# Patient Record
Sex: MALE | Race: WHITE | Employment: FULL TIME | URBAN - METROPOLITAN AREA
[De-identification: names, ages, dates, MRNs, and addresses within clinical notes are randomized per-mention and may not be internally consistent; named-entity substitution may affect disease eponyms.]

---

## 2017-01-11 ENCOUNTER — TRANSFERRED RECORDS (OUTPATIENT)
Dept: HEALTH INFORMATION MANAGEMENT | Facility: CLINIC | Age: 57
End: 2017-01-11

## 2017-01-12 ENCOUNTER — OFFICE VISIT (OUTPATIENT)
Dept: DERMATOLOGY | Facility: CLINIC | Age: 57
End: 2017-01-12
Payer: COMMERCIAL

## 2017-01-12 VITALS — HEART RATE: 91 BPM | DIASTOLIC BLOOD PRESSURE: 96 MMHG | SYSTOLIC BLOOD PRESSURE: 151 MMHG | OXYGEN SATURATION: 96 %

## 2017-01-12 DIAGNOSIS — D17.0 LIPOMA OF HEAD: ICD-10-CM

## 2017-01-12 DIAGNOSIS — L82.1 SK (SEBORRHEIC KERATOSIS): ICD-10-CM

## 2017-01-12 DIAGNOSIS — D48.5 NEOPLASM OF UNCERTAIN BEHAVIOR OF SKIN: Primary | ICD-10-CM

## 2017-01-12 DIAGNOSIS — L81.4 LENTIGO: ICD-10-CM

## 2017-01-12 PROCEDURE — 99203 OFFICE O/P NEW LOW 30 MIN: CPT | Mod: 25 | Performed by: DERMATOLOGY

## 2017-01-12 PROCEDURE — 11100 HC BIOPSY SKIN/SUBQ/MUC MEM, SINGLE LESION: CPT | Performed by: DERMATOLOGY

## 2017-01-12 PROCEDURE — 88305 TISSUE EXAM BY PATHOLOGIST: CPT | Performed by: DERMATOLOGY

## 2017-01-12 NOTE — PATIENT INSTRUCTIONS
Wound Care Instructions     FOR SUPERFICIAL WOUNDS     [facility] [phone]    [facility] [phone]                       AFTER 24 HOURS YOU SHOULD REMOVE THE BANDAGE AND BEGIN DAILY DRESSING CHANGES AS FOLLOWS:     1) Remove Dressing.     2) Clean and dry the area with tap water using a Q-tip or sterile gauze pad.     3) Apply Vaseline, Aquaphor, Polysporin ointment or Bacitracin ointment over entire wound.  Do NOT use Neosporin ointment.     4) Cover the wound with a band-aid, or a sterile non-stick gauze pad and micropore paper tape      REPEAT THESE INSTRUCTIONS AT LEAST ONCE A DAY UNTIL THE WOUND HAS COMPLETELY HEALED.    It is an old wives tale that a wound heals better when it is exposed to air and allowed to dry out. The wound will heal faster with a better cosmetic result if it is kept moist with ointment and covered with a bandage.    **Do not let the wound dry out.**      Supplies Needed:      *Cotton tipped applicators (Q-tips)    *Polysporin Ointment or Bacitracin Ointment (NOT NEOSPORIN)    *Band-aids or non-stick gauze pads and micropore paper tape.      PATIENT INFORMATION:    During the healing process you will notice a number of changes. All wounds develop a small halo of redness surrounding the wound.  This means healing is occurring. Severe itching with extensive redness usually indicates sensitivity to the ointment or bandage tape used to dress the wound.  You should call our office if this develops.      Swelling  and/or discoloration around your surgical site is common, particularly when performed around the eye.    All wounds normally drain.  The larger the wound the more drainage there will be.  After 7-10 days, you will notice the wound beginning to shrink and new skin will begin to grow.  The wound is healed when you can see skin has formed over the entire area.  A healed wound has a healthy, shiny look to the surface and is red to dark pink in color  to normalize.  Wounds may take approximately 4-6 weeks to heal.  Larger wounds may take 6-8 weeks.  After the wound is healed you may discontinue dressing changes.    You may experience a sensation of tightness as your wound heals. This is normal and will gradually subside.    Your healed wound may be sensitive to temperature changes. This sensitivity improves with time, but if you re having a lot of discomfort, try to avoid temperature extremes.    Patients frequently experience itching after their wound appears to have healed because of the continue healing under the skin.  Plain Vaseline will help relieve the itching.        POSSIBLE COMPLICATIONS    BLEEDIN. Leave the bandage in place.  2. Use tightly rolled up gauze or a cloth to apply direct pressure over the bandage for 30  minutes.  3. Reapply pressure for an additional 30 minutes if necessary  4. Use additional gauze and tape to maintain pressure once the bleeding has stopped.

## 2017-01-12 NOTE — PROGRESS NOTES
Bryce Espinoza is a 56 year old year old male patient here today for growth on left chest and scalp.   .  Patient states this has been present for years.  Patient reports the following symptoms:  Bleeding on chest , tender on scalp .  Patient reports the following previous treatments none.  Patient reports the following modifying factors none.  Associated symptoms: none.  Patient has no other skin complaints today.  Remainder of the HPI, Meds, PMH, Allergies, FH, and SH was reviewed in chart.    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     Unspecified hemorrhoids without mention of complication 8/01     Unspecified hereditary and idiopathic peripheral neuropathy        Past Surgical History   Procedure Laterality Date     C nonspecific procedure  2/00/02     EBCT        Family History   Problem Relation Age of Onset     Arthritis Mother      RA     Hypertension Mother      Prostate Cancer Maternal Grandfather      80     CANCER Maternal Grandmother      Lung     Breast Cancer Mother        Social History     Social History     Marital Status:      Spouse Name: N/A     Number of Children: N/A     Years of Education: N/A     Occupational History     Not on file.     Social History Main Topics     Smoking status: Former Smoker -- 0.50 packs/day for 25 years     Quit date: 02/02/2013     Smokeless tobacco: Never Used     Alcohol Use: Yes      Comment: 4-5 drinks per week     Drug Use: Yes      Comment: nica taylor     Sexual Activity: Yes     Other Topics Concern     Not on file     Social History Narrative       Outpatient Encounter Prescriptions as of 1/12/2017    Medication Sig Dispense Refill     lisinopril (PRINIVIL,ZESTRIL) 10 MG tablet Take 1 tablet (10 mg) by mouth daily 30 tablet 8     Cholecalciferol (VITAMIN D) 2000 UNITS tablet Take 2,000 Units by mouth daily       fluticasone (FLONASE) 50 MCG/ACT nasal spray Spray 1-2 sprays into both nostrils daily 1 Package 1     ASPIRIN 81 MG OR TABS 1 tab po QD (Once per day) 30 12     No facility-administered encounter medications on file as of 1/12/2017.             Review Of Systems  Skin: As above  Eyes: negative  Ears/Nose/Throat: negative  Respiratory: No shortness of breath, dyspnea on exertion, cough, or hemoptysis  Cardiovascular: negative  Gastrointestinal: negative  Genitourinary: negative  Musculoskeletal: negative  Neurologic: negative  Psychiatric: negative  Hematologic/Lymphatic/Immunologic: negative  Endocrine: negative      O:   NAD, WDWN, Alert & Oriented, Mood & Affect wnl, Vitals stable   Here today alone   /96 mmHg  Pulse 91  SpO2 96%   General appearance normal   Vitals stable   Alert, oriented and in no acute distress      Following lymph nodes palpated: Occipital, Cervical, Supraclavicular no lad   L chest crusted 2cm red brown scaly papule    L forehead soft movable nodule    Stuck on papules and brown macules on trunk and ext         The remainder of expanded problem focused exam was unremarkable; the following areas were examined:  scalp/hair, conjunctiva/lids, face, neck, lips, ab, back , chest, digits/nails, RUE, LUE.      Eyes: Conjunctivae/lids:Normal     ENT: Lips, buccal mucosa, tongue: normal    MSK:Normal    Cardiovascular: peripheral edema none    Pulm: Breathing Normal    Lymph Nodes: No Head and Neck Lymphadenopathy     Neuro/Psych: Orientation:Normal; Mood/Affect:Normal      A/P:  1. Lipoma  Schedule excision  2. Inflamed seborrheic keratosis v squamous cell carcinoma on chest  TANGENTIAL BIOPSY SENT OUT:  After consent, anesthesia with LEC and prep, tangential excision performed  and specimen sent out for permanent section histology.  No complications and routine wound care. Patient told to call our office in 1-2 weeks for result.        3. Seborrheic keratosis, lentigo  BENIGN LESIONS DISCUSSED WITH PATIENT:  I discussed the specifics of tumor, prognosis, and genetics of benign lesions.  I explained that treatment of these lesions would be purely cosmetic and not medically neccessary.  I discussed with patient different removal options including excision, cautery and /or laser.      Nature and genetics of benign skin lesions dicussed with patient.  Signs and Symptoms of skin cancer discussed with patient.  Patient encouraged to perform monthly skin exams.  UV precautions reviewed with patient.  Skin care regimen reviewed with patient: Eliminate harsh soaps, i.e. Dial, zest, irsih spring; Mild soaps such as Cetaphil or Dove sensitive skin, avoid hot or cold showers, aggressive use of emollients including vanicream, cetaphil or cerave discussed with patient.    Risks of non-melanoma skin cancer discussed with patient   Return to clinic prn and for excision

## 2017-01-12 NOTE — NURSING NOTE
"Initial /96 mmHg  Pulse 91  SpO2 96% Estimated body mass index is 29.99 kg/(m^2) as calculated from the following:    Height as of 8/14/15: 1.753 m (5' 9\").    Weight as of 8/14/15: 92.171 kg (203 lb 3.2 oz). .      "

## 2017-01-12 NOTE — MR AVS SNAPSHOT
After Visit Summary   1/12/2017    Bryce Espinoza    MRN: [id number]           Patient Information     Date Of Birth          1960        Visit Information        Provider Department      1/12/2017 1:45 PM Grey Akers MD [facility]        Today's Diagnoses     Neoplasm of uncertain behavior of skin    -  1     SK (seborrheic keratosis)         Lentigo         Lipoma of head           Care Instructions          Wound Care Instructions     FOR SUPERFICIAL WOUNDS     [facility] [phone]    [facility] [phone]                       AFTER 24 HOURS YOU SHOULD REMOVE THE BANDAGE AND BEGIN DAILY DRESSING CHANGES AS FOLLOWS:     1) Remove Dressing.     2) Clean and dry the area with tap water using a Q-tip or sterile gauze pad.     3) Apply Vaseline, Aquaphor, Polysporin ointment or Bacitracin ointment over entire wound.  Do NOT use Neosporin ointment.     4) Cover the wound with a band-aid, or a sterile non-stick gauze pad and micropore paper tape      REPEAT THESE INSTRUCTIONS AT LEAST ONCE A DAY UNTIL THE WOUND HAS COMPLETELY HEALED.    It is an old wives tale that a wound heals better when it is exposed to air and allowed to dry out. The wound will heal faster with a better cosmetic result if it is kept moist with ointment and covered with a bandage.    **Do not let the wound dry out.**      Supplies Needed:      *Cotton tipped applicators (Q-tips)    *Polysporin Ointment or Bacitracin Ointment (NOT NEOSPORIN)    *Band-aids or non-stick gauze pads and micropore paper tape.      PATIENT INFORMATION:    During the healing process you will notice a number of changes. All wounds develop a small halo of redness surrounding the wound.  This means healing is occurring. Severe itching with extensive redness usually indicates sensitivity to the ointment or bandage tape used to dress the wound.  You should call our office if this develops.       Swelling  and/or discoloration around your surgical site is common, particularly when performed around the eye.    All wounds normally drain.  The larger the wound the more drainage there will be.  After 7-10 days, you will notice the wound beginning to shrink and new skin will begin to grow.  The wound is healed when you can see skin has formed over the entire area.  A healed wound has a healthy, shiny look to the surface and is red to dark pink in color to normalize.  Wounds may take approximately 4-6 weeks to heal.  Larger wounds may take 6-8 weeks.  After the wound is healed you may discontinue dressing changes.    You may experience a sensation of tightness as your wound heals. This is normal and will gradually subside.    Your healed wound may be sensitive to temperature changes. This sensitivity improves with time, but if you re having a lot of discomfort, try to avoid temperature extremes.    Patients frequently experience itching after their wound appears to have healed because of the continue healing under the skin.  Plain Vaseline will help relieve the itching.        POSSIBLE COMPLICATIONS    BLEEDIN. Leave the bandage in place.  2. Use tightly rolled up gauze or a cloth to apply direct pressure over the bandage for 30  minutes.  3. Reapply pressure for an additional 30 minutes if necessary  4. Use additional gauze and tape to maintain pressure once the bleeding has stopped.        Follow-ups after your visit        Who to contact     If you have questions or need follow up information about today's clinic visit or your schedule please contact [facility] directly at [phone].  Normal or non-critical lab and imaging results will be communicated to you by MyChart, letter or phone within 4 business days after the clinic has received the results. If you do not hear from us within 7 days, please contact the clinic through MyChart or phone. If you have a critical or  "abnormal lab result, we will notify you by phone as soon as possible.  Submit refill requests through [employer] or call your pharmacy and they will forward the refill request to us. Please allow 3 business days for your refill to be completed.          Additional Information About Your Visit        MyChart Information     [employer] lets you send messages to your doctor, view your test results, renew your prescriptions, schedule appointments and more. To sign up, go to [email or web address] . Click on \"Log in\" on the left side of the screen, which will take you to the Welcome page. Then click on \"Sign up Now\" on the right side of the page.     You will be asked to enter the access code listed below, as well as some personal information. Please follow the directions to create your username and password.     Your access code is: [email or web address]  Expires: 2017  2:13 PM     Your access code will  in 90 days. If you need help or a new code, please call your [facility] clinic or [phone].        Care EveryWhere ID     This is your Care EveryWhere ID. This could be used by other organizations to access your [facility] medical records  [id number]        Your Vitals Were     Pulse Pulse Oximetry                91 96%           Blood Pressure from Last 3 Encounters:   17 151/96   08/14/15 136/86   02/02/15 140/96    Weight from Last 3 Encounters:   08/14/15 92.171 kg (203 lb 3.2 oz)   02/02/15 92.08 kg (203 lb)   05/26/10 80.74 kg (178 lb)              We Performed the Following     BIOPSY SKIN/SUBQ/MUC MEM, SINGLE LESION     Surgical pathology exam        Primary Care Provider Office Phone # Fax #    Remington Riggs [phone]       [facility] [address] [facility] [address]        Thank you!     Thank you for choosing [facility]  for your care. Our goal is always to provide you with excellent care. Hearing back from our patients is one way we can continue " to improve our services. Please take a few minutes to complete the written survey that you may receive in the mail after your visit with us. Thank you!             Your Updated Medication List - Protect others around you: Learn how to safely use, store and throw away your medicines at www.disposemymeds.org.          This list is accurate as of: 1/12/17  2:13 PM.  Always use your most recent med list.                   Brand Name Dispense Instructions for use    aspirin 81 MG tablet     30    1 tab po QD (Once per day)       fluticasone 50 MCG/ACT spray    FLONASE    1 Package    Spray 1-2 sprays into both nostrils daily       lisinopril 10 MG tablet    PRINIVIL/ZESTRIL    30 tablet    Take 1 tablet (10 mg) by mouth daily       vitamin D 2000 UNITS tablet      Take 2,000 Units by mouth daily

## 2017-01-16 LAB — COPATH REPORT: NORMAL

## 2017-01-17 ENCOUNTER — TELEPHONE (OUTPATIENT)
Dept: DERMATOLOGY | Facility: CLINIC | Age: 57
End: 2017-01-17

## 2017-01-30 ENCOUNTER — OFFICE VISIT (OUTPATIENT)
Dept: URGENT CARE | Facility: URGENT CARE | Age: 57
End: 2017-01-30
Payer: COMMERCIAL

## 2017-01-30 ENCOUNTER — HOSPITAL ENCOUNTER (OUTPATIENT)
Dept: CT IMAGING | Facility: CLINIC | Age: 57
Discharge: HOME | End: 2017-01-30
Attending: PHYSICIAN ASSISTANT | Admitting: PHYSICIAN ASSISTANT
Payer: COMMERCIAL

## 2017-01-30 VITALS
BODY MASS INDEX: 30.81 KG/M2 | SYSTOLIC BLOOD PRESSURE: 110 MMHG | WEIGHT: 208 LBS | HEIGHT: 69 IN | HEART RATE: 70 BPM | OXYGEN SATURATION: 95 % | DIASTOLIC BLOOD PRESSURE: 60 MMHG | TEMPERATURE: 97.5 F

## 2017-01-30 DIAGNOSIS — R10.9 LEFT SIDED ABDOMINAL PAIN: ICD-10-CM

## 2017-01-30 DIAGNOSIS — M54.6 ACUTE LEFT-SIDED THORACIC BACK PAIN: ICD-10-CM

## 2017-01-30 DIAGNOSIS — R10.9 LEFT SIDED ABDOMINAL PAIN: Primary | ICD-10-CM

## 2017-01-30 LAB
ALBUMIN SERPL-MCNC: 3.7 G/DL (ref 3.4–5)
ALBUMIN UR-MCNC: NEGATIVE MG/DL
ALP SERPL-CCNC: 109 U/L (ref 40–150)
ALT SERPL W P-5'-P-CCNC: 29 U/L (ref 0–70)
AMYLASE SERPL-CCNC: 52 U/L (ref 30–110)
ANION GAP SERPL CALCULATED.3IONS-SCNC: 7 MMOL/L (ref 3–14)
APPEARANCE UR: CLEAR
AST SERPL W P-5'-P-CCNC: 14 U/L (ref 0–45)
BASOPHILS # BLD AUTO: 0 10E9/L (ref 0–0.2)
BASOPHILS NFR BLD AUTO: 0.2 %
BILIRUB SERPL-MCNC: 0.6 MG/DL (ref 0.2–1.3)
BILIRUB UR QL STRIP: NEGATIVE
BUN SERPL-MCNC: 10 MG/DL (ref 7–30)
CALCIUM SERPL-MCNC: 9 MG/DL (ref 8.5–10.1)
CHLORIDE SERPL-SCNC: 106 MMOL/L (ref 94–109)
CO2 SERPL-SCNC: 29 MMOL/L (ref 20–32)
COLOR UR AUTO: YELLOW
CREAT SERPL-MCNC: 0.67 MG/DL (ref 0.66–1.25)
DIFFERENTIAL METHOD BLD: NORMAL
EOSINOPHIL # BLD AUTO: 0.1 10E9/L (ref 0–0.7)
EOSINOPHIL NFR BLD AUTO: 1 %
ERYTHROCYTE [DISTWIDTH] IN BLOOD BY AUTOMATED COUNT: 12.7 % (ref 10–15)
GFR SERPL CREATININE-BSD FRML MDRD: ABNORMAL ML/MIN/1.7M2
GLUCOSE SERPL-MCNC: 92 MG/DL (ref 70–99)
GLUCOSE UR STRIP-MCNC: NEGATIVE MG/DL
HCT VFR BLD AUTO: 44 % (ref 40–53)
HGB BLD-MCNC: 15 G/DL (ref 13.3–17.7)
HGB UR QL STRIP: NEGATIVE
KETONES UR STRIP-MCNC: NEGATIVE MG/DL
LEUKOCYTE ESTERASE UR QL STRIP: NEGATIVE
LIPASE SERPL-CCNC: 62 U/L (ref 73–393)
LYMPHOCYTES # BLD AUTO: 2.1 10E9/L (ref 0.8–5.3)
LYMPHOCYTES NFR BLD AUTO: 22.8 %
MCH RBC QN AUTO: 32.9 PG (ref 26.5–33)
MCHC RBC AUTO-ENTMCNC: 34.1 G/DL (ref 31.5–36.5)
MCV RBC AUTO: 97 FL (ref 78–100)
MONOCYTES # BLD AUTO: 0.8 10E9/L (ref 0–1.3)
MONOCYTES NFR BLD AUTO: 9.1 %
NEUTROPHILS # BLD AUTO: 6.1 10E9/L (ref 1.6–8.3)
NEUTROPHILS NFR BLD AUTO: 66.9 %
NITRATE UR QL: NEGATIVE
PH UR STRIP: 5.5 PH (ref 5–7)
PLATELET # BLD AUTO: 245 10E9/L (ref 150–450)
POTASSIUM SERPL-SCNC: 3.7 MMOL/L (ref 3.4–5.3)
PROT SERPL-MCNC: 6.6 G/DL (ref 6.8–8.8)
RBC # BLD AUTO: 4.56 10E12/L (ref 4.4–5.9)
SODIUM SERPL-SCNC: 142 MMOL/L (ref 133–144)
SP GR UR STRIP: >1.03 (ref 1–1.03)
URN SPEC COLLECT METH UR: NORMAL
UROBILINOGEN UR STRIP-ACNC: 0.2 EU/DL (ref 0.2–1)
WBC # BLD AUTO: 9.2 10E9/L (ref 4–11)

## 2017-01-30 PROCEDURE — 87086 URINE CULTURE/COLONY COUNT: CPT | Performed by: PHYSICIAN ASSISTANT

## 2017-01-30 PROCEDURE — 36415 COLL VENOUS BLD VENIPUNCTURE: CPT | Performed by: PHYSICIAN ASSISTANT

## 2017-01-30 PROCEDURE — 80053 COMPREHEN METABOLIC PANEL: CPT | Performed by: PHYSICIAN ASSISTANT

## 2017-01-30 PROCEDURE — 85025 COMPLETE CBC W/AUTO DIFF WBC: CPT | Performed by: PHYSICIAN ASSISTANT

## 2017-01-30 PROCEDURE — 74177 CT ABD & PELVIS W/CONTRAST: CPT

## 2017-01-30 PROCEDURE — 25500064 ZZH RX 255 OP 636: Performed by: PHYSICIAN ASSISTANT

## 2017-01-30 PROCEDURE — 83690 ASSAY OF LIPASE: CPT | Performed by: PHYSICIAN ASSISTANT

## 2017-01-30 PROCEDURE — 99214 OFFICE O/P EST MOD 30 MIN: CPT | Performed by: PHYSICIAN ASSISTANT

## 2017-01-30 PROCEDURE — 25000125 ZZHC RX 250: Performed by: PHYSICIAN ASSISTANT

## 2017-01-30 PROCEDURE — 82150 ASSAY OF AMYLASE: CPT | Performed by: PHYSICIAN ASSISTANT

## 2017-01-30 PROCEDURE — 81003 URINALYSIS AUTO W/O SCOPE: CPT | Performed by: PHYSICIAN ASSISTANT

## 2017-01-30 RX ORDER — NAPROXEN 500 MG/1
500 TABLET ORAL 2 TIMES DAILY PRN
Qty: 30 TABLET | Refills: 1 | Status: SHIPPED | OUTPATIENT
Start: 2017-01-30 | End: 2018-11-29

## 2017-01-30 RX ORDER — IOPAMIDOL 755 MG/ML
104 INJECTION, SOLUTION INTRAVASCULAR ONCE
Status: COMPLETED | OUTPATIENT
Start: 2017-01-30 | End: 2017-01-30

## 2017-01-30 RX ORDER — METHOCARBAMOL 750 MG/1
750 TABLET, FILM COATED ORAL 3 TIMES DAILY PRN
Qty: 30 TABLET | Refills: 0 | Status: SHIPPED | OUTPATIENT
Start: 2017-01-30 | End: 2018-11-29

## 2017-01-30 RX ORDER — HYDROCODONE BITARTRATE AND ACETAMINOPHEN 5; 325 MG/1; MG/1
1 TABLET ORAL EVERY 6 HOURS PRN
Qty: 20 TABLET | Refills: 0 | Status: SHIPPED | OUTPATIENT
Start: 2017-01-30 | End: 2018-11-29

## 2017-01-30 RX ADMIN — SODIUM CHLORIDE 72 ML: 9 INJECTION, SOLUTION INTRAVENOUS at 18:35

## 2017-01-30 RX ADMIN — IOPAMIDOL 104 ML: 755 INJECTION, SOLUTION INTRAVENOUS at 18:31

## 2017-01-30 NOTE — NURSING NOTE
"Chief Complaint   Patient presents with     Pain     left side pain x 2 weeks, sometimes has some dizziness, denies any injury, denies any urinary issues       Initial /60 mmHg  Pulse 70  Temp(Src) 97.5  F (36.4  C)  Ht 5' 9\" (1.753 m)  Wt 208 lb (94.348 kg)  BMI 30.70 kg/m2  SpO2 95% Estimated body mass index is 30.7 kg/(m^2) as calculated from the following:    Height as of this encounter: 5' 9\" (1.753 m).    Weight as of this encounter: 208 lb (94.348 kg).  BP completed using cuff size: tyra Anderson CMA      "

## 2017-02-01 LAB
BACTERIA SPEC CULT: NO GROWTH
MICRO REPORT STATUS: NORMAL
SPECIMEN SOURCE: NORMAL

## 2017-02-06 NOTE — PROGRESS NOTES
"SUBJECTIVE  HPI:  Bryce Espinoza is a 56 year old male who presents with the CC of left sided back, side and abdominal pain.    Pain is located in the left side area, with radiation to flank.  The pain is characterized as ongoing and painful, and at worst is a level 6 on a scale of 1-10.  Pain has been present for few days  and is fluctuating.  EXACERBATING FACTORS: certain movements  RELIEVING FACTORS:  rest.  ASSOCIATED SX: side pain.    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     Unspecified hemorrhoids without mention of complication 8/01     Unspecified hereditary and idiopathic peripheral neuropathy      Allergies   Allergen Reactions     Lisinopril      Body aches pt d/mylene  06/2015     No Known Drug Allergies      Social History   Substance Use Topics     Smoking status: Former Smoker -- 0.50 packs/day for 25 years     Quit date: 02/02/2013     Smokeless tobacco: Never Used     Alcohol Use: Yes      Comment: 4-5 drinks per week       ROS:  CONSTITUTIONAL:NEGATIVE for fever, chills, change in weight  INTEGUMENTARY/SKIN: NEGATIVE for worrisome rashes, moles or lesions  ENT/MOUTH: NEGATIVE for ear, mouth and throat problems  RESP:NEGATIVE for significant cough or SOB  CV: NEGATIVE for chest pain, palpitations or peripheral edema  GI: POSITIVE for left sided abdominal , left side pain  : negative for dysuria, hematuria, decreased urinary stream, erectile dysfunction  MUSCULOSKELETAL: Positive for left side flank pain  NEURO: NEGATIVE for weakness, dizziness or paresthesias    OBJECTIVE:  /60 mmHg  Pulse 70  Temp(Src) 97.5  F (36.4  C)  Ht 5' 9\" (1.753 m)  Wt 208 lb (94.348 kg)  BMI 30.70 " kg/m2  SpO2 95%  GENERAL APPEARANCE: healthy, alert and no distress  EYES: EOMI,  PERRL, conjunctiva clear  HENT: ear canals and TM's normal.  Nose and mouth without ulcers, erythema or lesions  NECK: supple, nontender, no lymphadenopathy  RESP: lungs clear to auscultation - no rales, rhonchi or wheezes  CV: regular rates and rhythm, normal S1 S2, no murmur noted  ABDOMEN: soft, normal bowel sounds, tenderness mild and moderate left side  MS: Positive for left side back and side pain with palpation  NEURO: Normal strength and tone, sensory exam grossly normal,  normal speech and mentation  SKIN: no suspicious lesions or rashes    Results for orders placed or performed in visit on 01/30/17   CBC with platelets differential   Result Value Ref Range    WBC 9.2 4.0 - 11.0 10e9/L    RBC Count 4.56 4.4 - 5.9 10e12/L    Hemoglobin 15.0 13.3 - 17.7 g/dL    Hematocrit 44.0 40.0 - 53.0 %    MCV 97 78 - 100 fl    MCH 32.9 26.5 - 33.0 pg    MCHC 34.1 31.5 - 36.5 g/dL    RDW 12.7 10.0 - 15.0 %    Platelet Count 245 150 - 450 10e9/L    Diff Method Automated Method     % Neutrophils 66.9 %    % Lymphocytes 22.8 %    % Monocytes 9.1 %    % Eosinophils 1.0 %    % Basophils 0.2 %    Absolute Neutrophil 6.1 1.6 - 8.3 10e9/L    Absolute Lymphocytes 2.1 0.8 - 5.3 10e9/L    Absolute Monocytes 0.8 0.0 - 1.3 10e9/L    Absolute Eosinophils 0.1 0.0 - 0.7 10e9/L    Absolute Basophils 0.0 0.0 - 0.2 10e9/L   Comprehensive metabolic panel   Result Value Ref Range    Sodium 142 133 - 144 mmol/L    Potassium 3.7 3.4 - 5.3 mmol/L    Chloride 106 94 - 109 mmol/L    Carbon Dioxide 29 20 - 32 mmol/L    Anion Gap 7 3 - 14 mmol/L    Glucose 92 70 - 99 mg/dL    Urea Nitrogen 10 7 - 30 mg/dL    Creatinine 0.67 0.66 - 1.25 mg/dL    GFR Estimate >90  Non  GFR Calc   >60 mL/min/1.7m2    GFR Estimate If Black >90   GFR Calc   >60 mL/min/1.7m2    Calcium 9.0 8.5 - 10.1 mg/dL    Bilirubin Total 0.6 0.2 - 1.3 mg/dL    Albumin 3.7  3.4 - 5.0 g/dL    Protein Total 6.6 (L) 6.8 - 8.8 g/dL    Alkaline Phosphatase 109 40 - 150 U/L    ALT 29 0 - 70 U/L    AST 14 0 - 45 U/L   *UA reflex to Microscopic   Result Value Ref Range    Color Urine Yellow     Appearance Urine Clear     Glucose Urine Negative NEG mg/dL    Bilirubin Urine Negative NEG    Ketones Urine Negative NEG mg/dL    Specific Gravity Urine >1.030 1.003 - 1.035    Blood Urine Negative NEG    pH Urine 5.5 5.0 - 7.0 pH    Protein Albumin Urine Negative NEG mg/dL    Urobilinogen Urine 0.2 0.2 - 1.0 EU/dL    Nitrite Urine Negative NEG    Leukocyte Esterase Urine Negative NEG    Source Midstream Urine    Lipase   Result Value Ref Range    Lipase 62 (L) 73 - 393 U/L   Amylase   Result Value Ref Range    Amylase 52 30 - 110 U/L   Urine Culture Aerobic Bacterial   Result Value Ref Range    Specimen Description Midstream Urine     Culture Micro No growth     Micro Report Status FINAL 02/01/2017        ASSESSMENT/PLAN:      ICD-10-CM    1. Left sided abdominal pain R10.9 CBC with platelets differential     Comprehensive metabolic panel     *UA reflex to Microscopic     Lipase     Amylase     CT Abdomen Pelvis w Contrast     naproxen (NAPROSYN) 500 MG tablet     HYDROcodone-acetaminophen (NORCO) 5-325 MG per tablet   2. Acute left-sided thoracic back pain M54.6 CBC with platelets differential     Comprehensive metabolic panel     *UA reflex to Microscopic     Lipase     Amylase     Urine Culture Aerobic Bacterial     CT Abdomen Pelvis w Contrast     naproxen (NAPROSYN) 500 MG tablet     HYDROcodone-acetaminophen (NORCO) 5-325 MG per tablet     methocarbamol (ROBAXIN) 750 MG tablet       Fluids, rest  Follow up with PCP   Go to the ED if symptoms worsen

## 2017-03-02 ENCOUNTER — OFFICE VISIT (OUTPATIENT)
Dept: DERMATOLOGY | Facility: CLINIC | Age: 57
End: 2017-03-02
Payer: COMMERCIAL

## 2017-03-02 DIAGNOSIS — D17.0 LIPOMA OF FACE: Primary | ICD-10-CM

## 2017-03-02 PROCEDURE — 88304 TISSUE EXAM BY PATHOLOGIST: CPT | Performed by: DERMATOLOGY

## 2017-03-02 PROCEDURE — 13132 CMPLX RPR F/C/C/M/N/AX/G/H/F: CPT | Performed by: DERMATOLOGY

## 2017-03-02 PROCEDURE — 11444 EXC FACE-MM B9+MARG 3.1-4 CM: CPT | Performed by: DERMATOLOGY

## 2017-03-02 NOTE — PROGRESS NOTES
Bryce Espinoza is a 56 year old year old male patient here today for tender lipoma on left forehead.  Remainder of the HPI, Meds, PMH, Allergies, FH, and SH was reviewed in chart.      Past Medical History   Diagnosis Date     Essential hypertension, benign      Hematuria      Malignant neoplasm of bladder, part unspecified 11/02     Transitional Cell Carcinoma bladder     Olecranon bursitis 3/01     right     Other and unspecified hyperlipidemia      RIGHT LUNG CALCIFIED GRANULOMA 2/02     RML     Tobacco use disorder      Quit 1/03     Unspecified hemorrhoids without mention of complication 8/01     Unspecified hereditary and idiopathic peripheral neuropathy        Past Surgical History   Procedure Laterality Date     C nonspecific procedure  2/00/02     EBCT        Family History   Problem Relation Age of Onset     Arthritis Mother      RA     Hypertension Mother      Prostate Cancer Maternal Grandfather      80     CANCER Maternal Grandmother      Lung     Breast Cancer Mother        Social History     Social History     Marital status:      Spouse name: N/A     Number of children: N/A     Years of education: N/A     Occupational History     Not on file.     Social History Main Topics     Smoking status: Former Smoker     Packs/day: 0.50     Years: 25.00     Quit date: 2/2/2013     Smokeless tobacco: Never Used     Alcohol use Yes      Comment: 4-5 drinks per week     Drug use: Yes      Comment: niac taylor     Sexual activity: Yes     Other Topics Concern     Not on file     Social History Narrative       Outpatient Encounter Prescriptions as of 3/2/2017   Medication Sig Dispense Refill     naproxen (NAPROSYN) 500 MG tablet Take 1 tablet (500 mg) by mouth 2 times daily as needed for moderate pain 30 tablet 1     HYDROcodone-acetaminophen (NORCO) 5-325 MG per tablet Take 1 tablet by mouth every 6 hours as needed 20 tablet 0     methocarbamol (ROBAXIN) 750 MG tablet Take 1 tablet (750 mg) by mouth 3 times  daily as needed 30 tablet 0     lisinopril (PRINIVIL,ZESTRIL) 10 MG tablet Take 1 tablet (10 mg) by mouth daily 30 tablet 8     Cholecalciferol (VITAMIN D) 2000 UNITS tablet Take 2,000 Units by mouth daily       fluticasone (FLONASE) 50 MCG/ACT nasal spray Spray 1-2 sprays into both nostrils daily 1 Package 1     ASPIRIN 81 MG OR TABS 1 tab po QD (Once per day) 30 12     No facility-administered encounter medications on file as of 3/2/2017.              Review Of Systems  Skin: As above  Eyes: negative  Ears/Nose/Throat: negative  Respiratory: No shortness of breath, dyspnea on exertion, cough, or hemoptysis  Cardiovascular: negative  Gastrointestinal: negative  Genitourinary: negative  Musculoskeletal: negative  Neurologic: negative  Psychiatric: negative  Hematologic/Lymphatic/Immunologic: negative  Endocrine: negative      O:   NAD, WDWN, Alert & Oriented, Mood & Affect wnl, Vitals stable   Here today alone   There were no vitals taken for this visit.   General appearance normal   Vitals stable   Alert, oriented and in no acute distress     L forehead movable 4cm nodule           Eyes: Conjunctivae/lids:Normal     ENT: Lips, buccal mucosa, tongue: normal    MSK:Normal    Cardiovascular: peripheral edema none    Pulm: Breathing Normal    Neuro/Psych: Orientation:Normal; Mood/Affect:Normal      A/P:  1. Lipoma  EXCISION OF BENIGN LESION AND COMPLEX: [facility] consent obtained, anesthesia and prep, the margins of the lesion were identified and an elliptical incision was made encompassing the lesion. The incisions were made through the skin and down to and including the subcutaneous tissue. The lesion was removed en bloc and submitted for perms pathologic review. The wound edges were widely undermined until adequate tissue mobility was obtained. hemostasis was achieved. The wound edges were then closed in a layered fashion, being careful not to leave any dead space. Postoperative length was  6.5 cm.   EBL minimal; complications none; wound care routine. The patient was discharged in good condition and will return in one week for wound evaluation.  Return to clinic one week

## 2017-03-02 NOTE — PATIENT INSTRUCTIONS
Sutured Wound Care     [facility]: [phone]    [facility]: [phone]          ? No strenuous activity for 48 hours. Resume moderate activity in 48 hours. No heavy exercising until you are seen for follow up in one week.     ? Take Tylenol as needed for discomfort.                         ? Do not drink alcoholic beverages for 48 hours.     ? Keep the pressure bandage in place for 24 hours. If the bandage becomes blood tinged or loose, reinforce it with gauze and tape.        (Refer to the reverse side of this page for management of bleeding).    ? Remove pressure bandage in 24 hours     ? Leave the flat bandage in place until your follow up appointment.    ? Keep the bandage dry. Wash around it carefully.    ? If the tape becomes soiled or starts to come off, reinforce it with additional paper tape.    ? Do not smoke for 3 weeks; smoking is detrimental to wound healing.    ? It is normal to have swelling and bruising around the surgical site. The bruising will fade in approximately 10-14 days. Elevate the area to reduce swelling.    ? Numbness, itchiness and sensitivity to temperature changes can occur after surgery and may take up to 18 months to normalize.      POSSIBLE COMPLICATIONS    BLEEDIN. Leave the bandage in place.  2. Use tightly rolled up gauze or a cloth to apply direct pressure over the bandage for 20   minutes.  3. Reapply pressure for an additional 20 minutes if necessary  4. Call the office or go to the nearest emergency room if pressure fails to stop the bleeding.  5. Use additional gauze and tape to maintain pressure once the bleeding has stopped.        PAIN:    1. Post operative pain should slowly get better, never worse.  2. A severe increase in pain may indicate a problem. Call the office if this occurs.    In case of emergency phone:Dr Akers [phone]

## 2017-03-02 NOTE — MR AVS SNAPSHOT
After Visit Summary   3/2/2017    Bryce Espinoza    MRN: [id number]           Patient Information     Date Of Birth          1960        Visit Information        Provider Department      3/2/2017 2:00 PM Grey Akers MD [facility]        Today's Diagnoses     Lipoma of face    -  1      Care Instructions      Sutured Wound Care     [facility]: [phone]    [facility]: [phone]          ? No strenuous activity for 48 hours. Resume moderate activity in 48 hours. No heavy exercising until you are seen for follow up in one week.     ? Take Tylenol as needed for discomfort.                         ? Do not drink alcoholic beverages for 48 hours.     ? Keep the pressure bandage in place for 24 hours. If the bandage becomes blood tinged or loose, reinforce it with gauze and tape.        (Refer to the reverse side of this page for management of bleeding).    ? Remove pressure bandage in 24 hours     ? Leave the flat bandage in place until your follow up appointment.    ? Keep the bandage dry. Wash around it carefully.    ? If the tape becomes soiled or starts to come off, reinforce it with additional paper tape.    ? Do not smoke for 3 weeks; smoking is detrimental to wound healing.    ? It is normal to have swelling and bruising around the surgical site. The bruising will fade in approximately 10-14 days. Elevate the area to reduce swelling.    ? Numbness, itchiness and sensitivity to temperature changes can occur after surgery and may take up to 18 months to normalize.      POSSIBLE COMPLICATIONS    BLEEDIN. Leave the bandage in place.  2. Use tightly rolled up gauze or a cloth to apply direct pressure over the bandage for 20   minutes.  3. Reapply pressure for an additional 20 minutes if necessary  4. Call the office or go to the nearest emergency room if pressure fails to stop the bleeding.  5. Use additional gauze and tape to  "maintain pressure once the bleeding has stopped.        PAIN:    1. Post operative pain should slowly get better, never worse.  2. A severe increase in pain may indicate a problem. Call the office if this occurs.    In case of emergency phone:Dr Akers [phone]          Follow-ups after your visit        Who to contact     If you have questions or need follow up information about today's clinic visit or your schedule please contact [facility] directly at [phone].  Normal or non-critical lab and imaging results will be communicated to you by [employer], letter or phone within 4 business days after the clinic has received the results. If you do not hear from us within 7 days, please contact the clinic through [employer] or phone. If you have a critical or abnormal lab result, we will notify you by phone as soon as possible.  Submit refill requests through [employer] or call your pharmacy and they will forward the refill request to us. Please allow 3 business days for your refill to be completed.          Additional Information About Your Visit        [employer] Information     [employer] lets you send messages to your doctor, view your test results, renew your prescriptions, schedule appointments and more. To sign up, go to [email or web address] . Click on \"Log in\" on the left side of the screen, which will take you to the Welcome page. Then click on \"Sign up Now\" on the right side of the page.     You will be asked to enter the access code listed below, as well as some personal information. Please follow the directions to create your username and password.     Your access code is: [email or web address]  Expires: 2017  2:13 PM     Your access code will  in 90 days. If you need help or a new code, please call your [facility] clinic or [phone].        Care EveryWhere ID     This is your Care EveryWhere ID. This could be used by other organizations to access your [facility] medical " records  [id number]         Blood Pressure from Last 3 Encounters:   01/30/17 110/60   01/12/17 (!) 151/96   08/14/15 136/86    Weight from Last 3 Encounters:   01/30/17 94.3 kg (208 lb)   08/14/15 92.2 kg (203 lb 3.2 oz)   02/02/15 92.1 kg (203 lb)              We Performed the Following     EXC BENIGN SKIN LESION FACE/EARS 3.1-4.0 CM     REPAIR COMPLEX, WOUND HEAD/AXIL/GEN/HND/FT 2.6-7.5 CM     Surgical pathology exam        Primary Care Provider Office Phone # Fax #    Remington Riggs [phone]       [facility] [address] [facility] [address]        Thank you!     Thank you for choosing [facility] [place]  for your care. Our goal is always to provide you with excellent care. Hearing back from our patients is one way we can continue to improve our services. Please take a few minutes to complete the written survey that you may receive in the mail after your visit with us. Thank you!             Your Updated Medication List - Protect others around you: Learn how to safely use, store and throw away your medicines at www.disposemymeds.org.          This list is accurate as of: 3/2/17  2:40 PM.  Always use your most recent med list.                   Brand Name Dispense Instructions for use    aspirin 81 MG tablet     30    1 tab po QD (Once per day)       fluticasone 50 MCG/ACT spray    FLONASE    1 Package    Spray 1-2 sprays into both nostrils daily       HYDROcodone-acetaminophen 5-325 MG per tablet    NORCO    20 tablet    Take 1 tablet by mouth every 6 hours as needed       lisinopril 10 MG tablet    PRINIVIL/ZESTRIL    30 tablet    Take 1 tablet (10 mg) by mouth daily       methocarbamol 750 MG tablet    ROBAXIN    30 tablet    Take 1 tablet (750 mg) by mouth 3 times daily as needed       naproxen 500 MG tablet    NAPROSYN    30 tablet    Take 1 tablet (500 mg) by mouth 2 times daily as needed for moderate pain       vitamin D 2000 UNITS tablet      Take 2,000  Units by mouth daily

## 2017-03-06 LAB — COPATH REPORT: NORMAL

## 2017-03-09 ENCOUNTER — ALLIED HEALTH/NURSE VISIT (OUTPATIENT)
Dept: DERMATOLOGY | Facility: CLINIC | Age: 57
End: 2017-03-09
Payer: COMMERCIAL

## 2017-03-09 DIAGNOSIS — D17.0 LIPOMA OF FACE: Primary | ICD-10-CM

## 2017-03-09 PROCEDURE — 99207 ZZC NO CHARGE NURSE ONLY: CPT

## 2017-03-09 NOTE — NURSING NOTE
Pt returned to clinic for post surgery 1 week follow up bandage change. Pt has no complaints, denies pain. Bandage removed from forehead, area cleansed with normal saline. Site is healing and wound edges approximating well. Reapplied new steri strips and paper tape.    Advised to watch for signs/sx of infection; spreading redness, drainage, odor, fever. Call or report promptly to clinic. Pt given written instructions and informed to rtc as needed. Patient verbalized understanding.

## 2017-03-09 NOTE — PATIENT INSTRUCTIONS
WOUND CARE INSTRUCTIONS  for  ONE WEEK AFTER SURGERY          1) Leave flat bandage on your skin for one week after today s bandage change.  2) In one week when you remove the bandage, you may resume your regular skin care routine, including washing with mild soap and water, applying moisturizer, make-up and sunscreen.    3) If there are any open or bleeding areas at the incision/graft site you should begin to cover the area with a bandage daily as follows:    1) Clean and dry the area with plain tap water using a Q-tip or sterile gauze pad.  2) Apply Polysporin or Bacitracin ointment to the open area.  3) Cover the wound with a band-aid or a sterile non-stick gauze pad and micropore paper tape.             *Once the bandages are removed, the scar will be red and firm (especially in the lip/chin area). This is normal and will fade in time. It might take 6-12 months for this to happen.     *Massaging the area will help the scar soften and fade quicker. Begin to massage the area one month after the bandages have been removed. To massage apply pressure directly and firmly over the scar with the fingertips and move in a circular motion. Massage the area for a few minutes several times a day. Continue to massage the site for several months.    *Approximately 6-8 weeks after surgery it is not uncommon to see the formation of  tender pimple-like  bump along the scar. This is normal. As the scar continues to mature and the stitches underneath the skin begin to dissolve, this might occur. Do not pick or squeeze, this will resolve on it s own. Should one break open producing a small amount of drainage, apply Polysporin or Bacitracin ointment a few times a day until the wound is completely healed.    *Numbness in the surgical area is expected. It might take 12-18 months for the feeling to return to normal. During this time sensations of itchiness, tingling and occasional sharp pains might be noted. These feelings are normal  and will subside once the nerves have completely healed.         IN CASE OF EMERGENCY: Dr Akers [phone]       If you were seen in [place] call: [phone]

## 2017-03-09 NOTE — MR AVS SNAPSHOT
After Visit Summary   3/9/2017    Bryce Espinoza    MRN: [id number]           Patient Information     Date Of Birth          1960        Visit Information        Provider Department      3/9/2017 1:20 PM  [facility]        Care Instructions    WOUND CARE INSTRUCTIONS  for  ONE WEEK AFTER SURGERY          1) Leave flat bandage on your skin for one week after today s bandage change.  2) In one week when you remove the bandage, you may resume your regular skin care routine, including washing with mild soap and water, applying moisturizer, make-up and sunscreen.    3) If there are any open or bleeding areas at the incision/graft site you should begin to cover the area with a bandage daily as follows:    1) Clean and dry the area with plain tap water using a Q-tip or sterile gauze pad.  2) Apply Polysporin or Bacitracin ointment to the open area.  3) Cover the wound with a band-aid or a sterile non-stick gauze pad and micropore paper tape.             *Once the bandages are removed, the scar will be red and firm (especially in the lip/chin area). This is normal and will fade in time. It might take 6-12 months for this to happen.     *Massaging the area will help the scar soften and fade quicker. Begin to massage the area one month after the bandages have been removed. To massage apply pressure directly and firmly over the scar with the fingertips and move in a circular motion. Massage the area for a few minutes several times a day. Continue to massage the site for several months.    *Approximately 6-8 weeks after surgery it is not uncommon to see the formation of  tender pimple-like  bump along the scar. This is normal. As the scar continues to mature and the stitches underneath the skin begin to dissolve, this might occur. Do not pick or squeeze, this will resolve on it s own. Should one break open producing a small amount of drainage, apply Polysporin or  Bacitracin ointment a few times a day until the wound is completely healed.    *Numbness in the surgical area is expected. It might take 12-18 months for the feeling to return to normal. During this time sensations of itchiness, tingling and occasional sharp pains might be noted. These feelings are normal and will subside once the nerves have completely healed.         IN CASE OF EMERGENCY: Dr Akers [phone]       If you were seen in [place] call: [phone]            Follow-ups after your visit        Who to contact     If you have questions or need follow up information about today's clinic visit or your schedule please contact [facility] directly at [phone].  Normal or non-critical lab and imaging results will be communicated to you by [employer], letter or phone within 4 business days after the clinic has received the results. If you do not hear from us within 7 days, please contact the clinic through [employer] or phone. If you have a critical or abnormal lab result, we will notify you by phone as soon as possible.  Submit refill requests through [employer] or call your pharmacy and they will forward the refill request to us. Please allow 3 business days for your refill to be completed.          Additional Information About Your Visit        [employer] Information     [employer] gives you secure access to your electronic health record. If you see a primary care provider, you can also send messages to your care team and make appointments. If you have questions, please call your primary care clinic.  If you do not have a primary care provider, please call [phone] and they will assist you.        Care EveryWhere ID     This is your Care EveryWhere ID. This could be used by other organizations to access your [place] medical records  [id number]         Blood Pressure from Last 3 Encounters:   01/30/17 110/60   01/12/17 (!) 151/96   08/14/15 136/86    Weight from Last 3 Encounters:    01/30/17 94.3 kg (208 lb)   08/14/15 92.2 kg (203 lb 3.2 oz)   02/02/15 92.1 kg (203 lb)              Today, you had the following     No orders found for display       Primary Care Provider Office Phone # Fax #    Remington Riggs [phone]       [facility] [address] [facility] [address]        Thank you!     Thank you for choosing [facility]  for your care. Our goal is always to provide you with excellent care. Hearing back from our patients is one way we can continue to improve our services. Please take a few minutes to complete the written survey that you may receive in the mail after your visit with us. Thank you!             Your Updated Medication List - Protect others around you: Learn how to safely use, store and throw away your medicines at www.disposemymeds.org.          This list is accurate as of: 3/9/17  1:42 PM.  Always use your most recent med list.                   Brand Name Dispense Instructions for use    aspirin 81 MG tablet     30    1 tab po QD (Once per day)       fluticasone 50 MCG/ACT spray    FLONASE    1 Package    Spray 1-2 sprays into both nostrils daily       HYDROcodone-acetaminophen 5-325 MG per tablet    NORCO    20 tablet    Take 1 tablet by mouth every 6 hours as needed       lisinopril 10 MG tablet    PRINIVIL/ZESTRIL    30 tablet    Take 1 tablet (10 mg) by mouth daily       methocarbamol 750 MG tablet    ROBAXIN    30 tablet    Take 1 tablet (750 mg) by mouth 3 times daily as needed       naproxen 500 MG tablet    NAPROSYN    30 tablet    Take 1 tablet (500 mg) by mouth 2 times daily as needed for moderate pain       vitamin D 2000 UNITS tablet      Take 2,000 Units by mouth daily

## 2017-11-01 ENCOUNTER — TELEPHONE (OUTPATIENT)
Dept: INTERNAL MEDICINE | Facility: CLINIC | Age: 57
End: 2017-11-01

## 2017-11-01 DIAGNOSIS — K64.4 EXTERNAL HEMORRHOIDS: Primary | ICD-10-CM

## 2017-11-01 NOTE — TELEPHONE ENCOUNTER
" Chart reviewed. Pt had colonoscopy in 2009.  Chart does not show a fam hx of colon cancer or polyps and pt had a normal colonoscopy in 2009. Dr Vargas did the colonoscopy then and said to do it 5 years later so that is the \"health maintenance warning\" that was entered in the chart to do 5 years later. However, unless pt has fam hx of colon CA or polyps in fam members before age 60 or  2 first degree relatives after age 60 with colon polyps/cancer hx, by current guidelines, pt would  NOT need a colonoscopy until 2019 (10 years after last one done) since is average risk, even with hx previous bladder CA. If pt has colon family history not documented in chart or having new rectal/intestinal sx (blood,etc), then route back to me. Otherwise will defer procedure for 2 years and HCM warning updated to reflect 10 year follow-up  "

## 2017-11-01 NOTE — TELEPHONE ENCOUNTER
Reason for Call: Request for an order or referral:    Order or referral being requested: colonoscopy    Date needed: as soon as possible    Has the patient been seen by the PCP for this problem? Not Applicable    Additional comments:     Phone number Patient can be reached at:  Cell number on file:    Telephone Information:   Mobile [phone]       Best Time:  anytime    Can we leave a detailed message on this number?  YES    Call taken on 11/1/2017 at 1:33 PM by TAMERA BROCK

## 2017-11-06 NOTE — TELEPHONE ENCOUNTER
informed pt of the below information and pt states he does have some blood in stool but thinks from a hemorrhoid . Phone number for colorectal where pt seen in past given to pt.Karime Treviño RN

## 2017-11-15 ENCOUNTER — TRANSFERRED RECORDS (OUTPATIENT)
Dept: HEALTH INFORMATION MANAGEMENT | Facility: CLINIC | Age: 57
End: 2017-11-15

## 2017-11-17 ENCOUNTER — OFFICE VISIT (OUTPATIENT)
Dept: PODIATRY | Facility: CLINIC | Age: 57
End: 2017-11-17
Payer: COMMERCIAL

## 2017-11-17 VITALS
BODY MASS INDEX: 30.07 KG/M2 | DIASTOLIC BLOOD PRESSURE: 110 MMHG | SYSTOLIC BLOOD PRESSURE: 178 MMHG | HEIGHT: 69 IN | WEIGHT: 203 LBS

## 2017-11-17 DIAGNOSIS — L60.8 TOENAIL DEFORMITY: ICD-10-CM

## 2017-11-17 DIAGNOSIS — B35.1 ONYCHOMYCOSIS: Primary | ICD-10-CM

## 2017-11-17 LAB
ALBUMIN SERPL-MCNC: 3.6 G/DL (ref 3.4–5)
ALP SERPL-CCNC: 105 U/L (ref 40–150)
ALT SERPL W P-5'-P-CCNC: 26 U/L (ref 0–70)
AST SERPL W P-5'-P-CCNC: 12 U/L (ref 0–45)
BILIRUB DIRECT SERPL-MCNC: 0.1 MG/DL (ref 0–0.2)
BILIRUB SERPL-MCNC: 0.5 MG/DL (ref 0.2–1.3)
PROT SERPL-MCNC: 6.6 G/DL (ref 6.8–8.8)

## 2017-11-17 PROCEDURE — 80076 HEPATIC FUNCTION PANEL: CPT | Performed by: PODIATRIST

## 2017-11-17 PROCEDURE — 99203 OFFICE O/P NEW LOW 30 MIN: CPT | Mod: 25 | Performed by: PODIATRIST

## 2017-11-17 PROCEDURE — 36415 COLL VENOUS BLD VENIPUNCTURE: CPT | Performed by: PODIATRIST

## 2017-11-17 NOTE — LETTER
11/17/2017         RE: Bryce Espinoza  [address] [facility] [address]        Dear Colleague,    Thank you for referring your patient, Bryce Espinoza, to the [facility]. Please see a copy of my visit note below.      ASSESSMENT/PLAN:    Encounter Diagnoses   Name Primary?     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    The cause and nature of fungal nails was discussed wtih the patient.  I explained that there are no ideal treatment options. Oral therapy rarely results in a cure and often needs to be repeated every few years.   Topical preparations are even less successful.      I explained that for particularly deformed and/or painful toenails, permanent removal is an option.      Many people opt to simply keep the nails trimmed and filed, living with the problem.        He elects to try terbinafin therapy, pending hepatic panel which is pending.    Body mass index is 29.98 kg/(m^2).    Weight management plan: Patient was referred to their PCP to discuss a diet and exercise plan.      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    RML     Tobacco use disorder     Quit 1/03     Unspecified hemorrhoids without mention of complication 8/01     Unspecified hereditary and  idiopathic peripheral neuropathy    *  *  Past Surgical History:   Procedure Laterality Date     C NONSPECIFIC PROCEDURE  2/00/02    EBCT   *  *  Current Outpatient Prescriptions   Medication Sig Dispense Refill     ASPIRIN 81 MG OR TABS 1 tab po QD (Once per day) 30 12     naproxen (NAPROSYN) 500 MG tablet Take 1 tablet (500 mg) by mouth 2 times daily as needed for moderate pain (Patient not taking: Reported on 11/17/2017) 30 tablet 1     HYDROcodone-acetaminophen (NORCO) 5-325 MG per tablet Take 1 tablet by mouth every 6 hours as needed (Patient not taking: Reported on 11/17/2017) 20 tablet 0     methocarbamol (ROBAXIN) 750 MG tablet Take 1 tablet (750 mg) by mouth 3 times daily as needed (Patient not taking: Reported on 11/17/2017) 30 tablet 0     lisinopril (PRINIVIL,ZESTRIL) 10 MG tablet Take 1 tablet (10 mg) by mouth daily (Patient not taking: Reported on 11/17/2017) 30 tablet 8     Cholecalciferol (VITAMIN D) 2000 UNITS tablet Take 2,000 Units by mouth daily       fluticasone (FLONASE) 50 MCG/ACT nasal spray Spray 1-2 sprays into both nostrils daily (Patient not taking: Reported on 11/17/2017) 1 Package 1       ROS:     A 10-point review of systems was performed and is positive for that noted in the HPI and as seen below.  All other areas are negative.     Numbness in feet?  no   Calf pain with walking? no  Recent foot/ankle injury? no  Weight change over past 12 months? no  Self perception as overweight? yes  Recent flu-like symptoms? no  Joint pain other than feet ? no    Social History: Employment:  [employer];  Exercise/Physical activity:  no;  Tobacco use:  no  Social History     Social History     Marital status:      Spouse name: N/A     Number of children: N/A     Years of education: N/A     Occupational History     Not on file.     Social History Main Topics     Smoking status: Former Smoker     Packs/day: 0.50     Years: 25.00     Quit date: 2/2/2013     Smokeless tobacco: Never Used      "Alcohol use Yes      Comment: 4-5 drinks per week     Drug use: Yes      Comment: nica taylor     Sexual activity: Yes     Other Topics Concern     Not on file     Social History Narrative       Family history:  Family History   Problem Relation Age of Onset     Arthritis Mother      RA     Hypertension Mother      Breast Cancer Mother      Prostate Cancer Maternal Grandfather      80     CANCER Maternal Grandmother      Lung       Rheumatoid arthritis:  no  Foot Problems: no  Diabetes: no      EXAM:    Vitals: BP (!) 178/110 (BP Location: Right arm, Cuff Size: Adult Large)  Ht 5' 9\" (1.753 m)  Wt 203 lb (92.1 kg)  BMI 29.98 kg/m2  BMI: Body mass index is 29.98 kg/(m^2).  Height: 5' 9\"    Constitutional/ general:  Pt is in no apparent distress, appears well-nourished.  Cooperative with history and physical exam.     Vascular:  Pedal pulses are palpable bilaterally for both the DP and PT arteries.  CFT < 3 sec.  No edema.  Pedal hair growth noted.     Neuro:  Alert and oriented x 3. Coordinated gait.  Light touch sensation is intact to the L4, L5, S1 distributions. No obvious deficits.  No evidence of neurological-based weakness, spasticity, or contracture in the lower extremities.     Derm: Normal texture and turgor.  No erythema, ecchymosis, or cyanosis.  No open lesions.   Multiple toenails are thickened, dystrophic and discolored.  No associated pain.    Musculoskeletal:    Lower extremity muscle strength is normal.  Patient is ambulatory without an assistive device or brace .  No gross deformities. Ankle and subtalar joint ROM within normal limits B/L.       Grey Naik DPM, FACFAS, [facility] Department of Podiatry/Foot & Ankle Surgery                Again, thank you for allowing me to participate in the care of your patient.        Sincerely,        Grey Naik DPM    "

## 2017-11-17 NOTE — PROGRESS NOTES
ASSESSMENT/PLAN:    Encounter Diagnoses   Name Primary?     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    The cause and nature of fungal nails was discussed wtih the patient.  I explained that there are no ideal treatment options. Oral therapy rarely results in a cure and often needs to be repeated every few years.   Topical preparations are even less successful.      I explained that for particularly deformed and/or painful toenails, permanent removal is an option.      Many people opt to simply keep the nails trimmed and filed, living with the problem.        He elects to try terbinafin therapy, pending hepatic panel which is pending.    Body mass index is 29.98 kg/(m^2).    Weight management plan: Patient was referred to their PCP to discuss a diet and exercise plan.      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    RML     Tobacco use disorder     Quit 1/03     Unspecified hemorrhoids without mention of complication 8/01     Unspecified hereditary and idiopathic peripheral neuropathy    *  *  Past Surgical History:   Procedure Laterality Date     C NONSPECIFIC PROCEDURE  2/00/02    EBCT   *  *  Current Outpatient Prescriptions   Medication Sig Dispense Refill     ASPIRIN 81 MG OR TABS 1 tab po QD (Once per day)  30 12     naproxen (NAPROSYN) 500 MG tablet Take 1 tablet (500 mg) by mouth 2 times daily as needed for moderate pain (Patient not taking: Reported on 11/17/2017) 30 tablet 1     HYDROcodone-acetaminophen (NORCO) 5-325 MG per tablet Take 1 tablet by mouth every 6 hours as needed (Patient not taking: Reported on 11/17/2017) 20 tablet 0     methocarbamol (ROBAXIN) 750 MG tablet Take 1 tablet (750 mg) by mouth 3 times daily as needed (Patient not taking: Reported on 11/17/2017) 30 tablet 0     lisinopril (PRINIVIL,ZESTRIL) 10 MG tablet Take 1 tablet (10 mg) by mouth daily (Patient not taking: Reported on 11/17/2017) 30 tablet 8     Cholecalciferol (VITAMIN D) 2000 UNITS tablet Take 2,000 Units by mouth daily       fluticasone (FLONASE) 50 MCG/ACT nasal spray Spray 1-2 sprays into both nostrils daily (Patient not taking: Reported on 11/17/2017) 1 Package 1       ROS:     A 10-point review of systems was performed and is positive for that noted in the HPI and as seen below.  All other areas are negative.     Numbness in feet?  no   Calf pain with walking? no  Recent foot/ankle injury? no  Weight change over past 12 months? no  Self perception as overweight? yes  Recent flu-like symptoms? no  Joint pain other than feet ? no    Social History: Employment:  [employer];  Exercise/Physical activity:  no;  Tobacco use:  no  Social History     Social History     Marital status:      Spouse name: N/A     Number of children: N/A     Years of education: N/A     Occupational History     Not on file.     Social History Main Topics     Smoking status: Former Smoker     Packs/day: 0.50     Years: 25.00     Quit date: 2/2/2013     Smokeless tobacco: Never Used     Alcohol use Yes      Comment: 4-5 drinks per week     Drug use: Yes      Comment: nica taylor     Sexual activity: Yes     Other Topics Concern     Not on file     Social History Narrative       Family history:  Family History   Problem Relation Age of Onset     Arthritis  "Mother      RA     Hypertension Mother      Breast Cancer Mother      Prostate Cancer Maternal Grandfather      80     CANCER Maternal Grandmother      Lung       Rheumatoid arthritis:  no  Foot Problems: no  Diabetes: no      EXAM:    Vitals: BP (!) 178/110 (BP Location: Right arm, Cuff Size: Adult Large)  Ht 5' 9\" (1.753 m)  Wt 203 lb (92.1 kg)  BMI 29.98 kg/m2  BMI: Body mass index is 29.98 kg/(m^2).  Height: 5' 9\"    Constitutional/ general:  Pt is in no apparent distress, appears well-nourished.  Cooperative with history and physical exam.     Vascular:  Pedal pulses are palpable bilaterally for both the DP and PT arteries.  CFT < 3 sec.  No edema.  Pedal hair growth noted.     Neuro:  Alert and oriented x 3. Coordinated gait.  Light touch sensation is intact to the L4, L5, S1 distributions. No obvious deficits.  No evidence of neurological-based weakness, spasticity, or contracture in the lower extremities.     Derm: Normal texture and turgor.  No erythema, ecchymosis, or cyanosis.  No open lesions.   Multiple toenails are thickened, dystrophic and discolored.  No associated pain.    Musculoskeletal:    Lower extremity muscle strength is normal.  Patient is ambulatory without an assistive device or brace .  No gross deformities. Ankle and subtalar joint ROM within normal limits B/L.       Grey Naik DPM, FACETIENNE, MS [place] Department of Podiatry/Foot & Ankle Surgery              "

## 2017-11-17 NOTE — MR AVS SNAPSHOT
"              After Visit Summary   11/17/2017    Bryce Espinoza    MRN: [id number]           Patient Information     Date Of Birth          1960        Visit Information        Provider Department      11/17/2017 8:15 AM Grey Naik DPM [facility]        Today's Diagnoses     Onychomycosis    -  1    Toenail deformity           Follow-ups after your visit        Who to contact     If you have questions or need follow up information about today's clinic visit or your schedule please contact [facility] directly at [phone].  Normal or non-critical lab and imaging results will be communicated to you by [employer]Medix[employer], letter or phone within 4 business days after the clinic has received the results. If you do not hear from us within 7 days, please contact the clinic through [employer]Med[employer] or phone. If you have a critical or abnormal lab result, we will notify you by phone as soon as possible.  Submit refill requests through [email or web address] or call your pharmacy and they will forward the refill request to us. Please allow 3 business days for your refill to be completed.          Additional Information About Your Visit        MyChart Information     [email or web address] gives you secure access to your electronic health record. If you see a primary care provider, you can also send messages to your care team and make appointments. If you have questions, please call your primary care clinic.  If you do not have a primary care provider, please call [phone] and they will assist you.        Care EveryWhere ID     This is your Care EveryWhere ID. This could be used by other organizations to access your [place] medical records  [id number]        Your Vitals Were     Height BMI (Body Mass Index)                5' 9\" (1.753 m) 29.98 kg/m2           Blood Pressure from Last 3 Encounters:   11/17/17 (!) 178/110   01/30/17 110/60   01/12/17 (!) 151/96    Weight from Last 3 Encounters: "   11/17/17 203 lb (92.1 kg)   01/30/17 208 lb (94.3 kg)   08/14/15 203 lb 3.2 oz (92.2 kg)              We Performed the Following     Hepatic panel (Albumin, ALT, AST, Bili, Alk Phos, TP)        Primary Care Provider Office Phone # Fax #    Remington Riggs [phone]       [address] [facility] [address]        Equal Access to Services     KATEY QUINONES : Hadii aad ku hadasho Soomaali, waaxda luqadaha, qaybta kaalmada adeegyada, waxay idiin hayaan adeeg kharash la'david . So [facility] [phone].    ATENCIÓN: Si habla español, tiene a navarro disposición servicios gratuitos de asistencia lingüística. [facility] [phone].    We comply with applicable federal civil rights laws and [place] laws. We do not discriminate on the basis of race, color, national origin, age, disability, sex, sexual orientation, or gender identity.            Thank you!     Thank you for choosing [facility]  for your care. Our goal is always to provide you with excellent care. Hearing back from our patients is one way we can continue to improve our services. Please take a few minutes to complete the written survey that you may receive in the mail after your visit with us. Thank you!             Your Updated Medication List - Protect others around you: Learn how to safely use, store and throw away your medicines at www.disposemymeds.org.          This list is accurate as of: 11/17/17  8:36 AM.  Always use your most recent med list.                   Brand Name Dispense Instructions for use Diagnosis    aspirin 81 MG tablet     30    1 tab po QD (Once per day)        fluticasone 50 MCG/ACT spray    FLONASE    1 Package    Spray 1-2 sprays into both nostrils daily    Dysfunction of Eustachian tube, right       HYDROcodone-acetaminophen 5-325 MG per tablet    NORCO    20 tablet    Take 1 tablet by mouth every 6 hours as needed    Acute left-sided thoracic back pain, Left sided abdominal pain       lisinopril  10 MG tablet    PRINIVIL/ZESTRIL    30 tablet    Take 1 tablet (10 mg) by mouth daily    Essential hypertension, benign       methocarbamol 750 MG tablet    ROBAXIN    30 tablet    Take 1 tablet (750 mg) by mouth 3 times daily as needed    Acute left-sided thoracic back pain       naproxen 500 MG tablet    NAPROSYN    30 tablet    Take 1 tablet (500 mg) by mouth 2 times daily as needed for moderate pain    Left sided abdominal pain, Acute left-sided thoracic back pain       vitamin D 2000 UNITS tablet      Take 2,000 Units by mouth daily    Hemoptysis, Cough

## 2017-11-17 NOTE — NURSING NOTE
"Chief Complaint   Patient presents with     Toenail     thickening and discoloration of toenails on both feet        Initial BP (!) 178/110 (BP Location: Right arm, Cuff Size: Adult Large)  Ht 5' 9\" (1.753 m)  Wt 203 lb (92.1 kg)  BMI 29.98 kg/m2 Estimated body mass index is 29.98 kg/(m^2) as calculated from the following:    Height as of this encounter: 5' 9\" (1.753 m).    Weight as of this encounter: 203 lb (92.1 kg).  Medication Reconciliation: complete   Bianka Post MA      "

## 2017-11-20 ENCOUNTER — TELEPHONE (OUTPATIENT)
Dept: INTERNAL MEDICINE | Facility: CLINIC | Age: 57
End: 2017-11-20

## 2017-11-20 ENCOUNTER — HOSPITAL PATHOLOGY (OUTPATIENT)
Dept: OTHER | Facility: CLINIC | Age: 57
End: 2017-11-20

## 2017-11-20 ENCOUNTER — TRANSFERRED RECORDS (OUTPATIENT)
Dept: HEALTH INFORMATION MANAGEMENT | Facility: CLINIC | Age: 57
End: 2017-11-20

## 2017-11-20 ENCOUNTER — TELEPHONE (OUTPATIENT)
Dept: PODIATRY | Facility: CLINIC | Age: 57
End: 2017-11-20

## 2017-11-20 NOTE — TELEPHONE ENCOUNTER
On Wed 11/15, pt was seen at [facility] [phone]  for Hemorrhoids and Rectal Bleeding.  Pt says that he called that clinic 2 hours ago, with worsening of symptoms since the Wed appt. He has a prolapsed Hemorid, he has been trying to push it back into the rectum, but that is not working. It is bleeding, it is painful, and he is unable to sit due to the pain / increased bleeding. Pt states he was seen by Dr. Rossy Hugo , but he wanted to see  Dr. Aníbal Howard (he was seen by this DR when he was [facility] years ago on 3/9/2011.   Advised that pt f/u with [facility] regarding his symptoms of bleeding and pain, and Hemorid that is unable to be reinserted back into the rectum.   Pt agreed, and said that Clinic was calling him now to f/u with message he left there earlier this morning.

## 2017-11-20 NOTE — TELEPHONE ENCOUNTER
Reason for Call:  Other call back    Detailed comments:  Pt is requesting the name of the doctor that Dr Riggs wanted the pt to see.  Pt states that he was to see a Dr Dang, but he had an appt with a different doctor.    Phone Number Patient can be reached at: Cell number on file:    Telephone Information:   Mobile [phone]       Best Time: asap    Can we leave a detailed message on this number? YES    Call taken on 11/20/2017 at 9:47 AM by TAMERA BROCK

## 2017-11-20 NOTE — TELEPHONE ENCOUNTER
Per Dr. Naik advise the patient that his labs are normal and we could send the RX to the pharmacy / patient declined the RX at this time stating that he's aware side effects / no RX sent / Dr. Naik is aware  Keya Barreto CMA

## 2017-11-22 LAB — COPATH REPORT: NORMAL

## 2018-06-20 ENCOUNTER — TRANSFERRED RECORDS (OUTPATIENT)
Dept: HEALTH INFORMATION MANAGEMENT | Facility: CLINIC | Age: 58
End: 2018-06-20

## 2018-10-25 ENCOUNTER — TELEPHONE (OUTPATIENT)
Dept: INTERNAL MEDICINE | Facility: CLINIC | Age: 58
End: 2018-10-25

## 2018-10-25 NOTE — TELEPHONE ENCOUNTER
Reason for Call:  Other call back    Detailed comments: pt called to let dr glaser know that his older brother had a triple bypass heart surg and his younger brother is have one now, pt is concerned and wants to be checked out because both of his brothers have had bypass heart surg. Please call pt back.     Phone Number Patient can be reached at: Other phone number:  [phone]    Best Time: anytime    Can we leave a detailed message on this number? YES    Call taken on 10/25/2018 at 12:00 PM by MILLIE NULL

## 2018-10-25 NOTE — TELEPHONE ENCOUNTER
Patient concerned because both of his brothers have needed bypass surgery within last 2 months. Patient denies having any symptoms. Wants to get checked out and have testing. Patient prefers to see Dr. Riggs. Has not seen him since 2015. Scheduled for 11/16, soonest opening with PCP that also worked with patient's schedule. FYI.

## 2018-11-13 NOTE — PROGRESS NOTES
"  SUBJECTIVE:   Bryce Espinoza is a 58 year old male who presents to clinic today for the following health issues:    Chief Complaint   Patient presents with     Family History     Heart disease, Both brothers recently uderwent Bypass Surgeries     Pt's past medical history, family history, habits, medications and allergies were reviewed with the patient today.  See snap shot for  HCM status. Most recent lab results reviewed with pt. Problem list and histories reviewed & adjusted, as indicated.  Additional history as below:    Nonsmoker now. Quit years ago.    2 siblings have been dx'd with CAD and had CABG. Father also had CAD.  Pt gets occ atypical pressure in chest with exertion. Didn't have when went deer hunting however recently. Denies SOB. No diahporesis or nausea with exertion. Needs lipids checked. BP elevated today and was in past also        Additional ROS:   Constitutional, HEENT, Cardiovascular, Pulmonary, GI and , Neuro, MSK and Psych review of systems/symptoms are otherwise negative or unchanged from previous, except as noted above.      OBJECTIVE:  /88  Pulse 80  Temp 98.4  F (36.9  C) (Oral)  Resp 16  Wt 197 lb (89.4 kg)  SpO2 96%  BMI 29.09 kg/m2   Estimated body mass index is 29.09 kg/(m^2) as calculated from the following:    Height as of 11/17/17: 5' 9\" (1.753 m).    Weight as of this encounter: 197 lb (89.4 kg).    Neck: no adenopathy. Thyroid normal to palpation. No bruits. No JVD  Pulm: Lungs clear to auscultation   CV: Regular rates and rhythm  GI: Soft, nontender, Normal active bowel sounds, No hepatosplenomegaly or masses palpable  Ext: Peripheral pulses intact. No edema.  Neuro: Normal strength and tone, sensory exam grossly normal    Assessment/Plan: (See plan discussion below for further details)  1. Atypical chest pain   No sx currently.  With strong fam hx, will check stress test heart  - NM Exercise stress test; Future    2. Family history of ischemic heart disease   " See #1 above  - NM Exercise stress test; Future    3. Essential hypertension, benign  Uncontrolled. Will start Amlodipine  - Comprehensive metabolic panel; Future  - amLODIPine (NORVASC) 5 MG tablet; Take 1 tablet (5 mg) by mouth daily  Dispense: 30 tablet; Refill: 11    4. Hyperlipidemia LDL goal <100  Due for lab f/u. Will then  Start statin given risks that is likely > 7.5% for CAD  - Comprehensive metabolic panel; Future  - Lipid panel reflex to direct LDL Fasting; Future    5. Screening for HIV (human immunodeficiency virus)   HIV screening recommendation per USPSTF guideline discussed with pt. Pt agrees.   - HIV Screening; Future    6. Need for hepatitis C screening test  Pt meets criteria for hepatitis C screening based on birth year 1945-65. Discussed pro/cons of Hep C screening/treatment and recs of USPTF. Pt agreeable to screening  - Hepatitis C Screen Reflex to HCV RNA Quant and Genotype; Future    7. Need for prophylactic vaccination with tetanus-diphtheria (Td)  - TD (ADULT, 7+) PRESERVE FREE  -      ADMIN VACCINE, FIRST    Plan discussion:   Fasting labs  next week. Likely will start statin therapy after numbers back given fam hx    Amlodipine 5mg daily for blood pressure   St[facility] test heart at [facility] with cardiology. They will call you to schedule. If not heard from them by Wed, then let me know  See me in 3-4 weeks for blood pressure recheck   Tetanus vaccine  Screenings as ordered          Remington Riggs MD  Internal Medicine Department  [facility]

## 2018-11-16 ENCOUNTER — OFFICE VISIT (OUTPATIENT)
Dept: INTERNAL MEDICINE | Facility: CLINIC | Age: 58
End: 2018-11-16
Payer: COMMERCIAL

## 2018-11-16 VITALS
OXYGEN SATURATION: 96 % | SYSTOLIC BLOOD PRESSURE: 156 MMHG | HEART RATE: 80 BPM | WEIGHT: 197 LBS | BODY MASS INDEX: 29.09 KG/M2 | RESPIRATION RATE: 16 BRPM | TEMPERATURE: 98.4 F | DIASTOLIC BLOOD PRESSURE: 88 MMHG

## 2018-11-16 DIAGNOSIS — E78.5 HYPERLIPIDEMIA LDL GOAL <100: ICD-10-CM

## 2018-11-16 DIAGNOSIS — Z11.59 NEED FOR HEPATITIS C SCREENING TEST: ICD-10-CM

## 2018-11-16 DIAGNOSIS — I10 ESSENTIAL HYPERTENSION, BENIGN: ICD-10-CM

## 2018-11-16 DIAGNOSIS — Z23 NEED FOR PROPHYLACTIC VACCINATION WITH TETANUS-DIPHTHERIA (TD): ICD-10-CM

## 2018-11-16 DIAGNOSIS — Z11.4 SCREENING FOR HIV (HUMAN IMMUNODEFICIENCY VIRUS): ICD-10-CM

## 2018-11-16 DIAGNOSIS — Z82.49 FAMILY HISTORY OF ISCHEMIC HEART DISEASE: ICD-10-CM

## 2018-11-16 DIAGNOSIS — R07.89 ATYPICAL CHEST PAIN: Primary | ICD-10-CM

## 2018-11-16 PROCEDURE — 90714 TD VACC NO PRESV 7 YRS+ IM: CPT | Performed by: INTERNAL MEDICINE

## 2018-11-16 PROCEDURE — 90471 IMMUNIZATION ADMIN: CPT | Performed by: INTERNAL MEDICINE

## 2018-11-16 PROCEDURE — 99215 OFFICE O/P EST HI 40 MIN: CPT | Mod: 25 | Performed by: INTERNAL MEDICINE

## 2018-11-16 NOTE — MR AVS SNAPSHOT
After Visit Summary   11/16/2018    Bryce Espinoza    MRN: [id number]           Patient Information     Date Of Birth          1960        Visit Information        Provider Department      11/16/2018 4:30 PM Remington Riggs MD [facility]        Today's Diagnoses     Need for hepatitis C screening test        Screening for HIV (human immunodeficiency virus)        Need for prophylactic vaccination with tetanus-diphtheria (Td)          Care Instructions     Fasting labs  next week  Check blood pressure at home in AM and later PM between now and lab appt and drop off the readings to me. Goal < 130/80  Stress test heart at [facility] with cardiology. They will call you to schedule. If not heard from them by Wed, then let me know          Follow-ups after your visit        Who to contact     If you have questions or need follow up information about today's clinic visit or your schedule please contact [facility] directly at [phone].  Normal or non-critical lab and imaging results will be communicated to you by [employer], letter or phone within 4 business days after the clinic has received the results. If you do not hear from us within 7 days, please contact the clinic through So[employer] or phone. If you have a critical or abnormal lab result, we will notify you by phone as soon as possible.  Submit refill requests through [employer] or call your pharmacy and they will forward the refill request to us. Please allow 3 business days for your refill to be completed.          Additional Information About Your Visit        Soo[employer]hart Information     [employer] gives you secure access to your electronic health record. If you see a primary care provider, you can also send messages to your care team and make appointments. If you have questions, please call your primary care clinic.  If you do not have a primary care provider, please call [phone] and they will assist you.         Care EveryWhere ID     This is your Care EveryWhere ID. This could be used by other organizations to access your [facility] medical records  [id number]        Your Vitals Were     Pulse Temperature Respirations Pulse Oximetry BMI (Body Mass Index)       80 98.4  F (36.9  C) (Oral) 16 96% 29.09 kg/m2        Blood Pressure from Last 3 Encounters:   11/16/18 156/88   11/17/17 (!) 178/110   01/30/17 110/60    Weight from Last 3 Encounters:   11/16/18 197 lb (89.4 kg)   11/17/17 203 lb (92.1 kg)   01/30/17 208 lb (94.3 kg)              We Performed the Following          ADMIN VACCINE, FIRST     Hepatitis C Screen Reflex to HCV RNA Quant and Genotype     HIV Screening     TD (ADULT, 7+) PRESERVE FREE        Primary Care Provider Office Phone # Fax #    Remington Riggs [phone]       [address] [facility] [address]        Equal Access to Services     KATEY QUINONES : Hadii aad ku hadasho Soomaali, waaxda luqadaha, qaybta kaalmada adeegyada, waxay idiin haylauran celeste weiner . So [facility] [phone].    ATENCIÓN: Si habla español, tiene a navarro disposición servicios gratuitos de asistencia lingüística. Llame al [phone].    We comply with applicable federal civil rights laws and [place] laws. We do not discriminate on the basis of race, color, national origin, age, disability, sex, sexual orientation, or gender identity.            Thank you!     Thank you for choosing [facility]  for your care. Our goal is always to provide you with excellent care. Hearing back from our patients is one way we can continue to improve our services. Please take a few minutes to complete the written survey that you may receive in the mail after your visit with us. Thank you!             Your Updated Medication List - Protect others around you: Learn how to safely use, store and throw away your medicines at www.disposemymeds.org.          This list is accurate as of 11/16/18  5:14 PM.   Always use your most recent med list.                   Brand Name Dispense Instructions for use Diagnosis    aspirin 81 MG tablet     30    1 tab po QD (Once per day)        fluticasone 50 MCG/ACT spray    FLONASE    1 Package    Spray 1-2 sprays into both nostrils daily    Dysfunction of Eustachian tube, right       HYDROcodone-acetaminophen 5-325 MG per tablet    NORCO    20 tablet    Take 1 tablet by mouth every 6 hours as needed    Acute left-sided thoracic back pain, Left sided abdominal pain       lisinopril 10 MG tablet    PRINIVIL/ZESTRIL    30 tablet    Take 1 tablet (10 mg) by mouth daily    Essential hypertension, benign       methocarbamol 750 MG tablet    ROBAXIN    30 tablet    Take 1 tablet (750 mg) by mouth 3 times daily as needed    Acute left-sided thoracic back pain       naproxen 500 MG tablet    NAPROSYN    30 tablet    Take 1 tablet (500 mg) by mouth 2 times daily as needed for moderate pain    Left sided abdominal pain, Acute left-sided thoracic back pain       vitamin D3 2000 units tablet    CHOLECALCIFEROL     Take 2,000 Units by mouth daily    Hemoptysis, Cough

## 2018-11-16 NOTE — PATIENT INSTRUCTIONS
Fasting labs  next week. Likely will start statin therapy after numbers back given fam hx    Amlodipine 5mg daily for blood pressure   Stress test heart at [facility] with cardiology. They will call you to schedule. If not heard from them by Wed, then let me know  See me in 3-4 weeks for blood pressure recheck   Tetanus vaccine  Screenings as ordered

## 2018-11-16 NOTE — NURSING NOTE
Screening Questionnaire for Adult Immunization    Are you sick today?   No   Do you have allergies to medications, food, a vaccine component or latex?   No   Have you ever had a serious reaction after receiving a vaccination?   No   Do you have a long-term health problem with heart disease, lung disease, asthma, kidney disease, metabolic disease (e.g. diabetes), anemia, or other blood disorder?   No   Do you have cancer, leukemia, HIV/AIDS, or any other immune system problem?   No   In the past 3 months, have you taken medications that affect  your immune system, such as prednisone, other steroids, or anticancer drugs; drugs for the treatment of rheumatoid arthritis, Crohn s disease, or psoriasis; or have you had radiation treatments?   No   Have you had a seizure, or a brain or other nervous system problem?   No   During the past year, have you received a transfusion of blood or blood     products, or been given immune (gamma) globulin or antiviral drug?   No   For women: Are you pregnant or is there a chance you could become        pregnant during the next month?   No   Have you received any vaccinations in the past 4 weeks?   No     Immunization questionnaire answers were all negative.        Per orders of Dr. Riggs, injection of TD given by Anisa Drew. Patient instructed to remain in clinic for 15 minutes afterwards, and to report any adverse reaction to me immediately.       Screening performed by Anisa Drew on 11/16/2018 at 4:48 PM.

## 2018-11-28 ENCOUNTER — TELEPHONE (OUTPATIENT)
Dept: INTERNAL MEDICINE | Facility: CLINIC | Age: 58
End: 2018-11-28

## 2018-11-28 NOTE — TELEPHONE ENCOUNTER
Patient called saying no one called for his stress test at [facility] no one called so he wants to know what to do now to schedule this please call pt at   [phone]

## 2018-11-29 PROBLEM — E78.5 HYPERLIPIDEMIA LDL GOAL <100: Status: ACTIVE | Noted: 2018-11-29

## 2018-11-29 RX ORDER — AMLODIPINE BESYLATE 5 MG/1
5 TABLET ORAL DAILY
Qty: 30 TABLET | Refills: 11 | Status: SHIPPED | OUTPATIENT
Start: 2018-11-29 | End: 2019-04-04

## 2018-11-30 NOTE — TELEPHONE ENCOUNTER
Called and spoke with patient. Patient did receive message from imaging to schedule appointment for stress test yesterday. Patient still needs to call and schedule appointment. Confirmed that patient had number to return the call to schedule.     Katerina Peres CMA on 11/30/2018 at 10:48 AM

## 2018-12-01 DIAGNOSIS — I10 ESSENTIAL HYPERTENSION, BENIGN: ICD-10-CM

## 2018-12-01 DIAGNOSIS — Z11.59 NEED FOR HEPATITIS C SCREENING TEST: ICD-10-CM

## 2018-12-01 DIAGNOSIS — Z11.4 SCREENING FOR HIV (HUMAN IMMUNODEFICIENCY VIRUS): ICD-10-CM

## 2018-12-01 DIAGNOSIS — E78.5 HYPERLIPIDEMIA LDL GOAL <100: ICD-10-CM

## 2018-12-01 LAB
ALBUMIN SERPL-MCNC: 3.8 G/DL (ref 3.4–5)
ALP SERPL-CCNC: 105 U/L (ref 40–150)
ALT SERPL W P-5'-P-CCNC: 30 U/L (ref 0–70)
ANION GAP SERPL CALCULATED.3IONS-SCNC: 6 MMOL/L (ref 3–14)
AST SERPL W P-5'-P-CCNC: 19 U/L (ref 0–45)
BILIRUB SERPL-MCNC: 0.5 MG/DL (ref 0.2–1.3)
BUN SERPL-MCNC: 13 MG/DL (ref 7–30)
CALCIUM SERPL-MCNC: 9 MG/DL (ref 8.5–10.1)
CHLORIDE SERPL-SCNC: 107 MMOL/L (ref 94–109)
CHOLEST SERPL-MCNC: 216 MG/DL
CO2 SERPL-SCNC: 26 MMOL/L (ref 20–32)
CREAT SERPL-MCNC: 0.68 MG/DL (ref 0.66–1.25)
GFR SERPL CREATININE-BSD FRML MDRD: >90 ML/MIN/1.7M2
GLUCOSE SERPL-MCNC: 104 MG/DL (ref 70–99)
HDLC SERPL-MCNC: 41 MG/DL
LDLC SERPL CALC-MCNC: 143 MG/DL
NONHDLC SERPL-MCNC: 175 MG/DL
POTASSIUM SERPL-SCNC: 4.1 MMOL/L (ref 3.4–5.3)
PROT SERPL-MCNC: 7 G/DL (ref 6.8–8.8)
SODIUM SERPL-SCNC: 139 MMOL/L (ref 133–144)
TRIGL SERPL-MCNC: 160 MG/DL

## 2018-12-01 PROCEDURE — 86803 HEPATITIS C AB TEST: CPT | Performed by: INTERNAL MEDICINE

## 2018-12-01 PROCEDURE — 80053 COMPREHEN METABOLIC PANEL: CPT | Performed by: INTERNAL MEDICINE

## 2018-12-01 PROCEDURE — 36415 COLL VENOUS BLD VENIPUNCTURE: CPT | Performed by: INTERNAL MEDICINE

## 2018-12-01 PROCEDURE — 80061 LIPID PANEL: CPT | Performed by: INTERNAL MEDICINE

## 2018-12-01 PROCEDURE — 87389 HIV-1 AG W/HIV-1&-2 AB AG IA: CPT | Performed by: INTERNAL MEDICINE

## 2018-12-03 LAB
HCV AB SERPL QL IA: NONREACTIVE
HIV 1+2 AB+HIV1 P24 AG SERPL QL IA: NONREACTIVE

## 2018-12-17 ENCOUNTER — OFFICE VISIT (OUTPATIENT)
Dept: URGENT CARE | Facility: URGENT CARE | Age: 58
End: 2018-12-17
Payer: COMMERCIAL

## 2018-12-17 ENCOUNTER — HOSPITAL ENCOUNTER (OUTPATIENT)
Dept: ULTRASOUND IMAGING | Facility: CLINIC | Age: 58
Discharge: HOME | End: 2018-12-17
Attending: FAMILY MEDICINE | Admitting: FAMILY MEDICINE
Payer: COMMERCIAL

## 2018-12-17 VITALS
WEIGHT: 202.4 LBS | SYSTOLIC BLOOD PRESSURE: 119 MMHG | HEART RATE: 76 BPM | OXYGEN SATURATION: 96 % | DIASTOLIC BLOOD PRESSURE: 78 MMHG | TEMPERATURE: 97.7 F | BODY MASS INDEX: 29.89 KG/M2

## 2018-12-17 DIAGNOSIS — N50.811 RIGHT TESTICULAR PAIN: Primary | ICD-10-CM

## 2018-12-17 DIAGNOSIS — C67.9 MALIGNANT NEOPLASM OF URINARY BLADDER, UNSPECIFIED SITE (H): ICD-10-CM

## 2018-12-17 DIAGNOSIS — N45.1 EPIDIDYMITIS: ICD-10-CM

## 2018-12-17 LAB
ALBUMIN UR-MCNC: NEGATIVE MG/DL
APPEARANCE UR: CLEAR
BILIRUB UR QL STRIP: NEGATIVE
COLOR UR AUTO: YELLOW
GLUCOSE UR STRIP-MCNC: NEGATIVE MG/DL
HGB UR QL STRIP: NEGATIVE
KETONES UR STRIP-MCNC: NEGATIVE MG/DL
LEUKOCYTE ESTERASE UR QL STRIP: NEGATIVE
NITRATE UR QL: NEGATIVE
PH UR STRIP: 5.5 PH (ref 5–7)
RBC #/AREA URNS AUTO: NORMAL /HPF
SOURCE: NORMAL
SP GR UR STRIP: >1.03 (ref 1–1.03)
UROBILINOGEN UR STRIP-ACNC: 0.2 EU/DL (ref 0.2–1)
WBC #/AREA URNS AUTO: NORMAL /HPF

## 2018-12-17 PROCEDURE — 87491 CHLMYD TRACH DNA AMP PROBE: CPT | Performed by: FAMILY MEDICINE

## 2018-12-17 PROCEDURE — 99214 OFFICE O/P EST MOD 30 MIN: CPT | Performed by: FAMILY MEDICINE

## 2018-12-17 PROCEDURE — 81001 URINALYSIS AUTO W/SCOPE: CPT | Performed by: FAMILY MEDICINE

## 2018-12-17 PROCEDURE — 87591 N.GONORRHOEAE DNA AMP PROB: CPT | Performed by: FAMILY MEDICINE

## 2018-12-17 PROCEDURE — 93976 VASCULAR STUDY: CPT

## 2018-12-17 RX ORDER — DOXYCYCLINE 100 MG/1
100 TABLET ORAL 2 TIMES DAILY
Qty: 20 TABLET | Refills: 0 | Status: SHIPPED | OUTPATIENT
Start: 2018-12-17 | End: 2019-04-04

## 2018-12-17 RX ORDER — NAPROXEN 500 MG/1
500 TABLET ORAL 2 TIMES DAILY WITH MEALS
Qty: 14 TABLET | Refills: 0 | COMMUNITY
Start: 2018-12-17 | End: 2019-04-04

## 2018-12-17 NOTE — PROGRESS NOTES
SUBJECTIVE: Bryce Espinoza is a 58 year old male presenting with a chief complaint of rt testicle pain.  Onset of symptoms was 3 day(s) ago.  Course of illness is worsening.    Severity moderate  Current and Associated symptoms: none  Treatment measures tried include None tried.  Predisposing factors include None.    Past Medical History:   Diagnosis Date     Essential hypertension, benign      Hematuria      Malignant neoplasm of bladder, part unspecified     Transitional Cell Carcinoma bladder     Olecranon bursitis 3/01    right     Other and unspecified hyperlipidemia      RIGHT LUNG CALCIFIED GRANULOMA     RML     Tobacco use disorder     Quit      Unspecified hemorrhoids without mention of complication      Unspecified hereditary and idiopathic peripheral neuropathy      Allergies   Allergen Reactions     Lisinopril      Body aches pt d/mylene  2015     Social History     Tobacco Use     Smoking status: Former Smoker     Packs/day: 0.50     Years: 25.00     Pack years: 12.50     Last attempt to quit: 2013     Years since quittin.8     Smokeless tobacco: Never Used   Substance Use Topics     Alcohol use: Yes     Comment: 4-5 drinks per week       ROS:  SKIN: no rash  GI: no vomiting    OBJECTIVE:  /78   Pulse 76   Temp 97.7  F (36.5  C) (Oral)   Wt 91.8 kg (202 lb 6.4 oz)   SpO2 96%   BMI 29.89 kg/m     GENERAL APPEARANCE: healthy, alert and no distress  ABDOMEN:  soft, nontender, no HSM or masses and bowel sounds normal  GU_male: no hernias, penis normal without urethral discharge and testicular tenderness rt sided  SKIN: no suspicious lesions or rashes    Study Result     ULTRASOUND TESTICULAR AND SCROTUM WITH DOPPLER LIMITED   2018  4:09 PM      HISTORY: Right testicle pain for 3 days. Right testicular pain.  Malignant neoplasm of urinary bladder, unspecified site (H).     COMPARISON: None.     FINDINGS:  Right testicle is 5 x 3.3 x 2.4 cm. Left testicle is 3.3  x  2.6 x 2.2 cm. No focal intraparenchymal testicular lesion. Color and  Doppler spectral assessment of the right and left testicle shows  normal arterial and venous waveforms. Right epididymal head cyst is  0.6 cm. The right epididymal head also appears mildly heterogeneous  and may be edematous. There is also the question of mild hyperemia of  the right epididymal head. Unremarkable left epididymis.     There is a right-sided small hydrocele. There are bilateral  varicoceles with example vessels on the right and left sides each  measuring 0.3 cm.                                                                      IMPRESSION:  1. Potential mild right epididymal head epididymitis.  2. No other acute abnormality. No testicular parenchymal lesion.  3. Small right hydrocele.  4. Bilateral small varicoceles.  5. Right epididymal head cyst.       ICD-10-CM    1. Right testicular pain N50.811 UA with Microscopic reflex to Culture     NEISSERIA GONORRHOEA PCR     CHLAMYDIA TRACHOMATIS PCR     US Testicular & Scrotum w Doppler Ltd     doxycycline monohydrate (ADOXA) 100 MG tablet     naproxen (NAPROSYN) 500 MG tablet   2. Malignant neoplasm of urinary bladder, unspecified site (H) C67.9 UA with Microscopic reflex to Culture     NEISSERIA GONORRHOEA PCR     CHLAMYDIA TRACHOMATIS PCR     US Testicular & Scrotum w Doppler Ltd   3. Epididymitis N45.1 doxycycline monohydrate (ADOXA) 100 MG tablet     naproxen (NAPROSYN) 500 MG tablet     Fluids/Rest, f/u if worse/not any better

## 2018-12-18 LAB
C TRACH DNA SPEC QL NAA+PROBE: NEGATIVE
N GONORRHOEA DNA SPEC QL NAA+PROBE: NEGATIVE
SPECIMEN SOURCE: NORMAL
SPECIMEN SOURCE: NORMAL

## 2019-01-03 ENCOUNTER — HOSPITAL ENCOUNTER (OUTPATIENT)
Dept: CARDIOLOGY | Facility: CLINIC | Age: 59
Discharge: HOME | End: 2019-01-03
Attending: INTERNAL MEDICINE | Admitting: INTERNAL MEDICINE
Payer: COMMERCIAL

## 2019-01-03 DIAGNOSIS — Z82.49 FAMILY HISTORY OF ISCHEMIC HEART DISEASE: ICD-10-CM

## 2019-01-03 DIAGNOSIS — R07.89 ATYPICAL CHEST PAIN: ICD-10-CM

## 2019-01-03 PROCEDURE — 78452 HT MUSCLE IMAGE SPECT MULT: CPT | Mod: 26 | Performed by: INTERNAL MEDICINE

## 2019-01-03 PROCEDURE — 93018 CV STRESS TEST I&R ONLY: CPT

## 2019-01-03 PROCEDURE — 34300033 ZZH RX 343: Performed by: INTERNAL MEDICINE

## 2019-01-03 PROCEDURE — 93016 CV STRESS TEST SUPVJ ONLY: CPT

## 2019-01-03 PROCEDURE — A9502 TC99M TETROFOSMIN: HCPCS | Performed by: INTERNAL MEDICINE

## 2019-01-03 PROCEDURE — 78452 HT MUSCLE IMAGE SPECT MULT: CPT

## 2019-01-03 RX ADMIN — TETROFOSMIN 4.03 MCI.: 1.38 INJECTION, POWDER, LYOPHILIZED, FOR SOLUTION INTRAVENOUS at 13:18

## 2019-01-03 RX ADMIN — TETROFOSMIN 13.2 MCI.: 1.38 INJECTION, POWDER, LYOPHILIZED, FOR SOLUTION INTRAVENOUS at 14:35

## 2019-02-28 ENCOUNTER — TELEPHONE (OUTPATIENT)
Dept: INTERNAL MEDICINE | Facility: CLINIC | Age: 59
End: 2019-02-28

## 2019-02-28 DIAGNOSIS — R07.9 CHEST PAIN, UNSPECIFIED TYPE: Primary | ICD-10-CM

## 2019-02-28 NOTE — TELEPHONE ENCOUNTER
Reason for Call:  Other call back    Detailed comments: Pt wants Dr Riggs to call him back regarding a stress test the pt had 1/3/19 and also some other lab tests.    Phone Number Patient can be reached at: Cell number on file:    Telephone Information:   Mobile [phone]       Best Time: anytime    Can we leave a detailed message on this number? YES    Call taken on 2/28/2019 at 3:09 PM by TAMERA BROCK

## 2019-03-05 NOTE — TELEPHONE ENCOUNTER
Spoke with patient.  Stress test reviewed.  No ischemia was seen on imaging portion but patient did have some ST depression and a question was raised whether patient could have diffuse three-vessel disease that made the imaging at contrast not show up.  Patient has not been exercising much recently though did shovel for about 1 hour and had slight shortness of breath that resolved after shoveling but no specific chest pain.  To be sure diffuse coronary disease not present, will have patient undergo a CT angio as suggested by cardiology.  Test ordered.  Patient informed that  [facility] will be calling him tomorrow to schedule.

## 2019-03-12 ENCOUNTER — TELEPHONE (OUTPATIENT)
Dept: INTERNAL MEDICINE | Facility: CLINIC | Age: 59
End: 2019-03-12

## 2019-03-12 NOTE — TELEPHONE ENCOUNTER
Patient called to say [facility] has not called regarding ct scan of heart was told to called dr glaser back to schedule this phone to pt [phone]

## 2019-03-13 NOTE — TELEPHONE ENCOUNTER
Pt informed of [facility] scheduling line to schedule appt.    Danielle Livingston, CMA

## 2019-03-26 ENCOUNTER — HOSPITAL ENCOUNTER (OUTPATIENT)
Dept: CARDIOLOGY | Facility: CLINIC | Age: 59
Discharge: HOME | End: 2019-03-26
Attending: INTERNAL MEDICINE | Admitting: INTERNAL MEDICINE
Payer: COMMERCIAL

## 2019-03-26 VITALS — DIASTOLIC BLOOD PRESSURE: 99 MMHG | HEART RATE: 72 BPM | SYSTOLIC BLOOD PRESSURE: 132 MMHG

## 2019-03-26 DIAGNOSIS — R07.9 CHEST PAIN, UNSPECIFIED TYPE: ICD-10-CM

## 2019-03-26 PROCEDURE — 75571 CT HRT W/O DYE W/CA TEST: CPT

## 2019-03-26 PROCEDURE — 75571 CT HRT W/O DYE W/CA TEST: CPT | Mod: 26 | Performed by: INTERNAL MEDICINE

## 2019-03-26 PROCEDURE — 25000132 ZZH RX MED GY IP 250 OP 250 PS 637: Performed by: INTERNAL MEDICINE

## 2019-03-26 RX ORDER — NITROGLYCERIN 0.4 MG/1
0.4 TABLET SUBLINGUAL
Status: DISCONTINUED | OUTPATIENT
Start: 2019-03-26 | End: 2019-03-27 | Stop reason: HOSPADM

## 2019-03-26 RX ORDER — METHYLPREDNISOLONE SODIUM SUCCINATE 125 MG/2ML
125 INJECTION, POWDER, LYOPHILIZED, FOR SOLUTION INTRAMUSCULAR; INTRAVENOUS
Status: DISCONTINUED | OUTPATIENT
Start: 2019-03-26 | End: 2019-03-27 | Stop reason: HOSPADM

## 2019-03-26 RX ORDER — METOPROLOL TARTRATE 1 MG/ML
5-15 INJECTION, SOLUTION INTRAVENOUS
Status: DISCONTINUED | OUTPATIENT
Start: 2019-03-26 | End: 2019-03-27 | Stop reason: HOSPADM

## 2019-03-26 RX ORDER — DIPHENHYDRAMINE HCL 25 MG
25 CAPSULE ORAL
Status: DISCONTINUED | OUTPATIENT
Start: 2019-03-26 | End: 2019-03-27 | Stop reason: HOSPADM

## 2019-03-26 RX ORDER — DIPHENHYDRAMINE HYDROCHLORIDE 50 MG/ML
25-50 INJECTION INTRAMUSCULAR; INTRAVENOUS
Status: DISCONTINUED | OUTPATIENT
Start: 2019-03-26 | End: 2019-03-27 | Stop reason: HOSPADM

## 2019-03-26 RX ORDER — ONDANSETRON 2 MG/ML
4 INJECTION INTRAMUSCULAR; INTRAVENOUS
Status: DISCONTINUED | OUTPATIENT
Start: 2019-03-26 | End: 2019-03-27 | Stop reason: HOSPADM

## 2019-03-26 RX ORDER — ACYCLOVIR 200 MG/1
0-1 CAPSULE ORAL
Status: DISCONTINUED | OUTPATIENT
Start: 2019-03-26 | End: 2019-03-27 | Stop reason: HOSPADM

## 2019-03-26 RX ORDER — METOPROLOL TARTRATE 50 MG
50-100 TABLET ORAL
Status: COMPLETED | OUTPATIENT
Start: 2019-03-26 | End: 2019-03-26

## 2019-03-26 RX ADMIN — METOPROLOL TARTRATE 100 MG: 50 TABLET ORAL at 14:00

## 2019-04-04 ENCOUNTER — OFFICE VISIT (OUTPATIENT)
Dept: INTERNAL MEDICINE | Facility: CLINIC | Age: 59
End: 2019-04-04
Payer: COMMERCIAL

## 2019-04-04 VITALS
DIASTOLIC BLOOD PRESSURE: 88 MMHG | HEART RATE: 80 BPM | BODY MASS INDEX: 30.21 KG/M2 | RESPIRATION RATE: 16 BRPM | WEIGHT: 204 LBS | HEIGHT: 69 IN | TEMPERATURE: 99.2 F | OXYGEN SATURATION: 97 % | SYSTOLIC BLOOD PRESSURE: 132 MMHG

## 2019-04-04 DIAGNOSIS — I25.10 CORONARY ARTERY DISEASE INVOLVING NATIVE CORONARY ARTERY OF NATIVE HEART, ANGINA PRESENCE UNSPECIFIED: ICD-10-CM

## 2019-04-04 DIAGNOSIS — I10 ESSENTIAL HYPERTENSION, BENIGN: ICD-10-CM

## 2019-04-04 DIAGNOSIS — L30.9 DERMATITIS: ICD-10-CM

## 2019-04-04 DIAGNOSIS — R94.39 ABNORMAL STRESS TEST: ICD-10-CM

## 2019-04-04 DIAGNOSIS — E78.5 HYPERLIPIDEMIA LDL GOAL <70: ICD-10-CM

## 2019-04-04 PROCEDURE — 99215 OFFICE O/P EST HI 40 MIN: CPT | Performed by: INTERNAL MEDICINE

## 2019-04-04 RX ORDER — ATORVASTATIN CALCIUM 80 MG/1
80 TABLET, FILM COATED ORAL DAILY
Qty: 30 TABLET | Refills: 11 | Status: SHIPPED | OUTPATIENT
Start: 2019-04-04 | End: 2019-06-10 | Stop reason: SINTOL

## 2019-04-04 RX ORDER — AMLODIPINE BESYLATE 5 MG/1
5 TABLET ORAL DAILY
Qty: 30 TABLET | Refills: 11 | Status: SHIPPED | OUTPATIENT
Start: 2019-04-04 | End: 2019-06-20 | Stop reason: ALTCHOICE

## 2019-04-04 ASSESSMENT — MIFFLIN-ST. JEOR: SCORE: 1735.72

## 2019-04-04 NOTE — PATIENT INSTRUCTIONS
Restart Amlodipine 5mg tab, 1 tab daily for blood pressure   Start Atorvastatin 80mg tab, 1 tab daily for cholesterol   Fasting lab in 1 month for cholesterol, liver   Appt with cardiology re: stress test, probable angiogram.   Call [phone] to schedule   Restart Aspirin 81mg daily for heart protection  Clotrimazole/ Lotrimin antifungal cream along with 1% hydrocortisone steroid cream. Mix a little of both together on finger and apply to rash area twice a day for up to 10 days,. If not resolved by then, let me know

## 2019-04-04 NOTE — PROGRESS NOTES
SUBJECTIVE:   Bryce Espinoza is a 58 year old male who presents to clinic today for the following health issues:  Chief Complaint   Patient presents with     Chest Pain     Derm Problem     x 1 days, Patient C/O reound red rash left armpit.  There is no tenderness when touched,       Pt's past medical history, family history, habits, medications and allergies were reviewed with the patient today.  See snap shot for  HCM status. Most recent lab results reviewed with pt. Problem list and histories reviewed & adjusted, as indicated.  Additional history as below:    Underwent previous stress test in Jan 2019 for some exertional chest pain that showed:    Impression       1. Exercise: Below average functional capacity, 96% maximum heart  rate still achieved. Baseline blood pressure 164/98, peak blood  pressure 230/100.       2. EKG: Normal baseline EKG, 2 mm of ST depression diffusely with  mostly horizontal ST segments, up to 2 mm ST elevation in aVR,  normalization of ST segments within 7 minutes of recovery       3. Symptoms: No chest pain during exercise  4. Myocardial perfusion imaging using single isotope technique  demonstrated normal perfusion, no ischemia or infarct.   5. Gated images demonstrated normal wall motion.  The left ventricular  systolic function is 53% at rest and with stress.  6. No previous study for comparison.     Perfusion is normal, however EKG shows diffuse ST segment depression.  This could be a response from the hypertensive response to exercise.  Multivessel disease with balanced ischemia is less likely. If  clinically indicated, consider cardiology consult, coronary CTA could  be a good study to more convincingly rule out coronary disease.       Because the study showed normal perfusion but abnormal EKG findings, CTA initially ordered but pt was thought to have too much calcium on coronary arteries by cardiology to undergo that test so order was changed by cardiology to a CT calcium  scoring test with results as as below:    Examination Date: 3/26/2019 3:37 PM      Clinical Information: CAD eval, prior stress imaging equivocal;  Intermittent exertional chest pain/shortness of breath. ST depression  on EKG portion of  NM stress test. Cardiology recommending CT Angio to  clarify possible 3V disease; Chest pain, unspecified type      Ordering Provider: Keely     PROCEDURE: High-resolution, ECG synchronized multi-slice computed  tomography was performed without incident. Coronary calcification was  analyzed using PK Clean calcium scoring software. Scan protocol was  optimized to minimize radiation exposure. The total radiation exposure  was calculated to be 32 DLP and 0.45 mSv.     A cardiac CT angiogram could not be completed as the patient had dense  coronary artery calcifications in the left main/ostial LAD preventing  evaluation of this territory. Therefore a calcium only score was  completed.     FINDINGS:     Overall quality of the study: Good.      CORONARY ARTERY CALCIUM SCORES:      Left main coronary artery: 41.3  Left anterior descending coronary artery: 258.94  Circumflex coronary artery: 11.04  Right coronary artery: 411.69     TOTAL CALCIUM SCORE: 722.97     The total Agatston calcium score is 722.97 placing the patient in the  96th percentile when compared to age and gender matched control group.     Please review Radiology report for incidental noncardiac findings that  will follow separately     CALCIUM SCORE OF >400: If the patient has more than one cardiac risk  factor then the risk factor ((male age >45, female age >55, diabetes,  hypertension, smoking, high cholesterol, low HDL, family history of  heart disease) of coronary heart disease, death or myocardial  infarction is 2.4% per year (DEEP Waldrop. et al. JACC, 2007;  49:378-402.) This represents a high and significant coronary  atherosclerotic plaque burden and is consistent with a high risk of  the presence of clinically  significant coronary artery disease.  Because of the risk for the presence of obstructive coronary disease,  the patient should consider further imaging studies or cardiac stress  test at the discretion of the physician. Alternatively, referral could  be made to a cardiologist for further evaluation. Consider starting  aspirin in doses between 81 and 325 mg if there is no  contraindication. If there are any cardiac symptoms (for example such  as chest pain/pressure or shortness of breath) then immediate medical  attention is necessary.      Lab Results   Component Value Date     12/01/2018     No results found for: A1C  Lab Results   Component Value Date    CHOL 216 12/01/2018     Lab Results   Component Value Date     12/01/2018     Lab Results   Component Value Date    HDL 41 12/01/2018     Lab Results   Component Value Date    TRIG 160 12/01/2018     Lab Results   Component Value Date    CR 0.68 12/01/2018     Lab Results   Component Value Date    ALT 30 12/01/2018     Lab Results   Component Value Date    AST 19 12/01/2018     No results found for: MICROL  Lab Results   Component Value Date    TSH 0.87 04/08/2015           Has not been exercising at all recently. Gets occ  Brief soreness/ache in chest with certain exertional activity such as lifting heavy things but says hard to say if muscle pull or other. Denies current chest pain or shortness of breath Never at rest  Previously on Amlodipine but stopped it on his own few mos ago. Denies side effects with med when taking. Intolerant of ACEI  Also has small superficial rash left axilla for last week or so. No insect bite. NO swelling or pain. No change deodorants    Additional ROS:   Constitutional, HEENT, Cardiovascular, Pulmonary, GI and , Neuro, MSK and Psych review of systems/symptoms are otherwise negative or unchanged from previous, except as noted above.      OBJECTIVE:  /88   Pulse 80   Temp 99.2  F (37.3  C) (Oral)   Resp 16    "Ht 1.753 m (5' 9\")   Wt 92.5 kg (204 lb)   SpO2 97%   BMI 30.13 kg/m     Estimated body mass index is 30.13 kg/m  as calculated from the following:    Height as of this encounter: 1.753 m (5' 9\").    Weight as of this encounter: 92.5 kg (204 lb).     Neck: no adenopathy. Thyroid normal to palpation. No bruits  Pulm: Lungs clear to auscultation   CV: Regular rates and rhythm  GI: Soft, nontender, Normal active bowel sounds, No hepatosplenomegaly or masses palpable  Ext: Peripheral pulses intact. No edema.  Neuro: Normal strength and tone, sensory exam grossly normal  MSK: No tenderness to palpation chest wall musculature  Skin: 4x7 cm area erythema left axilla. No underlying fluctuance or tenderness.     Assessment/Plan: (See plan discussion below for further details)  1. Essential hypertension, benign  Not controlled. Will restart Amlodipine. Will have recheck blood pressure when sees cardiology  - amLODIPine (NORVASC) 5 MG tablet; Take 1 tablet (5 mg) by mouth daily  Dispense: 30 tablet; Refill: 11    2. Hyperlipidemia LDL goal <70   With calcium score on CT for coronary arteries and possible exertional stable angina, will start high dose statin therapy. Recheck labs 1 month  - atorvastatin (LIPITOR) 80 MG tablet; Take 1 tablet (80 mg) by mouth daily  Dispense: 30 tablet; Refill: 11  - Lipid panel reflex to direct LDL Fasting; Future  - Comprehensive metabolic panel; Future  - CK total; Future    3. Abnormal stress test   Pt to see cardiology for probable coronary angio. No sx currently. Possible future beta blocker in addition  - CARDIOLOGY EVAL ADULT REFERRAL    4. Coronary artery disease involving native coronary artery of native heart, angina presence unspecified   Will restart ASA. Statin as above. Restart BP med control. ? Future BB. Will defer to cardiology depending on if angio done and results    5. Dermatitis   Probable mild tinea. Contact dermatitis less likely being one sided but will treat for both. " See plan discussion below.     Plan discussion:   Restart Amlodipine 5mg tab, 1 tab daily for blood pressure   Start Atorvastatin 80mg tab, 1 tab daily for cholesterol   Fasting lab in 1 month for cholesterol, liver   Appt with cardiology re: stress test for  probable coronary angiogram.   Call [phone] to schedule   Restart Aspirin 81mg daily for heart protection  Clotrimazole/ Lotrimin antifungal cream along with 1% hydrocortisone steroid cream. Mix a little of both together on finger and apply to rash area twice a day for up to 10 days,. If not resolved by then   pt to contact clinic            Remington Riggs MD  Internal Medicine Department  [facility]

## 2019-06-10 ENCOUNTER — TELEPHONE (OUTPATIENT)
Dept: INTERNAL MEDICINE | Facility: CLINIC | Age: 59
End: 2019-06-10

## 2019-06-10 NOTE — TELEPHONE ENCOUNTER
Remain off of Atorvastatin and restart Amlodipine to see if sx  reoccur. Most likely sx  related to Atorvastatin and not Amldipine if had similar in past with being on Atorvastatin (chart shows pt had been on Lipisor/ Atorvastatin [id number] and no mention then of muscle achiness and not on allergy list so that is why I chose to recently use Atorvastatin)  See me in a few weeks to review blood pressure with Amlodipine and call earlier if muscle achiness returns. Will discuss other cholesterol med options at f/u appt after seeing how does with Amlodipine alone

## 2019-06-10 NOTE — TELEPHONE ENCOUNTER
"Dr. Riggs,     Pt is calling with medication side effects. On 4/4/19, He started taking both amLODIPine (NORVASC) 5 MG & atorvastatin (LIPITOR) 80 MG tablet. And he says that after about 1 month on the meds he could tell \"bone pain\" was gradually starting. And after about 1.5 month \"he was miserable.\" He was: achy, sore, constant/continuous--did not go away, and nothing relieved the pain. It was hard to walk and says that he \"felt and looked like an old man walking.\"   \"Every bone and joint in my body hurt.\"  Pt stopped taking both meds about 2 weeks ago, and he is feeling much better. Says the bone pain has been slowly going away. It is only slightly there.  Pt says that he was on Lisinopril in the past and experienced similar symptoms - but they were not as bad as this pain he went through.   Also, says he is allergic to Lipitor - was on Lipitor in the past as well, and had bad reaction to it. (need to put on allergy list?)  "

## 2019-06-20 ENCOUNTER — OFFICE VISIT (OUTPATIENT)
Dept: INTERNAL MEDICINE | Facility: CLINIC | Age: 59
End: 2019-06-20
Payer: COMMERCIAL

## 2019-06-20 DIAGNOSIS — I25.10 CORONARY ARTERY DISEASE INVOLVING NATIVE CORONARY ARTERY OF NATIVE HEART WITHOUT ANGINA PECTORIS: ICD-10-CM

## 2019-06-20 DIAGNOSIS — E78.5 HYPERLIPIDEMIA LDL GOAL <70: ICD-10-CM

## 2019-06-20 DIAGNOSIS — I10 ESSENTIAL HYPERTENSION, BENIGN: Primary | ICD-10-CM

## 2019-06-20 PROCEDURE — 99214 OFFICE O/P EST MOD 30 MIN: CPT | Performed by: INTERNAL MEDICINE

## 2019-06-20 RX ORDER — AMLODIPINE BESYLATE 10 MG/1
10 TABLET ORAL DAILY
Qty: 30 TABLET | Refills: 11 | Status: SHIPPED | OUTPATIENT
Start: 2019-06-20 | End: 2019-08-22

## 2019-06-20 RX ORDER — ROSUVASTATIN CALCIUM 10 MG/1
10 TABLET, COATED ORAL DAILY
Qty: 30 TABLET | Refills: 11 | Status: SHIPPED | OUTPATIENT
Start: 2019-06-20 | End: 2019-08-22

## 2019-06-20 ASSESSMENT — MIFFLIN-ST. JEOR: SCORE: 1726.65

## 2019-06-20 NOTE — PROGRESS NOTES
Subjective     Bryce Espinoza is a 58 year old male who presents to clinic today for the following health issues:    HPI   Hyperlipidemia Follow-Up      Are you having any of the following symptoms? (Select all that apply)  No complaints of shortness of breath, chest pain or pressure.  No increased sweating or nausea with activity.  No left-sided neck or arm pain.  No complaints of pain in calves when walking 1-2 blocks.    Are you regularly taking any medication or supplement to lower your cholesterol?   No    Are you having muscle aches or other side effects that you think could be caused by your cholesterol lowering medication?  Yes- has now stopped Lipitor due to serious muscle pains      Hypertension Follow-up      Do you check your blood pressure regularly outside of the clinic? Yes     Are you following a low salt diet? No    Are your blood pressures ever more than 140 on the top number (systolic) OR more   than 90 on the bottom number (diastolic), for example 140/90? No    Amount of exercise or physical activity: 4-5 days/week for an average of all day for 10 hr shifts at work    Problems taking medications regularly: No    Medication side effects: muscle aches from med. Stopped taking    Diet: regular (no restrictions)    Pt's past medical history, family history, habits, medications and allergies were reviewed with the patient today.  See snap shot for  HCM status. Most recent lab results reviewed with pt. Problem list and histories reviewed & adjusted, as indicated.  Additional history as below:     Blood pressures 120-130/72-80 every night about 8PM when checked at home and stable per patient  Denies current chest pain, shortness of breath, abdominal pain, headache, vision changes or side effects with medications.  Patient was treated with high intensity 80 mg dose atorvastatin for hyperlipidemia with history of coronary disease but developed significant myalgias with this which stopped when he  "discontinued the medication  Previous stress testing did not show ischemia but there was question whether patient could have balanced three-vessel disease but given the test a false negative result.   Cardiac CT scan calcium scoring test was done which showed significant calcium burden    Lab Results   Component Value Date     12/01/2018     No results found for: A1C  Lab Results   Component Value Date    CHOL 216 12/01/2018     Lab Results   Component Value Date     12/01/2018     Lab Results   Component Value Date    HDL 41 12/01/2018     Lab Results   Component Value Date    TRIG 160 12/01/2018     Lab Results   Component Value Date    CR 0.68 12/01/2018     Lab Results   Component Value Date    ALT 30 12/01/2018     Lab Results   Component Value Date    AST 19 12/01/2018     No results found for: MICROL  Lab Results   Component Value Date    TSH 0.87 04/08/2015          Additional ROS:   Constitutional, HEENT, Cardiovascular, Pulmonary, GI and , Neuro, MSK and Psych review of systems/symptoms are otherwise negative or unchanged from previous, except as noted above.      OBJECTIVE:  /90   Pulse 73   Temp 97.8  F (36.6  C) (Oral)   Resp 16   Ht 1.753 m (5' 9\")   Wt 91.6 kg (202 lb)   SpO2 98%   BMI 29.83 kg/m     Estimated body mass index is 29.83 kg/m  as calculated from the following:    Height as of this encounter: 1.753 m (5' 9\").    Weight as of this encounter: 91.6 kg (202 lb).     Neck: no adenopathy. Thyroid normal to palpation. No bruits  Pulm: Lungs clear to auscultation   CV: Regular rates and rhythm  GI: Soft, nontender, Normal active bowel sounds, No hepatosplenomegaly or masses palpable  Ext: Peripheral pulses intact. No edema.   MSK: No tenderness to  palpation global musculature     Assessment/Plan: (See plan discussion below for further details)  1. Essential hypertension, benign  Uncontrolled.  Will increase amlodipine.  Possible future beta-blocker  - amLODIPine " (NORVASC) 10 MG tablet; Take 1 tablet (10 mg) by mouth daily  Dispense: 30 tablet; Refill: 11    2. Hyperlipidemia LDL goal <70  Uncontrolled.  Intolerant of atorvastatin.  Will try rosuvastatin.  Future labs in 6 weeks as previously ordered  - rosuvastatin (CRESTOR) 10 MG tablet; Take 1 tablet (10 mg) by mouth daily  Dispense: 30 tablet; Refill: 11    3. Coronary artery disease involving native coronary artery of native heart without angina pectoris  Previous mild exertional chest pain.  Denies currently.  Will see cardiology as below to discuss further    Plan discussion:   Increase Amlodipine to 5mg tab,   2 tabs (10mg) daily for blood pressure until run out. Then change to 10mg tab, 1 tab daily. Take in AM   In 2 weeks, then start Rosuvastatin (Crestor) 10mg tab, 1 tab daily for cholesterol. Future titration upward of Rosuvastatin as tolerate but since did not tolerate high dose Atorvastatin, starting lower for now.   6 weeks from now, then repeat a fasting lab test  Appt with cardiology to discuss possible angiogram and to check blood pressure. Call [phone] to schedule appt. Bring blood pressure machine to the appt to compare their reading and yours  If not able to see cardiology in the next 3-4 weeks, then see me back instead first to recheck blood pressure          Remington Riggs MD  Internal Medicine Department  [facility]    (Chart documentation was completed, in part, with [employer] voice-recognition software. Even though reviewed, some grammatical, spelling, and word errors may remain.)

## 2019-06-20 NOTE — PATIENT INSTRUCTIONS
Increase Amlodipine to 5mg tab,   2 tabs (10mg) daily for blood pressure until run out. Then change to 10mg tab, 1 tab daily. Take in AM   In 2 weeks, then start Rosuvastatin (Crestor) 10mg tab, 1 tab daily for cholesterol  In 6 weeks from now, then repeat a fasting lab test  Appt with cardiology to discuss possible angiogram and to check blood pressure. Call [phone] to schedule appt. Bring blood pressure machine to the appt to compare their reading and yours  If not able to see cardiology in the next 3-4 weeks, then see me back instead first to recheck blood pressure

## 2019-07-04 VITALS
HEIGHT: 69 IN | HEART RATE: 73 BPM | DIASTOLIC BLOOD PRESSURE: 90 MMHG | WEIGHT: 202 LBS | TEMPERATURE: 97.8 F | OXYGEN SATURATION: 98 % | RESPIRATION RATE: 16 BRPM | SYSTOLIC BLOOD PRESSURE: 152 MMHG | BODY MASS INDEX: 29.92 KG/M2

## 2019-07-04 PROBLEM — I25.10 CORONARY ARTERY DISEASE INVOLVING NATIVE CORONARY ARTERY OF NATIVE HEART WITHOUT ANGINA PECTORIS: Status: ACTIVE | Noted: 2019-07-04

## 2019-07-11 ENCOUNTER — TELEPHONE (OUTPATIENT)
Dept: INTERNAL MEDICINE | Facility: CLINIC | Age: 59
End: 2019-07-11

## 2019-07-11 NOTE — TELEPHONE ENCOUNTER
Would not recommend using Simvastatin for 2 reasons:  1.  Pt did not tolerate Atorvastaitn which is a fat soluble statin cholesterol med. Simvastatin (Zocor) is also a fat soluble statin. That is part of the reason why I chose Rosuvastatin for pt which is a water soluble statin instead so potentially less risk for causing muscle achiness like he had with Atorvastatin    2. Pt had muscle achiness when max dose Atorvastatin was prescribed (which was appropriate dose  with dx of coronary artery disease per  CT cardiac calcium scan). Pt needs to have  LDL cholesterol value driven < 70 which, based on most recent cholesterol values, would require him to also use max dose Simvastatin which increases the risk for muscle achiness side effects )since Simvastatin is a older and weaker statin). That is also why I chose Rosuvastatin which has a max dose of 40mg daily but should be able to get a reasonable drop in LDL cholesterol even with the current 10mg prescribed dose.    Unless pt is having new ,se with the Rosuvastatin, I recommend he stay with the current 10mg daily dose and recheck labs 1 month later  As previously instructed. If pt IS having new  Issues with the med, then I need to know what those issues are

## 2019-07-11 NOTE — TELEPHONE ENCOUNTER
Reason for Call:  Other call back    Detailed comments: Patient would like to change cholesterol medication to Simvastatin    Phone Number Patient can be reached at: Cell number on file:    Telephone Information:   Mobile [phone]       Best Time: Anytime    Can we leave a detailed message on this number? YES    Call taken on 7/11/2019 at 10:37 AM by John Bernstein

## 2019-07-19 ENCOUNTER — OFFICE VISIT (OUTPATIENT)
Dept: URGENT CARE | Facility: URGENT CARE | Age: 59
End: 2019-07-19
Payer: COMMERCIAL

## 2019-07-19 VITALS
SYSTOLIC BLOOD PRESSURE: 170 MMHG | HEIGHT: 69 IN | DIASTOLIC BLOOD PRESSURE: 87 MMHG | RESPIRATION RATE: 16 BRPM | BODY MASS INDEX: 29.92 KG/M2 | OXYGEN SATURATION: 100 % | WEIGHT: 202 LBS | HEART RATE: 80 BPM

## 2019-07-19 DIAGNOSIS — M25.50 MULTIPLE JOINT PAIN: Primary | ICD-10-CM

## 2019-07-19 LAB
ALBUMIN SERPL-MCNC: 3.9 G/DL (ref 3.4–5)
ALP SERPL-CCNC: 117 U/L (ref 40–150)
ALT SERPL W P-5'-P-CCNC: 32 U/L (ref 0–70)
ANION GAP SERPL CALCULATED.3IONS-SCNC: 5 MMOL/L (ref 3–14)
AST SERPL W P-5'-P-CCNC: 13 U/L (ref 0–45)
BASOPHILS # BLD AUTO: 0 10E9/L (ref 0–0.2)
BASOPHILS NFR BLD AUTO: 0.2 %
BILIRUB SERPL-MCNC: 0.3 MG/DL (ref 0.2–1.3)
BUN SERPL-MCNC: 20 MG/DL (ref 7–30)
CALCIUM SERPL-MCNC: 9 MG/DL (ref 8.5–10.1)
CHLORIDE SERPL-SCNC: 110 MMOL/L (ref 94–109)
CK SERPL-CCNC: 88 U/L (ref 30–300)
CO2 SERPL-SCNC: 26 MMOL/L (ref 20–32)
CREAT SERPL-MCNC: 0.64 MG/DL (ref 0.66–1.25)
DIFFERENTIAL METHOD BLD: ABNORMAL
EOSINOPHIL # BLD AUTO: 0.1 10E9/L (ref 0–0.7)
EOSINOPHIL NFR BLD AUTO: 0.7 %
ERYTHROCYTE [DISTWIDTH] IN BLOOD BY AUTOMATED COUNT: 13.6 % (ref 10–15)
ERYTHROCYTE [SEDIMENTATION RATE] IN BLOOD BY WESTERGREN METHOD: 9 MM/H (ref 0–20)
GFR SERPL CREATININE-BSD FRML MDRD: >90 ML/MIN/{1.73_M2}
GLUCOSE SERPL-MCNC: 89 MG/DL (ref 70–99)
HCT VFR BLD AUTO: 44.9 % (ref 40–53)
HGB BLD-MCNC: 15.2 G/DL (ref 13.3–17.7)
LYMPHOCYTES # BLD AUTO: 1.9 10E9/L (ref 0.8–5.3)
LYMPHOCYTES NFR BLD AUTO: 14.8 %
MAGNESIUM SERPL-MCNC: 2.4 MG/DL (ref 1.6–2.3)
MCH RBC QN AUTO: 32.1 PG (ref 26.5–33)
MCHC RBC AUTO-ENTMCNC: 33.9 G/DL (ref 31.5–36.5)
MCV RBC AUTO: 95 FL (ref 78–100)
MONOCYTES # BLD AUTO: 1.1 10E9/L (ref 0–1.3)
MONOCYTES NFR BLD AUTO: 8.9 %
NEUTROPHILS # BLD AUTO: 9.6 10E9/L (ref 1.6–8.3)
NEUTROPHILS NFR BLD AUTO: 75.4 %
PLATELET # BLD AUTO: 256 10E9/L (ref 150–450)
POTASSIUM SERPL-SCNC: 3.9 MMOL/L (ref 3.4–5.3)
PROT SERPL-MCNC: 7.1 G/DL (ref 6.8–8.8)
RBC # BLD AUTO: 4.73 10E12/L (ref 4.4–5.9)
SODIUM SERPL-SCNC: 141 MMOL/L (ref 133–144)
TSH SERPL DL<=0.005 MIU/L-ACNC: 0.82 MU/L (ref 0.4–4)
WBC # BLD AUTO: 12.8 10E9/L (ref 4–11)

## 2019-07-19 PROCEDURE — 86431 RHEUMATOID FACTOR QUANT: CPT | Performed by: FAMILY MEDICINE

## 2019-07-19 PROCEDURE — 84443 ASSAY THYROID STIM HORMONE: CPT | Performed by: FAMILY MEDICINE

## 2019-07-19 PROCEDURE — 80053 COMPREHEN METABOLIC PANEL: CPT | Performed by: FAMILY MEDICINE

## 2019-07-19 PROCEDURE — 82607 VITAMIN B-12: CPT | Performed by: FAMILY MEDICINE

## 2019-07-19 PROCEDURE — 85652 RBC SED RATE AUTOMATED: CPT | Performed by: FAMILY MEDICINE

## 2019-07-19 PROCEDURE — 99213 OFFICE O/P EST LOW 20 MIN: CPT | Performed by: FAMILY MEDICINE

## 2019-07-19 PROCEDURE — 82550 ASSAY OF CK (CPK): CPT | Performed by: FAMILY MEDICINE

## 2019-07-19 PROCEDURE — 86618 LYME DISEASE ANTIBODY: CPT | Performed by: FAMILY MEDICINE

## 2019-07-19 PROCEDURE — 83735 ASSAY OF MAGNESIUM: CPT | Performed by: FAMILY MEDICINE

## 2019-07-19 PROCEDURE — 36415 COLL VENOUS BLD VENIPUNCTURE: CPT | Performed by: FAMILY MEDICINE

## 2019-07-19 PROCEDURE — 86140 C-REACTIVE PROTEIN: CPT | Performed by: FAMILY MEDICINE

## 2019-07-19 PROCEDURE — 85025 COMPLETE CBC W/AUTO DIFF WBC: CPT | Performed by: FAMILY MEDICINE

## 2019-07-19 RX ORDER — ATORVASTATIN CALCIUM 80 MG/1
TABLET, FILM COATED ORAL
Refills: 11 | COMMUNITY
Start: 2019-05-12 | End: 2019-08-22

## 2019-07-19 RX ORDER — AMLODIPINE BESYLATE 5 MG/1
TABLET ORAL
Refills: 11 | Status: ON HOLD | COMMUNITY
Start: 2019-05-12 | End: 2019-09-25

## 2019-07-19 ASSESSMENT — MIFFLIN-ST. JEOR: SCORE: 1726.65

## 2019-07-20 LAB
CRP SERPL-MCNC: 5.9 MG/L (ref 0–8)
VIT B12 SERPL-MCNC: 292 PG/ML (ref 193–986)

## 2019-07-20 NOTE — PROGRESS NOTES
"SUBJECTIVE:                  Bryce Espinoza is a 58 year old male patient, here alone, in today for:  Diffuse joint pain. Having pain in his Rt. Leg.  No swelling of leg.  Feels like the pain is moving around. Has been in his legs. Has been in his upper extremities also.  Whole body aches. No fevers.  Increase in fatigue.      Was placed on blood pressure medication and cholesterol medication. Stopped both of them with no change in symptoms. Does not want to restart either medication.      Was in [place], [place] about a month ago. Feels like the pain started about the-but that was also when he was started on Cholesterol medication. Friend that was with him on that trip was recently diagnosed with Lyme's disease.  No known tick bites but was in the woods.      No CP, SOB.  Tried some Ibuprofen about an hour before came in. No rashes.      Seeing Cardiology in August. Has a strong family history of CAD.     Problem list and histories reviewed & adjusted, as indicated.    OBJECTIVE:                       /87 (BP Location: Left arm, Patient Position: Sitting, Cuff Size: Adult Regular)   Pulse 80   Resp 16   Ht 1.753 m (5' 9\")   Wt 91.6 kg (202 lb)   SpO2 100%   BMI 29.83 kg/m    GENERAL: no apparent distress  EYES: Conjunctiva are not injected, no discharge.  EARS: Left TM -no erythema, no effusion,  not bulged.               Right TM -no erythema, no effusion,  not bulged.  NOSE: no discharge, no sinus tenderness  THROAT: no erythema, no exudate, no lesions  NECK: supple, no adenopathy.  CARDIAC: regular rate and rhythm, no murmur  RESP: clear, no wheezing, no rales, no rhonchi  ABD: soft, no distension, no tenderness  SKIN: No rashes. No joint swelling or warmth.       Diagnostic testing:(labs, x-rays, EKG) - Multiple labs drawn today    ASSESSMENT/PLAN:                       (M25.50) Multiple joint pain  (primary encounter diagnosis)  Comment: ?etiology. Will await lab results. ?Lymes.  Plan: CBC with " platelets and differential,         Comprehensive metabolic panel (BMP + Alb, Alk         Phos, ALT, AST, Total. Bili, TP), ESR:         Erythrocyte sedimentation rate, TSH with free         T4 reflex, Rheumatoid factor, CRP,         inflammation, Lyme Disease Skye with reflex to         WB Serum, CK total, Magnesium, Vitamin B12          HTN-BP elevated today. Has been off of medication. Offered to switch to different medication. Patient does not want to start anything new at this time.  Encouraged patient to follow up with PCP in 3-5 days to recheck BP and follow up on labs.  Return sooner if symptoms worsening.     Symptomatic cares and fever control(if indicated) discussed.  Risks and benefits of meds discussed.    Karen Weiler, MD  [facility]

## 2019-07-22 LAB
B BURGDOR IGG+IGM SER QL: 0.02 (ref 0–0.89)
RHEUMATOID FACT SER NEPH-ACNC: <20 IU/ML (ref 0–20)

## 2019-08-22 ENCOUNTER — OFFICE VISIT (OUTPATIENT)
Dept: CARDIOLOGY | Facility: CLINIC | Age: 59
End: 2019-08-22
Attending: INTERNAL MEDICINE
Payer: COMMERCIAL

## 2019-08-22 VITALS
DIASTOLIC BLOOD PRESSURE: 92 MMHG | HEART RATE: 76 BPM | WEIGHT: 208 LBS | BODY MASS INDEX: 30.81 KG/M2 | HEIGHT: 69 IN | SYSTOLIC BLOOD PRESSURE: 160 MMHG

## 2019-08-22 DIAGNOSIS — E78.5 HYPERLIPIDEMIA LDL GOAL <70: ICD-10-CM

## 2019-08-22 DIAGNOSIS — I25.10 CORONARY ARTERY DISEASE INVOLVING NATIVE CORONARY ARTERY OF NATIVE HEART WITHOUT ANGINA PECTORIS: Primary | ICD-10-CM

## 2019-08-22 PROCEDURE — 93000 ELECTROCARDIOGRAM COMPLETE: CPT | Performed by: INTERNAL MEDICINE

## 2019-08-22 PROCEDURE — 99205 OFFICE O/P NEW HI 60 MIN: CPT | Performed by: INTERNAL MEDICINE

## 2019-08-22 RX ORDER — METOPROLOL TARTRATE 25 MG/1
25 TABLET, FILM COATED ORAL 2 TIMES DAILY
Qty: 60 TABLET | Refills: 3 | Status: ON HOLD | OUTPATIENT
Start: 2019-08-22 | End: 2019-09-30

## 2019-08-22 RX ORDER — ROSUVASTATIN CALCIUM 10 MG/1
10 TABLET, COATED ORAL DAILY
Qty: 30 TABLET | Refills: 11 | Status: SHIPPED | OUTPATIENT
Start: 2019-08-22 | End: 2019-09-13

## 2019-08-22 ASSESSMENT — MIFFLIN-ST. JEOR: SCORE: 1753.86

## 2019-08-22 NOTE — LETTER
8/22/2019      Remington Riggs MD  [address] [facility] [address]      RE: Bryce Espinoza       Dear Colleague,    I had the pleasure of seeing Bryce Espinoza in the [facility].    Service Date: 08/22/2019      HISTORY OF PRESENT ILLNESS:  This is a pleasant 58-year-old gentleman who is being referred to me by Dr. Riggs.  He has a history of hypertension and hyperlipidemia.  However, he says his home blood pressures have been fine.        He says many years ago, he was taking atorvastatin that he stopped taking.  He has a strong family history of coronary disease with his dad dying suddenly at a young age and both his siblings, which are near the same age as he is, have had bypass surgery.  He used to be a smoker.  He quit about 5 years ago.      In 01/2019, he had a stress test done for chest discomfort that showed normal perfusion, but showed markedly positive and ECG.  Subsequently, CT was recommended and the CT was not completed because the calcium score was close to 700.  He was prescribed atorvastatin 80 mg and amlodipine 10 mg, but he started having severe myalgias and ended up stopping both of those medications after a month.  Then he was prescribed rosuvastatin 10 mg, which he did not take because of concerns of this.  He has a past history of bladder cancer that he says has been taken care of many years ago.      Today he is seeking cardiology consultation for these problems.  As mentioned, his blood pressure is elevated in clinic today, but says at home readings are fine and he showed me pictures of that and all of them are in the 110s to 120s range.  He attributes to white coat hypertension.  He denies classic symptoms of angina, but says he has periodic chest discomfort and heart burning on and off, sometimes at rest but sometimes with activity.  He gets short winded and he is slowing down.  He denies any lower extremity edema, syncope or presyncope.      PHYSICAL  EXAMINATION:   VITAL SIGNS:  Blood pressure is 160/90 with a pulse of 76.   GENERAL:  Alert and oriented x3, in no acute distress.   NECK:  Supple.  JVP is normal.   LUNGS:  Clear.   CARDIAC:  Positive bowel sounds are regular, soft systolic murmur.  No rubs or gallops.   EXTREMITIES:  Warm without edema.   PSYCHIATRIC:  Appropriate affect.   ABDOMEN:  Nontender.   HEENT:  No icterus or pallor.      PERTINENT DATA:  Last LDL as of 12/2018 showed 143, HDL was 41.  Total cholesterol 216.  Creatinine last month was normal at 0.6, potassium 3.9, hemoglobin and platelets are okay.  Nuclear stress test and CT coronary calcium as mentioned in the HPI above.  ECG shows normal sinus rhythm with nonspecific ST-T changes with subtle ST depression likely nondiagnostic.      ASSESSMENT AND PLAN:  Bryce Espinoza is a pleasant 58-year-old gentleman who is complaining of symptoms of atypical angina with a positive stress test and a significantly elevated calcium score of 700.  Given his strong family history with both his brothers having bypass in the 60s he says and the fact that he has been a prior smoker with uncontrolled hyperlipidemia, at this time, I recommend coronary angiography to invasively assess coronary anatomy with intent to revascularize as indicated.  He understands the risk of the procedure including need for dual antiplatelet therapy thereafter.        At this time, we will start off with an echocardiogram.  He takes aspirin every day.  I have told him to start taking Crestor 10 mg.  He was somewhat concerned about the side effects.  I have told him to take it 3 times a week for now and then if he is tolerating it, then go to every day.  If he does not tolerate rosuvastatin, then he needs to get on the pravastatin or PCSK9 inhibitor, depending on what we find.        I will start him on metoprolol 25 mg b.i.d. at this time for borderline elevated blood pressure and presumed coronary disease.  He will also follow up  with Dr. Riggs, but we will have him see our ABBEY here after the catheterization in about 1 month and I will see him back in 3 months to close the loop.        He has been informed that if he has any evidence of resting chest pain, he needs to come into the ED. Risks and benefits of the procedure were discussed with the patient in detail that includes but not limited to the risk of stroke, heart attack, death, cardiac or vascular perforation, emergent stenting or bypass, contrast induced allergic reaction, renal dysfunction (including risks of temporary or permanent dialysis), and pulmonary, sedation, and vascular complications (including bleeding and transfusion). Patient denies any major active bleeding issues and is willing to take and comply with dual antiplatelet therapy and understands associated bleeding risks. Patient understands the overall risks of the procedure and wishes to proceed.     cc:      Remington Riggs MD    [facility]   [address] [place] [id number]         CHRISTINA CHEW MD             D: 2019   T: 2019   MT:       Name:     RIA CHACON   MRN:      [id number]        Account:      [id number]   :      1960           Service Date: 2019      Document: [id number]           Outpatient Encounter Medications as of 2019   Medication Sig Dispense Refill     ASPIRIN 81 MG OR TABS 1 tab po QD (Once per day) 30 12     Cholecalciferol (VITAMIN D) 2000 UNITS tablet Take 2,000 Units by mouth daily       metoprolol tartrate (LOPRESSOR) 25 MG tablet Take 1 tablet (25 mg) by mouth 2 times daily 60 tablet 3     rosuvastatin (CRESTOR) 10 MG tablet Take 1 tablet (10 mg) by mouth daily 30 tablet 11     amLODIPine (NORVASC) 5 MG tablet   11     [DISCONTINUED] amLODIPine (NORVASC) 10 MG tablet Take 1 tablet (10 mg) by mouth daily (Patient not taking: Reported on 2019) 30 tablet 11     [DISCONTINUED] atorvastatin (LIPITOR) 80 MG tablet   11     [DISCONTINUED]  rosuvastatin (CRESTOR) 10 MG tablet Take 1 tablet (10 mg) by mouth daily (Patient not taking: Reported on 8/22/2019) 30 tablet 11     No facility-administered encounter medications on file as of 8/22/2019.        Again, thank you for allowing me to participate in the care of your patient.      Sincerely,    Fetlon Blake MD     [facility]

## 2019-08-22 NOTE — PROGRESS NOTES
HPI and Plan:   See dictation [id number]    Orders Placed This Encounter   Procedures     Follow-Up with Cardiac Advanced Practice Provider     Follow-Up with Cardiologist     EKG 12-lead complete w/read - Clinics (performed today)     Echocardiogram Complete     Orders Placed This Encounter   Medications     rosuvastatin (CRESTOR) 10 MG tablet     Sig: Take 1 tablet (10 mg) by mouth daily     Dispense:  30 tablet     Refill:  11     metoprolol tartrate (LOPRESSOR) 25 MG tablet     Sig: Take 1 tablet (25 mg) by mouth 2 times daily     Dispense:  60 tablet     Refill:  3     Medications Discontinued During This Encounter   Medication Reason     amLODIPine (NORVASC) 10 MG tablet      atorvastatin (LIPITOR) 80 MG tablet      rosuvastatin (CRESTOR) 10 MG tablet Reorder         Encounter Diagnoses   Name Primary?     Coronary artery disease involving native coronary artery of native heart without angina pectoris Yes     Hyperlipidemia LDL goal <70        CURRENT MEDICATIONS:  Current Outpatient Medications   Medication Sig Dispense Refill     ASPIRIN 81 MG OR TABS 1 tab po QD (Once per day) 30 12     Cholecalciferol (VITAMIN D) 2000 UNITS tablet Take 2,000 Units by mouth daily       metoprolol tartrate (LOPRESSOR) 25 MG tablet Take 1 tablet (25 mg) by mouth 2 times daily 60 tablet 3     rosuvastatin (CRESTOR) 10 MG tablet Take 1 tablet (10 mg) by mouth daily 30 tablet 11     amLODIPine (NORVASC) 5 MG tablet   11       ALLERGIES     Allergies   Allergen Reactions     Atorvastatin      Myalgias     Lisinopril      Body aches pt d/mylene  06/2015       PAST MEDICAL HISTORY:  Past Medical History:   Diagnosis Date     Coronary artery disease involving native coronary artery of native heart without angina pectoris 7/4/2019    Per CT calcium score of 722.97 3/2019     Essential hypertension, benign      Hematuria      Malignant neoplasm of bladder, 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     Olecranon bursitis  3/01    right     Other and unspecified hyperlipidemia      RIGHT LUNG CALCIFIED GRANULOMA     RML     Tobacco use disorder     Quit      Unspecified hemorrhoids without mention of complication      Unspecified hereditary and idiopathic peripheral neuropathy        PAST SURGICAL HISTORY:  Past Surgical History:   Procedure Laterality Date     C NONSPECIFIC PROCEDURE      EBCT       FAMILY HISTORY:  Family History   Problem Relation Age of Onset     Arthritis Mother         RA     Hypertension Mother      Breast Cancer Mother      Prostate Cancer Maternal Grandfather         80     Cancer Maternal Grandmother         Lung     Coronary Artery Disease Father      Other - See Comments Brother      Diabetes Brother      Genetic Disorder Son        SOCIAL HISTORY:  Social History     Socioeconomic History     Marital status:      Spouse name: None     Number of children: None     Years of education: None     Highest education level: None   Occupational History     None   Social Needs     Financial resource strain: None     Food insecurity:     Worry: None     Inability: None     Transportation needs:     Medical: None     Non-medical: None   Tobacco Use     Smoking status: Former Smoker     Packs/day: 0.50     Years: 25.00     Pack years: 12.50     Last attempt to quit: 2013     Years since quittin.5     Smokeless tobacco: Never Used   Substance and Sexual Activity     Alcohol use: Yes     Comment: 4-5 drinks per week     Drug use: Yes     Comment: nica taylor     Sexual activity: Yes   Lifestyle     Physical activity:     Days per week: None     Minutes per session: None     Stress: None   Relationships     Social connections:     Talks on phone: None     Gets together: None     Attends [employer] service: None     Active member of club or organization: None     Attends meetings of clubs or organizations: None     Relationship status: None     Intimate partner violence:     Fear of current  "or ex partner: None     Emotionally abused: None     Physically abused: None     Forced sexual activity: None   Other Topics Concern     Parent/sibling w/ CABG, MI or angioplasty before 65F 55M? Not Asked   Social History Narrative     None       Review of Systems:  Skin:  Negative     Eyes:  Negative    ENT:  Negative    Respiratory:  Negative    Cardiovascular:  Negative    Gastroenterology: Negative    Genitourinary:  Negative    Musculoskeletal:  Positive for nocturnal cramping  Neurologic:  Negative    Psychiatric:  Negative    Heme/Lymph/Imm:  Negative    Endocrine:  Negative      Physical Exam:  Vitals: BP (!) 160/92   Pulse 76   Ht 1.753 m (5' 9\")   Wt 94.3 kg (208 lb)   BMI 30.72 kg/m      Recent Lab Results:  LIPID RESULTS:  Lab Results   Component Value Date    CHOL 216 (H) 12/01/2018    HDL 41 12/01/2018     (H) 12/01/2018    TRIG 160 (H) 12/01/2018    CHOLHDLRATIO 5.3 (H) 04/08/2015       LIVER ENZYME RESULTS:  Lab Results   Component Value Date    AST 13 07/19/2019    ALT 32 07/19/2019       CBC RESULTS:  Lab Results   Component Value Date    WBC 12.8 (H) 07/19/2019    RBC 4.73 07/19/2019    HGB 15.2 07/19/2019    HCT 44.9 07/19/2019    MCV 95 07/19/2019    MCH 32.1 07/19/2019    MCHC 33.9 07/19/2019    RDW 13.6 07/19/2019     07/19/2019       BMP RESULTS:  Lab Results   Component Value Date     07/19/2019    POTASSIUM 3.9 07/19/2019    CHLORIDE 110 (H) 07/19/2019    CO2 26 07/19/2019    ANIONGAP 5 07/19/2019    GLC 89 07/19/2019    BUN 20 07/19/2019    CR 0.64 (L) 07/19/2019    GFRESTIMATED >90 07/19/2019    GFRESTBLACK >90 07/19/2019    KELLY 9.0 07/19/2019        A1C RESULTS:  No results found for: A1C    INR RESULTS:  No results found for: INR        CC  Remington Riggs MD  [address] [place] [address]                  "

## 2019-08-22 NOTE — PROGRESS NOTES
Service Date: 08/22/2019      HISTORY OF PRESENT ILLNESS:  This is a pleasant 58-year-old gentleman who is being referred to me by Dr. Riggs.  He has a history of hypertension and hyperlipidemia.  However, he says his home blood pressures have been fine.        He says many years ago, he was taking atorvastatin that he stopped taking.  He has a strong family history of coronary disease with his dad dying suddenly at a young age and both his siblings, which are near the same age as he is, have had bypass surgery.  He used to be a smoker.  He quit about 5 years ago.      In 01/2019, he had a stress test done for chest discomfort that showed normal perfusion, but showed markedly positive and ECG.  Subsequently, CT was recommended and the CT was not completed because the calcium score was close to 700.  He was prescribed atorvastatin 80 mg and amlodipine 10 mg, but he started having severe myalgias and ended up stopping both of those medications after a month.  Then he was prescribed rosuvastatin 10 mg, which he did not take because of concerns of this.  He has a past history of bladder cancer that he says has been taken care of many years ago.      Today he is seeking cardiology consultation for these problems.  As mentioned, his blood pressure is elevated in clinic today, but says at home readings are fine and he showed me pictures of that and all of them are in the 110s to 120s range.  He attributes to white coat hypertension.  He denies classic symptoms of angina, but says he has periodic chest discomfort and heart burning on and off, sometimes at rest but sometimes with activity.  He gets short winded and he is slowing down.  He denies any lower extremity edema, syncope or presyncope.      PHYSICAL EXAMINATION:   VITAL SIGNS:  Blood pressure is 160/90 with a pulse of 76.   GENERAL:  Alert and oriented x3, in no acute distress.   NECK:  Supple.  JVP is normal.   LUNGS:  Clear.   CARDIAC:  Positive bowel sounds are  regular, soft systolic murmur.  No rubs or gallops.   EXTREMITIES:  Warm without edema.   PSYCHIATRIC:  Appropriate affect.   ABDOMEN:  Nontender.   HEENT:  No icterus or pallor.      PERTINENT DATA:  Last LDL as of 12/2018 showed 143, HDL was 41.  Total cholesterol 216.  Creatinine last month was normal at 0.6, potassium 3.9, hemoglobin and platelets are okay.  Nuclear stress test and CT coronary calcium as mentioned in the HPI above.  ECG shows normal sinus rhythm with nonspecific ST-T changes with subtle ST depression likely nondiagnostic.      ASSESSMENT AND PLAN:  Bryce Espinoza is a pleasant 58-year-old gentleman who is complaining of symptoms of atypical angina with a positive stress test and a significantly elevated calcium score of 700.  Given his strong family history with both his brothers having bypass in the 60s he says and the fact that he has been a prior smoker with uncontrolled hyperlipidemia, at this time, I recommend coronary angiography to invasively assess coronary anatomy with intent to revascularize as indicated.  He understands the risk of the procedure including need for dual antiplatelet therapy thereafter.        At this time, we will start off with an echocardiogram.  He takes aspirin every day.  I have told him to start taking Crestor 10 mg.  He was somewhat concerned about the side effects.  I have told him to take it 3 times a week for now and then if he is tolerating it, then go to every day.  If he does not tolerate rosuvastatin, then he needs to get on the pravastatin or PCSK9 inhibitor, depending on what we find.        I will start him on metoprolol 25 mg b.i.d. at this time for borderline elevated blood pressure and presumed coronary disease.  He will also follow up with Dr. Riggs, but we will have him see our ABBEY here after the catheterization in about 1 month and I will see him back in 3 months to close the loop.        He has been informed that if he has any evidence of resting  chest pain, he needs to come into the ED. Risks and benefits of the procedure were discussed with the patient in detail that includes but not limited to the risk of stroke, heart attack, death, cardiac or vascular perforation, emergent stenting or bypass, contrast induced allergic reaction, renal dysfunction (including risks of temporary or permanent dialysis), and pulmonary, sedation, and vascular complications (including bleeding and transfusion). Patient denies any major active bleeding issues and is willing to take and comply with dual antiplatelet therapy and understands associated bleeding risks. Patient understands the overall risks of the procedure and wishes to proceed.     cc:      Remington Riggs MD    [facility]   [address] [place] [id number]         CHRISTINA CHEW MD             D: 2019   T: 2019   MT: JUSTYNA      Name:     RIA CHACON   MRN:      [id number]        Account:      [id number]   :      1960           Service Date: 2019      Document: [id number]

## 2019-08-22 NOTE — LETTER
8/22/2019    Remington Riggs MD  [address] [facility] [address]    RE: Bryce Espinoza       Dear Colleague,    I had the pleasure of seeing Bryce Espinoza in the [facility].    HPI and Plan:   See dictation [id number]    Orders Placed This Encounter   Procedures     Follow-Up with Cardiac Advanced Practice Provider     Follow-Up with Cardiologist     EKG 12-lead complete w/read - Clinics (performed today)     Echocardiogram Complete     Orders Placed This Encounter   Medications     rosuvastatin (CRESTOR) 10 MG tablet     Sig: Take 1 tablet (10 mg) by mouth daily     Dispense:  30 tablet     Refill:  11     metoprolol tartrate (LOPRESSOR) 25 MG tablet     Sig: Take 1 tablet (25 mg) by mouth 2 times daily     Dispense:  60 tablet     Refill:  3     Medications Discontinued During This Encounter   Medication Reason     amLODIPine (NORVASC) 10 MG tablet      atorvastatin (LIPITOR) 80 MG tablet      rosuvastatin (CRESTOR) 10 MG tablet Reorder         Encounter Diagnoses   Name Primary?     Coronary artery disease involving native coronary artery of native heart without angina pectoris Yes     Hyperlipidemia LDL goal <70        CURRENT MEDICATIONS:  Current Outpatient Medications   Medication Sig Dispense Refill     ASPIRIN 81 MG OR TABS 1 tab po QD (Once per day) 30 12     Cholecalciferol (VITAMIN D) 2000 UNITS tablet Take 2,000 Units by mouth daily       metoprolol tartrate (LOPRESSOR) 25 MG tablet Take 1 tablet (25 mg) by mouth 2 times daily 60 tablet 3     rosuvastatin (CRESTOR) 10 MG tablet Take 1 tablet (10 mg) by mouth daily 30 tablet 11     amLODIPine (NORVASC) 5 MG tablet   11       ALLERGIES     Allergies   Allergen Reactions     Atorvastatin      Myalgias     Lisinopril      Body aches pt d/mylene  06/2015       PAST MEDICAL HISTORY:  Past Medical History:   Diagnosis Date     Coronary artery disease involving native coronary artery of native heart without angina pectoris 7/4/2019     Per CT calcium score of 722.97 3/2019     Essential hypertension, benign      Hematuria      Malignant neoplasm of bladder, part unspecified     Transitional Cell Carcinoma bladder     Olecranon bursitis 3/01    right     Other and unspecified hyperlipidemia      RIGHT LUNG CALCIFIED GRANULOMA     RML     Tobacco use disorder     Quit      Unspecified hemorrhoids without mention of complication      Unspecified hereditary and idiopathic peripheral neuropathy        PAST SURGICAL HISTORY:  Past Surgical History:   Procedure Laterality Date     C NONSPECIFIC PROCEDURE      EBCT       FAMILY HISTORY:  Family History   Problem Relation Age of Onset     Arthritis Mother         RA     Hypertension Mother      Breast Cancer Mother      Prostate Cancer Maternal Grandfather         80     Cancer Maternal Grandmother         Lung     Coronary Artery Disease Father      Other - See Comments Brother      Diabetes Brother      Genetic Disorder Son        SOCIAL HISTORY:  Social History     Socioeconomic History     Marital status:      Spouse name: None     Number of children: None     Years of education: None     Highest education level: None   Occupational History     None   Social Needs     Financial resource strain: None     Food insecurity:     Worry: None     Inability: None     Transportation needs:     Medical: None     Non-medical: None   Tobacco Use     Smoking status: Former Smoker     Packs/day: 0.50     Years: 25.00     Pack years: 12.50     Last attempt to quit: 2013     Years since quittin.5     Smokeless tobacco: Never Used   Substance and Sexual Activity     Alcohol use: Yes     Comment: 4-5 drinks per week     Drug use: Yes     Comment: nica taylor     Sexual activity: Yes   Lifestyle     Physical activity:     Days per week: None     Minutes per session: None     Stress: None   Relationships     Social connections:     Talks on phone: None     Gets together: None      "Attends Yazidism service: None     Active member of club or organization: None     Attends meetings of clubs or organizations: None     Relationship status: None     Intimate partner violence:     Fear of current or ex partner: None     Emotionally abused: None     Physically abused: None     Forced sexual activity: None   Other Topics Concern     Parent/sibling w/ CABG, MI or angioplasty before 65F 55M? Not Asked   Social History Narrative     None       Review of Systems:  Skin:  Negative     Eyes:  Negative    ENT:  Negative    Respiratory:  Negative    Cardiovascular:  Negative    Gastroenterology: Negative    Genitourinary:  Negative    Musculoskeletal:  Positive for nocturnal cramping  Neurologic:  Negative    Psychiatric:  Negative    Heme/Lymph/Imm:  Negative    Endocrine:  Negative      Physical Exam:  Vitals: BP (!) 160/92   Pulse 76   Ht 1.753 m (5' 9\")   Wt 94.3 kg (208 lb)   BMI 30.72 kg/m       Recent Lab Results:  LIPID RESULTS:  Lab Results   Component Value Date    CHOL 216 (H) 12/01/2018    HDL 41 12/01/2018     (H) 12/01/2018    TRIG 160 (H) 12/01/2018    CHOLHDLRATIO 5.3 (H) 04/08/2015       LIVER ENZYME RESULTS:  Lab Results   Component Value Date    AST 13 07/19/2019    ALT 32 07/19/2019       CBC RESULTS:  Lab Results   Component Value Date    WBC 12.8 (H) 07/19/2019    RBC 4.73 07/19/2019    HGB 15.2 07/19/2019    HCT 44.9 07/19/2019    MCV 95 07/19/2019    MCH 32.1 07/19/2019    MCHC 33.9 07/19/2019    RDW 13.6 07/19/2019     07/19/2019       BMP RESULTS:  Lab Results   Component Value Date     07/19/2019    POTASSIUM 3.9 07/19/2019    CHLORIDE 110 (H) 07/19/2019    CO2 26 07/19/2019    ANIONGAP 5 07/19/2019    GLC 89 07/19/2019    BUN 20 07/19/2019    CR 0.64 (L) 07/19/2019    GFRESTIMATED >90 07/19/2019    GFRESTBLACK >90 07/19/2019    KELLY 9.0 07/19/2019        A1C RESULTS:  No results found for: A1C    INR RESULTS:  No results found for: INR        CC  Remington CARMONA " MD [facility]  [address] [place] [id number]                    Thank you for allowing me to participate in the care of your patient.      Sincerely,     Felton Blake MD     [facility]    cc:   Remington Rigsg MD  [address] [place] [id number]

## 2019-09-04 ENCOUNTER — CARE COORDINATION (OUTPATIENT)
Dept: CARDIOLOGY | Facility: CLINIC | Age: 59
End: 2019-09-04

## 2019-09-04 ENCOUNTER — HOSPITAL ENCOUNTER (OUTPATIENT)
Dept: CARDIOLOGY | Facility: CLINIC | Age: 59
Discharge: HOME | End: 2019-09-04
Attending: INTERNAL MEDICINE | Admitting: INTERNAL MEDICINE
Payer: COMMERCIAL

## 2019-09-04 DIAGNOSIS — I25.10 CORONARY ARTERY DISEASE INVOLVING NATIVE CORONARY ARTERY OF NATIVE HEART WITHOUT ANGINA PECTORIS: ICD-10-CM

## 2019-09-04 DIAGNOSIS — E78.5 HYPERLIPIDEMIA LDL GOAL <70: ICD-10-CM

## 2019-09-04 PROCEDURE — 93306 TTE W/DOPPLER COMPLETE: CPT | Mod: 26 | Performed by: INTERNAL MEDICINE

## 2019-09-04 PROCEDURE — 93306 TTE W/DOPPLER COMPLETE: CPT

## 2019-09-04 NOTE — PROGRESS NOTES
Call to pt to review left heart cath pre-procedure instructions.     Pt scheduled for LHC at [facility] on 9/6/19, arrival at 0630 and procedure scheduled for 0830. Pt said he was unable to talk right now and requested a call back in 1-2 hours. I told pt the office would be closed and nobody would be here at that time. Pt said around 10am tomorrow morning he will be available. Reminder sent to Team RN board to call pt tomorrow around 10am to review prep and then enter the Cath order set.     Also, pt had echo done 9/4 which can be reviewed as well. Any Avilez RN on 9/4/2019 at 4:56 PM      Hospital: [facility]  Arrival Time: 0630   to/from procedure?   Responsible adult with patient 24hrs post procedure?   NPO starting at midnight (except allowable meds morning of procedure with small sip of water) reviewed with patient?   On Anticoagulant?   Diabetic?   On Aspirin?   Any other Meds to Hold (Such as phosphodiesterase type 5 inhibitors (Viagra/Cialis/Levitra) (or diuretics)?   Contrast Dye Allergy?

## 2019-09-05 ENCOUNTER — TELEPHONE (OUTPATIENT)
Dept: LAB | Facility: CLINIC | Age: 59
End: 2019-09-05

## 2019-09-05 RX ORDER — POTASSIUM CHLORIDE 1500 MG/1
20 TABLET, EXTENDED RELEASE ORAL
Status: CANCELLED | OUTPATIENT
Start: 2019-09-05

## 2019-09-05 RX ORDER — SODIUM CHLORIDE 9 MG/ML
INJECTION, SOLUTION INTRAVENOUS CONTINUOUS
Status: CANCELLED | OUTPATIENT
Start: 2019-09-05

## 2019-09-05 RX ORDER — LIDOCAINE 40 MG/G
CREAM TOPICAL
Status: CANCELLED | OUTPATIENT
Start: 2019-09-05

## 2019-09-05 NOTE — TELEPHONE ENCOUNTER
Called Pt and went over pre angiogram instructions. Pt has no allergies to dye. Pt not on blood thinner, and not diabetic. Pt will take 325 mg aspirin tonight and tomorrow Am. Pt will be NPO after midnight and sips of water with Pills. Pt informed nurse he does not take any medications. Per Pt he had bladder CA and feels the medications did cause it. Pt will have  and 24 hour sitter. IGNACIO Kang RN

## 2019-09-06 ENCOUNTER — HOSPITAL ENCOUNTER (OUTPATIENT)
Facility: CLINIC | Age: 59
Discharge: HOME | End: 2019-09-06
Admitting: INTERNAL MEDICINE
Payer: COMMERCIAL

## 2019-09-06 ENCOUNTER — SURGERY (OUTPATIENT)
Age: 59
End: 2019-09-06
Payer: COMMERCIAL

## 2019-09-06 ENCOUNTER — APPOINTMENT (OUTPATIENT)
Dept: GENERAL RADIOLOGY | Facility: CLINIC | Age: 59
End: 2019-09-06
Attending: SURGERY
Payer: COMMERCIAL

## 2019-09-06 ENCOUNTER — APPOINTMENT (OUTPATIENT)
Dept: ULTRASOUND IMAGING | Facility: CLINIC | Age: 59
End: 2019-09-06
Attending: SURGERY
Payer: COMMERCIAL

## 2019-09-06 VITALS
SYSTOLIC BLOOD PRESSURE: 150 MMHG | OXYGEN SATURATION: 95 % | TEMPERATURE: 98 F | HEART RATE: 66 BPM | HEIGHT: 69 IN | BODY MASS INDEX: 30.79 KG/M2 | WEIGHT: 207.89 LBS | DIASTOLIC BLOOD PRESSURE: 101 MMHG | RESPIRATION RATE: 18 BRPM

## 2019-09-06 DIAGNOSIS — E78.5 HYPERLIPIDEMIA LDL GOAL <70: ICD-10-CM

## 2019-09-06 DIAGNOSIS — I25.10 CORONARY ARTERY DISEASE INVOLVING NATIVE CORONARY ARTERY OF NATIVE HEART WITHOUT ANGINA PECTORIS: ICD-10-CM

## 2019-09-06 DIAGNOSIS — I25.10 ATHEROSCLEROSIS OF NATIVE CORONARY ARTERY OF NATIVE HEART WITHOUT ANGINA PECTORIS: Primary | ICD-10-CM

## 2019-09-06 PROBLEM — Z98.890 STATUS POST CORONARY ANGIOGRAM: Status: ACTIVE | Noted: 2019-09-06

## 2019-09-06 LAB
ALBUMIN SERPL-MCNC: 4 G/DL (ref 3.4–5)
ALBUMIN UR-MCNC: NEGATIVE MG/DL
ALP SERPL-CCNC: 120 U/L (ref 40–150)
ALT SERPL W P-5'-P-CCNC: 31 U/L (ref 0–70)
ANION GAP SERPL CALCULATED.3IONS-SCNC: 6 MMOL/L (ref 3–14)
APPEARANCE UR: CLEAR
APTT PPP: 29 SEC (ref 22–37)
AST SERPL W P-5'-P-CCNC: 25 U/L (ref 0–45)
BILIRUB DIRECT SERPL-MCNC: <0.1 MG/DL (ref 0–0.2)
BILIRUB SERPL-MCNC: 0.4 MG/DL (ref 0.2–1.3)
BILIRUB UR QL STRIP: NEGATIVE
BUN SERPL-MCNC: 20 MG/DL (ref 7–30)
CALCIUM SERPL-MCNC: 9.1 MG/DL (ref 8.5–10.1)
CATH EF ESTIMATED: 60 %
CHLORIDE SERPL-SCNC: 106 MMOL/L (ref 94–109)
CO2 SERPL-SCNC: 27 MMOL/L (ref 20–32)
COLOR UR AUTO: YELLOW
CREAT SERPL-MCNC: 0.66 MG/DL (ref 0.66–1.25)
ERYTHROCYTE [DISTWIDTH] IN BLOOD BY AUTOMATED COUNT: 12.8 % (ref 10–15)
GFR SERPL CREATININE-BSD FRML MDRD: >90 ML/MIN/{1.73_M2}
GLUCOSE SERPL-MCNC: 109 MG/DL (ref 70–99)
GLUCOSE UR STRIP-MCNC: NEGATIVE MG/DL
HBA1C MFR BLD: 5.5 % (ref 0–5.6)
HCT VFR BLD AUTO: 48.1 % (ref 40–53)
HGB BLD-MCNC: 16.6 G/DL (ref 13.3–17.7)
HGB UR QL STRIP: NEGATIVE
INR PPP: 0.85 (ref 0.86–1.14)
KETONES UR STRIP-MCNC: NEGATIVE MG/DL
LEUKOCYTE ESTERASE UR QL STRIP: NEGATIVE
MCH RBC QN AUTO: 32.4 PG (ref 26.5–33)
MCHC RBC AUTO-ENTMCNC: 34.5 G/DL (ref 31.5–36.5)
MCV RBC AUTO: 94 FL (ref 78–100)
MUCOUS THREADS #/AREA URNS LPF: PRESENT /LPF
NITRATE UR QL: NEGATIVE
PH UR STRIP: 5 PH (ref 5–7)
PLATELET # BLD AUTO: 247 10E9/L (ref 150–450)
POTASSIUM SERPL-SCNC: 3.8 MMOL/L (ref 3.4–5.3)
PROT SERPL-MCNC: 7.4 G/DL (ref 6.8–8.8)
RBC # BLD AUTO: 5.12 10E12/L (ref 4.4–5.9)
RBC #/AREA URNS AUTO: <1 /HPF (ref 0–2)
SODIUM SERPL-SCNC: 139 MMOL/L (ref 133–144)
SOURCE: ABNORMAL
SP GR UR STRIP: 1.02 (ref 1–1.03)
UROBILINOGEN UR STRIP-MCNC: NORMAL MG/DL (ref 0–2)
WBC # BLD AUTO: 9.4 10E9/L (ref 4–11)
WBC #/AREA URNS AUTO: 0 /HPF (ref 0–5)

## 2019-09-06 PROCEDURE — 36415 COLL VENOUS BLD VENIPUNCTURE: CPT

## 2019-09-06 PROCEDURE — 25000125 ZZHC RX 250: Performed by: INTERNAL MEDICINE

## 2019-09-06 PROCEDURE — 99152 MOD SED SAME PHYS/QHP 5/>YRS: CPT | Performed by: INTERNAL MEDICINE

## 2019-09-06 PROCEDURE — C1894 INTRO/SHEATH, NON-LASER: HCPCS | Performed by: INTERNAL MEDICINE

## 2019-09-06 PROCEDURE — 86850 RBC ANTIBODY SCREEN: CPT | Performed by: SURGERY

## 2019-09-06 PROCEDURE — 93880 EXTRACRANIAL BILAT STUDY: CPT

## 2019-09-06 PROCEDURE — 40000235 ZZH STATISTIC TELEMETRY

## 2019-09-06 PROCEDURE — 93970 EXTREMITY STUDY: CPT

## 2019-09-06 PROCEDURE — 85610 PROTHROMBIN TIME: CPT | Performed by: INTERNAL MEDICINE

## 2019-09-06 PROCEDURE — 80048 BASIC METABOLIC PNL TOTAL CA: CPT | Performed by: INTERNAL MEDICINE

## 2019-09-06 PROCEDURE — 83036 HEMOGLOBIN GLYCOSYLATED A1C: CPT | Performed by: INTERNAL MEDICINE

## 2019-09-06 PROCEDURE — 36415 COLL VENOUS BLD VENIPUNCTURE: CPT | Performed by: SURGERY

## 2019-09-06 PROCEDURE — 85027 COMPLETE CBC AUTOMATED: CPT | Performed by: INTERNAL MEDICINE

## 2019-09-06 PROCEDURE — 25000128 H RX IP 250 OP 636: Performed by: INTERNAL MEDICINE

## 2019-09-06 PROCEDURE — 27210794 ZZH OR GENERAL SUPPLY STERILE: Performed by: INTERNAL MEDICINE

## 2019-09-06 PROCEDURE — 40000065 ZZH STATISTIC EKG NON-CHARGEABLE

## 2019-09-06 PROCEDURE — 93458 L HRT ARTERY/VENTRICLE ANGIO: CPT | Mod: 26 | Performed by: INTERNAL MEDICINE

## 2019-09-06 PROCEDURE — 93458 L HRT ARTERY/VENTRICLE ANGIO: CPT | Performed by: INTERNAL MEDICINE

## 2019-09-06 PROCEDURE — 86901 BLOOD TYPING SEROLOGIC RH(D): CPT | Performed by: SURGERY

## 2019-09-06 PROCEDURE — 93010 ELECTROCARDIOGRAM REPORT: CPT | Performed by: INTERNAL MEDICINE

## 2019-09-06 PROCEDURE — 25000132 ZZH RX MED GY IP 250 OP 250 PS 637: Performed by: INTERNAL MEDICINE

## 2019-09-06 PROCEDURE — 85730 THROMBOPLASTIN TIME PARTIAL: CPT | Performed by: INTERNAL MEDICINE

## 2019-09-06 PROCEDURE — 93005 ELECTROCARDIOGRAM TRACING: CPT

## 2019-09-06 PROCEDURE — 40000852 ZZH STATISTIC HEART CATH LAB OR EP LAB

## 2019-09-06 PROCEDURE — 99153 MOD SED SAME PHYS/QHP EA: CPT | Performed by: INTERNAL MEDICINE

## 2019-09-06 PROCEDURE — 80076 HEPATIC FUNCTION PANEL: CPT | Performed by: INTERNAL MEDICINE

## 2019-09-06 PROCEDURE — 81001 URINALYSIS AUTO W/SCOPE: CPT | Performed by: SURGERY

## 2019-09-06 PROCEDURE — 71046 X-RAY EXAM CHEST 2 VIEWS: CPT

## 2019-09-06 PROCEDURE — 25800030 ZZH RX IP 258 OP 636: Performed by: INTERNAL MEDICINE

## 2019-09-06 PROCEDURE — 86923 COMPATIBILITY TEST ELECTRIC: CPT | Performed by: SURGERY

## 2019-09-06 PROCEDURE — 86900 BLOOD TYPING SEROLOGIC ABO: CPT | Performed by: SURGERY

## 2019-09-06 RX ORDER — ACETAMINOPHEN 325 MG/1
650 TABLET ORAL EVERY 4 HOURS PRN
Status: DISCONTINUED | OUTPATIENT
Start: 2019-09-06 | End: 2019-09-06 | Stop reason: HOSPADM

## 2019-09-06 RX ORDER — PHENYLEPHRINE HCL IN 0.9% NACL 50MG/250ML
.5-6 PLASTIC BAG, INJECTION (ML) INTRAVENOUS CONTINUOUS PRN
Status: DISCONTINUED | OUTPATIENT
Start: 2019-09-06 | End: 2019-09-06 | Stop reason: HOSPADM

## 2019-09-06 RX ORDER — NITROGLYCERIN 20 MG/100ML
.07-2 INJECTION INTRAVENOUS CONTINUOUS PRN
Status: DISCONTINUED | OUTPATIENT
Start: 2019-09-06 | End: 2019-09-06 | Stop reason: HOSPADM

## 2019-09-06 RX ORDER — SODIUM CHLORIDE 9 MG/ML
INJECTION, SOLUTION INTRAVENOUS CONTINUOUS
Status: DISCONTINUED | OUTPATIENT
Start: 2019-09-06 | End: 2019-09-06 | Stop reason: HOSPADM

## 2019-09-06 RX ORDER — ASPIRIN 81 MG/1
81 TABLET ORAL DAILY
Status: DISCONTINUED | OUTPATIENT
Start: 2019-09-07 | End: 2019-09-06 | Stop reason: HOSPADM

## 2019-09-06 RX ORDER — NITROGLYCERIN 5 MG/ML
VIAL (ML) INTRAVENOUS
Status: DISCONTINUED | OUTPATIENT
Start: 2019-09-06 | End: 2019-09-06 | Stop reason: HOSPADM

## 2019-09-06 RX ORDER — CHOLECALCIFEROL (VITAMIN D3) 50 MCG
2000 TABLET ORAL DAILY
Status: DISCONTINUED | OUTPATIENT
Start: 2019-09-06 | End: 2019-09-06 | Stop reason: HOSPADM

## 2019-09-06 RX ORDER — DOPAMINE HYDROCHLORIDE 160 MG/100ML
2-20 INJECTION, SOLUTION INTRAVENOUS CONTINUOUS PRN
Status: DISCONTINUED | OUTPATIENT
Start: 2019-09-06 | End: 2019-09-06 | Stop reason: HOSPADM

## 2019-09-06 RX ORDER — FLUMAZENIL 0.1 MG/ML
0.2 INJECTION, SOLUTION INTRAVENOUS
Status: DISCONTINUED | OUTPATIENT
Start: 2019-09-06 | End: 2019-09-06 | Stop reason: HOSPADM

## 2019-09-06 RX ORDER — ROSUVASTATIN CALCIUM 10 MG/1
10 TABLET, COATED ORAL DAILY
Status: DISCONTINUED | OUTPATIENT
Start: 2019-09-06 | End: 2019-09-06 | Stop reason: HOSPADM

## 2019-09-06 RX ORDER — ARGATROBAN 1 MG/ML
350 INJECTION, SOLUTION INTRAVENOUS
Status: DISCONTINUED | OUTPATIENT
Start: 2019-09-06 | End: 2019-09-06 | Stop reason: HOSPADM

## 2019-09-06 RX ORDER — LIDOCAINE 40 MG/G
CREAM TOPICAL
Status: DISCONTINUED | OUTPATIENT
Start: 2019-09-06 | End: 2019-09-06 | Stop reason: HOSPADM

## 2019-09-06 RX ORDER — NOREPINEPHRINE BITARTRATE 0.06 MG/ML
.03-.4 INJECTION, SOLUTION INTRAVENOUS CONTINUOUS PRN
Status: DISCONTINUED | OUTPATIENT
Start: 2019-09-06 | End: 2019-09-06 | Stop reason: HOSPADM

## 2019-09-06 RX ORDER — EPTIFIBATIDE 2 MG/ML
2 INJECTION, SOLUTION INTRAVENOUS CONTINUOUS PRN
Status: DISCONTINUED | OUTPATIENT
Start: 2019-09-06 | End: 2019-09-06 | Stop reason: HOSPADM

## 2019-09-06 RX ORDER — EPTIFIBATIDE 2 MG/ML
180 INJECTION, SOLUTION INTRAVENOUS EVERY 10 MIN PRN
Status: DISCONTINUED | OUTPATIENT
Start: 2019-09-06 | End: 2019-09-06 | Stop reason: HOSPADM

## 2019-09-06 RX ORDER — FENTANYL CITRATE 50 UG/ML
INJECTION, SOLUTION INTRAMUSCULAR; INTRAVENOUS
Status: DISCONTINUED | OUTPATIENT
Start: 2019-09-06 | End: 2019-09-06 | Stop reason: HOSPADM

## 2019-09-06 RX ORDER — HEPARIN SODIUM 1000 [USP'U]/ML
INJECTION, SOLUTION INTRAVENOUS; SUBCUTANEOUS
Status: DISCONTINUED | OUTPATIENT
Start: 2019-09-06 | End: 2019-09-06 | Stop reason: HOSPADM

## 2019-09-06 RX ORDER — METOPROLOL TARTRATE 25 MG/1
25 TABLET, FILM COATED ORAL 2 TIMES DAILY
Status: DISCONTINUED | OUTPATIENT
Start: 2019-09-06 | End: 2019-09-06 | Stop reason: HOSPADM

## 2019-09-06 RX ORDER — ARGATROBAN 1 MG/ML
150 INJECTION, SOLUTION INTRAVENOUS
Status: DISCONTINUED | OUTPATIENT
Start: 2019-09-06 | End: 2019-09-06 | Stop reason: HOSPADM

## 2019-09-06 RX ORDER — NALOXONE HYDROCHLORIDE 0.4 MG/ML
.1-.4 INJECTION, SOLUTION INTRAMUSCULAR; INTRAVENOUS; SUBCUTANEOUS
Status: DISCONTINUED | OUTPATIENT
Start: 2019-09-06 | End: 2019-09-06 | Stop reason: HOSPADM

## 2019-09-06 RX ORDER — FENTANYL CITRATE 50 UG/ML
25-50 INJECTION, SOLUTION INTRAMUSCULAR; INTRAVENOUS
Status: DISCONTINUED | OUTPATIENT
Start: 2019-09-06 | End: 2019-09-06 | Stop reason: HOSPADM

## 2019-09-06 RX ORDER — POTASSIUM CHLORIDE 1500 MG/1
20 TABLET, EXTENDED RELEASE ORAL
Status: COMPLETED | OUTPATIENT
Start: 2019-09-06 | End: 2019-09-06

## 2019-09-06 RX ORDER — IOPAMIDOL 755 MG/ML
INJECTION, SOLUTION INTRAVASCULAR
Status: DISCONTINUED | OUTPATIENT
Start: 2019-09-06 | End: 2019-09-06 | Stop reason: HOSPADM

## 2019-09-06 RX ORDER — VERAPAMIL HYDROCHLORIDE 2.5 MG/ML
INJECTION, SOLUTION INTRAVENOUS
Status: DISCONTINUED | OUTPATIENT
Start: 2019-09-06 | End: 2019-09-06 | Stop reason: HOSPADM

## 2019-09-06 RX ORDER — DOBUTAMINE HYDROCHLORIDE 200 MG/100ML
2-20 INJECTION INTRAVENOUS CONTINUOUS PRN
Status: DISCONTINUED | OUTPATIENT
Start: 2019-09-06 | End: 2019-09-06 | Stop reason: HOSPADM

## 2019-09-06 RX ORDER — NALOXONE HYDROCHLORIDE 0.4 MG/ML
.2-.4 INJECTION, SOLUTION INTRAMUSCULAR; INTRAVENOUS; SUBCUTANEOUS
Status: DISCONTINUED | OUTPATIENT
Start: 2019-09-06 | End: 2019-09-06 | Stop reason: HOSPADM

## 2019-09-06 RX ORDER — ATROPINE SULFATE 0.1 MG/ML
0.5 INJECTION INTRAVENOUS EVERY 5 MIN PRN
Status: DISCONTINUED | OUTPATIENT
Start: 2019-09-06 | End: 2019-09-06 | Stop reason: HOSPADM

## 2019-09-06 RX ADMIN — MIDAZOLAM 1 MG: 1 INJECTION INTRAMUSCULAR; INTRAVENOUS at 08:58

## 2019-09-06 RX ADMIN — LIDOCAINE HYDROCHLORIDE 1 ML: 10 INJECTION, SOLUTION EPIDURAL; INFILTRATION; INTRACAUDAL; PERINEURAL at 09:03

## 2019-09-06 RX ADMIN — VERAPAMIL HYDROCHLORIDE 2.5 MG: 2.5 INJECTION, SOLUTION INTRAVENOUS at 09:05

## 2019-09-06 RX ADMIN — ASPIRIN 325 MG ORAL TABLET 325 MG: 325 PILL ORAL at 07:29

## 2019-09-06 RX ADMIN — POTASSIUM CHLORIDE 20 MEQ: 1500 TABLET, EXTENDED RELEASE ORAL at 07:30

## 2019-09-06 RX ADMIN — HEPARIN SODIUM 5000 UNITS: 1000 INJECTION, SOLUTION INTRAVENOUS; SUBCUTANEOUS at 09:07

## 2019-09-06 RX ADMIN — IOPAMIDOL 115 ML: 755 INJECTION, SOLUTION INTRAVENOUS at 09:37

## 2019-09-06 RX ADMIN — NITROGLYCERIN 200 MCG: 5 INJECTION, SOLUTION INTRAVENOUS at 09:05

## 2019-09-06 RX ADMIN — SODIUM CHLORIDE: 9 INJECTION, SOLUTION INTRAVENOUS at 07:06

## 2019-09-06 RX ADMIN — MIDAZOLAM 1 MG: 1 INJECTION INTRAMUSCULAR; INTRAVENOUS at 09:07

## 2019-09-06 RX ADMIN — FENTANYL CITRATE 50 MCG: 50 INJECTION, SOLUTION INTRAMUSCULAR; INTRAVENOUS at 09:08

## 2019-09-06 RX ADMIN — FENTANYL CITRATE 50 MCG: 50 INJECTION, SOLUTION INTRAMUSCULAR; INTRAVENOUS at 08:58

## 2019-09-06 ASSESSMENT — MIFFLIN-ST. JEOR: SCORE: 1753.63

## 2019-09-06 NOTE — PRE-PROCEDURE
GENERAL PRE-PROCEDURE:   Procedure:  Left heart cath,Left ventriculogram, coronary angiogram and possible intervention  Date/Time:  9/6/2019 8:40 AM    Written consent obtained?: Yes    Risks and benefits: Risks, benefits and alternatives were discussed    Consent given by:  Patient  Patient states understanding of procedure being performed: Yes    Patient's understanding of procedure matches consent: Yes    Procedure consent matches procedure scheduled: Yes    Expected level of sedation:  Moderate  Appropriately NPO:  Yes  ASA Class:  Class 2- mild systemic disease, no acute problems, no functional limitations  Mallampati  :  Grade 2- soft palate, base of uvula, tonsillar pillars, and portion of posterior pharyngeal wall visible  Lungs:  Lungs clear with good breath sounds bilaterally  Heart:  Normal heart sounds and rate  History & Physical reviewed:  History and physical reviewed and no updates needed  Statement of review:  I have reviewed the lab findings, diagnostic data, medications, and the plan for sedation

## 2019-09-06 NOTE — DISCHARGE INSTRUCTIONS
Cardiac Angiogram Discharge Instructions - Radial    After you go home:      Have an adult stay with you until tomorrow.    Drink extra fluids for 2 days.    You may resume your normal diet.    No smoking       For 24 hours - due to the sedation you received:    Relax and take it easy.    Do NOT make any important or legal decisions.    Do NOT drive or operate machines at home or at work.    Do NOT drink alcohol.    Care of Wrist Puncture Site:      For the first 24 hrs - check the puncture site every 1-2 hours while awake.    It is normal to have soreness at the puncture site and mild tingling in your hand for up to 3 days.    Remove the bandaid after 24 hours. If there is minor oozing, apply another bandaid and remove it after 12 hours.    You may shower tomorrow.  Do NOT take a bath, or use a hot tub or pool for at least 3 days. Do NOT scrub the site. Do not use lotion or powder near the puncture site.           Activity:        For 2 days:     do not use your hand or arm to support your weight (such as rising from a chair)     do not bend your wrist (such as lifting a garage door).    do not lift more than 5 pounds or exercise your arm (such as tennis, golf or bowling).    Do NOT do any heavy activity such as exercise, lifting, or straining.     Bleeding:      If you start bleeding from the site in your wrist, sit down and press firmly on/above the site for 10 minutes.     Once bleeding stops, keep arm still for 2 hours.     Call [facility] as soon as you can.       Call 911 right away if you have heavy bleeding or bleeding that does not stop.      Medicines:      If you are taking an antiplatelet medication such as Plavix, Brilinta or Effient, do not stop taking it until you talk to your cardiologist.        If you are on Metformin (Glucophage), do not restart it until you have blood tests (within 2 to 3 days after discharge).  After you have your blood drawn, you may restart the Metformin.     Take your  medications, including blood thinners, unless your provider tells you not to.  If you take Coumadin (Warfarin), have your INR checked by your provider in  3-5 days. Call your clinic to schedule this.    If you have stopped any medicines, check with your provider about when to restart them.    Follow Up Appointments:      Follow up with [facility] Nurse Practitioner at [facility] of patient preference in 7-10 days.    Call the clinic if:      You have a large or growing hard lump around the site.    The site is red, swollen, hot or tender.    Blood or fluid is draining from the site.    You have chills or a fever greater than 101 F (38 C).    Your arm feels numb, cool or changes color.    You have hives, a rash or unusual itching.    Any questions or concerns.          [facility] [place]:    [phone] [facility] (7 days a week)

## 2019-09-06 NOTE — PROGRESS NOTES
Met with patient and his wife. Pre op imaging and labs ordered, will review. Pre op instructions reviewed. Plan CABG. Will have our  call early next week. Surgical date options given.

## 2019-09-06 NOTE — CONSULTS
Cardiovascular Surgery Consultation     Asked to see this patient in consultation for possible surgical treatment. Primary care physician is Dr. Riggs and cardiologist is Dr. Willson.           Chief Complaint:   Lethargy    History is obtained from the patient         History of Present Illness:   This patient is a 58 year old male who presents with five years of lethargy.  Wife states he has been lethargic but without chest discomfort.  Prior to five years, he was very active.  Pt denies any shortness of breath or chest pain on exertion, just feels 'tired' after coming home from work.  Pt had a LHC on  with findings of LM stenosis.  No significant stenosis on right side.  History of bladder cancer.      Strong history of coronary artery disease in family  Ex-smoker              Past Medical History:     Past Medical History:   Diagnosis Date     Coronary artery disease involving native coronary artery of native heart without angina pectoris 2019    Per CT calcium score of 722.97 3/2019     Essential hypertension, benign      Hematuria      Malignant neoplasm of bladder, part unspecified     Transitional Cell Carcinoma bladder     Olecranon bursitis 3/01    right     Other and unspecified hyperlipidemia      RIGHT LUNG CALCIFIED GRANULOMA     L     Tobacco use disorder     Quit      Unspecified hemorrhoids without mention of complication      Unspecified hereditary and idiopathic peripheral neuropathy              Past Surgical History:     Past Surgical History:   Procedure Laterality Date     C NONSPECIFIC PROCEDURE      EBCT               Social History:     Social History     Tobacco Use     Smoking status: Former Smoker     Packs/day: 0.50     Years: 25.00     Pack years: 12.50     Last attempt to quit: 2013     Years since quittin.5     Smokeless tobacco: Never Used   Substance Use Topics     Alcohol use: Yes     Comment: 4-5 drinks per week             Family History:      Family History   Problem Relation Age of Onset     Arthritis Mother         RA     Hypertension Mother      Breast Cancer Mother      Prostate Cancer Maternal Grandfather         80     Cancer Maternal Grandmother         Lung     Coronary Artery Disease Father      Other - See Comments Brother      Diabetes Brother      Genetic Disorder Son              Immunizations:     Immunization History   Administered Date(s) Administered     TD (ADULT, 7+) 01/01/1998, 11/16/2018     TDAP Vaccine (Adacel) 08/23/2007             Allergies:     Allergies   Allergen Reactions     Atorvastatin      Myalgias     Lisinopril      Body aches pt d/mylene  06/2015             Medications:     No current outpatient medications on file.             Review of Systems:   Gen: denies frequent headaches, double/blurry vision, insomnia, fatigue, unexplained weight loss/gain. No previous anesthesia reactions.  CV: denies chest pain, shortness of breath, peripheral edema.  Pulm: denies shortness of breath, asthma or wheezing  GI/: history of bladder cancer  Endo: denies thyroid problems or Diabetes  Heme/Onc: denies bleeding or clotting disorders, no family problems with bleeding/clotting diorders  MS: no weakness, tremors or gait instability  Neuro: denies depression, memory problems, no dysesthesias, no previous strokes, no migraines, no dysphagia  Skin: No petechiae, purpura or rash.            Physical Exam:    B/P: 159/101, P: 66,    Wt Readings from Last 2 Encounters:   09/06/19 94.3 kg (207 lb 14.3 oz)   08/22/19 94.3 kg (208 lb)     General: NAD, in bed comfortable with wife at bedside  CV: regular rate and rhythm  Pulm: breathing comfortably on room air, no audible wheezing  GI: soft, non distended  MS: moves all extremities spontaneously, no gross deficits  Neuro: pupils equal round, no gross neurologic deficits noted         Data:        Lab Results   Component Value Date     09/06/2019    Lab Results   Component Value Date     CHLORIDE 106 09/06/2019    Lab Results   Component Value Date    BUN 20 09/06/2019      Lab Results   Component Value Date    POTASSIUM 3.8 09/06/2019    Lab Results   Component Value Date    CO2 27 09/06/2019    Lab Results   Component Value Date    CR 0.66 09/06/2019        Lab Results   Component Value Date    WBC 9.4 09/06/2019    HGB 16.6 09/06/2019    HCT 48.1 09/06/2019    MCV 94 09/06/2019     09/06/2019     Lab Results   Component Value Date    INR 0.85 (L) 09/06/2019            Assessment and Plan:   59 yo ex-smoker, non-diabetic with history of lethargy found to have 69% LM stenosis and 50% distal RCA.    -discussed role of corornary stenting vs surgical revascularization.  Based on his age, history  intolerance of statin therapy, we recommended surgical revascularization of LAD with LIMA.  Pt and wife agreeable to plan.  Discussed risks, benefits and alternative to surgical revascularization.  Pt asked appropriate questions which were answered fully.    -ECHO without any valvular abnormalities with EF 60-65%  -Will obtain US carotid duplex  -okay to discharge, will schedule surgery with Dr. Turner in the next 1-2 weeks  -discussed plan of care with Dr. Willson  -rounds with Dr. Turner    Thank you for including us in the care of this kind patient. Do not hesitate to contact us with any questions.    Roni Kline  Cardiac Surgery Fellow  [phone]

## 2019-09-06 NOTE — PROGRESS NOTES
Care Suites Admission Nursing Note    Reason for admission: Heart Catheterization  CS arrival time: 0645  Accompanied by: Spouse  Name/phone of DC : Brooke-  Medications held: none  Consent signed: To be done by MD at bedside  Abnormal assessment/labs: none  If abnormal, provider notified:n/a  Education/questions answered: provided  Plan: Proceed

## 2019-09-06 NOTE — PROGRESS NOTES
Care Suites Discharge Summary    Discharge Criteria:   Discharge Criteria met per MD orders: Yes.   Vital signs stable.     Pt demonstrates ability to ambulate safely: Yes.  (See discharge questionnaire for additional information)    Discharge instructions & education:   Discharge instructions reviewed with patient and spouse. Patient verbalizes understanding.   Additional patient education provided:  Cardiac angiogram    Medications:   Patient will be discharging on new medications- No. Patient verbalizes reason for use, start date, and side effects NA.    Items returned to patient:   Home and hospital acquired medications returned to patient n/a   Listed belongings gathered and returned to patient: Yes    Patient discharged to home with spouse.     Jacoby Russo, FELICIANO

## 2019-09-06 NOTE — PROGRESS NOTES
0950: Pt returned from Cath Lab. TR band intact to L wrist.  No oozing or hematoma noted. Area soft & flat. L arm elevated on pillows. Pt denies pain. Pt instructed on activity restrictions with L wrist. Verbal understanding received from pt. Pt's family at bedside. Detailed update given.  Pt taking diet & flds well. No complaints.

## 2019-09-09 ENCOUNTER — TELEPHONE (OUTPATIENT)
Dept: INTERNAL MEDICINE | Facility: CLINIC | Age: 59
End: 2019-09-09

## 2019-09-09 ENCOUNTER — TELEPHONE (OUTPATIENT)
Dept: CARDIOLOGY | Facility: CLINIC | Age: 59
End: 2019-09-09

## 2019-09-09 NOTE — TELEPHONE ENCOUNTER
Spoke with pt. Report in chart doesn't describe degree of left main stenosis. Only comments on distal 60% stenosis but pt says  Dr Escobedo told him 85% stenosis. He also says Dr Escobedo said he could stent the left main but CV surgery consult was also ordered and recommending bypass. Not clear what risks/benefits  Cardiology offered pt re: CABG vs ? Stenting of left main so cannot comment on what would be recommended to pt. Will try to reach Dr Escobedo and have him either call pt to discuss further or have pt brought into cardiology clinic further to make recommendation for treatment

## 2019-09-09 NOTE — TELEPHONE ENCOUNTER
Patient calling to gather PCP opinion/ perspective on his recent angiogram. Looks like recommendations is to have Bypass Surgery. He wants to know if provider can advise on him proceeding or possibly having a stent placement.    Patient would like PCP to review and discuss with him; he would much rather discuss with provider.     Ok to call on [phone] - can leave detailed message on VM.

## 2019-09-09 NOTE — TELEPHONE ENCOUNTER
Pt task, pt needs to schedule surgery with Dr. Turner. Talked with pt and pt states that they are not ready to schedule. Pt will call once they are ready

## 2019-09-10 NOTE — TELEPHONE ENCOUNTER
Spoke with Dr Escobedo. He informed me that his rec would be for pt to do LIMA CABG and that would do stent only if pt refusing to consider the CABG. Part of his rec also based on pt's age and length of time that CABG is likely to stay open vs stent. Passed Dr Escobedo's rec for CABG on to pt. Pt has already met with surgeon for consult. Pt instructed to either  Talk with surgery office to schedule CABG or, if pt needed to talk more face to face with Dr Escobedo, then pt to schedule cardiology clinic appt with him

## 2019-09-11 ENCOUNTER — TELEPHONE (OUTPATIENT)
Dept: CARDIOLOGY | Facility: CLINIC | Age: 59
End: 2019-09-11

## 2019-09-11 ENCOUNTER — CARE COORDINATION (OUTPATIENT)
Dept: CARDIOLOGY | Facility: CLINIC | Age: 59
End: 2019-09-11

## 2019-09-11 DIAGNOSIS — E78.5 HYPERLIPIDEMIA LDL GOAL <70: ICD-10-CM

## 2019-09-11 NOTE — PROGRESS NOTES
I received notification that MARE Conroy with CV surgery said pt called with questions about his medications. I called pt to review. He said he is busy right now, but would like a call back this afternoon to discuss. Bonilla WIGGINS September 11, 2019, 11:18 AM

## 2019-09-11 NOTE — PROGRESS NOTES
I left message for pt to call back to review his questions. Bonilla WIGGINS September 11, 2019, 1:59 PM

## 2019-09-11 NOTE — TELEPHONE ENCOUNTER
"Patient called this writer regarding questions about his surgery.  Patient stated that he also was not taking any of his prescribed medications because he thought they were \"too much and make him feel sick.\"  Instructed patient to call his primary or cardiologist and inform them that he is not taking his medications and ask for alternatives.All questions answered and patient verbalized understanding.    "

## 2019-09-12 LAB — INTERPRETATION ECG - MUSE: NORMAL

## 2019-09-13 ENCOUNTER — CARE COORDINATION (OUTPATIENT)
Dept: CARDIOLOGY | Facility: CLINIC | Age: 59
End: 2019-09-13

## 2019-09-13 RX ORDER — ROSUVASTATIN CALCIUM 10 MG/1
10 TABLET, COATED ORAL DAILY
Qty: 30 TABLET | Refills: 11 | Status: SHIPPED | OUTPATIENT
Start: 2019-09-13 | End: 2019-11-26

## 2019-09-13 NOTE — PROGRESS NOTES
I called pt and he does have some questions regarding medications. He has not started the crestor yet, because he was nervous about developing muscle aches and the cost was higher than expected. Pt had issues with muscle aching while taking atorvastatin. He is willing to try the crestor. I told him I can resend the script as the generic rosuvastatin to see if that helps with the cost. Pt also said he is only taking the metoprolol once daily because he is concerned about the dosing. Pt said his BP has been great at home, around 117/60s. I told pt he should take the metoprolol tartrate 25 mg twice daily, as it is the shorter acting version of it, so he needs it twice daily to maintain his dose.

## 2019-09-24 RX ORDER — METOPROLOL TARTRATE 25 MG/1
25 TABLET, FILM COATED ORAL
Status: CANCELLED | OUTPATIENT
Start: 2019-09-24

## 2019-09-24 RX ORDER — CEFAZOLIN SODIUM 1 G/3ML
1 INJECTION, POWDER, FOR SOLUTION INTRAMUSCULAR; INTRAVENOUS SEE ADMIN INSTRUCTIONS
Status: CANCELLED | OUTPATIENT
Start: 2019-09-24

## 2019-09-24 RX ORDER — CEFAZOLIN SODIUM 2 G/100ML
2 INJECTION, SOLUTION INTRAVENOUS
Status: CANCELLED | OUTPATIENT
Start: 2019-09-24

## 2019-09-24 RX ORDER — MUPIROCIN 20 MG/G
OINTMENT TOPICAL 2 TIMES DAILY
Status: CANCELLED | OUTPATIENT
Start: 2019-09-24 | End: 2019-09-25

## 2019-09-25 ENCOUNTER — ANESTHESIA EVENT (OUTPATIENT)
Dept: SURGERY | Facility: CLINIC | Age: 59
DRG: 228 | End: 2019-09-25
Payer: COMMERCIAL

## 2019-09-25 ENCOUNTER — ANESTHESIA (OUTPATIENT)
Dept: SURGERY | Facility: CLINIC | Age: 59
DRG: 228 | End: 2019-09-25
Payer: COMMERCIAL

## 2019-09-25 ENCOUNTER — HOSPITAL ENCOUNTER (INPATIENT)
Facility: CLINIC | Age: 59
LOS: 5 days | Discharge: HOME | DRG: 228 | End: 2019-09-30
Attending: SURGERY | Admitting: SURGERY
Payer: COMMERCIAL

## 2019-09-25 ENCOUNTER — APPOINTMENT (OUTPATIENT)
Dept: GENERAL RADIOLOGY | Facility: CLINIC | Age: 59
DRG: 228 | End: 2019-09-25
Attending: SURGERY
Payer: COMMERCIAL

## 2019-09-25 DIAGNOSIS — I10 ESSENTIAL HYPERTENSION, BENIGN: Primary | ICD-10-CM

## 2019-09-25 DIAGNOSIS — Z95.1 S/P CABG (CORONARY ARTERY BYPASS GRAFT): ICD-10-CM

## 2019-09-25 PROBLEM — I25.10 CAD (CORONARY ARTERY DISEASE): Status: ACTIVE | Noted: 2019-09-25

## 2019-09-25 LAB
ABO + RH BLD: NORMAL
ABO + RH BLD: NORMAL
ALBUMIN SERPL-MCNC: 3.2 G/DL (ref 3.4–5)
ALP SERPL-CCNC: 76 U/L (ref 40–150)
ALT SERPL W P-5'-P-CCNC: 35 U/L (ref 0–70)
ANION GAP SERPL CALCULATED.3IONS-SCNC: 5 MMOL/L (ref 3–14)
APTT PPP: 44 SEC (ref 22–37)
APTT PPP: 58 SEC (ref 22–37)
AST SERPL W P-5'-P-CCNC: 24 U/L (ref 0–45)
BASE DEFICIT BLDA-SCNC: 0.2 MMOL/L
BILIRUB SERPL-MCNC: 0.5 MG/DL (ref 0.2–1.3)
BLD GP AB SCN SERPL QL: NORMAL
BLD PROD TYP BPU: NORMAL
BLD UNIT ID BPU: 0
BLD UNIT ID BPU: 0
BLOOD BANK CMNT PATIENT-IMP: NORMAL
BLOOD BANK CMNT PATIENT-IMP: NORMAL
BLOOD PRODUCT CODE: NORMAL
BLOOD PRODUCT CODE: NORMAL
BPU ID: NORMAL
BPU ID: NORMAL
BUN SERPL-MCNC: 15 MG/DL (ref 7–30)
CA-I BLD-MCNC: 4.7 MG/DL (ref 4.4–5.2)
CA-I BLD-SCNC: 4.8 MG/DL (ref 4.4–5.2)
CA-I BLD-SCNC: 5.3 MG/DL (ref 4.4–5.2)
CALCIUM SERPL-MCNC: 8.1 MG/DL (ref 8.5–10.1)
CHLORIDE SERPL-SCNC: 112 MMOL/L (ref 94–109)
CO2 BLD-SCNC: 26 MMOL/L (ref 21–28)
CO2 BLD-SCNC: 26 MMOL/L (ref 21–28)
CO2 BLD-SCNC: 27 MMOL/L (ref 21–28)
CO2 BLD-SCNC: 29 MMOL/L (ref 21–28)
CO2 BLDCOV-SCNC: 26 MMOL/L (ref 21–28)
CO2 SERPL-SCNC: 26 MMOL/L (ref 20–32)
CPB APPLIED: ABNORMAL
CREAT SERPL-MCNC: 0.63 MG/DL (ref 0.66–1.25)
ERYTHROCYTE [DISTWIDTH] IN BLOOD BY AUTOMATED COUNT: 12.4 % (ref 10–15)
ERYTHROCYTE [DISTWIDTH] IN BLOOD BY AUTOMATED COUNT: 12.6 % (ref 10–15)
FIBRINOGEN PPP-MCNC: 231 MG/DL (ref 200–420)
GFR SERPL CREATININE-BSD FRML MDRD: >90 ML/MIN/{1.73_M2}
GLUCOSE BLDC GLUCOMTR-MCNC: 109 MG/DL (ref 70–99)
GLUCOSE BLDC GLUCOMTR-MCNC: 118 MG/DL (ref 70–99)
GLUCOSE BLDC GLUCOMTR-MCNC: 125 MG/DL (ref 70–99)
GLUCOSE BLDC GLUCOMTR-MCNC: 130 MG/DL (ref 70–99)
GLUCOSE BLDC GLUCOMTR-MCNC: 158 MG/DL (ref 70–99)
GLUCOSE BLDC GLUCOMTR-MCNC: 168 MG/DL (ref 70–99)
GLUCOSE BLDC GLUCOMTR-MCNC: 190 MG/DL (ref 70–99)
GLUCOSE BLDC GLUCOMTR-MCNC: 198 MG/DL (ref 70–99)
GLUCOSE SERPL-MCNC: 134 MG/DL (ref 70–99)
HCO3 BLD-SCNC: 26 MMOL/L (ref 21–28)
HCT VFR BLD AUTO: 37 % (ref 40–53)
HCT VFR BLD AUTO: 38.8 % (ref 40–53)
HCT VFR BLD CALC: 33 %PCV (ref 40–53)
HCT VFR BLD CALC: 33 %PCV (ref 40–53)
HCT VFR BLD CALC: 34 %PCV (ref 40–53)
HCT VFR BLD CALC: 39 %PCV (ref 40–53)
HGB BLD CALC-MCNC: 11.2 G/DL (ref 13.3–17.7)
HGB BLD CALC-MCNC: 11.2 G/DL (ref 13.3–17.7)
HGB BLD CALC-MCNC: 11.6 G/DL (ref 13.3–17.7)
HGB BLD CALC-MCNC: 13.3 G/DL (ref 13.3–17.7)
HGB BLD-MCNC: 12.8 G/DL (ref 13.3–17.7)
HGB BLD-MCNC: 13.4 G/DL (ref 13.3–17.7)
INR PPP: 1.12 (ref 0.86–1.14)
INR PPP: 1.24 (ref 0.86–1.14)
LACTATE BLD-SCNC: 0.9 MMOL/L (ref 0.7–2.1)
LACTATE BLD-SCNC: 1.2 MMOL/L (ref 0.7–2)
LACTATE BLD-SCNC: 1.2 MMOL/L (ref 0.7–2.1)
LACTATE BLD-SCNC: 1.5 MMOL/L (ref 0.7–2.1)
MAGNESIUM SERPL-MCNC: 2.4 MG/DL (ref 1.6–2.3)
MCH RBC QN AUTO: 32.2 PG (ref 26.5–33)
MCH RBC QN AUTO: 32.2 PG (ref 26.5–33)
MCHC RBC AUTO-ENTMCNC: 34.5 G/DL (ref 31.5–36.5)
MCHC RBC AUTO-ENTMCNC: 34.6 G/DL (ref 31.5–36.5)
MCV RBC AUTO: 93 FL (ref 78–100)
MCV RBC AUTO: 93 FL (ref 78–100)
MRSA DNA SPEC QL NAA+PROBE: NEGATIVE
NUM BPU REQUESTED: 4
OXYHGB MFR BLD: 97 % (ref 92–100)
PCO2 BLD: 44 MM HG (ref 35–45)
PCO2 BLD: 45 MM HG (ref 35–45)
PCO2 BLD: 45 MM HG (ref 35–45)
PCO2 BLD: 46 MM HG (ref 35–45)
PCO2 BLD: 56 MM HG (ref 35–45)
PCO2 BLDV: 44 MM HG (ref 40–50)
PH BLD: 7.32 PH (ref 7.35–7.45)
PH BLD: 7.36 PH (ref 7.35–7.45)
PH BLD: 7.36 PH (ref 7.35–7.45)
PH BLD: 7.37 PH (ref 7.35–7.45)
PH BLD: 7.39 PH (ref 7.35–7.45)
PH BLDV: 7.37 PH (ref 7.32–7.43)
PHOSPHATE SERPL-MCNC: 2.5 MG/DL (ref 2.5–4.5)
PLATELET # BLD AUTO: 130 10E9/L (ref 150–450)
PLATELET # BLD AUTO: 153 10E9/L (ref 150–450)
PO2 BLD: 105 MM HG (ref 80–105)
PO2 BLD: 274 MM HG (ref 80–105)
PO2 BLD: 292 MM HG (ref 80–105)
PO2 BLD: 389 MM HG (ref 80–105)
PO2 BLD: 483 MM HG (ref 80–105)
PO2 BLDV: 44 MM HG (ref 25–47)
POTASSIUM BLD-SCNC: 3.6 MMOL/L (ref 3.4–5.3)
POTASSIUM BLD-SCNC: 3.8 MMOL/L (ref 3.4–5.3)
POTASSIUM BLD-SCNC: 4.1 MMOL/L (ref 3.4–5.3)
POTASSIUM BLD-SCNC: 4.7 MMOL/L (ref 3.4–5.3)
POTASSIUM SERPL-SCNC: 3.9 MMOL/L (ref 3.4–5.3)
PROT SERPL-MCNC: 5.2 G/DL (ref 6.8–8.8)
RBC # BLD AUTO: 3.97 10E12/L (ref 4.4–5.9)
RBC # BLD AUTO: 4.16 10E12/L (ref 4.4–5.9)
SAO2 % BLDA FROM PO2: 100 % (ref 92–100)
SAO2 % BLDV FROM PO2: 78 %
SODIUM BLD-SCNC: 139 MMOL/L (ref 133–144)
SODIUM BLD-SCNC: 139 MMOL/L (ref 133–144)
SODIUM BLD-SCNC: 140 MMOL/L (ref 133–144)
SODIUM BLD-SCNC: 141 MMOL/L (ref 133–144)
SODIUM SERPL-SCNC: 143 MMOL/L (ref 133–144)
SPECIMEN EXP DATE BLD: NORMAL
SPECIMEN SOURCE: NORMAL
TRANSFUSION STATUS PATIENT QL: NORMAL
WBC # BLD AUTO: 13 10E9/L (ref 4–11)
WBC # BLD AUTO: 16.8 10E9/L (ref 4–11)

## 2019-09-25 PROCEDURE — 25000132 ZZH RX MED GY IP 250 OP 250 PS 637: Performed by: SURGERY

## 2019-09-25 PROCEDURE — 27210794 ZZH OR GENERAL SUPPLY STERILE: Performed by: SURGERY

## 2019-09-25 PROCEDURE — 25000565 ZZH ISOFLURANE, EA 15 MIN: Performed by: SURGERY

## 2019-09-25 PROCEDURE — 25000128 H RX IP 250 OP 636

## 2019-09-25 PROCEDURE — 25000125 ZZHC RX 250: Performed by: NURSE ANESTHETIST, CERTIFIED REGISTERED

## 2019-09-25 PROCEDURE — 25000131 ZZH RX MED GY IP 250 OP 636 PS 637: Performed by: SURGERY

## 2019-09-25 PROCEDURE — 25800030 ZZH RX IP 258 OP 636: Performed by: ANESTHESIOLOGY

## 2019-09-25 PROCEDURE — 25000125 ZZHC RX 250: Performed by: INTERNAL MEDICINE

## 2019-09-25 PROCEDURE — 02100Z9 BYPASS CORONARY ARTERY, ONE ARTERY FROM LEFT INTERNAL MAMMARY, OPEN APPROACH: ICD-10-PCS | Performed by: SURGERY

## 2019-09-25 PROCEDURE — 84100 ASSAY OF PHOSPHORUS: CPT | Performed by: SURGERY

## 2019-09-25 PROCEDURE — 84295 ASSAY OF SERUM SODIUM: CPT

## 2019-09-25 PROCEDURE — 36000075 ZZH SURGERY LEVEL 6 EA 15 ADDTL MIN: Performed by: SURGERY

## 2019-09-25 PROCEDURE — 93005 ELECTROCARDIOGRAM TRACING: CPT

## 2019-09-25 PROCEDURE — 94660 CPAP INITIATION&MGMT: CPT

## 2019-09-25 PROCEDURE — 85014 HEMATOCRIT: CPT

## 2019-09-25 PROCEDURE — 25800030 ZZH RX IP 258 OP 636: Performed by: SURGERY

## 2019-09-25 PROCEDURE — 84132 ASSAY OF SERUM POTASSIUM: CPT

## 2019-09-25 PROCEDURE — 85730 THROMBOPLASTIN TIME PARTIAL: CPT | Performed by: SURGERY

## 2019-09-25 PROCEDURE — 94003 VENT MGMT INPAT SUBQ DAY: CPT

## 2019-09-25 PROCEDURE — 40000986 XR CHEST PORT 1 VW

## 2019-09-25 PROCEDURE — 85384 FIBRINOGEN ACTIVITY: CPT | Performed by: SURGERY

## 2019-09-25 PROCEDURE — 25000128 H RX IP 250 OP 636: Performed by: NURSE ANESTHETIST, CERTIFIED REGISTERED

## 2019-09-25 PROCEDURE — 3E033XZ INTRODUCTION OF VASOPRESSOR INTO PERIPHERAL VEIN, PERCUTANEOUS APPROACH: ICD-10-PCS | Performed by: INTERNAL MEDICINE

## 2019-09-25 PROCEDURE — 82805 BLOOD GASES W/O2 SATURATION: CPT | Performed by: SURGERY

## 2019-09-25 PROCEDURE — 83605 ASSAY OF LACTIC ACID: CPT | Performed by: SURGERY

## 2019-09-25 PROCEDURE — C9113 INJ PANTOPRAZOLE SODIUM, VIA: HCPCS | Performed by: SURGERY

## 2019-09-25 PROCEDURE — 25000128 H RX IP 250 OP 636: Performed by: SURGERY

## 2019-09-25 PROCEDURE — 87640 STAPH A DNA AMP PROBE: CPT | Performed by: SURGERY

## 2019-09-25 PROCEDURE — 41000023 ZZH PERA-PERFUSION, SH ONLY,  EACH ADDTL 15 MIN: Performed by: SURGERY

## 2019-09-25 PROCEDURE — 99291 CRITICAL CARE FIRST HOUR: CPT | Performed by: INTERNAL MEDICINE

## 2019-09-25 PROCEDURE — 40000171 ZZH STATISTIC PRE-PROCEDURE ASSESSMENT III: Performed by: SURGERY

## 2019-09-25 PROCEDURE — 41000022 ZZH PER-PERFUSION, SH ONLY,  1ST 30 MIN: Performed by: SURGERY

## 2019-09-25 PROCEDURE — 82330 ASSAY OF CALCIUM: CPT

## 2019-09-25 PROCEDURE — 87641 MR-STAPH DNA AMP PROBE: CPT | Performed by: SURGERY

## 2019-09-25 PROCEDURE — 93010 ELECTROCARDIOGRAM REPORT: CPT | Performed by: INTERNAL MEDICINE

## 2019-09-25 PROCEDURE — 40000275 ZZH STATISTIC RCP TIME EA 10 MIN

## 2019-09-25 PROCEDURE — P9041 ALBUMIN (HUMAN),5%, 50ML: HCPCS

## 2019-09-25 PROCEDURE — 25800030 ZZH RX IP 258 OP 636

## 2019-09-25 PROCEDURE — 27210339 ZZH CIRCUIT HUMIDITY W/CPAP BIP

## 2019-09-25 PROCEDURE — 36000073 ZZH SURGERY LEVEL 6 1ST 30 MIN: Performed by: SURGERY

## 2019-09-25 PROCEDURE — 25000125 ZZHC RX 250: Performed by: SURGERY

## 2019-09-25 PROCEDURE — 27210338 ZZH CIRCUIT HUMID FACE/TRACH MSK

## 2019-09-25 PROCEDURE — 25800030 ZZH RX IP 258 OP 636: Performed by: NURSE ANESTHETIST, CERTIFIED REGISTERED

## 2019-09-25 PROCEDURE — 20000003 ZZH R&B ICU

## 2019-09-25 PROCEDURE — 40000008 ZZH STATISTIC AIRWAY CARE

## 2019-09-25 PROCEDURE — 85610 PROTHROMBIN TIME: CPT | Performed by: SURGERY

## 2019-09-25 PROCEDURE — 37000009 ZZH ANESTHESIA TECHNICAL FEE, EACH ADDTL 15 MIN: Performed by: SURGERY

## 2019-09-25 PROCEDURE — 5A1221Z PERFORMANCE OF CARDIAC OUTPUT, CONTINUOUS: ICD-10-PCS | Performed by: SURGERY

## 2019-09-25 PROCEDURE — 37000008 ZZH ANESTHESIA TECHNICAL FEE, 1ST 30 MIN: Performed by: SURGERY

## 2019-09-25 PROCEDURE — 85027 COMPLETE CBC AUTOMATED: CPT | Performed by: SURGERY

## 2019-09-25 PROCEDURE — 82330 ASSAY OF CALCIUM: CPT | Performed by: SURGERY

## 2019-09-25 PROCEDURE — 25000125 ZZHC RX 250

## 2019-09-25 PROCEDURE — 80053 COMPREHEN METABOLIC PANEL: CPT | Performed by: SURGERY

## 2019-09-25 PROCEDURE — 25000128 H RX IP 250 OP 636: Performed by: ANESTHESIOLOGY

## 2019-09-25 PROCEDURE — 00000146 ZZHCL STATISTIC GLUCOSE BY METER IP

## 2019-09-25 PROCEDURE — 27211316 ZZ H MASK, CPAP TOTAL HEADGEAR, LATEX FREE

## 2019-09-25 PROCEDURE — P9041 ALBUMIN (HUMAN),5%, 50ML: HCPCS | Performed by: NURSE ANESTHETIST, CERTIFIED REGISTERED

## 2019-09-25 PROCEDURE — 83735 ASSAY OF MAGNESIUM: CPT | Performed by: SURGERY

## 2019-09-25 PROCEDURE — 83605 ASSAY OF LACTIC ACID: CPT

## 2019-09-25 PROCEDURE — 02C00ZZ EXTIRPATION OF MATTER FROM CORONARY ARTERY, ONE ARTERY, OPEN APPROACH: ICD-10-PCS | Performed by: SURGERY

## 2019-09-25 PROCEDURE — C1779 LEAD, PMKR, TRANSVENOUS VDD: HCPCS | Performed by: SURGERY

## 2019-09-25 PROCEDURE — 82803 BLOOD GASES ANY COMBINATION: CPT

## 2019-09-25 DEVICE — LEAD PACER MYOCARDIAL BIPOLAR TEMPORARY 53CM 6495F: Type: IMPLANTABLE DEVICE | Site: HEART | Status: FUNCTIONAL

## 2019-09-25 RX ORDER — PROTAMINE SULFATE 10 MG/ML
INJECTION, SOLUTION INTRAVENOUS PRN
Status: DISCONTINUED | OUTPATIENT
Start: 2019-09-25 | End: 2019-09-25

## 2019-09-25 RX ORDER — POTASSIUM CHLORIDE 1.5 G/1.58G
20-40 POWDER, FOR SOLUTION ORAL
Status: DISCONTINUED | OUTPATIENT
Start: 2019-09-25 | End: 2019-09-30 | Stop reason: HOSPADM

## 2019-09-25 RX ORDER — NITROGLYCERIN 10 MG/100ML
INJECTION INTRAVENOUS PRN
Status: DISCONTINUED | OUTPATIENT
Start: 2019-09-25 | End: 2019-09-25

## 2019-09-25 RX ORDER — MEPERIDINE HYDROCHLORIDE 25 MG/ML
12.5-25 INJECTION INTRAMUSCULAR; INTRAVENOUS; SUBCUTANEOUS
Status: DISCONTINUED | OUTPATIENT
Start: 2019-09-25 | End: 2019-09-26

## 2019-09-25 RX ORDER — MAGNESIUM SULFATE HEPTAHYDRATE 40 MG/ML
2 INJECTION, SOLUTION INTRAVENOUS DAILY PRN
Status: DISCONTINUED | OUTPATIENT
Start: 2019-09-25 | End: 2019-09-30 | Stop reason: HOSPADM

## 2019-09-25 RX ORDER — NALOXONE HYDROCHLORIDE 0.4 MG/ML
.1-.4 INJECTION, SOLUTION INTRAMUSCULAR; INTRAVENOUS; SUBCUTANEOUS
Status: DISCONTINUED | OUTPATIENT
Start: 2019-09-25 | End: 2019-09-30 | Stop reason: HOSPADM

## 2019-09-25 RX ORDER — DIPHENHYDRAMINE HCL 25 MG
25 CAPSULE ORAL EVERY 6 HOURS PRN
Status: DISCONTINUED | OUTPATIENT
Start: 2019-09-25 | End: 2019-09-30 | Stop reason: HOSPADM

## 2019-09-25 RX ORDER — METHOCARBAMOL 750 MG/1
750 TABLET, FILM COATED ORAL 4 TIMES DAILY PRN
Status: DISCONTINUED | OUTPATIENT
Start: 2019-09-25 | End: 2019-09-30 | Stop reason: HOSPADM

## 2019-09-25 RX ORDER — METOPROLOL TARTRATE 25 MG/1
25 TABLET, FILM COATED ORAL
Status: COMPLETED | OUTPATIENT
Start: 2019-09-25 | End: 2019-09-25

## 2019-09-25 RX ORDER — ALBUMIN, HUMAN INJ 5% 5 %
SOLUTION INTRAVENOUS CONTINUOUS PRN
Status: DISCONTINUED | OUTPATIENT
Start: 2019-09-25 | End: 2019-09-25

## 2019-09-25 RX ORDER — HYDROMORPHONE HYDROCHLORIDE 1 MG/ML
.3-.5 INJECTION, SOLUTION INTRAMUSCULAR; INTRAVENOUS; SUBCUTANEOUS
Status: DISCONTINUED | OUTPATIENT
Start: 2019-09-25 | End: 2019-09-30 | Stop reason: HOSPADM

## 2019-09-25 RX ORDER — NEOSTIGMINE METHYLSULFATE 1 MG/ML
VIAL (ML) INJECTION PRN
Status: DISCONTINUED | OUTPATIENT
Start: 2019-09-25 | End: 2019-09-25

## 2019-09-25 RX ORDER — MUPIROCIN 20 MG/G
OINTMENT TOPICAL 2 TIMES DAILY
Status: DISCONTINUED | OUTPATIENT
Start: 2019-09-25 | End: 2019-09-25 | Stop reason: HOSPADM

## 2019-09-25 RX ORDER — FENTANYL CITRATE 50 UG/ML
50 INJECTION, SOLUTION INTRAMUSCULAR; INTRAVENOUS
Status: COMPLETED | OUTPATIENT
Start: 2019-09-25 | End: 2019-09-25

## 2019-09-25 RX ORDER — NITROGLYCERIN 20 MG/100ML
INJECTION INTRAVENOUS CONTINUOUS PRN
Status: DISCONTINUED | OUTPATIENT
Start: 2019-09-25 | End: 2019-09-25

## 2019-09-25 RX ORDER — SODIUM CHLORIDE, SODIUM LACTATE, POTASSIUM CHLORIDE, CALCIUM CHLORIDE 600; 310; 30; 20 MG/100ML; MG/100ML; MG/100ML; MG/100ML
INJECTION, SOLUTION INTRAVENOUS CONTINUOUS PRN
Status: DISCONTINUED | OUTPATIENT
Start: 2019-09-25 | End: 2019-09-25

## 2019-09-25 RX ORDER — PROPOFOL 10 MG/ML
INJECTION, EMULSION INTRAVENOUS CONTINUOUS PRN
Status: DISCONTINUED | OUTPATIENT
Start: 2019-09-25 | End: 2019-09-25

## 2019-09-25 RX ORDER — AMOXICILLIN 250 MG
2 CAPSULE ORAL 2 TIMES DAILY
Status: DISCONTINUED | OUTPATIENT
Start: 2019-09-25 | End: 2019-09-30 | Stop reason: HOSPADM

## 2019-09-25 RX ORDER — POTASSIUM CL/LIDO/0.9 % NACL 10MEQ/0.1L
10 INTRAVENOUS SOLUTION, PIGGYBACK (ML) INTRAVENOUS
Status: DISCONTINUED | OUTPATIENT
Start: 2019-09-25 | End: 2019-09-30 | Stop reason: HOSPADM

## 2019-09-25 RX ORDER — ALBUMIN, HUMAN INJ 5% 5 %
500-1000 SOLUTION INTRAVENOUS
Status: DISCONTINUED | OUTPATIENT
Start: 2019-09-25 | End: 2019-09-30 | Stop reason: HOSPADM

## 2019-09-25 RX ORDER — SODIUM CHLORIDE 9 MG/ML
INJECTION, SOLUTION INTRAVENOUS CONTINUOUS PRN
Status: DISCONTINUED | OUTPATIENT
Start: 2019-09-25 | End: 2019-09-25

## 2019-09-25 RX ORDER — NALOXONE HYDROCHLORIDE 0.4 MG/ML
.1-.4 INJECTION, SOLUTION INTRAMUSCULAR; INTRAVENOUS; SUBCUTANEOUS
Status: DISCONTINUED | OUTPATIENT
Start: 2019-09-25 | End: 2019-09-25

## 2019-09-25 RX ORDER — FUROSEMIDE 10 MG/ML
20 INJECTION INTRAMUSCULAR; INTRAVENOUS
Status: DISCONTINUED | OUTPATIENT
Start: 2019-09-25 | End: 2019-09-30 | Stop reason: HOSPADM

## 2019-09-25 RX ORDER — ALBUTEROL SULFATE 0.83 MG/ML
2.5 SOLUTION RESPIRATORY (INHALATION)
Status: DISCONTINUED | OUTPATIENT
Start: 2019-09-25 | End: 2019-09-30 | Stop reason: HOSPADM

## 2019-09-25 RX ORDER — LIDOCAINE 40 MG/G
CREAM TOPICAL
Status: DISCONTINUED | OUTPATIENT
Start: 2019-09-25 | End: 2019-09-30 | Stop reason: HOSPADM

## 2019-09-25 RX ORDER — SODIUM CHLORIDE, SODIUM LACTATE, POTASSIUM CHLORIDE, CALCIUM CHLORIDE 600; 310; 30; 20 MG/100ML; MG/100ML; MG/100ML; MG/100ML
INJECTION, SOLUTION INTRAVENOUS CONTINUOUS
Status: DISCONTINUED | OUTPATIENT
Start: 2019-09-25 | End: 2019-09-25 | Stop reason: HOSPADM

## 2019-09-25 RX ORDER — MUPIROCIN 20 MG/G
0.5 OINTMENT TOPICAL 2 TIMES DAILY
Status: DISCONTINUED | OUTPATIENT
Start: 2019-09-25 | End: 2019-09-25 | Stop reason: CLARIF

## 2019-09-25 RX ORDER — DIPHENHYDRAMINE HYDROCHLORIDE 50 MG/ML
25 INJECTION INTRAMUSCULAR; INTRAVENOUS EVERY 6 HOURS PRN
Status: DISCONTINUED | OUTPATIENT
Start: 2019-09-25 | End: 2019-09-30 | Stop reason: HOSPADM

## 2019-09-25 RX ORDER — POTASSIUM CHLORIDE 1500 MG/1
20-40 TABLET, EXTENDED RELEASE ORAL
Status: DISCONTINUED | OUTPATIENT
Start: 2019-09-25 | End: 2019-09-30 | Stop reason: HOSPADM

## 2019-09-25 RX ORDER — PROPOFOL 10 MG/ML
5-75 INJECTION, EMULSION INTRAVENOUS CONTINUOUS
Status: DISCONTINUED | OUTPATIENT
Start: 2019-09-25 | End: 2019-09-26

## 2019-09-25 RX ORDER — HALOPERIDOL 5 MG/ML
INJECTION INTRAMUSCULAR
Status: COMPLETED
Start: 2019-09-25 | End: 2019-09-25

## 2019-09-25 RX ORDER — METOCLOPRAMIDE HYDROCHLORIDE 5 MG/ML
10 INJECTION INTRAMUSCULAR; INTRAVENOUS EVERY 6 HOURS PRN
Status: DISCONTINUED | OUTPATIENT
Start: 2019-09-25 | End: 2019-09-30 | Stop reason: HOSPADM

## 2019-09-25 RX ORDER — DEXTROSE MONOHYDRATE 25 G/50ML
25-50 INJECTION, SOLUTION INTRAVENOUS
Status: DISCONTINUED | OUTPATIENT
Start: 2019-09-25 | End: 2019-09-30 | Stop reason: HOSPADM

## 2019-09-25 RX ORDER — POTASSIUM CHLORIDE 29.8 MG/ML
20 INJECTION INTRAVENOUS
Status: DISCONTINUED | OUTPATIENT
Start: 2019-09-25 | End: 2019-09-30 | Stop reason: HOSPADM

## 2019-09-25 RX ORDER — NICOTINE POLACRILEX 4 MG
15-30 LOZENGE BUCCAL
Status: DISCONTINUED | OUTPATIENT
Start: 2019-09-25 | End: 2019-09-30 | Stop reason: HOSPADM

## 2019-09-25 RX ORDER — PROPOFOL 10 MG/ML
INJECTION, EMULSION INTRAVENOUS PRN
Status: DISCONTINUED | OUTPATIENT
Start: 2019-09-25 | End: 2019-09-25

## 2019-09-25 RX ORDER — OXYCODONE HYDROCHLORIDE 5 MG/1
5-10 TABLET ORAL
Status: DISCONTINUED | OUTPATIENT
Start: 2019-09-25 | End: 2019-09-30 | Stop reason: HOSPADM

## 2019-09-25 RX ORDER — PROPOFOL 10 MG/ML
10-20 INJECTION, EMULSION INTRAVENOUS EVERY 30 MIN PRN
Status: DISCONTINUED | OUTPATIENT
Start: 2019-09-25 | End: 2019-09-26

## 2019-09-25 RX ORDER — CEFAZOLIN SODIUM 2 G/100ML
2 INJECTION, SOLUTION INTRAVENOUS
Status: DISCONTINUED | OUTPATIENT
Start: 2019-09-25 | End: 2019-09-25 | Stop reason: HOSPADM

## 2019-09-25 RX ORDER — DEXTROSE MONOHYDRATE 25 G/50ML
25-50 INJECTION, SOLUTION INTRAVENOUS
Status: DISCONTINUED | OUTPATIENT
Start: 2019-09-25 | End: 2019-09-25

## 2019-09-25 RX ORDER — ACETAMINOPHEN 325 MG/1
650 TABLET ORAL EVERY 4 HOURS PRN
Status: DISCONTINUED | OUTPATIENT
Start: 2019-09-28 | End: 2019-09-30 | Stop reason: HOSPADM

## 2019-09-25 RX ORDER — MAGNESIUM SULFATE HEPTAHYDRATE 40 MG/ML
4 INJECTION, SOLUTION INTRAVENOUS EVERY 4 HOURS PRN
Status: DISCONTINUED | OUTPATIENT
Start: 2019-09-25 | End: 2019-09-30 | Stop reason: HOSPADM

## 2019-09-25 RX ORDER — ONDANSETRON 2 MG/ML
4 INJECTION INTRAMUSCULAR; INTRAVENOUS EVERY 6 HOURS PRN
Status: DISCONTINUED | OUTPATIENT
Start: 2019-09-25 | End: 2019-09-30 | Stop reason: HOSPADM

## 2019-09-25 RX ORDER — DEXTROSE MONOHYDRATE, SODIUM CHLORIDE, AND POTASSIUM CHLORIDE 50; 1.49; 4.5 G/1000ML; G/1000ML; G/1000ML
INJECTION, SOLUTION INTRAVENOUS CONTINUOUS
Status: DISCONTINUED | OUTPATIENT
Start: 2019-09-25 | End: 2019-09-30

## 2019-09-25 RX ORDER — NICOTINE POLACRILEX 4 MG
15-30 LOZENGE BUCCAL
Status: DISCONTINUED | OUTPATIENT
Start: 2019-09-25 | End: 2019-09-25

## 2019-09-25 RX ORDER — CEFAZOLIN SODIUM 1 G/3ML
1 INJECTION, POWDER, FOR SOLUTION INTRAMUSCULAR; INTRAVENOUS SEE ADMIN INSTRUCTIONS
Status: DISCONTINUED | OUTPATIENT
Start: 2019-09-25 | End: 2019-09-25 | Stop reason: HOSPADM

## 2019-09-25 RX ORDER — PROCHLORPERAZINE MALEATE 10 MG
10 TABLET ORAL EVERY 6 HOURS PRN
Status: DISCONTINUED | OUTPATIENT
Start: 2019-09-25 | End: 2019-09-30 | Stop reason: HOSPADM

## 2019-09-25 RX ORDER — HEPARIN SODIUM 1000 [USP'U]/ML
INJECTION, SOLUTION INTRAVENOUS; SUBCUTANEOUS PRN
Status: DISCONTINUED | OUTPATIENT
Start: 2019-09-25 | End: 2019-09-25

## 2019-09-25 RX ORDER — AMOXICILLIN 250 MG
1 CAPSULE ORAL 2 TIMES DAILY
Status: DISCONTINUED | OUTPATIENT
Start: 2019-09-25 | End: 2019-09-30 | Stop reason: HOSPADM

## 2019-09-25 RX ORDER — POTASSIUM CHLORIDE 7.45 MG/ML
10 INJECTION INTRAVENOUS
Status: DISCONTINUED | OUTPATIENT
Start: 2019-09-25 | End: 2019-09-30 | Stop reason: HOSPADM

## 2019-09-25 RX ORDER — ROSUVASTATIN CALCIUM 10 MG/1
10 TABLET, COATED ORAL DAILY
Status: DISCONTINUED | OUTPATIENT
Start: 2019-09-26 | End: 2019-09-30 | Stop reason: HOSPADM

## 2019-09-25 RX ORDER — ONDANSETRON 2 MG/ML
INJECTION INTRAMUSCULAR; INTRAVENOUS PRN
Status: DISCONTINUED | OUTPATIENT
Start: 2019-09-25 | End: 2019-09-25

## 2019-09-25 RX ORDER — CHLORHEXIDINE GLUCONATE ORAL RINSE 1.2 MG/ML
15 SOLUTION DENTAL EVERY 12 HOURS
Status: DISCONTINUED | OUTPATIENT
Start: 2019-09-25 | End: 2019-09-25 | Stop reason: CLARIF

## 2019-09-25 RX ORDER — GLYCOPYRROLATE 0.2 MG/ML
INJECTION, SOLUTION INTRAMUSCULAR; INTRAVENOUS PRN
Status: DISCONTINUED | OUTPATIENT
Start: 2019-09-25 | End: 2019-09-25

## 2019-09-25 RX ORDER — LIDOCAINE HYDROCHLORIDE 20 MG/ML
INJECTION, SOLUTION INFILTRATION; PERINEURAL PRN
Status: DISCONTINUED | OUTPATIENT
Start: 2019-09-25 | End: 2019-09-25

## 2019-09-25 RX ORDER — PAPAVERINE HYDROCHLORIDE 30 MG/ML
INJECTION INTRAMUSCULAR; INTRAVENOUS PRN
Status: DISCONTINUED | OUTPATIENT
Start: 2019-09-25 | End: 2019-09-25 | Stop reason: HOSPADM

## 2019-09-25 RX ORDER — HEPARIN SODIUM 5000 [USP'U]/.5ML
5000 INJECTION, SOLUTION INTRAVENOUS; SUBCUTANEOUS EVERY 8 HOURS
Status: DISCONTINUED | OUTPATIENT
Start: 2019-09-26 | End: 2019-09-28

## 2019-09-25 RX ORDER — CEFAZOLIN SODIUM 2 G/100ML
2 INJECTION, SOLUTION INTRAVENOUS EVERY 8 HOURS
Status: COMPLETED | OUTPATIENT
Start: 2019-09-25 | End: 2019-09-26

## 2019-09-25 RX ORDER — CEFAZOLIN SODIUM 1 G/3ML
INJECTION, POWDER, FOR SOLUTION INTRAMUSCULAR; INTRAVENOUS PRN
Status: DISCONTINUED | OUTPATIENT
Start: 2019-09-25 | End: 2019-09-25

## 2019-09-25 RX ORDER — ONDANSETRON 4 MG/1
4 TABLET, ORALLY DISINTEGRATING ORAL EVERY 6 HOURS PRN
Status: DISCONTINUED | OUTPATIENT
Start: 2019-09-25 | End: 2019-09-30 | Stop reason: HOSPADM

## 2019-09-25 RX ORDER — ACETAMINOPHEN 325 MG/1
975 TABLET ORAL EVERY 8 HOURS
Status: COMPLETED | OUTPATIENT
Start: 2019-09-25 | End: 2019-09-28

## 2019-09-25 RX ORDER — FENTANYL CITRATE 50 UG/ML
INJECTION, SOLUTION INTRAMUSCULAR; INTRAVENOUS PRN
Status: DISCONTINUED | OUTPATIENT
Start: 2019-09-25 | End: 2019-09-25

## 2019-09-25 RX ORDER — NITROGLYCERIN 20 MG/100ML
.07-2 INJECTION INTRAVENOUS CONTINUOUS
Status: DISCONTINUED | OUTPATIENT
Start: 2019-09-25 | End: 2019-09-26

## 2019-09-25 RX ORDER — HALOPERIDOL 5 MG/ML
2-5 INJECTION INTRAMUSCULAR EVERY 6 HOURS PRN
Status: DISCONTINUED | OUTPATIENT
Start: 2019-09-25 | End: 2019-09-26

## 2019-09-25 RX ORDER — GABAPENTIN 300 MG/1
300 CAPSULE ORAL 2 TIMES DAILY
Status: DISPENSED | OUTPATIENT
Start: 2019-09-25 | End: 2019-09-28

## 2019-09-25 RX ORDER — METOCLOPRAMIDE 5 MG/1
10 TABLET ORAL EVERY 6 HOURS PRN
Status: DISCONTINUED | OUTPATIENT
Start: 2019-09-25 | End: 2019-09-30 | Stop reason: HOSPADM

## 2019-09-25 RX ORDER — HYDRALAZINE HYDROCHLORIDE 20 MG/ML
10 INJECTION INTRAMUSCULAR; INTRAVENOUS EVERY 30 MIN PRN
Status: DISCONTINUED | OUTPATIENT
Start: 2019-09-25 | End: 2019-09-30 | Stop reason: HOSPADM

## 2019-09-25 RX ORDER — METOPROLOL TARTRATE 25 MG/1
25 TABLET, FILM COATED ORAL EVERY 12 HOURS
Status: DISCONTINUED | OUTPATIENT
Start: 2019-09-26 | End: 2019-09-27

## 2019-09-25 RX ORDER — CLOPIDOGREL BISULFATE 75 MG/1
75 TABLET ORAL DAILY
Status: DISCONTINUED | OUTPATIENT
Start: 2019-09-26 | End: 2019-09-26

## 2019-09-25 RX ORDER — EPHEDRINE SULFATE 50 MG/ML
INJECTION, SOLUTION INTRAMUSCULAR; INTRAVENOUS; SUBCUTANEOUS PRN
Status: DISCONTINUED | OUTPATIENT
Start: 2019-09-25 | End: 2019-09-25

## 2019-09-25 RX ORDER — VECURONIUM BROMIDE 1 MG/ML
INJECTION, POWDER, LYOPHILIZED, FOR SOLUTION INTRAVENOUS PRN
Status: DISCONTINUED | OUTPATIENT
Start: 2019-09-25 | End: 2019-09-25

## 2019-09-25 RX ADMIN — VECURONIUM BROMIDE 3 MG: 1 INJECTION, POWDER, LYOPHILIZED, FOR SOLUTION INTRAVENOUS at 10:16

## 2019-09-25 RX ADMIN — FENTANYL CITRATE 50 MCG: 50 INJECTION, SOLUTION INTRAMUSCULAR; INTRAVENOUS at 11:07

## 2019-09-25 RX ADMIN — GLYCOPYRROLATE 0.3 MG: 0.2 INJECTION, SOLUTION INTRAMUSCULAR; INTRAVENOUS at 12:45

## 2019-09-25 RX ADMIN — SODIUM CHLORIDE: 9 INJECTION, SOLUTION INTRAVENOUS at 10:17

## 2019-09-25 RX ADMIN — PHENYLEPHRINE HYDROCHLORIDE 0.1 MCG/KG/MIN: 10 INJECTION INTRAVENOUS at 07:49

## 2019-09-25 RX ADMIN — MUPIROCIN: 20 OINTMENT TOPICAL at 06:14

## 2019-09-25 RX ADMIN — SODIUM CHLORIDE 1.5 UNITS/HR: 9 INJECTION, SOLUTION INTRAVENOUS at 17:15

## 2019-09-25 RX ADMIN — ACETAMINOPHEN 975 MG: 325 TABLET, FILM COATED ORAL at 17:42

## 2019-09-25 RX ADMIN — RANITIDINE 150 MG: 150 TABLET ORAL at 06:24

## 2019-09-25 RX ADMIN — GABAPENTIN 300 MG: 300 CAPSULE ORAL at 17:43

## 2019-09-25 RX ADMIN — CEFAZOLIN SODIUM 2 G: 2 INJECTION, SOLUTION INTRAVENOUS at 20:17

## 2019-09-25 RX ADMIN — FENTANYL CITRATE 50 MCG: 50 INJECTION INTRAMUSCULAR; INTRAVENOUS at 07:07

## 2019-09-25 RX ADMIN — ROCURONIUM BROMIDE 50 MG: 10 INJECTION INTRAVENOUS at 07:43

## 2019-09-25 RX ADMIN — FENTANYL CITRATE 100 MCG: 50 INJECTION, SOLUTION INTRAMUSCULAR; INTRAVENOUS at 08:20

## 2019-09-25 RX ADMIN — PROPOFOL 100 MG: 10 INJECTION, EMULSION INTRAVENOUS at 11:10

## 2019-09-25 RX ADMIN — OXYCODONE HYDROCHLORIDE 5 MG: 5 TABLET ORAL at 20:22

## 2019-09-25 RX ADMIN — PROTAMINE SULFATE 200 MG: 10 INJECTION, SOLUTION INTRAVENOUS at 11:12

## 2019-09-25 RX ADMIN — HYDROMORPHONE HYDROCHLORIDE 0.5 MG: 1 INJECTION, SOLUTION INTRAMUSCULAR; INTRAVENOUS; SUBCUTANEOUS at 14:21

## 2019-09-25 RX ADMIN — PROPOFOL 200 MG: 10 INJECTION, EMULSION INTRAVENOUS at 07:43

## 2019-09-25 RX ADMIN — Medication 5 MG: at 07:55

## 2019-09-25 RX ADMIN — NEOSTIGMINE METHYLSULFATE 3 MG: 1 INJECTION, SOLUTION INTRAVENOUS at 12:45

## 2019-09-25 RX ADMIN — OXYCODONE HYDROCHLORIDE 5 MG: 5 TABLET ORAL at 17:43

## 2019-09-25 RX ADMIN — VECURONIUM BROMIDE 5 MG: 1 INJECTION, POWDER, LYOPHILIZED, FOR SOLUTION INTRAVENOUS at 09:12

## 2019-09-25 RX ADMIN — MIDAZOLAM HYDROCHLORIDE 2 MG: 1 INJECTION, SOLUTION INTRAMUSCULAR; INTRAVENOUS at 07:07

## 2019-09-25 RX ADMIN — SENNOSIDES AND DOCUSATE SODIUM 1 TABLET: 8.6; 5 TABLET ORAL at 20:22

## 2019-09-25 RX ADMIN — HYDROMORPHONE HYDROCHLORIDE 0.5 MG: 1 INJECTION, SOLUTION INTRAMUSCULAR; INTRAVENOUS; SUBCUTANEOUS at 16:21

## 2019-09-25 RX ADMIN — FENTANYL CITRATE 100 MCG: 50 INJECTION, SOLUTION INTRAMUSCULAR; INTRAVENOUS at 08:00

## 2019-09-25 RX ADMIN — FENTANYL CITRATE 250 MCG: 50 INJECTION, SOLUTION INTRAMUSCULAR; INTRAVENOUS at 07:43

## 2019-09-25 RX ADMIN — NITROGLYCERIN 5 MCG: 10 INJECTION INTRAVENOUS at 11:15

## 2019-09-25 RX ADMIN — Medication 5 MG: at 07:59

## 2019-09-25 RX ADMIN — METOPROLOL TARTRATE 25 MG: 25 TABLET ORAL at 06:27

## 2019-09-25 RX ADMIN — Medication 5 MG: at 07:50

## 2019-09-25 RX ADMIN — ONDANSETRON 4 MG: 2 INJECTION INTRAMUSCULAR; INTRAVENOUS at 11:42

## 2019-09-25 RX ADMIN — LIDOCAINE HYDROCHLORIDE 100 MG: 20 INJECTION, SOLUTION INFILTRATION; PERINEURAL at 07:43

## 2019-09-25 RX ADMIN — SODIUM CHLORIDE, POTASSIUM CHLORIDE, SODIUM LACTATE AND CALCIUM CHLORIDE: 600; 310; 30; 20 INJECTION, SOLUTION INTRAVENOUS at 07:00

## 2019-09-25 RX ADMIN — SODIUM CHLORIDE, POTASSIUM CHLORIDE, SODIUM LACTATE AND CALCIUM CHLORIDE: 600; 310; 30; 20 INJECTION, SOLUTION INTRAVENOUS at 09:57

## 2019-09-25 RX ADMIN — Medication 5 MG: at 08:05

## 2019-09-25 RX ADMIN — CEFAZOLIN 1 G: 1 INJECTION, POWDER, FOR SOLUTION INTRAMUSCULAR; INTRAVENOUS at 12:05

## 2019-09-25 RX ADMIN — Medication 10 MG: at 10:00

## 2019-09-25 RX ADMIN — PROPOFOL 5 MCG/KG/MIN: 10 INJECTION, EMULSION INTRAVENOUS at 09:50

## 2019-09-25 RX ADMIN — POTASSIUM CHLORIDE 20 MEQ: 29.8 INJECTION, SOLUTION INTRAVENOUS at 15:45

## 2019-09-25 RX ADMIN — ALBUTEROL SULFATE 2.5 MG: 2.5 SOLUTION RESPIRATORY (INHALATION) at 14:23

## 2019-09-25 RX ADMIN — NITROGLYCERIN 0.03 MCG/KG/MIN: 20 INJECTION INTRAVENOUS at 11:58

## 2019-09-25 RX ADMIN — FENTANYL CITRATE 150 MCG: 50 INJECTION, SOLUTION INTRAMUSCULAR; INTRAVENOUS at 09:47

## 2019-09-25 RX ADMIN — FENTANYL CITRATE 100 MCG: 50 INJECTION, SOLUTION INTRAMUSCULAR; INTRAVENOUS at 08:10

## 2019-09-25 RX ADMIN — PHENYLEPHRINE HYDROCHLORIDE 100 MCG: 10 INJECTION INTRAVENOUS at 07:43

## 2019-09-25 RX ADMIN — HEPARIN SODIUM 37000 UNITS: 1000 INJECTION, SOLUTION INTRAVENOUS; SUBCUTANEOUS at 09:36

## 2019-09-25 RX ADMIN — SODIUM CHLORIDE: 9 INJECTION, SOLUTION INTRAVENOUS at 07:35

## 2019-09-25 RX ADMIN — FENTANYL CITRATE 100 MCG: 50 INJECTION, SOLUTION INTRAMUSCULAR; INTRAVENOUS at 09:32

## 2019-09-25 RX ADMIN — Medication 5 MG: at 09:36

## 2019-09-25 RX ADMIN — NITROGLYCERIN 5 MCG: 10 INJECTION INTRAVENOUS at 11:07

## 2019-09-25 RX ADMIN — FENTANYL CITRATE 100 MCG: 50 INJECTION, SOLUTION INTRAMUSCULAR; INTRAVENOUS at 08:35

## 2019-09-25 RX ADMIN — Medication 5 MG: at 08:10

## 2019-09-25 RX ADMIN — AMINOCAPROIC ACID 5 G/HR: 250 INJECTION, SOLUTION INTRAVENOUS at 08:20

## 2019-09-25 RX ADMIN — CEFAZOLIN 2 G: 1 INJECTION, POWDER, FOR SOLUTION INTRAMUSCULAR; INTRAVENOUS at 08:05

## 2019-09-25 RX ADMIN — ALBUMIN HUMAN: 0.05 INJECTION, SOLUTION INTRAVENOUS at 11:21

## 2019-09-25 RX ADMIN — CEFAZOLIN 1 G: 1 INJECTION, POWDER, FOR SOLUTION INTRAMUSCULAR; INTRAVENOUS at 10:05

## 2019-09-25 RX ADMIN — METHOCARBAMOL TABLETS 750 MG: 750 TABLET, COATED ORAL at 20:22

## 2019-09-25 RX ADMIN — VECURONIUM BROMIDE 5 MG: 1 INJECTION, POWDER, LYOPHILIZED, FOR SOLUTION INTRAVENOUS at 08:18

## 2019-09-25 RX ADMIN — SODIUM CHLORIDE, POTASSIUM CHLORIDE, SODIUM LACTATE AND CALCIUM CHLORIDE: 600; 310; 30; 20 INJECTION, SOLUTION INTRAVENOUS at 07:09

## 2019-09-25 RX ADMIN — HALOPERIDOL 2 MG: 5 INJECTION INTRAMUSCULAR at 14:38

## 2019-09-25 RX ADMIN — FENTANYL CITRATE 100 MCG: 50 INJECTION, SOLUTION INTRAMUSCULAR; INTRAVENOUS at 09:03

## 2019-09-25 RX ADMIN — MIDAZOLAM 1 MG: 1 INJECTION INTRAMUSCULAR; INTRAVENOUS at 09:52

## 2019-09-25 RX ADMIN — EPINEPHRINE 0.03 MCG/KG/MIN: 1 INJECTION INTRAMUSCULAR; INTRAVENOUS; SUBCUTANEOUS at 10:53

## 2019-09-25 RX ADMIN — PROPOFOL 150 MG: 10 INJECTION, EMULSION INTRAVENOUS at 09:49

## 2019-09-25 RX ADMIN — PANTOPRAZOLE SODIUM 40 MG: 40 INJECTION, POWDER, FOR SOLUTION INTRAVENOUS at 15:43

## 2019-09-25 RX ADMIN — HALOPERIDOL LACTATE 2 MG: 5 INJECTION INTRAMUSCULAR at 14:38

## 2019-09-25 RX ADMIN — SODIUM CHLORIDE, POTASSIUM CHLORIDE, SODIUM LACTATE AND CALCIUM CHLORIDE: 600; 310; 30; 20 INJECTION, SOLUTION INTRAVENOUS at 07:35

## 2019-09-25 ASSESSMENT — ACTIVITIES OF DAILY LIVING (ADL)
ADLS_ACUITY_SCORE: 15
ADLS_ACUITY_SCORE: 15

## 2019-09-25 ASSESSMENT — LIFESTYLE VARIABLES: TOBACCO_USE: 1

## 2019-09-25 NOTE — BRIEF OP NOTE
[facility]    Brief Operative Note    Pre-operative diagnosis: CORONARY ARTERY DISEASE  Post-operative diagnosis Same    Procedure: Procedure(s):  CORONARY ARTERY BYPASS GRAFTING x 1 (LIMA - LAD) WITH CORONARY ENDARTERECTOMY  (ON PUMP OXYGENATOR ; HANK BY [facility])  Surgeon: Surgeon(s) and Role:     * Magdi Turner MD - Primary     * Roni Kline MD - Fellow - Assisting     Anesthesia: General     Estimated blood loss: 1000cc    Drains: Mediastinal x 2    Specimens: * No specimens in log *  Findings:   None.  Complications: None.  Implants:    Implant Name Type Inv. Item Serial No.  Lot No. LRB No. Used   LEAD ARTIRIAL PACING TEMPORARY 53CM [id number] Leads LEAD ARTIRIAL PACING TEMPORARY 53CM [id number]  [employer]  N/A 1

## 2019-09-25 NOTE — ANESTHESIA PROCEDURE NOTES
CENTRAL LINE INSERTION PROCEDURE NOTE:   Pre-Procedure  Performed by  in the presence of a teaching physician  Matthias Connell MD  Location: pre-op      Pre-Anesthestic Checklist: patient identified, IV checked, site marked, risks and benefits discussed, informed consent, monitors and equipment checked, pre-op evaluation and at physician/surgeon's request    Timeout  Correct Patient: Yes   Correct Procedure: Yes   Correct Site: Yes   Correct Laterality: Yes   Correct Position: Yes   Site Marked: Yes   .   Procedure Documentation   Procedure: central line  Position: Trendelenburg  Patient Prep/Sterile Barriers; chlorhexidine gluconate and isopropyl alcohol      Insertion Site:right, internal jugular    Skin infiltrated with 3 mL of 1% lidocaine.  Catheter: 9 Vincentian, 10 cm (sheath introducer)  Assessment/Narrative    Catheter: 9 Vincentian, 10 cm (sheath introducer)     Secured by suture  Tegaderm and Biopatch dressing used.  blood aspirated from all lumens  All lumens flushed: Yes    Comments:  Patient tolerated well.  No complications.

## 2019-09-25 NOTE — PROGRESS NOTES
Admission medication history interview status for the 9/25/2019  admission is complete. See EPIC admission navigator for prior to admission medications     Medication history source reliability:Good    Medication history interview source(s):Patient    Medication history resources (including written lists, pill bottles, clinic record):Picture of RX bottles    Primary pharmacy.[facility], [place]    Additional medication history information not noted on PTA med list :None    Time spent in this activity: 30 minutes    Prior to Admission medications    Medication Sig Last Dose Taking? Auth Provider   ASPIRIN 81 MG OR TABS 1 tab po QD (Once per day) 9/24/2019 at 2000 Yes    Cholecalciferol (VITAMIN D) 2000 UNITS tablet Take 2,000 Units by mouth daily 9/24/2019 at 2000 Yes Remington Riggs MD   metoprolol tartrate (LOPRESSOR) 25 MG tablet Take 1 tablet (25 mg) by mouth 2 times daily  Patient taking differently: Take 25 mg by mouth every evening  9/24/2019 at 2000 Yes Felton Blake MD   rosuvastatin (CRESTOR) 10 MG tablet Take 1 tablet (10 mg) by mouth daily 9/24/2019 at 2000 Yes Felton Blake MD

## 2019-09-25 NOTE — ANESTHESIA POSTPROCEDURE EVALUATION
Patient: Bryce Espinoza    Procedure(s):  CORONARY ARTERY BYPASS GRAFTING x 1 (LIMA - LAD) WITH CORONARY ENDARTERECTOMY  (ON PUMP OXYGENATOR ; HANK BY [facility])    Diagnosis:CORONARY ARTERY DISEASE  Diagnosis Additional Information: No value filed.    Anesthesia Type:  General, ETT    Note:  Anesthesia Post Evaluation    Patient location during evaluation: ICU  Patient participation: Unable to evaluate secondary to administered sedation  Pain management: adequate  Airway patency: patent  Cardiovascular status: hemodynamically stable  Respiratory status: ventilator and ETT  Hydration status: acceptable  PONV: none     Anesthetic complications: None          Last vitals:  Vitals:    09/25/19 0558 09/25/19 1233 09/25/19 1300   Resp:   17   Temp: 36.4  C (97.5  F)  35.8  C (96.4  F)   SpO2:  99% 99%         Electronically Signed By: Matthias Connell MD  September 25, 2019  1:52 PM

## 2019-09-25 NOTE — PLAN OF CARE
Patient tolerated extubation well at 1410; Initially anxious and hypertensive, nitroGLYCERIN drip adjusted, BP lowered to defined parameters; Blood glucose value 190 when Insulin drip initiated; Patient currently on Algorithm 2; VSS, no dysrhythmias; Patient dangled at bedside and up to chair for 25 minutes; U/O adequate; CT output WDL - total output 300 mL at 1900; Patient resting comfortably, pain controlled; All questions by patient and family answered.

## 2019-09-25 NOTE — ANESTHESIA CARE TRANSFER NOTE
Patient: Bryce Espinoza    Procedure(s):  CORONARY ARTERY BYPASS GRAFTING x 1 (LIMA - LAD) WITH CORONARY ENDARTERECTOMY  (ON PUMP OXYGENATOR ; HANK BY [facility])    Diagnosis: CORONARY ARTERY DISEASE  Diagnosis Additional Information: No value filed.    Anesthesia Type:   General, ETT     Note:  Airway :ETT  Patient transferred to:ICU  Comments: Transferred to ICU, ETT in place, ambu bag with 10L oxygen for transport, all monitors and alarms on for transport, IV/lines patent and drips continued per anesthesia record.  Placed on ventilator per RT in ICU, , RR 16, PEEP 5, FiO2 60. Placed on ICU monitors per ICU RN, alarms on, VSS.  Report to ICU RN.  Before trans. To ICU, VSS, BBSE, apical lung sounds clear, ALEXA suction, # 18 oral gastric tube placed with ease, with posit. Gastric returns.ICU Handoff: Call for PAUSE to initiate/utilize ICU HANDOFF, Identified Patient, Identified Responsible Provider, Reviewed the Pertinent Medical History, Discussed Surgical Course, Reviewed Intra-OP Anesthesia Management and Issues during Anesthesia, Set Expectations for Post Procedure Period and Allowed Opportunity for Questions and Acknowledgement of Understanding      Vitals: (Last set prior to Anesthesia Care Transfer)    CRNA VITALS  9/25/2019 1151 - 9/25/2019 1250      9/25/2019             Resp Rate (observed):  12    Resp Rate (set):  10                Electronically Signed By: AURELIA Walsh CRNA  September 25, 2019  12:50 PM

## 2019-09-25 NOTE — ANESTHESIA PROCEDURE NOTES
ARTERIAL LINE PROCEDURE NOTE:   Pre-Procedure    Location: pre-op      Pre-Anesthestic Checklist: patient identified, IV checked, risks and benefits discussed, informed consent, monitors and equipment checked and pre-op evaluation    Timeout  Correct Patient: Yes   Correct Procedure: Yes   Correct Site: Yes   Correct Laterality: Yes   Correct Position: Yes   Site Marked: Yes   .   Procedure Documentation  Procedure: arterial line  Diagnosis:Hemodynamic instability   Supine  Insertion Site:right, radial.Skin infiltrated with 3 mL of 1% lidocaine. Injection technique: ultrasound guided  .  Artery evaluated via ultrasound confirming patency.  Using realtime imaging the artery was punctured and needle was observed entering artery..  A permanent image is entered into the patient's record.Patient Prep/Sterile Barriers; all elements of maximal sterile barrier technique followed, mask, hat, sterile gown, sterile gloves, draped, hand hygiene, chlorhexidine gluconate and isopropyl alcohol    Assessment/Narrative    Catheter: 20 gauge, 1.75 in/4.5 cm quick cath (integral wire)     Secured by other  Tegaderm dressing used.    Arterial waveform: Yes

## 2019-09-25 NOTE — OP NOTE
Procedure Date: 09/25/2019      REFERRING CARDIOLOGIST:  Nick Willson MD, FACP      PREOPERATIVE DIAGNOSIS:  Severe left main disease.      POSTOPERATIVE DIAGNOSIS:  Severe left main disease.      SURGEON:  Magdi Turner MD      ASSISTANT:  Roni Kline MD      NAME OF OPERATION:  Coronary artery bypass grafting x 1 with left internal mammary artery to the left anterior descending, intraoperative HANK.      ANESTHESIA:  General endotracheal.      INDICATIONS FOR PROCEDURE:  Mr. Espinoza is a very pleasant 59-year-old gentleman who was recently worked up for possible ischemic heart disease and was found to have severe left main disease.  Coronary angiogram performed earlier this month by Dr. Willson.  He was taken to the operating room today for coronary bypass grafting with LIMA to the LAD. Of note, he had a very small LCx system which was not graftable. His RCA lesion was not felt to be hemodynamically significant.     OPERATIVE FINDINGS:  The patient had an overall normal LV systolic size and function.  His left internal mammary artery was excellent quality conduit with excellent flow, measuring 2.5 to 3 mm in diameter.  His LAD, however, was severely diseased proximally where we could access it.  In fact, this required a coronary endarterectomy so we could sew a graft onto it.  Distally, it was too small for bypass grafting and in the midsection, it was intramyocardial.  Therefore, we had to land a LIMA graft more proximally where it was significantly diseased, and as I mentioned, where we did the endarterectomy to make this technically feasible.      DESCRIPTION OF PROCEDURE:  After informed consent was obtained, the patient was brought down to the operating room and was placed on the OR table in a supine position.  Intravenous and intra-arterial lines were begun.  While monitoring his blood pressure and EKG tracing, he was anesthetized and intubated using a single lumen endotracheal tube.  His entire  chest, abdomen, both groins and legs were prepped down to the toes using multiple layers of DuraPrep.  He was draped in a sterile field.  Median sternotomy was performed, and the left internal mammary artery was taken down.  Prior to clipping the LIMA distally, the patient was fully heparinized.  The sternal edges were retracted laterally, and the pericardium was opened to suspend the heart in a pericardial cradle.  The ascending aorta and the right atrial appendage were cannulated.  An antegrade needle/aortic root vent was placed in the ascending aorta as well.  After appropriate ACT level was achieved, cardiopulmonary bypass was established, and the patient was kept normothermic during entire operation.  The aorta was then crossclamped, and antegrade cold blood cardioplegia was given to arrest the heart.  The patient went into good diastolic arrest without any LV distention.      The LIMA to LAD anastomosis was performed.  Findings are noted above.  While we were finishing up the anastomosis, we noticed that there were very heavy calcifications along the sidewall, and we had to take fairly large bites to technically sew the graft on.  Then while doing so, the shelf of calcium started to separate, and it became more obvious that we needed to do a coronary endarterectomy to do this anastomosis adequately.  We therefore did the coronary endarterectomy and redid the LIMA to LAD anastomoses in an end-to-side fashion using running 7-0 Prolene.  This anastomosis was protected by tacking the LIMA pedicle down to the epicardium using interrupted 6-0 plain x 2.  A liter of warm blood cardioplegia was given as an antegrade hot shot, and the aortic crossclamp was removed.  Aortic crossclamp time was 42 minutes.  The patient was eventually weaned off cardiopulmonary bypass with minimal inotropic and vasopressor support.  Total cardiopulmonary bypass time was 62 minutes.  LV function was good on HANK.  Using a handheld Doppler  probe, the LIMA graft had excellent signal.  Once the patient remained stable off bypass, the venous cannula was removed, and protamine was administered.  The aortic cannula was subsequently removed as well.  Temporary ventricular pacing wires placed in the RV muscle.  A 32-Georgian angled and straight chest tube was placed in mediastinum as well.  These were all brought out percutaneously below the sternotomy incision and secured to skin using 2-0 Ethibond.  The right pleural space was slightly opened.  The mediastinum was irrigated with antibiotic saline, and hemostasis was achieved.  The sternum was reapproximated using multiple interrupted single and double wires.  The incision was closed in layers of running Vicryl suture.  Skin was closed using 3-0 Vicryl and was sealed using Dermabond.      There were no intraoperative complications, and the patient tolerated the operation well.  No blood products were given intraoperatively.  All sponge counts, needle counts and instrument counts were correct x 2 at the end the operation.     We will recommend the patient going on Pavix postoperatively for several months due to his severe LAD disease requiring an endarterectomy.     ESTIMATED BLOOD LOSS:  Unknown.      SPECIMEN REMOVED:  None.      The patient was brought to the ICU in medically stable condition and remained intubated.         VIKTORIYA KHANNA MD             D: 2019   T: 2019   MT: DARIAN      Name:     RIA CHACON   MRN:      [id number]        Account:        [id number]   :      1960           Procedure Date: 2019      Document: [id number]

## 2019-09-25 NOTE — H&P
Critical Care  Note      09/25/2019    Name: Bryce Espinoza MRN#: [id number]   Age: 59 year old YOB: 1960     Hsptl Day# 1  ICU DAY # 1    MV DAY # 1             Problem List:   Active Problems:    CAD (coronary artery disease)  s/p CABGx1         Summary/Hospital Course:   57 yo M pmh bladder cancer, HTN, HLD, who was admitted to [facility] for CAD s/p CABG x1.  He was referred to cardiothoracic surgery because of positive nuclear stress test, elevated calcium score, left heart angiography showing left main disease and 5 years of fatigue and angina. CABG was performed as non-emergent procedure. The LAD was grafted with NEGRO, an endarterectomy was performed. EBL was 1000mL with 375mL cell saver. He was admitted to the ICU for further monitoring post-procedure.     Family history of CAD including father sudden death at young age, 2 siblings with CABG. 33 pack year smoking quit for 5 years.    History was obtained from chart review and MD because patient on ventilator.  ROS could not be completed because patient on ventilator.      Assessment and plan :     Bryce Espinoza IS a 59 year old male admitted on 9/25/2019 for CAD s/p CABG x1.   I have personally reviewed the daily labs, imaging studies, cultures and discussed the case with referring physician and consulting physicians.   My assessment and plan by system for this patient is as follows:    Neurology/Psychiatry:   1. Analgesia -- tylenol, prn narcotics  2.  Anxiety -- wean sedative drips to extubate.  No acute anxiety needs at this time.    Cardiovascular:   1. Shock, post-procedure, vasoplegic vs unspecified -  Most likely due to vasoplegia which would be expected after a procedure of this magnitude. Continue phenylephrine and epinephrine.  2.  S/p CAB -- ASA, statin  3. Surgeons had to perform coronary endarterectomy intraop: initiate plavix tomorrow    Pulmonary/Ventilator Management:   1. Airway -- intubated post-op.  Weaning to extubate    GI and  Nutrition :   1. Diet when cleared by CV surgery   2. Acid suppression for PUD prophy    Renal/Fluids/Electrolytes:   1. Monitor UOP/creatinine post-op.  2. Replete electrolytes prn  3. IVF and albumin administration for volume prn per protocol    Infectious Disease:   1. Prophylactic abx post-op per CV surgery     Endocrine:   1. Monitor for stress induced hyperglycemia. ICU insulin protocol, goal sugar <180     Hematology/Oncology:   1. Hgb/pltlts stable post op.  pRBC for goal hgb 8.0 or as indicated by CV surgery     IV/Access:   1. Venous access - Central access from OR  2. Arterial access - remove A-line when off vasoactives and extubated    ICU Prophylaxis:   1. DVT: Hep Subq/mechanical  2. VAP: HOB 30 degrees, chlorhexidine rinse  3. Stress Ulcer: PPI/H2 blocker  4. Restraints: Nonviolent soft two point restraints required and necessary for patient safety and continued cares and good effect as patient continues to pull at necessary lines, tubes despite education and distraction. Will readdress daily.   5. IV Access - central access required and necessary for continued patient cares  6. Disposition - ICU         Medical History:     Past Medical History:   Diagnosis Date     Coronary artery disease involving native coronary artery of native heart without angina pectoris 7/4/2019    Per CT calcium score of 722.97 3/2019     Essential hypertension, benign      Hematuria      Malignant neoplasm of bladder, 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     Olecranon bursitis 3/01    right     Other and unspecified hyperlipidemia      RIGHT LUNG CALCIFIED GRANULOMA 2/02    RML     Tobacco use disorder     Quit 1/03     Unspecified hemorrhoids without mention of complication 8/01     Unspecified hereditary and idiopathic peripheral neuropathy      Past Surgical History:   Procedure Laterality Date     C NONSPECIFIC PROCEDURE  2/00/02    EBCT     CV HEART CATHETERIZATION WITH POSSIBLE INTERVENTION  N/A 2019    Procedure: Coronary Angiogram;  Surgeon: Nick Willson MD;  Location:  HEART CARDIAC CATH LAB     CV LEFT HEART CATH N/A 2019    Procedure: Left Heart Cath;  Surgeon: Nick Willson MD;  Location:  HEART CARDIAC CATH LAB     CV LEFT VENTRICULOGRAM N/A 2019    Procedure: Left Ventriculogram;  Surgeon: Nick Willson MD;  Location:  HEART CARDIAC CATH LAB     Social History     Socioeconomic History     Marital status:      Spouse name: Not on file     Number of children: Not on file     Years of education: Not on file     Highest education level: Not on file   Occupational History     Not on file   Social Needs     Financial resource strain: Not on file     Food insecurity:     Worry: Not on file     Inability: Not on file     Transportation needs:     Medical: Not on file     Non-medical: Not on file   Tobacco Use     Smoking status: Former Smoker     Packs/day: 1.00     Years: 33.00     Pack years: 33.00     Last attempt to quit: 2013     Years since quittin.6     Smokeless tobacco: Never Used   Substance and Sexual Activity     Alcohol use: Yes     Comment: 2 daily     Drug use: Yes     Comment: nica taylor     Sexual activity: Yes   Lifestyle     Physical activity:     Days per week: Not on file     Minutes per session: Not on file     Stress: Not on file   Relationships     Social connections:     Talks on phone: Not on file     Gets together: Not on file     Attends Restoration service: Not on file     Active member of club or organization: Not on file     Attends meetings of clubs or organizations: Not on file     Relationship status: Not on file     Intimate partner violence:     Fear of current or ex partner: Not on file     Emotionally abused: Not on file     Physically abused: Not on file     Forced sexual activity: Not on file   Other Topics Concern     Parent/sibling w/ CABG, MI or angioplasty before 65F 55M? Not Asked   Social History  Narrative     Not on file        Allergies   Allergen Reactions     Atorvastatin      Myalgias     Lisinopril      Body aches pt d/mylene  06/2015              Key Medications:          Home Meds  No current facility-administered medications on file prior to encounter.   ASPIRIN 81 MG OR TABS, 1 tab po QD (Once per day)  Cholecalciferol (VITAMIN D) 2000 UNITS tablet, Take 2,000 Units by mouth daily  metoprolol tartrate (LOPRESSOR) 25 MG tablet, Take 1 tablet (25 mg) by mouth 2 times daily (Patient taking differently: Take 25 mg by mouth every evening )               Physical Examination:   Temp:  [97.5  F (36.4  C)] 97.5  F (36.4  C)  FiO2 (%):  [60 %] 60 %  SpO2:  [99 %] 99 %    Intake/Output Summary (Last 24 hours) at 9/25/2019 1242  Last data filed at 9/25/2019 1135  Gross per 24 hour   Intake 1567 ml   Output 1038 ml   Net 529 ml     Wt Readings from Last 4 Encounters:   09/25/19 93 kg (205 lb 1 oz)   09/06/19 94.3 kg (207 lb 14.3 oz)   08/22/19 94.3 kg (208 lb)   07/19/19 91.6 kg (202 lb)        Ventilation Mode: CMV/AC  (Continuous Mandatory Ventilation/ Assist Control)  FiO2 (%): 60 %  Rate Set (breaths/minute): 16 breaths/min  Tidal Volume Set (mL): 550 mL  PEEP (cm H2O): 5 cmH2O  Oxygen Concentration (%): 60 %    Recent Labs   Lab 09/25/19  1129 09/25/19  1128 09/25/19  1057 09/25/19  0828   PH 7.36 7.37 7.32* 7.39   PCO2 45 44 56* 45   PO2 292* 274* 389* 483*   HCO3 26 26 29* 27       GEN: no acute distress, intubated/sedated  HEENT: head ncat, sclera anicteric, OP patent, trachea midline, PERRL  PULM: unlabored synchronous with vent, clear anteriorly    CV/COR: RRR S1S2 no gallop,  No rub, no murmur.  Midline incision dressed, c/d/i.  ABD: soft nontender, hypoactive bowel sounds, no mass  EXT:   No Edema,   Warm x4  NEURO: sedated but no gross deficit apparent  SKIN: no obvious rash  LINES: clean, dry intact           Data:   All data and imaging reviewed     ROUTINE ICU LABS (Last four results)  CMP  Recent  Labs   Lab 09/25/19  1128 09/25/19  1057 09/25/19  1010 09/25/19  0828    139 141 139   POTASSIUM 3.8 4.7 3.6 4.1     CBC  Recent Labs   Lab 09/25/19  1128 09/25/19  1125 09/25/19  1057 09/25/19  1010   WBC  --  13.0*  --   --    RBC  --  3.97*  --   --    HGB 11.6* 12.8* 11.2* 11.2*   HCT  --  37.0*  --   --    MCV  --  93  --   --    MCH  --  32.2  --   --    MCHC  --  34.6  --   --    RDW  --  12.4  --   --    PLT  --  130*  --   --      INR  Recent Labs   Lab 09/25/19  1125   INR 1.24*     Arterial Blood Gas  Recent Labs   Lab 09/25/19  1129 09/25/19  1128 09/25/19  1057 09/25/19  0828   PH 7.36 7.37 7.32* 7.39   PCO2 45 44 56* 45   PO2 292* 274* 389* 483*   HCO3 26 26 29* 27       All cultures:  No results for input(s): CULT in the last 168 hours.  No results found for this or any previous visit (from the past 24 hour(s)).    Siddharth Brantley  MS4    Physician Attestation   I, Ihsan Pimentel, was present with the medical student who participated in the service and in the documentation of the note.  I have verified the history and personally performed the physical exam and medical decision making.  I agree with the assessment and plan of care as documented in the note.      I personally reviewed vital signs, medications, labs and imaging.    Briefly, 59M s/p cab1 for L main disease.  Unable to obtain history directly from pt as intubated/sedated.  Agree with MS4 exam.  Sedated, NAD, synchronous and non-labored breathing.  Benign abd exam.  Perrl.  extr wwp x4, non-edematous.  Labs (personally reviewed/interpreted):  Lytes ok.  Ical ok at 5.3.  Lactate normal 1.2.  abg ok 7.36/45/292.  Wbc elevated, likely reactive 16.8.  hgb 13.4 stable.  pltlts 153 ok.    CXR (personally reviewed/interpreted):  ETT, RIJ and feeding tube in acceptable position.  Lungs clear.  Small L pleural effusion.  EKG (personally reviewed/interpreted):  Rate 60; nl int; nl axis; non-ischemic.    A/p:  1.  Shock, post-op, likely  vasoplegic:  Would be expected after a procedure of this magnitude.  Continue epi and phenylephrine as needed.  2.  Post-op analgesia:  Tylenol and prn narcotics  3.  S/p CAB with LAD endarterectomy:  ASA today, initiate plavix as well tomorrow.  4.  Post-op hyperglycemia:  Reactive, also h/o DM2.  Insulin drip.  Rest per student note.    D/w Dr. Kline of CV surgery team.    Critical care time:  45 min    Ihsan Pimentel MD  Date of Service (when I saw the patient): 09/25/19

## 2019-09-25 NOTE — CONSULTS
CARDIAC SURGERY NUTRITION CONSULT    Received standing order to assess and educate patient.    Will follow and complete assessment once patient is extubated and/or is transferred to medical unit.    Patient will receive nutrition education during the Outpatient Cardiac Rehab Program (nutrition classes/dietitian counseling).    Debbie Yuan RD, LD, [facility]   Clinical Dietitian - [facility]

## 2019-09-25 NOTE — ANESTHESIA PREPROCEDURE EVALUATION
Anesthesia Pre-Procedure Evaluation    Patient: Bryce Espinoza   MRN: [id number] : 1960          Preoperative Diagnosis: CORONARY ARTERY DISEASE    Procedure(s):  CORONARY ARTERY BYPASS GRAFTING (ON PUMP, NO HANK, NO BALLOON) (CARDIOLOGIST:  JEEVAN)    Past Medical History:   Diagnosis Date     Coronary artery disease involving native coronary artery of native heart without angina pectoris 2019    Per CT calcium score of 722.97 3/2019     Essential hypertension, benign      Hematuria      Malignant neoplasm of bladder, part unspecified     Transitional Cell Carcinoma bladder     Olecranon bursitis 3/01    right     Other and unspecified hyperlipidemia      RIGHT LUNG CALCIFIED GRANULOMA     RML     Tobacco use disorder     Quit      Unspecified hemorrhoids without mention of complication      Unspecified hereditary and idiopathic peripheral neuropathy      Past Surgical History:   Procedure Laterality Date     C NONSPECIFIC PROCEDURE      EBCT     CV HEART CATHETERIZATION WITH POSSIBLE INTERVENTION N/A 2019    Procedure: Coronary Angiogram;  Surgeon: Nick Willson MD;  Location:  HEART CARDIAC CATH LAB     CV LEFT HEART CATH N/A 2019    Procedure: Left Heart Cath;  Surgeon: Nick Willson MD;  Location: [facility] CARDIAC CATH LAB     CV LEFT VENTRICULOGRAM N/A 2019    Procedure: Left Ventriculogram;  Surgeon: Nick Willson MD;  Location: [facility] CARDIAC CATH LAB       Anesthesia Evaluation     . Pt has had prior anesthetic.     No history of anesthetic complications          ROS/MED HX    ENT/Pulmonary:     (+)DENYS risk factors snores loudly, hypertension, tobacco use, Past use , . .   (-) sleep apnea   Neurologic:       Cardiovascular:     (+) Dyslipidemia, hypertension--CAD, angina-change in symptoms, -. : . . . :. .      (-) past MI, CHF, stent and past MI   METS/Exercise Tolerance:     Hematologic:         Musculoskeletal:         GI/Hepatic:     "    (-) GERD   Renal/Genitourinary:         Endo:         Psychiatric:         Infectious Disease:         Malignancy:   (+) Malignancy History of Other  Other CA Bladder Remission status post         Other:                          Physical Exam  Normal systems: dental    Airway   Mallampati: III  TM distance: >3 FB  Neck ROM: full    Dental     Cardiovascular   Rhythm and rate: regular  (-) no murmur    Pulmonary    breath sounds clear to auscultation            Lab Results   Component Value Date    WBC 9.4 09/06/2019    HGB 16.6 09/06/2019    HCT 48.1 09/06/2019     09/06/2019    CRP 5.9 07/19/2019    SED 9 07/19/2019     09/06/2019    POTASSIUM 3.8 09/06/2019    CHLORIDE 106 09/06/2019    CO2 27 09/06/2019    BUN 20 09/06/2019    CR 0.66 09/06/2019     (H) 09/06/2019    KELLY 9.1 09/06/2019    MAG 2.4 (H) 07/19/2019    ALBUMIN 4.0 09/06/2019    PROTTOTAL 7.4 09/06/2019    ALT 31 09/06/2019    AST 25 09/06/2019    ALKPHOS 120 09/06/2019    BILITOTAL 0.4 09/06/2019    LIPASE 62 (L) 01/30/2017    AMYLASE 52 01/30/2017    PTT 29 09/06/2019    INR 0.85 (L) 09/06/2019    TSH 0.82 07/19/2019       Preop Vitals  BP Readings from Last 3 Encounters:   09/06/19 (!) 150/101   08/22/19 (!) 160/92   07/19/19 170/87    Pulse Readings from Last 3 Encounters:   09/06/19 66   08/22/19 76   07/19/19 80      Resp Readings from Last 3 Encounters:   09/06/19 18   07/19/19 16   06/20/19 16    SpO2 Readings from Last 3 Encounters:   09/06/19 95%   07/19/19 100%   06/20/19 98%      Temp Readings from Last 1 Encounters:   09/25/19 36.4  C (97.5  F)    Ht Readings from Last 1 Encounters:   09/06/19 1.753 m (5' 9.02\")      Wt Readings from Last 1 Encounters:   09/25/19 93 kg (205 lb 1 oz)    Estimated body mass index is 30.27 kg/m  as calculated from the following:    Height as of 9/6/19: 1.753 m (5' 9.02\").    Weight as of this encounter: 93 kg (205 lb 1 oz).       Anesthesia Plan      History & Physical Review      ASA " Status:  4 .    NPO Status:  > 8 hours    Plan for General and ETT with Intravenous induction. Maintenance will be Balanced.    PONV prophylaxis:  Ondansetron (or other 5HT-3)  Additional equipment: Arterial Line, Central Line and HANK      Postoperative Care  Postoperative pain management:  Multi-modal analgesia.      Consents  Anesthetic plan, risks, benefits and alternatives discussed with:  Patient.  Use of blood products discussed: Yes.   Consented to blood products.  .                 Matthias Connell MD

## 2019-09-25 NOTE — PROGRESS NOTES
Rt called by RN to place patient on breathing trial prior to extubation. Within the first 2mins Pt become anxious and restless. Pt extuabted to 40% aersol mask per MD order. Pt complained feeling SOB. Albuterol given per MD order then pt placed on BiPAP with 10/5 BUR of 12 and 50%. Pt seems to be tolerating well with Bipap sating 98%. Will continue to monitor.    Jose Antonio Fernandez, RT on 9/25/2019 at 2:51 PM

## 2019-09-26 ENCOUNTER — APPOINTMENT (OUTPATIENT)
Dept: GENERAL RADIOLOGY | Facility: CLINIC | Age: 59
DRG: 228 | End: 2019-09-26
Attending: SURGERY
Payer: COMMERCIAL

## 2019-09-26 ENCOUNTER — APPOINTMENT (OUTPATIENT)
Dept: OCCUPATIONAL THERAPY | Facility: CLINIC | Age: 59
DRG: 228 | End: 2019-09-26
Attending: SURGERY
Payer: COMMERCIAL

## 2019-09-26 LAB
ALBUMIN SERPL-MCNC: 3.1 G/DL (ref 3.4–5)
ALP SERPL-CCNC: 70 U/L (ref 40–150)
ALT SERPL W P-5'-P-CCNC: 31 U/L (ref 0–70)
ANION GAP SERPL CALCULATED.3IONS-SCNC: 3 MMOL/L (ref 3–14)
AST SERPL W P-5'-P-CCNC: 23 U/L (ref 0–45)
BILIRUB SERPL-MCNC: 0.7 MG/DL (ref 0.2–1.3)
BLD PROD TYP BPU: NORMAL
BLD PROD TYP BPU: NORMAL
BLD UNIT ID BPU: 0
BLD UNIT ID BPU: 0
BLOOD PRODUCT CODE: NORMAL
BLOOD PRODUCT CODE: NORMAL
BPU ID: NORMAL
BPU ID: NORMAL
BUN SERPL-MCNC: 15 MG/DL (ref 7–30)
CA-I BLD-MCNC: 4.5 MG/DL (ref 4.4–5.2)
CALCIUM SERPL-MCNC: 8.2 MG/DL (ref 8.5–10.1)
CHLORIDE SERPL-SCNC: 107 MMOL/L (ref 94–109)
CO2 SERPL-SCNC: 28 MMOL/L (ref 20–32)
CREAT SERPL-MCNC: 0.56 MG/DL (ref 0.66–1.25)
ERYTHROCYTE [DISTWIDTH] IN BLOOD BY AUTOMATED COUNT: 12.8 % (ref 10–15)
GFR SERPL CREATININE-BSD FRML MDRD: >90 ML/MIN/{1.73_M2}
GLUCOSE BLDC GLUCOMTR-MCNC: 105 MG/DL (ref 70–99)
GLUCOSE BLDC GLUCOMTR-MCNC: 107 MG/DL (ref 70–99)
GLUCOSE BLDC GLUCOMTR-MCNC: 110 MG/DL (ref 70–99)
GLUCOSE BLDC GLUCOMTR-MCNC: 112 MG/DL (ref 70–99)
GLUCOSE BLDC GLUCOMTR-MCNC: 115 MG/DL (ref 70–99)
GLUCOSE BLDC GLUCOMTR-MCNC: 118 MG/DL (ref 70–99)
GLUCOSE BLDC GLUCOMTR-MCNC: 123 MG/DL (ref 70–99)
GLUCOSE BLDC GLUCOMTR-MCNC: 134 MG/DL (ref 70–99)
GLUCOSE SERPL-MCNC: 118 MG/DL (ref 70–99)
HCT VFR BLD AUTO: 39.7 % (ref 40–53)
HGB BLD-MCNC: 13.6 G/DL (ref 13.3–17.7)
INTERPRETATION ECG - MUSE: NORMAL
MAGNESIUM SERPL-MCNC: 2.1 MG/DL (ref 1.6–2.3)
MCH RBC QN AUTO: 32.2 PG (ref 26.5–33)
MCHC RBC AUTO-ENTMCNC: 34.3 G/DL (ref 31.5–36.5)
MCV RBC AUTO: 94 FL (ref 78–100)
PHOSPHATE SERPL-MCNC: 2.9 MG/DL (ref 2.5–4.5)
PLATELET # BLD AUTO: 159 10E9/L (ref 150–450)
POTASSIUM SERPL-SCNC: 4.1 MMOL/L (ref 3.4–5.3)
PROT SERPL-MCNC: 5.5 G/DL (ref 6.8–8.8)
RBC # BLD AUTO: 4.23 10E12/L (ref 4.4–5.9)
SODIUM SERPL-SCNC: 138 MMOL/L (ref 133–144)
TRANSFUSION STATUS PATIENT QL: NORMAL
WBC # BLD AUTO: 17.6 10E9/L (ref 4–11)

## 2019-09-26 PROCEDURE — 85027 COMPLETE CBC AUTOMATED: CPT | Performed by: SURGERY

## 2019-09-26 PROCEDURE — 25000128 H RX IP 250 OP 636: Performed by: SURGERY

## 2019-09-26 PROCEDURE — 99233 SBSQ HOSP IP/OBS HIGH 50: CPT | Performed by: INTERNAL MEDICINE

## 2019-09-26 PROCEDURE — 25000128 H RX IP 250 OP 636: Performed by: PHYSICIAN ASSISTANT

## 2019-09-26 PROCEDURE — 25000132 ZZH RX MED GY IP 250 OP 250 PS 637: Performed by: SURGERY

## 2019-09-26 PROCEDURE — 97110 THERAPEUTIC EXERCISES: CPT | Mod: GO

## 2019-09-26 PROCEDURE — 00000146 ZZHCL STATISTIC GLUCOSE BY METER IP

## 2019-09-26 PROCEDURE — 12000000 ZZH R&B MED SURG/OB

## 2019-09-26 PROCEDURE — 85049 AUTOMATED PLATELET COUNT: CPT | Performed by: SURGERY

## 2019-09-26 PROCEDURE — 84100 ASSAY OF PHOSPHORUS: CPT | Performed by: SURGERY

## 2019-09-26 PROCEDURE — 80053 COMPREHEN METABOLIC PANEL: CPT | Performed by: SURGERY

## 2019-09-26 PROCEDURE — 25000132 ZZH RX MED GY IP 250 OP 250 PS 637: Performed by: INTERNAL MEDICINE

## 2019-09-26 PROCEDURE — 71045 X-RAY EXAM CHEST 1 VIEW: CPT

## 2019-09-26 PROCEDURE — 97530 THERAPEUTIC ACTIVITIES: CPT | Mod: GO

## 2019-09-26 PROCEDURE — 93005 ELECTROCARDIOGRAM TRACING: CPT

## 2019-09-26 PROCEDURE — 93010 ELECTROCARDIOGRAM REPORT: CPT | Mod: 76 | Performed by: INTERNAL MEDICINE

## 2019-09-26 PROCEDURE — 25000132 ZZH RX MED GY IP 250 OP 250 PS 637: Performed by: PHYSICIAN ASSISTANT

## 2019-09-26 PROCEDURE — 97165 OT EVAL LOW COMPLEX 30 MIN: CPT | Mod: GO

## 2019-09-26 PROCEDURE — 83735 ASSAY OF MAGNESIUM: CPT | Performed by: SURGERY

## 2019-09-26 PROCEDURE — 82330 ASSAY OF CALCIUM: CPT | Performed by: SURGERY

## 2019-09-26 RX ORDER — CLOPIDOGREL BISULFATE 75 MG/1
75 TABLET ORAL DAILY
Status: DISCONTINUED | OUTPATIENT
Start: 2019-09-27 | End: 2019-09-26

## 2019-09-26 RX ORDER — CALCIUM CARBONATE 500 MG/1
1000 TABLET, CHEWABLE ORAL EVERY 4 HOURS PRN
Status: DISCONTINUED | OUTPATIENT
Start: 2019-09-26 | End: 2019-09-30 | Stop reason: HOSPADM

## 2019-09-26 RX ORDER — PANTOPRAZOLE SODIUM 40 MG/1
40 TABLET, DELAYED RELEASE ORAL
Status: DISCONTINUED | OUTPATIENT
Start: 2019-09-26 | End: 2019-09-30 | Stop reason: HOSPADM

## 2019-09-26 RX ORDER — KETOROLAC TROMETHAMINE 15 MG/ML
15 INJECTION, SOLUTION INTRAMUSCULAR; INTRAVENOUS EVERY 6 HOURS PRN
Status: DISCONTINUED | OUTPATIENT
Start: 2019-09-26 | End: 2019-09-30 | Stop reason: HOSPADM

## 2019-09-26 RX ORDER — ASPIRIN 81 MG/1
81 TABLET ORAL DAILY
Status: DISCONTINUED | OUTPATIENT
Start: 2019-09-27 | End: 2019-09-30 | Stop reason: HOSPADM

## 2019-09-26 RX ORDER — CALCIUM CARBONATE 500 MG/1
500 TABLET, CHEWABLE ORAL 3 TIMES DAILY PRN
Status: DISCONTINUED | OUTPATIENT
Start: 2019-09-26 | End: 2019-09-26

## 2019-09-26 RX ORDER — CLOPIDOGREL BISULFATE 75 MG/1
75 TABLET ORAL DAILY
Status: DISCONTINUED | OUTPATIENT
Start: 2019-09-26 | End: 2019-09-30 | Stop reason: HOSPADM

## 2019-09-26 RX ADMIN — METOPROLOL TARTRATE 25 MG: 25 TABLET ORAL at 07:50

## 2019-09-26 RX ADMIN — OXYCODONE HYDROCHLORIDE 10 MG: 5 TABLET ORAL at 15:32

## 2019-09-26 RX ADMIN — ACETAMINOPHEN 975 MG: 325 TABLET, FILM COATED ORAL at 17:09

## 2019-09-26 RX ADMIN — OXYCODONE HYDROCHLORIDE 5 MG: 5 TABLET ORAL at 01:57

## 2019-09-26 RX ADMIN — METOPROLOL TARTRATE 25 MG: 25 TABLET ORAL at 20:48

## 2019-09-26 RX ADMIN — METHOCARBAMOL TABLETS 750 MG: 750 TABLET, COATED ORAL at 08:48

## 2019-09-26 RX ADMIN — HYDROMORPHONE HYDROCHLORIDE 0.5 MG: 1 INJECTION, SOLUTION INTRAMUSCULAR; INTRAVENOUS; SUBCUTANEOUS at 09:45

## 2019-09-26 RX ADMIN — OXYCODONE HYDROCHLORIDE 10 MG: 5 TABLET ORAL at 11:22

## 2019-09-26 RX ADMIN — CEFAZOLIN SODIUM 2 G: 2 INJECTION, SOLUTION INTRAVENOUS at 11:22

## 2019-09-26 RX ADMIN — ROSUVASTATIN CALCIUM 10 MG: 10 TABLET, FILM COATED ORAL at 10:01

## 2019-09-26 RX ADMIN — GABAPENTIN 300 MG: 300 CAPSULE ORAL at 07:50

## 2019-09-26 RX ADMIN — CEFAZOLIN SODIUM 2 G: 2 INJECTION, SOLUTION INTRAVENOUS at 04:45

## 2019-09-26 RX ADMIN — OXYCODONE HYDROCHLORIDE 5 MG: 5 TABLET ORAL at 07:50

## 2019-09-26 RX ADMIN — SENNOSIDES AND DOCUSATE SODIUM 1 TABLET: 8.6; 5 TABLET ORAL at 20:48

## 2019-09-26 RX ADMIN — ACETAMINOPHEN 975 MG: 325 TABLET, FILM COATED ORAL at 10:01

## 2019-09-26 RX ADMIN — HEPARIN SODIUM 5000 UNITS: 5000 INJECTION, SOLUTION INTRAVENOUS; SUBCUTANEOUS at 21:42

## 2019-09-26 RX ADMIN — HEPARIN SODIUM 5000 UNITS: 5000 INJECTION, SOLUTION INTRAVENOUS; SUBCUTANEOUS at 13:51

## 2019-09-26 RX ADMIN — ACETAMINOPHEN 975 MG: 325 TABLET, FILM COATED ORAL at 01:57

## 2019-09-26 RX ADMIN — OXYCODONE HYDROCHLORIDE 10 MG: 5 TABLET ORAL at 18:28

## 2019-09-26 RX ADMIN — CLOPIDOGREL BISULFATE 75 MG: 75 TABLET ORAL at 13:51

## 2019-09-26 RX ADMIN — PANTOPRAZOLE SODIUM 40 MG: 40 TABLET, DELAYED RELEASE ORAL at 08:48

## 2019-09-26 RX ADMIN — ASPIRIN 325 MG: 325 TABLET, DELAYED RELEASE ORAL at 10:01

## 2019-09-26 RX ADMIN — OXYCODONE HYDROCHLORIDE 10 MG: 5 TABLET ORAL at 21:42

## 2019-09-26 RX ADMIN — SENNOSIDES AND DOCUSATE SODIUM 1 TABLET: 8.6; 5 TABLET ORAL at 10:01

## 2019-09-26 RX ADMIN — CALCIUM CARBONATE (ANTACID) CHEW TAB 500 MG 1000 MG: 500 CHEW TAB at 21:42

## 2019-09-26 RX ADMIN — CALCIUM CARBONATE (ANTACID) CHEW TAB 500 MG 500 MG: 500 CHEW TAB at 10:02

## 2019-09-26 RX ADMIN — GABAPENTIN 300 MG: 300 CAPSULE ORAL at 20:48

## 2019-09-26 RX ADMIN — METHOCARBAMOL TABLETS 750 MG: 750 TABLET, COATED ORAL at 20:48

## 2019-09-26 ASSESSMENT — ACTIVITIES OF DAILY LIVING (ADL)
IADL_COMMENTS: SPOUSE A WITH IADL'S
ADLS_ACUITY_SCORE: 12
ADLS_ACUITY_SCORE: 15
ADLS_ACUITY_SCORE: 11
ADLS_ACUITY_SCORE: 15
ADLS_ACUITY_SCORE: 12
ADLS_ACUITY_SCORE: 15

## 2019-09-26 NOTE — PROGRESS NOTES
[facility]  Cardiovascular and Thoracic Surgery Daily Note          Assessment and Plan:   POD#1 s/p Coronary artery bypass grafting x 1 with left internal mammary artery to the left anterior descending, intraoperative HANK by Dr. Magdi Turner  -CVS: HR: 90s-100s. SBP: 100-120s. Pre op EF: 60-65%  ASA, BB, statin. Will start plavix today for coronary artery endarterectomy. Pacer wires removed. Chest tubes to suction.   -Resp: Extubated within protocol. Saturating well on 2L. Continue to wean as able. Continue to encourage IS, cough, deep breathing, ambulation.   -Neuro:  Grossly intact. Pain controlled with toradol added.   -Renal: good UO, up about 2kg from preoperative weight. Will hold diuretics today.   Creatinine   Date Value Ref Range Status   2019 0.56 (L) 0.66 - 1.25 mg/dL Final   ]  -GI: Tolerating diet. No BM. Continue bowel regimen  -:  Solano in place, d/t limited mobility and need for accurate I&Os.  -Endo: pre op A1c: 5.5. minimal insulin gtt requirements. Transition to sliding scale insulin.   -FEN: replete lytes as needed, Na: 138. K: 4.1.  Orders Placed This Encounter      Low Saturated Fat Na <2400 mg    -ID: Temp (24hrs), Av.7  F (37.1  C), Min:96.4  F (35.8  C), Max:100  F (37.8  C)   Completing perioperative abx. Leukocytosis likely related to surgery  WBC   Date Value Ref Range Status   2019 17.6 (H) 4.0 - 11.0 10e9/L Final   ]  -Heme: plt: 159. Acute blood loss anemia and thrombocytopenia related to surgery  Hemoglobin   Date Value Ref Range Status   2019 13.6 13.3 - 17.7 g/dL Final   ],   -Proph: PCD, ASA, BB, statin, PPI, plavix  -Dispo: Transfer to [facility]. Initiate therapies. Continue to encourage IS, cough, deep breathing, ambulation.           Interval History:   Extubated within protocol. Saturating well on 2L. Weaned off gtts. Pain controlled. Tolerating diet. No BM.          Medications:       acetaminophen  975 mg Oral Q8H     [START ON  9/27/2019] aspirin  81 mg Oral Daily     clopidogrel  75 mg Oral Daily     gabapentin  300 mg Oral BID     heparin  5,000 Units Subcutaneous Q8H     insulin aspart  1-7 Units Subcutaneous TID AC     insulin aspart  1-5 Units Subcutaneous At Bedtime     metoprolol tartrate  25 mg Oral Q12H    Or     metoprolol  25 mg Per NG tube Q12H     pantoprazole  40 mg Oral QAM AC     rosuvastatin  10 mg Oral Daily     senna-docusate  1 tablet Oral BID    Or     senna-docusate  2 tablet Oral BID     sodium chloride (PF)  3 mL Intracatheter Q8H     [START ON 9/28/2019] acetaminophen, albumin human, albuterol, calcium carbonate, IV fluid REPLACEMENT ONLY, glucose **OR** dextrose **OR** glucagon, diphenhydrAMINE **OR** diphenhydrAMINE, furosemide, hydrALAZINE, HYDROmorphone, ketorolac, lidocaine 4%, lidocaine (buffered or not buffered), magnesium sulfate, magnesium sulfate, melatonin, methocarbamol, metoclopramide **OR** metoclopramide, naloxone, ondansetron **OR** ondansetron, oxyCODONE, potassium chloride, potassium chloride with lidocaine, potassium chloride, potassium chloride, potassium chloride, prochlorperazine **OR** prochlorperazine, sodium chloride (PF), sodium phosphate, sodium phosphate, sodium phosphate, sodium phosphate          Physical Exam:   Vitals were reviewed  Blood pressure 110/77, pulse 80, temperature 99.9  F (37.7  C), resp. rate 18, weight 95.2 kg (209 lb 14.1 oz), SpO2 95 %.  Rhythm: NSR    Lungs: diminished bases    Cardiovascular: RRR normal s1 and s2    Abdomen: soft NTND    Extremeties: minimal edema    Incision: CDI    CT: to suction    Weight:   Vitals:    09/25/19 0558 09/26/19 0500 09/26/19 1201   Weight: 93 kg (205 lb 1 oz) 95.3 kg (210 lb 1.6 oz) 95.2 kg (209 lb 14.1 oz)            Data:   Labs:   Lab Results   Component Value Date    WBC 17.6 09/26/2019     Lab Results   Component Value Date    RBC 4.23 09/26/2019     Lab Results   Component Value Date    HGB 13.6 09/26/2019     Lab Results    Component Value Date    HCT 39.7 09/26/2019     No components found for: MCT  Lab Results   Component Value Date    MCV 94 09/26/2019     Lab Results   Component Value Date    MCH 32.2 09/26/2019     Lab Results   Component Value Date    MCHC 34.3 09/26/2019     Lab Results   Component Value Date    RDW 12.8 09/26/2019     Lab Results   Component Value Date     09/26/2019       Last Basic Metabolic Panel:  Lab Results   Component Value Date     09/26/2019      Lab Results   Component Value Date    POTASSIUM 4.1 09/26/2019     Lab Results   Component Value Date    CHLORIDE 107 09/26/2019     Lab Results   Component Value Date    KELLY 8.2 09/26/2019     Lab Results   Component Value Date    CO2 28 09/26/2019     Lab Results   Component Value Date    BUN 15 09/26/2019     Lab Results   Component Value Date    CR 0.56 09/26/2019     Lab Results   Component Value Date     09/26/2019       CXR: 9/26/19    IMPRESSION: Portable view of the chest is performed. Left chest tube  is unchanged in position. No pneumothorax. Left lung base airspace  opacity and consolidation is stable. Heart is normal in size. Right  lung is clear with stable calcified granuloma in the mid right lung.  Median sternotomy changes for CABG are again noted. Mediastinal drain  is unchanged. Endotracheal tube, nasogastric tube and right IJ central  venous line have been removed.    Sabine Clement PA-C  CV Surgery  Pager #[phone]

## 2019-09-26 NOTE — PLAN OF CARE
A/O x4. AVSS on 2L NC. Tele NSR. Pain managed w/ PRN oxycodone. CT x2 to -20 suction, no AL, no crepitus. Sternal incisions, liquid bandage, ecchymosis.Pulses, palpable, +2. Tolerating low fat Na, 2400 diet. Solano in place, patent, adequate output.

## 2019-09-26 NOTE — PLAN OF CARE
Patient pathway.  OOB to dangle and up to chair X2, I.S. to 1000.  NC 2L sats well into the high 90's.  Total chest tube output 540ml sanguinous.  Tolerating water and pills without difficulty.  All lines Dc'd.  Insulin drip Alg#2 Q2hr BG's 1-2U/hr.  Spouse updated late evening over phone with plan of care.

## 2019-09-26 NOTE — PROGRESS NOTES
Crit care consult:    S:  No complaints this morning.  Chest pain appropriate to procedure; controlled by analgesia.  Denies SOB.  No dizziness/lightheadedness.  Tolerating diet as expected.    O:  /73   Pulse 90   Temp 100  F (37.8  C)   Resp 21   Wt 95.3 kg (210 lb 1.6 oz)   SpO2 94%   BMI 31.01 kg/m    Gen:  No acute distress // HEENT:  PERRL, nose/ears grossly normal // Neck:  Supple // Lymph : no cervical adenopathy // chest : CTA-B, unlabored, chest wound c/d/i // cor:  rrr no m/r/g // abd s/nt/nd // extr:  wwp x4, no edema // neuro:  Awake, alert, rapid fluent appropriate speech, good str/sens x4 // skin:  No obvious rash    Labs (personally reviewed): lytes ok, creat 0.56, wbc stable 17.6, hgb 13.6, pltlts 159 ok.     CXR (personally reviewed): lungs clear.  Chest tube in place.  Small L pleural effusion.    EKG (personally reviewed): 96, sinus with occasional pac's.  Nl axis, nl int, no acute ischemic changes.    A/P:  59 pod 1 s/p cab1.  Overall doing well.  Cardiac:  CAD:  Continue asa/metoprolol.  Pulm:  Breathing comfortably extubated.        Hypoxemia:  Requiring low level nasal cannula.  Likely due to atelectasis   which would be expected after a procedure of this magnitude.  Wean nasal cannula as tolerated.   Neuro: Analgesia:  Continue prn tylenol/opiates for post-op pain.  GI:  Advance diet as tolerated.  Endo:  Hyperglycemia:  Stress-related post-op as would be expected after a procedure of this magnitude.  Continue prn insulin per protocol.  Prophylaxis:  Continue subcutaneous heparin.      Intensivist service will sign off.  Please re-consult as needed.

## 2019-09-26 NOTE — PLAN OF CARE
Discharge Planner OT   Patient plan for discharge: return home with significant other A     Current status: order received, chart reviewed, eval completed, tx initiated. Pt seen POD 1 s/p CABG x 1. Pt lives in house with significant other and is IND with ADL/IADL's prior including work and driving. Significant other reports she has 3 weeks off of work to A at discharge.     Educated on sternal precautions. Pt required max A for bed mobility supine > sit with cues for log roll technique. Pt reports severe incisional pain limiting participation. Pt required mod A for sit <> stand with cues to not use arms. CGA for stand pivot transfer bed > chair. Pt required max A x 2 for sit > supine due to pain. Pt completed 3 sets of seated cals x 30 seconds each. Pt completed 2 sets of standing cals x 30 seconds each but request to return to bed due to pain. BP slightly elevated after exercise 143/90, O2 sats at 91% following exercise.     Barriers to return to prior living situation: post op precautions, pain, limited tolerance for therapy this date due to pain     Recommendations for discharge: anticipate home with SO A for cooking, cleaning, laundry and transportation. OP CR at [facility]     Rationale for recommendations: anticipate pt will progress with IP CR for safe discharge home with SO assistance. Pt currently limited by pain and fatigue in ICU. Will continue to update discharge recs as pt able to tolerate increased CR. Pt will benefit from OP CR for progressive monitored physical activity for overall cardiac health.          Entered by: Arlene Cruz 09/26/2019 9:51 AM

## 2019-09-26 NOTE — PROGRESS NOTES
Pt was placed on BiPAP 10/5 and 50%, tolerating well and RT will continue to follow the pt.    Hugo Pineda, RT.

## 2019-09-26 NOTE — PROGRESS NOTES
09/26/19 0907   Quick Adds   Type of Visit Initial Occupational Therapy Evaluation   Living Environment   Lives With significant other   Living Arrangements house   Transportation Anticipated family or friend will provide   Living Environment Comment 12 stairs to upstairs, SO will be home to A as needed.    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 none   Prior Functional Level Comment IND prior    General Information   Onset of Illness/Injury or Date of Surgery - Date 09/25/19   Referring Physician wants to return home   Patient/Family Goals Statement Roni Kline MD   Additional Occupational Profile Info/Pertinent History of Current Problem CABG x 1   Precautions/Limitations fall precautions;sternal precautions;oxygen therapy device and L/min   Cognitive Status Examination   Orientation orientation to person, place and time   Level of Consciousness alert   Follows Commands (Cognition) WNL   Memory intact   Attention No deficits were identified   Organization/Problem Solving No deficits were identified   Executive Function No deficits were identified   Visual Perception   Visual Perception No deficits were identified   Sensory Examination   Sensory Quick Adds No deficits were identified   Pain Assessment   Patient Currently in Pain Yes, see Vital Sign flowsheet   Integumentary/Edema    Integumentary/Edema no deficits were identifed   Posture   Posture not impaired   Range of Motion (ROM)   ROM Comment Not tested due to pain and precautions   Strength   Strength Comments not tested due to pain and precautions   Muscle Tone Assessment   Muscle Tone Quick Adds No deficits were identified   Coordination   Upper Extremity Coordination No deficits were identified   Mobility   Bed Mobility Bed mobility skill: Sit to supine;Bed mobility skill: Supine to sit   Bed Mobility Skill: Sit to Supine   Level of [place]: Sit/Supine moderate assist (50% patients effort)   Physical Assist/Nonphysical Assist: Sit/Supine 2 persons   Bed Mobility Skill: Supine to Sit   Level of [place]: Supine/Sit maximum assist (25% patients effort)   Transfer Skill: Bed to Chair/Chair to Bed   Level of [place]: Bed to Chair minimum assist (75% patients effort)   Transfer Skill: Sit to Stand   Level of [place]: Sit/Stand moderate assist (50% patients effort)   Upper Body Dressing   Level of [place]: Dress Upper Body moderate assist (50% patients effort)   Lower Body Dressing   Level of [place]: Dress Lower Body maximum assist (25% patients effort)   Eating/Self Feeding   Level of [place]: Eating independent   Instrumental Activities of Daily Living (IADL)   Previous Responsibilities medication management;finances;driving;work;shopping   IADL Comments spouse A with IADL's   Activities of Daily Living Analysis   Impairments Contributing to Impaired Activities of Daily Living balance impaired;pain;post surgical precautions;strength decreased   General Therapy Interventions   Planned Therapy Interventions ADL retraining;transfer training;home program guidelines;progressive activity/exercise;risk factor education   Clinical Impression   Criteria for Skilled Therapeutic Interventions Met yes, treatment indicated   OT Diagnosis dec IND with ADl's and transfers and need for exercise   Influenced by the  "following impairments dec ax tolerance, pain, precautions   Assessment of Occupational Performance 5 or more Performance Deficits   Identified Performance Deficits all ADL's and exercise   Clinical Decision Making (Complexity) Low complexity   Therapy Frequency 2x/day   Predicted Duration of Therapy Intervention (days/wks) 6 days   Anticipated Discharge Disposition Home with Assist;Home with Outpatient Therapy   Risks and Benefits of Treatment have been explained. Yes   Patient, Family & other staff in agreement with plan of care Yes   [facility] \"6 Clicks\"   2016, Trustees of [facility], under license to [employer].  All rights reserved.   6 Clicks Short Forms Daily Activity Inpatient Short Form   [facility]  \"6 Clicks\" Daily Activity Inpatient Short Form   1. Putting on and taking off regular lower body clothing? 2 - A Lot   2. Bathing (including washing, rinsing, drying)? 2 - A Lot   3. Toileting, which includes using toilet, bedpan or urinal? 2 - A Lot   4. Putting on and taking off regular upper body clothing? 2 - A Lot   5. Taking care of personal grooming such as brushing teeth? 4 - None   6. Eating meals? 4 - None   Daily Activity Raw Score (Score out of 24.Lower scores equate to lower levels of function) 16   Total Evaluation Time   Total Evaluation Time (Minutes) 8     "

## 2019-09-27 ENCOUNTER — APPOINTMENT (OUTPATIENT)
Dept: OCCUPATIONAL THERAPY | Facility: CLINIC | Age: 59
DRG: 228 | End: 2019-09-27
Attending: SURGERY
Payer: COMMERCIAL

## 2019-09-27 LAB
ANION GAP SERPL CALCULATED.3IONS-SCNC: 3 MMOL/L (ref 3–14)
BUN SERPL-MCNC: 13 MG/DL (ref 7–30)
CALCIUM SERPL-MCNC: 9.2 MG/DL (ref 8.5–10.1)
CHLORIDE SERPL-SCNC: 102 MMOL/L (ref 94–109)
CO2 SERPL-SCNC: 29 MMOL/L (ref 20–32)
CREAT SERPL-MCNC: 0.51 MG/DL (ref 0.66–1.25)
ERYTHROCYTE [DISTWIDTH] IN BLOOD BY AUTOMATED COUNT: 12.7 % (ref 10–15)
GFR SERPL CREATININE-BSD FRML MDRD: >90 ML/MIN/{1.73_M2}
GLUCOSE BLDC GLUCOMTR-MCNC: 109 MG/DL (ref 70–99)
GLUCOSE BLDC GLUCOMTR-MCNC: 151 MG/DL (ref 70–99)
GLUCOSE BLDC GLUCOMTR-MCNC: 169 MG/DL (ref 70–99)
GLUCOSE SERPL-MCNC: 142 MG/DL (ref 70–99)
HCT VFR BLD AUTO: 42 % (ref 40–53)
HGB BLD-MCNC: 14.3 G/DL (ref 13.3–17.7)
LACTATE BLD-SCNC: 1.4 MMOL/L (ref 0.7–2)
MAGNESIUM SERPL-MCNC: 2.1 MG/DL (ref 1.6–2.3)
MCH RBC QN AUTO: 32.4 PG (ref 26.5–33)
MCHC RBC AUTO-ENTMCNC: 34 G/DL (ref 31.5–36.5)
MCV RBC AUTO: 95 FL (ref 78–100)
PHOSPHATE SERPL-MCNC: 2.2 MG/DL (ref 2.5–4.5)
PLATELET # BLD AUTO: 158 10E9/L (ref 150–450)
POTASSIUM SERPL-SCNC: 3.9 MMOL/L (ref 3.4–5.3)
RBC # BLD AUTO: 4.42 10E12/L (ref 4.4–5.9)
SODIUM SERPL-SCNC: 134 MMOL/L (ref 133–144)
WBC # BLD AUTO: 20.5 10E9/L (ref 4–11)

## 2019-09-27 PROCEDURE — 25000128 H RX IP 250 OP 636: Performed by: SURGERY

## 2019-09-27 PROCEDURE — 85027 COMPLETE CBC AUTOMATED: CPT | Performed by: PHYSICIAN ASSISTANT

## 2019-09-27 PROCEDURE — 83735 ASSAY OF MAGNESIUM: CPT | Performed by: PHYSICIAN ASSISTANT

## 2019-09-27 PROCEDURE — 97110 THERAPEUTIC EXERCISES: CPT | Mod: GO | Performed by: OCCUPATIONAL THERAPIST

## 2019-09-27 PROCEDURE — 97535 SELF CARE MNGMENT TRAINING: CPT | Mod: GO | Performed by: OCCUPATIONAL THERAPIST

## 2019-09-27 PROCEDURE — 36415 COLL VENOUS BLD VENIPUNCTURE: CPT | Performed by: SURGERY

## 2019-09-27 PROCEDURE — 93010 ELECTROCARDIOGRAM REPORT: CPT | Performed by: INTERNAL MEDICINE

## 2019-09-27 PROCEDURE — 25000132 ZZH RX MED GY IP 250 OP 250 PS 637: Performed by: PHYSICIAN ASSISTANT

## 2019-09-27 PROCEDURE — 80048 BASIC METABOLIC PNL TOTAL CA: CPT | Performed by: PHYSICIAN ASSISTANT

## 2019-09-27 PROCEDURE — 12000047 ZZH R&B IMCU

## 2019-09-27 PROCEDURE — 12000000 ZZH R&B MED SURG/OB

## 2019-09-27 PROCEDURE — 83605 ASSAY OF LACTIC ACID: CPT | Performed by: SURGERY

## 2019-09-27 PROCEDURE — 00000146 ZZHCL STATISTIC GLUCOSE BY METER IP

## 2019-09-27 PROCEDURE — 25000128 H RX IP 250 OP 636: Performed by: PHYSICIAN ASSISTANT

## 2019-09-27 PROCEDURE — 93005 ELECTROCARDIOGRAM TRACING: CPT

## 2019-09-27 PROCEDURE — 25000125 ZZHC RX 250: Performed by: SURGERY

## 2019-09-27 PROCEDURE — 36415 COLL VENOUS BLD VENIPUNCTURE: CPT | Performed by: PHYSICIAN ASSISTANT

## 2019-09-27 PROCEDURE — 25000132 ZZH RX MED GY IP 250 OP 250 PS 637: Performed by: SURGERY

## 2019-09-27 PROCEDURE — 84100 ASSAY OF PHOSPHORUS: CPT | Performed by: PHYSICIAN ASSISTANT

## 2019-09-27 PROCEDURE — 25000125 ZZHC RX 250: Performed by: PHYSICIAN ASSISTANT

## 2019-09-27 PROCEDURE — 25800030 ZZH RX IP 258 OP 636: Performed by: PHYSICIAN ASSISTANT

## 2019-09-27 PROCEDURE — 25800030 ZZH RX IP 258 OP 636: Performed by: SURGERY

## 2019-09-27 RX ORDER — METOPROLOL TARTRATE 1 MG/ML
5 INJECTION, SOLUTION INTRAVENOUS
Status: COMPLETED | OUTPATIENT
Start: 2019-09-27 | End: 2019-09-27

## 2019-09-27 RX ORDER — METOPROLOL TARTRATE 50 MG
50 TABLET ORAL EVERY 12 HOURS
Status: DISCONTINUED | OUTPATIENT
Start: 2019-09-27 | End: 2019-09-29

## 2019-09-27 RX ORDER — METOPROLOL TARTRATE 25 MG/1
25 TABLET, FILM COATED ORAL ONCE
Status: COMPLETED | OUTPATIENT
Start: 2019-09-27 | End: 2019-09-27

## 2019-09-27 RX ADMIN — CALCIUM CARBONATE (ANTACID) CHEW TAB 500 MG 1000 MG: 500 CHEW TAB at 13:15

## 2019-09-27 RX ADMIN — POTASSIUM CHLORIDE 20 MEQ: 1500 TABLET, EXTENDED RELEASE ORAL at 11:19

## 2019-09-27 RX ADMIN — ACETAMINOPHEN 975 MG: 325 TABLET, FILM COATED ORAL at 03:10

## 2019-09-27 RX ADMIN — METOPROLOL TARTRATE 25 MG: 25 TABLET ORAL at 08:48

## 2019-09-27 RX ADMIN — ASPIRIN 81 MG: 81 TABLET, DELAYED RELEASE ORAL at 08:48

## 2019-09-27 RX ADMIN — GABAPENTIN 300 MG: 300 CAPSULE ORAL at 21:04

## 2019-09-27 RX ADMIN — METOPROLOL TARTRATE 5 MG: 5 INJECTION INTRAVENOUS at 02:18

## 2019-09-27 RX ADMIN — SENNOSIDES AND DOCUSATE SODIUM 2 TABLET: 8.6; 5 TABLET ORAL at 21:03

## 2019-09-27 RX ADMIN — PANTOPRAZOLE SODIUM 40 MG: 40 TABLET, DELAYED RELEASE ORAL at 07:12

## 2019-09-27 RX ADMIN — CALCIUM CARBONATE (ANTACID) CHEW TAB 500 MG 1000 MG: 500 CHEW TAB at 21:02

## 2019-09-27 RX ADMIN — SENNOSIDES AND DOCUSATE SODIUM 2 TABLET: 8.6; 5 TABLET ORAL at 08:48

## 2019-09-27 RX ADMIN — CALCIUM CARBONATE (ANTACID) CHEW TAB 500 MG 1000 MG: 500 CHEW TAB at 03:40

## 2019-09-27 RX ADMIN — ACETAMINOPHEN 975 MG: 325 TABLET, FILM COATED ORAL at 11:20

## 2019-09-27 RX ADMIN — GABAPENTIN 300 MG: 300 CAPSULE ORAL at 11:20

## 2019-09-27 RX ADMIN — OXYCODONE HYDROCHLORIDE 10 MG: 5 TABLET ORAL at 07:49

## 2019-09-27 RX ADMIN — METOPROLOL TARTRATE 25 MG: 25 TABLET ORAL at 11:19

## 2019-09-27 RX ADMIN — AMIODARONE HYDROCHLORIDE 0.5 MG/MIN: 50 INJECTION, SOLUTION INTRAVENOUS at 15:13

## 2019-09-27 RX ADMIN — AMIODARONE HYDROCHLORIDE 1 MG/MIN: 50 INJECTION, SOLUTION INTRAVENOUS at 07:48

## 2019-09-27 RX ADMIN — CLOPIDOGREL BISULFATE 75 MG: 75 TABLET ORAL at 08:47

## 2019-09-27 RX ADMIN — HEPARIN SODIUM 5000 UNITS: 5000 INJECTION, SOLUTION INTRAVENOUS; SUBCUTANEOUS at 15:15

## 2019-09-27 RX ADMIN — HEPARIN SODIUM 5000 UNITS: 5000 INJECTION, SOLUTION INTRAVENOUS; SUBCUTANEOUS at 06:10

## 2019-09-27 RX ADMIN — ROSUVASTATIN CALCIUM 10 MG: 10 TABLET, FILM COATED ORAL at 08:47

## 2019-09-27 RX ADMIN — AMIODARONE HYDROCHLORIDE 0.5 MG/MIN: 50 INJECTION, SOLUTION INTRAVENOUS at 08:51

## 2019-09-27 RX ADMIN — AMIODARONE HYDROCHLORIDE 1 MG/MIN: 50 INJECTION, SOLUTION INTRAVENOUS at 03:29

## 2019-09-27 RX ADMIN — SODIUM PHOSPHATE, MONOBASIC, MONOHYDRATE 15 MMOL: 276; 142 INJECTION, SOLUTION INTRAVENOUS at 13:02

## 2019-09-27 RX ADMIN — ACETAMINOPHEN 975 MG: 325 TABLET, FILM COATED ORAL at 18:44

## 2019-09-27 RX ADMIN — AMIODARONE HYDROCHLORIDE 150 MG: 1.5 INJECTION, SOLUTION INTRAVENOUS at 12:47

## 2019-09-27 RX ADMIN — AMIODARONE HYDROCHLORIDE 150 MG: 1.5 INJECTION, SOLUTION INTRAVENOUS at 03:20

## 2019-09-27 RX ADMIN — METOPROLOL TARTRATE 5 MG: 5 INJECTION INTRAVENOUS at 02:27

## 2019-09-27 RX ADMIN — METOPROLOL TARTRATE 50 MG: 50 TABLET ORAL at 21:11

## 2019-09-27 RX ADMIN — METOPROLOL TARTRATE 5 MG: 5 INJECTION INTRAVENOUS at 02:09

## 2019-09-27 ASSESSMENT — ACTIVITIES OF DAILY LIVING (ADL)
ADLS_ACUITY_SCORE: 12

## 2019-09-27 NOTE — PLAN OF CARE
OT/CR: Attempted therapy in pm; pt politely but adamantly refused, despite strong encouragement. Reports he will get up to the bathroom with nursing staff and then sit up in a chair after that.

## 2019-09-27 NOTE — PROGRESS NOTES
"NUTRITION ASSESSMENT      REASON FOR ASSESSMENT:  Cardiac Surgery Nutrition Consult    CURRENT DIET / INTAKE:  Low saturated fat, <2400 mg Na diet  - Wife states that patient hasn't enjoyed the food here too much, likely due to the dietary restrictions   - She does not believe his appetite has been decreased this admission    - Per chart, received CABG x1 on 9/25    Diet history:   - Visited patient and wife today (patient sleeping soundly, discussed with wife only)  - Regular diet at baseline, no PTA concerns regarding changes in appetite or weight  - No known food allergies  - Wife states that \"patient would never drink a protein shake\"      ANTHROPOMETRICS:   Ht: 5' 9\"  Wt: 209# (95.2 kg)  BMI: 30.98 kg/m^2  IBW: 72.7 kg  Weight Status: Obesity Grade I BMI 30-34.9  %IBW: 130%  Wt Readings from Last 10 Encounters:   09/26/19 95.2 kg (209 lb 14.1 oz)   09/06/19 94.3 kg (207 lb 14.3 oz)   08/22/19 94.3 kg (208 lb)   07/19/19 91.6 kg (202 lb)   06/20/19 91.6 kg (202 lb)   04/04/19 92.5 kg (204 lb)   12/17/18 91.8 kg (202 lb 6.4 oz)   11/16/18 89.4 kg (197 lb)   11/17/17 92.1 kg (203 lb)   01/30/17 94.3 kg (208 lb)       MALNUTRITION:  Patient does not meet two of the following criteria necessary for diagnosing malnutrition:  significant weight loss, reduced intake, subcutaneous fat loss, muscle loss or fluid retention. Nutrition Focused Physical Assessment (NFPA) not appropriate at this time.    NUTRITION DIAGNOSIS:   Increased nutrient needs (protein/calorie) related to healing post-op as evidenced by recent cardiac surgery     INTERVENTIONS:    Nutrition Prescription:  Recommend liberalizing diet to Regular to allow patient additional PO during post-op period. Patient will be following up with OP cardiac rehab and will discuss a heart healthy diet     Implementation:  Nutrition Education (Content):  Discussed the importance of adequate nutrition post-op for healing and energy, emphasizing protein foods, and " encouraged patient to order a protein food at each meal.    Goals:  Patient to consume ~75% at meals in the next 3 - 5 days    Follow Up/Monitoring (InPatient):  Food and Fluid intake -  Monitor for adequacy    Follow Up/Monitoring (OutPatient):  Patient will participate in out-patient cardiac rehab and attend nutrition classes during the program      Ashlyn Morris RD, LD  Clinical Dietitian

## 2019-09-27 NOTE — PLAN OF CARE
Pt up with assist of one, tolerates diet, denies the need for pain medications since chest tubes removed this AM, hunt removed, adequate urine output, passing flatus, chest incision clean dry and intact, chest tube site clean and dry, amio gtt continues, tele A-fib, lungs clear, O2 SATs mid high 90's on room air,

## 2019-09-27 NOTE — PROVIDER NOTIFICATION
Dr Kline Notified about new EKG showintg Afib with RVR. Received orders to attempt 3x 5mg doses of metoprolol and initiated Amiodarone drip if No EKG change.

## 2019-09-27 NOTE — PLAN OF CARE
Pt developed AF w/ RVR overnight, placed on IMC status and Amnio gtt started per protocol. This am at 0600, rate is in 90's so tele= AF/CVR. Taking Oxycodone occasionally for pain with good results.

## 2019-09-27 NOTE — PROGRESS NOTES
[facility]  Cardiovascular and Thoracic Surgery Daily Note          Assessment and Plan:   POD#2 s/p Coronary artery bypass grafting x 1 with left internal mammary artery to the left anterior descending, intraoperative HANK by Dr. Magdi Turner  -CVS: HR: 80s-110s. SBP: 110s-130s. Pre op EF: 60-65%. ASA, BB, statin. Plavix for coronary artery endarterectomy. Went into a fib with RVR overnight- started on IV BB, amiodarone bolus and gtt. Continues in a fib this am. Will rebolus amiodarone today. Chest tubes removed today. Repeat CXR. If remains in a fib- will stop plavix, start heparin gtt and then DOAC for discharge.  -Resp: Extubated within protocol. Saturating well on room air. Continue to encourage IS, cough, deep breathing, ambulation.   -Neuro:  Grossly intact. Pain controlled.   -Renal: good UOP. Up about 2kg from preoperative weight. Will continue to hold diuretics today.   -GI:  Tolerating diet. No BM. +flatus. Continue bowel regimen  -:  Ok to remove hunt today  -Endo: pre op a1c: 5.5. Continue sliding scale insulin.   -FEN: replace electrolytes as needed. Na: 134. K: 3.9  Orders Placed This Encounter      Low Saturated Fat Na <2400 mg    -ID: Temp (24hrs), Av.2  F (36.8  C), Min:97.8  F (36.6  C), Max:98.7  F (37.1  C)  WBC: 20.5. Leukocytosis likely related to stress from surgery. Will continue to monitor.   -Heme: Hgb: 14.3. plt: 158. Acute blood loss anemia and thrombocytopenia related to surgery  -Proph: PCD, ASA, BB, statin, PPI, Plavix  -Dispo: [facility] [facility]. Continue therapies. Continue to encourage IS, cough, deep breathing, ambulation.           Interval History:   No acute events overnight. Saturating well on room air. Pain controlled. Tolerating diet. No BM. +flatus         Medications:       acetaminophen  975 mg Oral Q8H     amiodarone  150 mg Intravenous Once     aspirin  81 mg Oral Daily     clopidogrel  75 mg Oral Daily     gabapentin  300 mg Oral BID     heparin  5,000  Units Subcutaneous Q8H     insulin aspart  1-7 Units Subcutaneous TID AC     insulin aspart  1-5 Units Subcutaneous At Bedtime     metoprolol tartrate  50 mg Oral Q12H     pantoprazole  40 mg Oral QAM AC     rosuvastatin  10 mg Oral Daily     senna-docusate  1 tablet Oral BID    Or     senna-docusate  2 tablet Oral BID     sodium chloride (PF)  3 mL Intracatheter Q8H     [START ON 9/28/2019] acetaminophen, albumin human, albuterol, calcium carbonate, IV fluid REPLACEMENT ONLY, glucose **OR** dextrose **OR** glucagon, diphenhydrAMINE **OR** diphenhydrAMINE, furosemide, hydrALAZINE, HYDROmorphone, ketorolac, lidocaine 4%, lidocaine (buffered or not buffered), magnesium sulfate, magnesium sulfate, melatonin, methocarbamol, metoclopramide **OR** metoclopramide, naloxone, ondansetron **OR** ondansetron, oxyCODONE, potassium chloride, potassium chloride with lidocaine, potassium chloride, potassium chloride, potassium chloride, prochlorperazine **OR** prochlorperazine, sodium chloride (PF), sodium phosphate, sodium phosphate, sodium phosphate, sodium phosphate          Physical Exam:   Vitals were reviewed  Blood pressure (!) 132/93, pulse 92, temperature 98.1  F (36.7  C), temperature source Oral, resp. rate 14, weight 95.2 kg (209 lb 14.1 oz), SpO2 93 %.  Rhythm: NSR    Lungs: diminished bases    Cardiovascular: RRR normal s1 and s2    Abdomen: soft NTND    Extremeties: minimal edema    Incision: CDI    CT: n/a    Weight:   Vitals:    09/25/19 0558 09/26/19 0500 09/26/19 1201   Weight: 93 kg (205 lb 1 oz) 95.3 kg (210 lb 1.6 oz) 95.2 kg (209 lb 14.1 oz)            Data:   Labs:   Lab Results   Component Value Date    WBC 20.5 09/27/2019     Lab Results   Component Value Date    RBC 4.42 09/27/2019     Lab Results   Component Value Date    HGB 14.3 09/27/2019     Lab Results   Component Value Date    HCT 42.0 09/27/2019     No components found for: MCT  Lab Results   Component Value Date    MCV 95 09/27/2019     Lab  Results   Component Value Date    MCH 32.4 09/27/2019     Lab Results   Component Value Date    MCHC 34.0 09/27/2019     Lab Results   Component Value Date    RDW 12.7 09/27/2019     Lab Results   Component Value Date     09/27/2019       Last Basic Metabolic Panel:  Lab Results   Component Value Date     09/27/2019      Lab Results   Component Value Date    POTASSIUM 3.9 09/27/2019     Lab Results   Component Value Date    CHLORIDE 102 09/27/2019     Lab Results   Component Value Date    KELLY 9.2 09/27/2019     Lab Results   Component Value Date    CO2 29 09/27/2019     Lab Results   Component Value Date    BUN 13 09/27/2019     Lab Results   Component Value Date    CR 0.51 09/27/2019     Lab Results   Component Value Date     09/27/2019         Sabine Clement PA-C  CV Surgery  Pager # [phone]

## 2019-09-27 NOTE — PLAN OF CARE
"Discharge Planner OT   Patient plan for discharge: return home with significant other  Current status: Pt needs increased time for all activity due to multiple cords/lines/tubes. Mod A and cues needed for supine<>EOB. Able to sit EOB for extended period of time with SBA. Sit<>stand x 4 with cues and CGA only. Ambulated x 125' with CGA at very slow pace and short, shuffling steps due to pain. C/o \"pressure\" in his lungs; pt discussed this with CV PA at end of session, and that it is likely from moving with the chest tubes in. Desats to 85% on RA at rest, only able to use incentive spirometer to 500 or less. O2 sats well into 90's with 2-4 LPM via NC with activity. In controlled A-fib, with rates around  at rest and 123 max with walking. Refer to vitals flow sheet. Pt diaphoretic even at rest, reports this is normal for him. Appears to understanding sternal precautions and is progressing with activity tolerance.  Barriers to return to prior living situation: Medical status, impaired activity tolerance, sternal precautions, level of assist for ADL  Recommendations for discharge: Home with assist from significant other (for all household chores, bathing and supervision for mobility initially) when medically stable. OP CR at [facility].  Rationale for recommendations: Pt not safe for discharge home today, but anticipate that with continued twice daily therapy, he will meet goals for discharge home with help as stated above.       Entered by: Huong Harding 09/27/2019 12:04 PM       "

## 2019-09-27 NOTE — PLAN OF CARE
AVSS On 1L NC. Pain controlled with PO. Incisions KATHERINE with skin glue; CT insertion site CDI with guaze dressing; No crepitus no airleak. LS dim with crackles in left base. Diet combination. Up with assist of 1. BS active and audible. Transferred care at 0300 due to EKG changes showing Afib with RVR. Amiodarone drip started and Pt transferred to [facility] status. [place] patent with adequate urine output.

## 2019-09-28 ENCOUNTER — APPOINTMENT (OUTPATIENT)
Dept: OCCUPATIONAL THERAPY | Facility: CLINIC | Age: 59
DRG: 228 | End: 2019-09-28
Attending: SURGERY
Payer: COMMERCIAL

## 2019-09-28 LAB
ANION GAP SERPL CALCULATED.3IONS-SCNC: 3 MMOL/L (ref 3–14)
BUN SERPL-MCNC: 11 MG/DL (ref 7–30)
CALCIUM SERPL-MCNC: 9.2 MG/DL (ref 8.5–10.1)
CHLORIDE SERPL-SCNC: 101 MMOL/L (ref 94–109)
CO2 SERPL-SCNC: 32 MMOL/L (ref 20–32)
CREAT SERPL-MCNC: 0.66 MG/DL (ref 0.66–1.25)
ERYTHROCYTE [DISTWIDTH] IN BLOOD BY AUTOMATED COUNT: 12.7 % (ref 10–15)
ERYTHROCYTE [DISTWIDTH] IN BLOOD BY AUTOMATED COUNT: 12.7 % (ref 10–15)
GFR SERPL CREATININE-BSD FRML MDRD: >90 ML/MIN/{1.73_M2}
GLUCOSE BLDC GLUCOMTR-MCNC: 116 MG/DL (ref 70–99)
GLUCOSE BLDC GLUCOMTR-MCNC: 132 MG/DL (ref 70–99)
GLUCOSE SERPL-MCNC: 179 MG/DL (ref 70–99)
HCT VFR BLD AUTO: 41.8 % (ref 40–53)
HCT VFR BLD AUTO: 42.5 % (ref 40–53)
HGB BLD-MCNC: 14.2 G/DL (ref 13.3–17.7)
HGB BLD-MCNC: 14.3 G/DL (ref 13.3–17.7)
LMWH PPP CHRO-ACNC: 0.1 IU/ML
MCH RBC QN AUTO: 32 PG (ref 26.5–33)
MCH RBC QN AUTO: 32.3 PG (ref 26.5–33)
MCHC RBC AUTO-ENTMCNC: 33.6 G/DL (ref 31.5–36.5)
MCHC RBC AUTO-ENTMCNC: 34 G/DL (ref 31.5–36.5)
MCV RBC AUTO: 95 FL (ref 78–100)
MCV RBC AUTO: 95 FL (ref 78–100)
PHOSPHATE SERPL-MCNC: 2.7 MG/DL (ref 2.5–4.5)
PLATELET # BLD AUTO: 179 10E9/L (ref 150–450)
PLATELET # BLD AUTO: 193 10E9/L (ref 150–450)
POTASSIUM SERPL-SCNC: 3.7 MMOL/L (ref 3.4–5.3)
RBC # BLD AUTO: 4.4 10E12/L (ref 4.4–5.9)
RBC # BLD AUTO: 4.47 10E12/L (ref 4.4–5.9)
SODIUM SERPL-SCNC: 136 MMOL/L (ref 133–144)
WBC # BLD AUTO: 18.1 10E9/L (ref 4–11)
WBC # BLD AUTO: 19.7 10E9/L (ref 4–11)

## 2019-09-28 PROCEDURE — 25000132 ZZH RX MED GY IP 250 OP 250 PS 637: Performed by: PHYSICIAN ASSISTANT

## 2019-09-28 PROCEDURE — 85520 HEPARIN ASSAY: CPT | Performed by: SURGERY

## 2019-09-28 PROCEDURE — 12000000 ZZH R&B MED SURG/OB

## 2019-09-28 PROCEDURE — 25000128 H RX IP 250 OP 636: Performed by: PHYSICIAN ASSISTANT

## 2019-09-28 PROCEDURE — 25800030 ZZH RX IP 258 OP 636: Performed by: SURGERY

## 2019-09-28 PROCEDURE — 80048 BASIC METABOLIC PNL TOTAL CA: CPT | Performed by: PHYSICIAN ASSISTANT

## 2019-09-28 PROCEDURE — 36415 COLL VENOUS BLD VENIPUNCTURE: CPT | Performed by: PHYSICIAN ASSISTANT

## 2019-09-28 PROCEDURE — 25000132 ZZH RX MED GY IP 250 OP 250 PS 637: Performed by: SURGERY

## 2019-09-28 PROCEDURE — 85027 COMPLETE CBC AUTOMATED: CPT | Performed by: PHYSICIAN ASSISTANT

## 2019-09-28 PROCEDURE — 85027 COMPLETE CBC AUTOMATED: CPT | Performed by: SURGERY

## 2019-09-28 PROCEDURE — 36415 COLL VENOUS BLD VENIPUNCTURE: CPT | Performed by: SURGERY

## 2019-09-28 PROCEDURE — 25000128 H RX IP 250 OP 636: Performed by: SURGERY

## 2019-09-28 PROCEDURE — 84100 ASSAY OF PHOSPHORUS: CPT | Performed by: PHYSICIAN ASSISTANT

## 2019-09-28 PROCEDURE — 00000146 ZZHCL STATISTIC GLUCOSE BY METER IP

## 2019-09-28 PROCEDURE — 97110 THERAPEUTIC EXERCISES: CPT | Mod: GO | Performed by: OCCUPATIONAL THERAPIST

## 2019-09-28 PROCEDURE — 97535 SELF CARE MNGMENT TRAINING: CPT | Mod: GO | Performed by: OCCUPATIONAL THERAPIST

## 2019-09-28 PROCEDURE — 12000047 ZZH R&B IMCU

## 2019-09-28 RX ORDER — AMIODARONE HYDROCHLORIDE 200 MG/1
200 TABLET ORAL DAILY
Status: DISCONTINUED | OUTPATIENT
Start: 2019-09-28 | End: 2019-09-30 | Stop reason: HOSPADM

## 2019-09-28 RX ADMIN — HEPARIN SODIUM 5000 UNITS: 5000 INJECTION, SOLUTION INTRAVENOUS; SUBCUTANEOUS at 00:10

## 2019-09-28 RX ADMIN — CLOPIDOGREL BISULFATE 75 MG: 75 TABLET ORAL at 08:06

## 2019-09-28 RX ADMIN — METOPROLOL TARTRATE 50 MG: 50 TABLET ORAL at 20:38

## 2019-09-28 RX ADMIN — OXYCODONE HYDROCHLORIDE 5 MG: 5 TABLET ORAL at 09:18

## 2019-09-28 RX ADMIN — OXYCODONE HYDROCHLORIDE 5 MG: 5 TABLET ORAL at 00:10

## 2019-09-28 RX ADMIN — METOPROLOL TARTRATE 50 MG: 50 TABLET ORAL at 08:06

## 2019-09-28 RX ADMIN — HEPARIN SODIUM 5000 UNITS: 5000 INJECTION, SOLUTION INTRAVENOUS; SUBCUTANEOUS at 08:07

## 2019-09-28 RX ADMIN — ACETAMINOPHEN 975 MG: 325 TABLET, FILM COATED ORAL at 04:33

## 2019-09-28 RX ADMIN — Medication 2400 UNITS: at 19:34

## 2019-09-28 RX ADMIN — METOPROLOL TARTRATE 12.5 MG: 25 TABLET, FILM COATED ORAL at 23:06

## 2019-09-28 RX ADMIN — ROSUVASTATIN CALCIUM 10 MG: 10 TABLET, FILM COATED ORAL at 08:06

## 2019-09-28 RX ADMIN — POTASSIUM CHLORIDE 20 MEQ: 1500 TABLET, EXTENDED RELEASE ORAL at 09:17

## 2019-09-28 RX ADMIN — SENNOSIDES AND DOCUSATE SODIUM 1 TABLET: 8.6; 5 TABLET ORAL at 20:38

## 2019-09-28 RX ADMIN — SENNOSIDES AND DOCUSATE SODIUM 1 TABLET: 8.6; 5 TABLET ORAL at 08:07

## 2019-09-28 RX ADMIN — ACETAMINOPHEN 975 MG: 325 TABLET, FILM COATED ORAL at 09:18

## 2019-09-28 RX ADMIN — AMIODARONE HYDROCHLORIDE 0.5 MG/MIN: 50 INJECTION, SOLUTION INTRAVENOUS at 08:26

## 2019-09-28 RX ADMIN — ASPIRIN 81 MG: 81 TABLET, DELAYED RELEASE ORAL at 08:06

## 2019-09-28 RX ADMIN — AMIODARONE HYDROCHLORIDE 0.5 MG/MIN: 50 INJECTION, SOLUTION INTRAVENOUS at 00:10

## 2019-09-28 RX ADMIN — AMIODARONE HYDROCHLORIDE 200 MG: 200 TABLET ORAL at 11:58

## 2019-09-28 RX ADMIN — CALCIUM CARBONATE (ANTACID) CHEW TAB 500 MG 1000 MG: 500 CHEW TAB at 04:32

## 2019-09-28 RX ADMIN — PANTOPRAZOLE SODIUM 40 MG: 40 TABLET, DELAYED RELEASE ORAL at 07:16

## 2019-09-28 RX ADMIN — HEPARIN SODIUM 950 UNITS/HR: 10000 INJECTION, SOLUTION INTRAVENOUS at 11:59

## 2019-09-28 ASSESSMENT — ACTIVITIES OF DAILY LIVING (ADL)
ADLS_ACUITY_SCORE: 12
ADLS_ACUITY_SCORE: 10

## 2019-09-28 NOTE — PLAN OF CARE
Discharge Planner OT   Patient plan for discharge: Home tomorrow or Monday with wife and OP CR at [facility]  Current status: Feeling much better today. Bed mobility with cues and Min A. Transfers from EOB, chair, toilet with SBA and cues, progressing to (I)ly, while following sternal precautions. Plans to sponge bathe for a while with assist of wife, but OT educated on tub transfer technique; may benefit from shower chair. Able to doff and don socks with cues and increased time. Ambulated halls x 8 min with SBA at very slow pace with very short step length, so not yet appropriate for treadmill but will try tomorrow am. Politely declined to try stairs this am, as too fatigued. Rates effort of walk at 5/10; no symptoms other than fatigue and mild SOB. Refer to vitals flow sheet; CV response WNL.  Barriers to return to prior living situation: None from OT standpoint, as will have assist at home from wife full time x 2 weeks  Recommendations for discharge: Home with OP CR; SBA for bathing, stairs and bed mobility  Rationale for recommendations: Pt will continue to benefit from CR for progressive, monitored exercise s/p CABG.       Entered by: Huong Harding 09/28/2019 10:42 AM        In PM session, seen for cardiac rehab education only, as he refused to exercise with therapy due to watching the [place] game. Assured writer he would walk the halls with nursing staff this pm after the game.

## 2019-09-28 NOTE — PLAN OF CARE
A&O. VSS on room air while awake, 1.5-2L NC while sleeping. Lung sounds clear. Tele: Afib CVR. amio gtt infusing. Incision WNL. Pain controlled with oxycodone. adquate urinary output.

## 2019-09-28 NOTE — PROGRESS NOTES
[facility]  Cardiovascular and Thoracic Surgery Daily Note          Assessment and Plan:   POD#3 s/p Coronary artery bypass grafting x 1 with left internal mammary artery to the left anterior descending, intraoperative HANK by Dr. Magdi Turner  -CVS: HR: 80s-110s. SBP: 110s-160s. Pre op EF: 60-65%. ASA, BB, statin. Plavix for coronary artery endarterectomy. Went into a fib with RVR overnight- started on IV BB, amiodarone bolus and gtt. Continues in a fib this am. Likely hep qtt and transition to DOAC on discharge tomorrow  -Resp: Saturating well on room air. Continue to encourage IS, cough, deep breathing, ambulation.   -Neuro:  Grossly intact. Pain controlled.   -Renal: good UOP. Weight down  -GI:  Tolerating diet. No BM. +flatus. Continue bowel regimen  -:  Voiding well  -Endo: pre op a1c: 5.5. Continue sliding scale insulin.   -FEN: replace electrolytes as needed.  Orders Placed This Encounter      Low Saturated Fat Na <2400 mg    -ID: Temp (24hrs), Av.2  F (36.8  C), Min:97.8  F (36.6  C), Max:98.7  F (37.1  C)  WBC: 19.7. Leukocytosis likely related to stress from surgery. Will continue to monitor.   -Heme: Hgb: 14.3. plt: 158. Acute blood loss anemia and thrombocytopenia related to surgery  -Proph: PCD, ASA, BB, statin, PPI, Plavix  -Dispo: [facility]. Continue therapies. Continue to encourage IS, cough, deep breathing, ambulation. Hep qtt, doac on discharge.  Discharge tomorrow.          Interval History:   No acute events overnight. Saturating well on room air. Pain controlled. Tolerating diet. +BM         Medications:       acetaminophen  975 mg Oral Q8H     aspirin  81 mg Oral Daily     clopidogrel  75 mg Oral Daily     heparin  5,000 Units Subcutaneous Q8H     insulin aspart  1-7 Units Subcutaneous TID AC     insulin aspart  1-5 Units Subcutaneous At Bedtime     metoprolol tartrate  50 mg Oral Q12H     pantoprazole  40 mg Oral QAM AC     rosuvastatin  10 mg Oral Daily      senna-docusate  1 tablet Oral BID    Or     senna-docusate  2 tablet Oral BID     sodium chloride (PF)  3 mL Intracatheter Q8H     acetaminophen, albumin human, albuterol, calcium carbonate, IV fluid REPLACEMENT ONLY, glucose **OR** dextrose **OR** glucagon, diphenhydrAMINE **OR** diphenhydrAMINE, furosemide, hydrALAZINE, HYDROmorphone, ketorolac, lidocaine 4%, lidocaine (buffered or not buffered), magnesium sulfate, magnesium sulfate, melatonin, methocarbamol, metoclopramide **OR** metoclopramide, naloxone, ondansetron **OR** ondansetron, oxyCODONE, potassium chloride, potassium chloride with lidocaine, potassium chloride, potassium chloride, potassium chloride, prochlorperazine **OR** prochlorperazine, sodium chloride (PF), sodium phosphate, sodium phosphate, sodium phosphate, sodium phosphate          Physical Exam:   Vitals were reviewed  Blood pressure (!) 164/101, pulse 83, temperature 97.7  F (36.5  C), temperature source Oral, resp. rate 16, weight 93.6 kg (206 lb 5.6 oz), SpO2 94 %.  Rhythm: NSR    Lungs: diminished bases    Cardiovascular: RRR normal s1 and s2    Abdomen: soft NTND    Extremeties: minimal edema    Incision: CDI    CT: n/a    Weight:   Vitals:    09/25/19 0558 09/26/19 0500 09/26/19 1201 09/28/19 0528   Weight: 93 kg (205 lb 1 oz) 95.3 kg (210 lb 1.6 oz) 95.2 kg (209 lb 14.1 oz) 93.6 kg (206 lb 5.6 oz)            Data:   Labs:   Lab Results   Component Value Date    WBC 20.5 09/27/2019     Lab Results   Component Value Date    RBC 4.42 09/27/2019     Lab Results   Component Value Date    HGB 14.3 09/27/2019     Lab Results   Component Value Date    HCT 42.0 09/27/2019     No components found for: MCT  Lab Results   Component Value Date    MCV 95 09/27/2019     Lab Results   Component Value Date    MCH 32.4 09/27/2019     Lab Results   Component Value Date    MCHC 34.0 09/27/2019     Lab Results   Component Value Date    RDW 12.7 09/27/2019     Lab Results   Component Value Date      09/27/2019       Last Basic Metabolic Panel:  Lab Results   Component Value Date     09/27/2019      Lab Results   Component Value Date    POTASSIUM 3.9 09/27/2019     Lab Results   Component Value Date    CHLORIDE 102 09/27/2019     Lab Results   Component Value Date    KELLY 9.2 09/27/2019     Lab Results   Component Value Date    CO2 29 09/27/2019     Lab Results   Component Value Date    BUN 13 09/27/2019     Lab Results   Component Value Date    CR 0.51 09/27/2019     Lab Results   Component Value Date     09/27/2019       Roni Kline  Cardiac Surgery Fellow  [phone]

## 2019-09-29 ENCOUNTER — APPOINTMENT (OUTPATIENT)
Dept: GENERAL RADIOLOGY | Facility: CLINIC | Age: 59
DRG: 228 | End: 2019-09-29
Attending: SURGERY
Payer: COMMERCIAL

## 2019-09-29 ENCOUNTER — APPOINTMENT (OUTPATIENT)
Dept: OCCUPATIONAL THERAPY | Facility: CLINIC | Age: 59
DRG: 228 | End: 2019-09-29
Attending: SURGERY
Payer: COMMERCIAL

## 2019-09-29 ENCOUNTER — HEALTH MAINTENANCE LETTER (OUTPATIENT)
Age: 59
End: 2019-09-29

## 2019-09-29 LAB
ERYTHROCYTE [DISTWIDTH] IN BLOOD BY AUTOMATED COUNT: 12.5 % (ref 10–15)
GLUCOSE BLDC GLUCOMTR-MCNC: 110 MG/DL (ref 70–99)
GLUCOSE BLDC GLUCOMTR-MCNC: 126 MG/DL (ref 70–99)
GLUCOSE BLDC GLUCOMTR-MCNC: 139 MG/DL (ref 70–99)
GLUCOSE BLDC GLUCOMTR-MCNC: 189 MG/DL (ref 70–99)
HCT VFR BLD AUTO: 41.1 % (ref 40–53)
HGB BLD-MCNC: 13.8 G/DL (ref 13.3–17.7)
LACTATE BLD-SCNC: 1.7 MMOL/L (ref 0.7–2)
LMWH PPP CHRO-ACNC: 0.18 IU/ML
LMWH PPP CHRO-ACNC: 0.37 IU/ML
LMWH PPP CHRO-ACNC: 0.4 IU/ML
LMWH PPP CHRO-ACNC: 0.56 IU/ML
LMWH PPP CHRO-ACNC: <0.1 IU/ML
MCH RBC QN AUTO: 31.8 PG (ref 26.5–33)
MCHC RBC AUTO-ENTMCNC: 33.6 G/DL (ref 31.5–36.5)
MCV RBC AUTO: 95 FL (ref 78–100)
PHOSPHATE SERPL-MCNC: 2.8 MG/DL (ref 2.5–4.5)
PLATELET # BLD AUTO: 199 10E9/L (ref 150–450)
PLATELET # BLD AUTO: 223 10E9/L (ref 150–450)
RBC # BLD AUTO: 4.34 10E12/L (ref 4.4–5.9)
WBC # BLD AUTO: 14.5 10E9/L (ref 4–11)

## 2019-09-29 PROCEDURE — 00000146 ZZHCL STATISTIC GLUCOSE BY METER IP

## 2019-09-29 PROCEDURE — 25000132 ZZH RX MED GY IP 250 OP 250 PS 637: Performed by: PHYSICIAN ASSISTANT

## 2019-09-29 PROCEDURE — 25000125 ZZHC RX 250: Performed by: NURSE PRACTITIONER

## 2019-09-29 PROCEDURE — 25000132 ZZH RX MED GY IP 250 OP 250 PS 637: Performed by: SURGERY

## 2019-09-29 PROCEDURE — 36415 COLL VENOUS BLD VENIPUNCTURE: CPT | Performed by: SURGERY

## 2019-09-29 PROCEDURE — 83605 ASSAY OF LACTIC ACID: CPT | Performed by: SURGERY

## 2019-09-29 PROCEDURE — 93005 ELECTROCARDIOGRAM TRACING: CPT

## 2019-09-29 PROCEDURE — 85520 HEPARIN ASSAY: CPT | Performed by: PHYSICIAN ASSISTANT

## 2019-09-29 PROCEDURE — 36415 COLL VENOUS BLD VENIPUNCTURE: CPT | Performed by: PHYSICIAN ASSISTANT

## 2019-09-29 PROCEDURE — 40000986 XR CHEST 2 VW

## 2019-09-29 PROCEDURE — 25000132 ZZH RX MED GY IP 250 OP 250 PS 637: Performed by: NURSE PRACTITIONER

## 2019-09-29 PROCEDURE — 97110 THERAPEUTIC EXERCISES: CPT | Mod: GO | Performed by: OCCUPATIONAL THERAPIST

## 2019-09-29 PROCEDURE — 12000000 ZZH R&B MED SURG/OB

## 2019-09-29 PROCEDURE — 93010 ELECTROCARDIOGRAM REPORT: CPT | Performed by: INTERNAL MEDICINE

## 2019-09-29 PROCEDURE — 85049 AUTOMATED PLATELET COUNT: CPT | Performed by: PHYSICIAN ASSISTANT

## 2019-09-29 PROCEDURE — 85520 HEPARIN ASSAY: CPT | Performed by: SURGERY

## 2019-09-29 PROCEDURE — 85027 COMPLETE CBC AUTOMATED: CPT | Performed by: PHYSICIAN ASSISTANT

## 2019-09-29 PROCEDURE — 84100 ASSAY OF PHOSPHORUS: CPT | Performed by: PHYSICIAN ASSISTANT

## 2019-09-29 PROCEDURE — 25000128 H RX IP 250 OP 636: Performed by: SURGERY

## 2019-09-29 PROCEDURE — 97110 THERAPEUTIC EXERCISES: CPT | Mod: GO

## 2019-09-29 RX ORDER — METOPROLOL TARTRATE 50 MG
50 TABLET ORAL ONCE
Status: COMPLETED | OUTPATIENT
Start: 2019-09-29 | End: 2019-09-29

## 2019-09-29 RX ORDER — METOPROLOL TARTRATE 1 MG/ML
5 INJECTION, SOLUTION INTRAVENOUS ONCE
Status: COMPLETED | OUTPATIENT
Start: 2019-09-29 | End: 2019-09-29

## 2019-09-29 RX ADMIN — AMIODARONE HYDROCHLORIDE 200 MG: 200 TABLET ORAL at 08:35

## 2019-09-29 RX ADMIN — ACETAMINOPHEN 650 MG: 325 TABLET, FILM COATED ORAL at 02:41

## 2019-09-29 RX ADMIN — PANTOPRAZOLE SODIUM 40 MG: 40 TABLET, DELAYED RELEASE ORAL at 10:20

## 2019-09-29 RX ADMIN — METOPROLOL TARTRATE 5 MG: 5 INJECTION INTRAVENOUS at 08:35

## 2019-09-29 RX ADMIN — METHOCARBAMOL TABLETS 750 MG: 750 TABLET, COATED ORAL at 22:00

## 2019-09-29 RX ADMIN — ROSUVASTATIN CALCIUM 10 MG: 10 TABLET, FILM COATED ORAL at 08:35

## 2019-09-29 RX ADMIN — METOPROLOL TARTRATE 50 MG: 50 TABLET ORAL at 11:18

## 2019-09-29 RX ADMIN — ASPIRIN 81 MG: 81 TABLET, DELAYED RELEASE ORAL at 08:35

## 2019-09-29 RX ADMIN — METHOCARBAMOL TABLETS 750 MG: 750 TABLET, COATED ORAL at 02:41

## 2019-09-29 RX ADMIN — METOPROLOL TARTRATE 75 MG: 50 TABLET ORAL at 20:34

## 2019-09-29 RX ADMIN — ACETAMINOPHEN 650 MG: 325 TABLET, FILM COATED ORAL at 22:00

## 2019-09-29 RX ADMIN — CLOPIDOGREL BISULFATE 75 MG: 75 TABLET ORAL at 08:35

## 2019-09-29 RX ADMIN — SENNOSIDES AND DOCUSATE SODIUM 2 TABLET: 8.6; 5 TABLET ORAL at 08:35

## 2019-09-29 RX ADMIN — HEPARIN SODIUM 950 UNITS/HR: 10000 INJECTION, SOLUTION INTRAVENOUS at 08:20

## 2019-09-29 ASSESSMENT — ACTIVITIES OF DAILY LIVING (ADL)
ADLS_ACUITY_SCORE: 12

## 2019-09-29 NOTE — PLAN OF CARE
Discharge Planner OT   Patient plan for discharge: Home with OP CR  Current status: Spoke with RN, HR down a bit, OK to walk on unit for CR. Ambulated x 6 min and completed set of 3 stairs. (I) with mobility. Rates effort at 2/10, although mild-moderate SOB, diaphoretic and HR into 140's with activity. Refer to vitals flow sheet.  Barriers to return to prior living situation: medical status, impaired activity tolerance  Recommendations for discharge: Home with OP CR at [facility] and assist of wife for all heavy chores  Rationale for recommendations: Pt s/p CABG with impaired activity tolerance and post op restrictions.       Entered by: Huong Harding 09/29/2019 11:43 AM

## 2019-09-29 NOTE — PROGRESS NOTES
[facility]  Cardiovascular and Thoracic Surgery Daily Note          Assessment and Plan:   POD#4 s/p Coronary artery bypass grafting x 1 with left internal mammary artery to the left anterior descending, intraoperative HANK by Dr. Magdi Turner  -CVS: HR: 80s-170s. SBP: 110s-160s. Pre op EF: 60-65%. ASA, BB increased to 75mg, statin. Plavix for coronary artery endarterectomy. Afib with RVR- started on IV BB, amiodarone bolus and gtt. Hep qtt and transition to DOAC on discharge tomorrow  -Resp: Saturating well on room air. Continue to encourage IS, cough, deep breathing, ambulation.   -Neuro:  Grossly intact. Pain controlled.   -Renal: good UOP. Weight down  -GI:  Tolerating diet. No BM. +flatus. Continue bowel regimen  -:  Voiding well  -Endo: pre op a1c: 5.5. Continue sliding scale insulin.   -FEN: replace electrolytes as needed.  Orders Placed This Encounter      Low Saturated Fat Na <2400 mg    -ID: Temp (24hrs), Av.2  F (36.8  C), Min:97.8  F (36.6  C), Max:98.7  F (37.1  C)  WBC: 14.5. Leukocytosis likely related to stress from surgery. Will continue to monitor.   -Heme: Hgb: 14.2. plt: 193. Acute blood loss anemia and thrombocytopenia related to surgery  -Proph: PCD, ASA, BB, statin, PPI, Plavix  -Dispo: [address]. Continue therapies. Continue to encourage IS, cough, deep breathing, ambulation. Hep qtt, doac on discharge.  Discharge tomorrow.          Interval History:   Had bout tachycardia while have a BM.  Resolved.  Doing well otherwise.  Tolerating food. Ambulating with minimal difficulty.         Medications:       amiodarone  200 mg Oral Daily     aspirin  81 mg Oral Daily     clopidogrel  75 mg Oral Daily     insulin aspart  1-7 Units Subcutaneous TID AC     insulin aspart  1-5 Units Subcutaneous At Bedtime     metoprolol tartrate  75 mg Oral Q12H     pantoprazole  40 mg Oral QAM AC     rosuvastatin  10 mg Oral Daily     senna-docusate  1 tablet Oral BID    Or     senna-docusate  2  tablet Oral BID     sodium chloride (PF)  3 mL Intracatheter Q8H     acetaminophen, albumin human, albuterol, calcium carbonate, IV fluid REPLACEMENT ONLY, glucose **OR** dextrose **OR** glucagon, diphenhydrAMINE **OR** diphenhydrAMINE, furosemide, hydrALAZINE, HYDROmorphone, ketorolac, lidocaine 4%, lidocaine (buffered or not buffered), magnesium sulfate, magnesium sulfate, melatonin, methocarbamol, metoclopramide **OR** metoclopramide, naloxone, ondansetron **OR** ondansetron, oxyCODONE, potassium chloride, potassium chloride with lidocaine, potassium chloride, potassium chloride, potassium chloride, prochlorperazine **OR** prochlorperazine, sodium chloride (PF), sodium phosphate, sodium phosphate, sodium phosphate, sodium phosphate          Physical Exam:   Vitals were reviewed  Blood pressure (!) 132/94, pulse 114, temperature 98.3  F (36.8  C), temperature source Oral, resp. rate 16, weight 92.5 kg (204 lb), SpO2 90 %.  Rhythm: NSR    Lungs: diminished bases    Cardiovascular: RRR normal s1 and s2    Abdomen: soft NT ND    Extremeties: minimal edema    Incision: CDI    CT: n/a    Weight:   Vitals:    09/25/19 0558 09/26/19 0500 09/26/19 1201 09/28/19 0528   Weight: 93 kg (205 lb 1 oz) 95.3 kg (210 lb 1.6 oz) 95.2 kg (209 lb 14.1 oz) 93.6 kg (206 lb 5.6 oz)    09/29/19 0619   Weight: 92.5 kg (204 lb)            Data:   Labs:   Lab Results   Component Value Date    WBC 20.5 09/27/2019     Lab Results   Component Value Date    RBC 4.42 09/27/2019     Lab Results   Component Value Date    HGB 14.3 09/27/2019     Lab Results   Component Value Date    HCT 42.0 09/27/2019     No components found for: MCT  Lab Results   Component Value Date    MCV 95 09/27/2019     Lab Results   Component Value Date    MCH 32.4 09/27/2019     Lab Results   Component Value Date    MCHC 34.0 09/27/2019     Lab Results   Component Value Date    RDW 12.7 09/27/2019     Lab Results   Component Value Date     09/27/2019       Last  Basic Metabolic Panel:  Lab Results   Component Value Date     09/27/2019      Lab Results   Component Value Date    POTASSIUM 3.9 09/27/2019     Lab Results   Component Value Date    CHLORIDE 102 09/27/2019     Lab Results   Component Value Date    KELLY 9.2 09/27/2019     Lab Results   Component Value Date    CO2 29 09/27/2019     Lab Results   Component Value Date    BUN 13 09/27/2019     Lab Results   Component Value Date    CR 0.51 09/27/2019     Lab Results   Component Value Date     09/27/2019       Roni Kline  Cardiac Surgery Fellow  [phone]

## 2019-09-29 NOTE — PLAN OF CARE
Discharge Planner OT   Patient plan for discharge: Home with OP CR    Current status: RN ok with CR on floor. A-fib more controlled with rates in the 90's at this time. Pt ambulated 7.5 minutes with SBA and completed 6 stairs with railing. Hypertensive response to exercise, see vitals flowsheet.     Barriers to return to prior living situation: none     Recommendations for discharge: Home with OP CR at [facility] and assist of wife for cooking, cleaning, laundry and driving     Rationale for recommendations: Pt will benefit from OP CR for progressive monitored physical activity for overall cardiac health.          Entered by: Arlene Cruz 09/29/2019 3:40 PM

## 2019-09-29 NOTE — PLAN OF CARE
"VSS overnight.  SR w/ PACs at times also fib/flutter but HR has been controlled 80's-90s since about 0000.  Mild pain, c/o \"soreness,\" Robaxin and Tylenol around 0300.  C/o restlessness d/t being very hot and sweaty while sleeping.  Hep gtt at 950, recheck at 0930.  Up SBA, voiding adequately.  "

## 2019-09-29 NOTE — PLAN OF CARE
Pt up in room and halls with stand by assist, tolerates diet, denies pain, voiding well, did have loose BM this shift, incision clean dry and intact, lungs clear, O2 sats mid 90's on room air, heparin drip continues running, tele A-fib with RVR to NSR at times, was tachy this am meds given as ordered,

## 2019-09-29 NOTE — PLAN OF CARE
Pt up with brianda by assist, walking in halls, tolerates diet, did have BM this am, good urine output, lungs clear, O2 sats mid 90's on room air, IS to 1500, incisions clean dry and intact, tele afib to NSR, amio gtt stopped today and oral amio started, heparin drip started, denies the need for pain medications at this time,

## 2019-09-29 NOTE — PROVIDER NOTIFICATION
Spoke with Dr. Orta on call regarding Afib that has been 's-140's for past couple of hours despite joana motoprolol dose.  See order for additional 12.5mg metoprolol now.  If BPs okay and rate stays below 140s overnight no need for call back, will assess and adjust meds in am.

## 2019-09-29 NOTE — PLAN OF CARE
OT/CR: Pt unable to come down to CR gym as planned, due to off unit at test. Discussed with RN.    Addendum: After procedure, pt with resting HR in 130's; not appropriate for stairs/treadmill at this time.

## 2019-09-30 VITALS
OXYGEN SATURATION: 97 % | BODY MASS INDEX: 30.49 KG/M2 | TEMPERATURE: 97.5 F | HEART RATE: 96 BPM | WEIGHT: 206.57 LBS | SYSTOLIC BLOOD PRESSURE: 113 MMHG | DIASTOLIC BLOOD PRESSURE: 79 MMHG | RESPIRATION RATE: 18 BRPM

## 2019-09-30 LAB
ERYTHROCYTE [DISTWIDTH] IN BLOOD BY AUTOMATED COUNT: 12.7 % (ref 10–15)
GLUCOSE BLDC GLUCOMTR-MCNC: 100 MG/DL (ref 70–99)
GLUCOSE BLDC GLUCOMTR-MCNC: 118 MG/DL (ref 70–99)
HCT VFR BLD AUTO: 34 % (ref 40–53)
HGB BLD-MCNC: 11.2 G/DL (ref 13.3–17.7)
INTERPRETATION ECG - MUSE: NORMAL
LMWH PPP CHRO-ACNC: <0.1 IU/ML
MCH RBC QN AUTO: 31.1 PG (ref 26.5–33)
MCHC RBC AUTO-ENTMCNC: 32.9 G/DL (ref 31.5–36.5)
MCV RBC AUTO: 94 FL (ref 78–100)
PHOSPHATE SERPL-MCNC: 3.7 MG/DL (ref 2.5–4.5)
PLATELET # BLD AUTO: 238 10E9/L (ref 150–450)
POTASSIUM SERPL-SCNC: 3.1 MMOL/L (ref 3.4–5.3)
RBC # BLD AUTO: 3.6 10E12/L (ref 4.4–5.9)
WBC # BLD AUTO: 11.6 10E9/L (ref 4–11)

## 2019-09-30 PROCEDURE — 25000132 ZZH RX MED GY IP 250 OP 250 PS 637: Performed by: PHYSICIAN ASSISTANT

## 2019-09-30 PROCEDURE — 36415 COLL VENOUS BLD VENIPUNCTURE: CPT | Performed by: SURGERY

## 2019-09-30 PROCEDURE — 36415 COLL VENOUS BLD VENIPUNCTURE: CPT | Performed by: PHYSICIAN ASSISTANT

## 2019-09-30 PROCEDURE — 84132 ASSAY OF SERUM POTASSIUM: CPT | Performed by: PHYSICIAN ASSISTANT

## 2019-09-30 PROCEDURE — 25000132 ZZH RX MED GY IP 250 OP 250 PS 637: Performed by: NURSE PRACTITIONER

## 2019-09-30 PROCEDURE — 00000146 ZZHCL STATISTIC GLUCOSE BY METER IP

## 2019-09-30 PROCEDURE — 25000132 ZZH RX MED GY IP 250 OP 250 PS 637: Performed by: SURGERY

## 2019-09-30 PROCEDURE — 84100 ASSAY OF PHOSPHORUS: CPT | Performed by: PHYSICIAN ASSISTANT

## 2019-09-30 PROCEDURE — 85520 HEPARIN ASSAY: CPT | Performed by: SURGERY

## 2019-09-30 PROCEDURE — 85027 COMPLETE CBC AUTOMATED: CPT | Performed by: PHYSICIAN ASSISTANT

## 2019-09-30 RX ORDER — ACETAMINOPHEN 325 MG/1
650 TABLET ORAL EVERY 4 HOURS PRN
Qty: 1 BOTTLE | Refills: 1 | Status: SHIPPED | OUTPATIENT
Start: 2019-09-30 | End: 2019-10-14

## 2019-09-30 RX ORDER — PANTOPRAZOLE SODIUM 40 MG/1
40 TABLET, DELAYED RELEASE ORAL
Qty: 14 TABLET | Refills: 0 | Status: SHIPPED | OUTPATIENT
Start: 2019-10-01 | End: 2019-11-08

## 2019-09-30 RX ORDER — AMOXICILLIN 250 MG
2 CAPSULE ORAL 2 TIMES DAILY PRN
Qty: 60 TABLET | Refills: 0 | Status: SHIPPED | OUTPATIENT
Start: 2019-09-30 | End: 2019-10-03

## 2019-09-30 RX ORDER — METOPROLOL SUCCINATE 25 MG/1
75 TABLET, EXTENDED RELEASE ORAL 2 TIMES DAILY
Qty: 60 TABLET | Refills: 3 | Status: SHIPPED | OUTPATIENT
Start: 2019-09-30 | End: 2019-11-08

## 2019-09-30 RX ORDER — METHOCARBAMOL 750 MG/1
750 TABLET, FILM COATED ORAL 4 TIMES DAILY PRN
Qty: 60 TABLET | Refills: 1 | Status: SHIPPED | OUTPATIENT
Start: 2019-09-30 | End: 2019-10-11

## 2019-09-30 RX ORDER — CLOPIDOGREL BISULFATE 75 MG/1
75 TABLET ORAL DAILY
Qty: 30 TABLET | Refills: 3 | Status: SHIPPED | OUTPATIENT
Start: 2019-10-01 | End: 2019-11-08

## 2019-09-30 RX ORDER — OXYCODONE HYDROCHLORIDE 5 MG/1
5-10 TABLET ORAL EVERY 4 HOURS PRN
Qty: 30 TABLET | Refills: 0 | Status: SHIPPED | OUTPATIENT
Start: 2019-09-30 | End: 2019-10-11

## 2019-09-30 RX ORDER — AMIODARONE HYDROCHLORIDE 200 MG/1
200 TABLET ORAL DAILY
Qty: 30 TABLET | Refills: 0 | Status: SHIPPED | OUTPATIENT
Start: 2019-10-01 | End: 2019-10-17

## 2019-09-30 RX ADMIN — POTASSIUM CHLORIDE 20 MEQ: 1500 TABLET, EXTENDED RELEASE ORAL at 09:44

## 2019-09-30 RX ADMIN — AMIODARONE HYDROCHLORIDE 200 MG: 200 TABLET ORAL at 08:06

## 2019-09-30 RX ADMIN — METOPROLOL TARTRATE 75 MG: 50 TABLET ORAL at 08:06

## 2019-09-30 RX ADMIN — SENNOSIDES AND DOCUSATE SODIUM 2 TABLET: 8.6; 5 TABLET ORAL at 08:06

## 2019-09-30 RX ADMIN — PANTOPRAZOLE SODIUM 40 MG: 40 TABLET, DELAYED RELEASE ORAL at 08:08

## 2019-09-30 RX ADMIN — POTASSIUM CHLORIDE 40 MEQ: 1.5 POWDER, FOR SOLUTION ORAL at 08:06

## 2019-09-30 RX ADMIN — CLOPIDOGREL BISULFATE 75 MG: 75 TABLET ORAL at 08:06

## 2019-09-30 RX ADMIN — APIXABAN 5 MG: 5 TABLET, FILM COATED ORAL at 11:02

## 2019-09-30 RX ADMIN — ASPIRIN 81 MG: 81 TABLET, DELAYED RELEASE ORAL at 08:06

## 2019-09-30 RX ADMIN — ROSUVASTATIN CALCIUM 10 MG: 10 TABLET, FILM COATED ORAL at 08:06

## 2019-09-30 RX ADMIN — Medication 1 MG: at 00:26

## 2019-09-30 ASSESSMENT — ACTIVITIES OF DAILY LIVING (ADL)
ADLS_ACUITY_SCORE: 12

## 2019-09-30 NOTE — PLAN OF CARE
Occupational Therapy Discharge Summary    Reason for therapy discharge:    Discharged to home with outpatient therapy.    Progress towards therapy goal(s). See goals on Care Plan in [facility] electronic health record for goal details.  Goals not met.  Barriers to achieving goals:   discharge from facility.    Therapy recommendation(s):    Continued therapy is recommended.  Rationale/Recommendations:  OPCR planned for progressive monitored exercise.

## 2019-09-30 NOTE — PLAN OF CARE
Vital signs stable on room air except in and out of a-flutter  heart rate. Alert and oriented. Lung sounds diminished but clear. Bowel sounds active, flatus present. Sternal incision and chest tube sites clean dry and intact. Pain controlled with robaxin. Up stand by assist. Tolerating diet. CMS/neuros intact. Heparin continued at 8 ml /hr.

## 2019-09-30 NOTE — DISCHARGE SUMMARY
Discharge Summary    Bryce Espinoza MRN# [id number]   YOB: 1960 Age: 59 year old     Date of Admission:  9/25/2019  Date of Discharge:  9/30/2019  Admitting Physician:  Magdi Turner MD  Discharge Physician:  Magdi Turner,*  Discharging Service:  Cardiovascular and Thoracic Surgery  Preoperative Weight:  205 lbs  Discharge Weight:  206 lbs       Primary Provider: Remington Riggs          Admission Diagnoses:   CORONARY ARTERY DISEASE  CAD (coronary artery disease)          Discharge Diagnosis:     Patient Active Problem List   Diagnosis     Essential hypertension, benign     Malignant neoplasm of bladder (H)     Other specified idiopathic peripheral neuropathy     Pain, joint, shoulder region, right     Hyperlipidemia LDL goal <70     Coronary artery disease involving native coronary artery of native heart without angina pectoris     Status post coronary angiogram     CAD (coronary artery disease)  Paroxysmal atrial fibrillation  Anticoagulation with Eliquis                Discharge Disposition:     Discharged to home           Condition on Discharge:     Discharge condition: Good   Discharge vitals: Blood pressure 113/79, pulse 96, temperature 97.5  F (36.4  C), temperature source Oral, resp. rate 18, weight 93.7 kg (206 lb 9.1 oz), SpO2 97 %.     Code status on discharge: Full Code           Procedures:   Coronary artery bypass grafting x 1 with left internal mammary artery to the left anterior descending, intraoperative HANK by on 9/25/2019 by Dr. Magdi Turner.       Medications Prior to Admission:     Medications Prior to Admission   Medication Sig Dispense Refill Last Dose     ASPIRIN 81 MG OR TABS 1 tab po QD (Once per day) 30 12 9/24/2019 at 2000     Cholecalciferol (VITAMIN D) 2000 UNITS tablet Take 2,000 Units by mouth daily   9/24/2019 at 2000     rosuvastatin (CRESTOR) 10 MG tablet Take 1 tablet (10 mg) by mouth daily 30 tablet 11 9/24/2019 at 2000     [DISCONTINUED]  metoprolol tartrate (LOPRESSOR) 25 MG tablet Take 1 tablet (25 mg) by mouth 2 times daily (Patient taking differently: Take 25 mg by mouth every evening ) 60 tablet 3 9/24/2019 at 2000             Discharge Medications:        Review of your medicines      START taking      Dose / Directions   acetaminophen 325 MG tablet  Commonly known as:  TYLENOL  Used for:  S/P CABG (coronary artery bypass graft)   Dose:  650 mg  Take 2 tablets (650 mg) by mouth every 4 hours as needed for other (multimodal surgical pain management along with NSAIDS and opioid medication as indicated based on pain control and physical function.)  Quantity:  1 Bottle  Refills:  1     amiodarone 200 MG tablet  Commonly known as:  PACERONE/CODARONE  Used for:  S/P CABG (coronary artery bypass graft)   Dose:  200 mg  Start taking on:  October 1, 2019  Take 1 tablet (200 mg) by mouth daily  Quantity:  30 tablet  Refills:  0     apixaban ANTICOAGULANT 5 MG tablet  Commonly known as:  ELIQUIS  Used for:  S/P CABG (coronary artery bypass graft)   Dose:  5 mg  Take 1 tablet (5 mg) by mouth 2 times daily  Quantity:  60 tablet  Refills:  0     clopidogrel 75 MG tablet  Commonly known as:  PLAVIX  Used for:  S/P CABG (coronary artery bypass graft)   Dose:  75 mg  Start taking on:  October 1, 2019  Take 1 tablet (75 mg) by mouth daily  Quantity:  30 tablet  Refills:  3     methocarbamol 750 MG tablet  Commonly known as:  ROBAXIN  Used for:  S/P CABG (coronary artery bypass graft)   Dose:  750 mg  Take 1 tablet (750 mg) by mouth 4 times daily as needed for muscle spasms  Quantity:  60 tablet  Refills:  1     metoprolol succinate ER 25 MG 24 hr tablet  Commonly known as:  TOPROL-XL  Used for:  S/P CABG (coronary artery bypass graft)   Dose:  75 mg  Take 3 tablets (75 mg) by mouth 2 times daily  Quantity:  60 tablet  Refills:  3     oxyCODONE 5 MG tablet  Commonly known as:  ROXICODONE  Used for:  S/P CABG (coronary artery bypass graft)   Dose:  5-10 mg  Take  1-2 tablets (5-10 mg) by mouth every 4 hours as needed for moderate to severe pain  Quantity:  30 tablet  Refills:  0     pantoprazole 40 MG EC tablet  Commonly known as:  PROTONIX  Used for:  S/P CABG (coronary artery bypass graft)   Dose:  40 mg  Start taking on:  October 1, 2019  Take 1 tablet (40 mg) by mouth every morning (before breakfast) for 14 days  Quantity:  14 tablet  Refills:  0     senna-docusate 8.6-50 MG tablet  Commonly known as:  SENOKOT-S/PERICOLACE  Used for:  S/P CABG (coronary artery bypass graft)   Dose:  2 tablet  Take 2 tablets by mouth 2 times daily as needed for constipation  Quantity:  60 tablet  Refills:  0        CONTINUE these medicines which have NOT CHANGED      Dose / Directions   aspirin 81 MG tablet  Commonly known as:  ASA   1 tab po QD (Once per day)  Quantity:  30  Refills:  12     rosuvastatin 10 MG tablet  Commonly known as:  CRESTOR  Used for:  Hyperlipidemia LDL goal <70   Dose:  10 mg  Take 1 tablet (10 mg) by mouth daily  Quantity:  30 tablet  Refills:  11     vitamin D3 2000 units (50 mcg) tablet  Commonly known as:  CHOLECALCIFEROL  Used for:  Hemoptysis, Cough   Dose:  2,000 Units  Take 2,000 Units by mouth daily  Refills:  0        STOP taking    metoprolol tartrate 25 MG tablet  Commonly known as:  LOPRESSOR           Where to get your medicines      These medications were sent to [facility], [place] - [address] [address]  [address] [address] [facility] [address]    Phone:  [phone]     amiodarone 200 MG tablet    apixaban ANTICOAGULANT 5 MG tablet    clopidogrel 75 MG tablet    methocarbamol 750 MG tablet    metoprolol succinate ER 25 MG 24 hr tablet    pantoprazole 40 MG EC tablet    senna-docusate 8.6-50 MG tablet     Some of these will need a paper prescription and others can be bought over the counter. Ask your nurse if you have questions.    Bring a paper prescription for each of these medications    acetaminophen 325 MG  tablet    oxyCODONE 5 MG tablet                Consultations:     Cardiac Rehab  Intensivist             Brief History of Illness:   Mr. Espinoza is a very pleasant 59-year-old gentleman who was recently worked up for possible ischemic heart disease and was found to have severe left main disease.  Coronary angiogram performed earlier this month by Dr. Willson.  He was taken to the operating room today for coronary bypass grafting with LIMA to the LAD. Of note, he had a very small LCx system which was not graftable. His RCA lesion was not felt to be hemodynamically significant.          Hospital Course:     On September 25, 2019, Mr. Espinoza underwent successful coronary artery bypass grafting x 1 with left internal mammary artery to the left anterior descending, intraoperative HANK by Dr. Magdi Turner.    Intraoperative findings included that the patient had an overall normal LV systolic size and function.  His left internal mammary artery was excellent quality conduit with excellent flow, measuring 2.5 to 3 mm in diameter.  His LAD, however, was severely diseased proximally where we could access it.  In fact, this required a coronary endarterectomy so we could sew a graft onto it.  Distally, it was too small for bypass grafting and in the midsection, it was intramyocardial.  Therefore, we had to land a LIMA graft more proximally where it was significantly diseased, and as I mentioned, where we did the endarterectomy to make this technically feasible.       Postoperatively, patient was taken to the intensive care unit for recovery in stable condition. Patient was extubated within protocol.  Blood pressure and cardiac index were managed with vasopressors and inotropic agents which were continuously weaned until no longer needed.  Patient was subsequently  transferred to the surgical telemetry floor.    While on the surgical unit, the patient continued to progress well. Chest tubes and temporary pacemaker wires were  removed when deemed appropriate.     Patient was fluid overloaded and treated with diuretics. He remains up about 1 lb from preoperative weight and will not discharge with diuretic therapy; will re-evaluate need in clinic follow-up.      Patient was transiently hyperglycemic and treated with insulin infusion then transitioned to sliding scale insulin per protocol. Blood sugars remained stable. No further glycemic control agents needed at this time.    Mr. Espinoza developed paroxysmal atrial fibrillation in his postoperative course - only symptom was diaphoresis. He will discharge on Amiodarone and Eliquis (Chadsvasc 2 - HTN, CAD). He will follow up with electrophysiology on an outpatient basis.    Because LAD required endarterectomy during surgery, patient will discharge on daily plavix for 6 months.      On day of discharge, patient's pain was well controlled, was working well with therapies and stable for discharge to home on POD #5. He has coronary artery disease and will discharge on ASA, BB, statin. Follow up with cardiology and cardiac surgery have been arranged. Pt encouraged to follow up with PCP and cardiac rehab upon discharge.             Significant Results:     Recent Labs   Lab 09/30/19  0726 09/29/19  0715 09/29/19  0129 09/28/19  1215 09/28/19  0754 09/27/19  0728 09/26/19  0411 09/25/19  1300  09/25/19  1125   WBC 11.6* 14.5*  --  18.1* 19.7* 20.5* 17.6* 16.8*  --  13.0*   HGB 11.2* 13.8  --  14.2 14.3 14.3 13.6 13.4   < > 12.8*   MCV 94 95  --  95 95 95 94 93  --  93    223 199 193 179 158 159 153  --  130*   INR  --   --   --   --   --   --   --  1.12  --  1.24*   NA  --   --   --   --  136 134 138 143   < >  --    POTASSIUM 3.1*  --   --   --  3.7 3.9 4.1 3.9   < >  --    CHLORIDE  --   --   --   --  101 102 107 112*   < >  --    CO2  --   --   --   --  32 29 28 26   < >  --    BUN  --   --   --   --  11 13 15 15   < >  --    CR  --   --   --   --  0.66 0.51* 0.56* 0.63*   < >  --     ANIONGAP  --   --   --   --  3 3 3 5   < >  --    KELLY  --   --   --   --  9.2 9.2 8.2* 8.1*   < >  --    GLC  --   --   --   --  179* 142* 118* 134*   < >  --    ALBUMIN  --   --   --   --   --   --  3.1* 3.2*  --   --    PROTTOTAL  --   --   --   --   --   --  5.5* 5.2*  --   --    BILITOTAL  --   --   --   --   --   --  0.7 0.5  --   --    ALKPHOS  --   --   --   --   --   --  70 76  --   --    ALT  --   --   --   --   --   --  31 35  --   --    AST  --   --   --   --   --   --  23 24  --   --     < > = values in this interval not displayed.                Pending Results:     None           Discharge Instructions and Follow-Up:     Discharge diet: Regular   Discharge activity: Daily weights: Call if weight gain 2-3 lbs over 24 hours or if greater than 5 lbs in 1 week.  Call for pain medication refill.  Call for temperature greater than 101.0  Daily shower with antibacterial soap.   Discharge follow-up: *Follow up primary care provider in 7-10 days after discharge in order to review your medication, vital signs, obtain any necessary lab work your doctor may want, and to update them on your hospitalization and medical issues.     *Follow up with CV surgery ABBEY on 10/14 at 3:30 pm at [facility] at [facility] [address]. If any questions or concerns call [phone].  You will see us once at this visit and then if everything is going well you will not need to see us again.  You will follow long term with your cardiologist.     *Follow up with Radha Suazo CNP with cardiology, on 11/26 at 1:10 pm. This is who you will follow with long term about your heart issues. [phone].     *For [facility] outpatient cardiac rehab, call [phone].    Outpatient therapy: Cardiac Rehab   Home Care agency: None    Supplies and equipment: None   Anticoagulation: Plavix daily x 6 months  Eliquis BID until EP follow up   Wound care: Wash incision daily with antibacterial soap  Pat dry  No lotions, oils or creams  to incision site   Other instructions: Ok to start driving on 10/23 as long as you are no longer taking narcotic pain medications.    No lifting greater than 10 pounds until 11/20, then    No lifting greater than 20-30 pounds until 12/18    Do not soak your incisions until fully healed over with no scabbing; this includes hot tubs, baths, pools etc.      MARII Adame, CCRN-CSC  Pager: [phone]

## 2019-09-30 NOTE — PROGRESS NOTES
CV Surgery Progress Note   September 30, 2019     Assessment & Plan:   Mr. Espinoza is a very pleasant 59-year-old gentleman who was recently worked up for possible ischemic heart disease and was found to have severe left main disease.  Coronary angiogram performed earlier this month by Dr. Willson.  He was taken to the operating room today for coronary bypass grafting with LIMA to the LAD. Of note, he had a very small LCx system which was not graftable. His RCA lesion was not felt to be hemodynamically significant.  # Severe left main disease POD #5 s/p coronary artery bypass grafting x 1 with left internal mammary artery to the left anterior descending, intraoperative HANK by on 9/25/2019 by Dr. Magdi Turner. ASA 81 mg daily, plavix 75 mg daily x 6 months.  # PAF. Developed atrial fibrillation in his postoperative course. He will discharge on Amiodarone 200 mg daily, Toprol 75 mg BID and Eliquis 5 mg BID.  # Anticoagulation for PAF. Eliquis 5 mg BID   # HTN. Metoprolol succinate 75 mg BID  # HLD. Rosuvastatin 10 mg daily  # Acute pain. Tylenol 650 mg q4 hoursn prn; Oxycodone 5-10 mg q4 hours prn; Methocarbamol 750 mg QID    Will discharge to home with the help of family. Patient in agreement.    Subjective: No acute events overnight. States pain is well managed on current regimen. Slept fairly well.  Appetite is good, is passing flatus and +BM. Working well with therapies, ambulating in halls. Breathing is good, IS to 1750. Denies sternal popping or clicking. Denies chest pain, palpitations, dizziness or syncopal symptoms.    Objective: Blood Pressure 131/87 (BP Location: Right arm)   Pulse 95   Temperature 98.2  F (36.8  C) (Oral)   Respiration 18   Weight 93.7 kg (206 lb 9.1 oz)   Oxygen Saturation 98%   Body Mass Index 30.49 kg/m     General: NAD  Heart: Irregularly irregular, no m/r/g  Lungs: CTA  Abd: S/NT/ND +BS  Sternum/Incisions: Midline sternal incision CDI  Extremities: No edema    Recent Labs    Lab 09/30/19  0726 09/29/19  0715 09/29/19  0129 09/28/19  1215 09/28/19  0754 09/27/19  0728 09/26/19  0411 09/25/19  1300  09/25/19  1125   WBC 11.6* 14.5*  --  18.1* 19.7* 20.5* 17.6* 16.8*  --  13.0*   HGB 11.2* 13.8  --  14.2 14.3 14.3 13.6 13.4   < > 12.8*   MCV 94 95  --  95 95 95 94 93  --  93    223 199 193 179 158 159 153  --  130*   INR  --   --   --   --   --   --   --  1.12  --  1.24*   NA  --   --   --   --  136 134 138 143   < >  --    POTASSIUM 3.1*  --   --   --  3.7 3.9 4.1 3.9   < >  --    CHLORIDE  --   --   --   --  101 102 107 112*   < >  --    CO2  --   --   --   --  32 29 28 26   < >  --    BUN  --   --   --   --  11 13 15 15   < >  --    CR  --   --   --   --  0.66 0.51* 0.56* 0.63*   < >  --    ANIONGAP  --   --   --   --  3 3 3 5   < >  --    KELLY  --   --   --   --  9.2 9.2 8.2* 8.1*   < >  --    GLC  --   --   --   --  179* 142* 118* 134*   < >  --    ALBUMIN  --   --   --   --   --   --  3.1* 3.2*  --   --    PROTTOTAL  --   --   --   --   --   --  5.5* 5.2*  --   --    BILITOTAL  --   --   --   --   --   --  0.7 0.5  --   --    ALKPHOS  --   --   --   --   --   --  70 76  --   --    ALT  --   --   --   --   --   --  31 35  --   --    AST  --   --   --   --   --   --  23 24  --   --     < > = values in this interval not displayed.       MARII Adame, CCRN-[facility]  Pager: [phone]

## 2019-09-30 NOTE — PLAN OF CARE
OT/cardiac rehab: pt not seen for AM appt- not transported to satellite per schedule, therapist went up to check on him and he was in bathroom not yet on transportable tele. Spoke with nursing who also reported that patient has been in/out of AFlutter AFib . Therefore, will keep on for PM appt and await feedback from CARDS as to whether can proceed with advancing to treadmill activity or should just see on floor for amb.

## 2019-09-30 NOTE — PLAN OF CARE
Pt. A & O x 4.  Stand by assist.  VSS on RA.  K+ replaced.  Voiding adequately.  BS+, BM+.  LS clear/diminished.  Educated on discharge orders & medications.  Discharged pt. To home with wife, sent medications to include oxy.

## 2019-09-30 NOTE — PROGRESS NOTES
SPIRITUAL HEALTH SERVICES  Spiritual Assessment Progress Note  [facility]   visited pt due to LOS.  Pt stated that he is [employer] and appreciated receiving communion from volunteer.  He was planning on discharging soon and had no other needs at this time.                                                                                                                                           Karla Angel M.Div., [facility]  Staff    Pager [phone]

## 2019-10-01 ENCOUNTER — TELEPHONE (OUTPATIENT)
Dept: INTERNAL MEDICINE | Facility: CLINIC | Age: 59
End: 2019-10-01

## 2019-10-01 LAB — INTERPRETATION ECG - MUSE: NORMAL

## 2019-10-01 NOTE — TELEPHONE ENCOUNTER
"See below for updates. Patient declined appt with PCP at this point, wants to figure everything out with cardiology first. Should he be reporting BP, pulse, and weight to cardiology?     \"Tell me how you are doing now that you are home?\" good, has to record BP and weight and pulse. This morning 240 lbs /78 pulse 91. Had a fib. Had issues with BP. Not taking oxycodone. Taking 3 tylenol to help him sleep, hurts to sleep on back. Someone told him he could start sleeping on side but hurts. Robaxin makes him sweat. Does not need stool softeners.\"  "

## 2019-10-01 NOTE — TELEPHONE ENCOUNTER
ED / Discharge Outreach Protocol    Patient Contact    Attempt # 1    Was call answered?  No.  Left message on voicemail with information to call me back and to speak with any available nurse.    Katerina Padron RN on 10/1/2019 at 9:31 AM

## 2019-10-01 NOTE — TELEPHONE ENCOUNTER
CV Surgery is just going to address heart related issues,   Pt needs f/u re: anemia, electrolytes, pain control, etc. Discharge summary from CV Surgery recommending pt f/u with me  71- days after discharge to be sure acute issues addressed. Not scheduled to see CV surgery until 10/14. I can't force pt to f/u with me but whole idea of having hospital f/u generally within 7-10 days of discharge is to be sure things remain stable and reduce risk of re-admission.  Unreliable weight  value below since pt was discharged at weight of 206 and doubt gained 34 pounds in 1 day

## 2019-10-01 NOTE — TELEPHONE ENCOUNTER
"Hospital/TCU/ED for chronic condition Discharge Protocol    \"Hi, my name is Bobby Farooq RN, a registered nurse, and I am calling from [facility].  I am calling to follow up and see how things are going for you after your recent emergency visit/hospital/TCU stay.\"    Tell me how you are doing now that you are home?\" good, has to record BP and weight and pulse. This morning 240 lbs /78 pulse 91. Had a fib. Had issues with BP. Not taking oxycodone. Taking tylenol to help him sleep, hurts to sleep on back. Someone told him he could start sleeping on side but hurts. Robaxin makes him sweat. Does not need stool softeners.      Discharge Instructions    \"Let's review your discharge instructions.  What is/are the follow-up recommendations?  Pt. Response: f/u with heart surgeon in a couple weeks, cardiology, cardiac rehab starts the 10th    \"Has an appointment with your primary care provider been scheduled?\"  No, patient declines at this point    \"When you see the provider, I would recommend that you bring your medications with you.\"    Medications    \"Tell me what changed about your medicines when you discharged?\"    Changes to chronic meds?    2 or more - Epic MTM referral needed    \"What questions do you have about your medications?\"    None     New diagnoses of heart failure, COPD, diabetes, or MI?    No              Post Discharge Medication Reconciliation Status: discharge medications reconciled and changed, per note/orders (see AVS).    Was MTM referral placed (*Make sure to put transitions as reason for referral)?   Yes - \"The Medication Therapy  will call you within the next few days to schedule an appointment with an MTM pharmacist to discuss your medicines and make sure they are working as well as they possibly can for you. This visit can be done in a [facility] or on the phone and is at no charge to you.\"    Call Summary    \"What questions or concerns do you have about your " "recent visit and your follow-up care?\"     none    \"If you have questions or things don't continue to improve, we encourage you contact us through the main clinic number (give number).  Even if the clinic is not open, triage nurses are available 24/7 to help you.     We would like you to know that our clinic has extended hours (provide information).  We also have urgent care (provide details on closest location and hours/contact info)\"      \"Thank you for your time and take care!\"             "

## 2019-10-02 ENCOUNTER — TELEPHONE (OUTPATIENT)
Dept: OTHER | Facility: CLINIC | Age: 59
End: 2019-10-02

## 2019-10-02 NOTE — TELEPHONE ENCOUNTER
48 hour post op call    ACTIVITY  How are you doing with activity? I am doing well.   Are you continuing to use your IS 3-4 times a day and do you have any shortness of breath. No shortness of breath and I am getting my IS tup to 2200 ml.  Are you weighing yourself daily and has it been stable?. I have lost 2 lbs. Since coming home.     PAIN  How is your pain, what are you doing for your pain? Good, I have only needed tylenol     Are you still doing sternal precautions? Yes    Do you hear any clicking when you are moving or taking a deep breath? No      INCISION: It looks really good.     ASSISTANCE  Do you have someone at home to help you with your daily activities and transportation needs? My wife      MEDICATIONS  I would like to review your discharge medications and answer any questions you may have.   Reviewed and tele med appt for 10/3 with PCP.      Are you on a blood thinner/Coumadin  No    Who is managing your Coumadin dosing/INR? No       FOLLOW UP       Scheduled for cardiac rehab? 10/10. Requested sooner enrollment.    Scheduled to see our PA or Surgeon? 10/14  Scheduled to see your cardiologist ? 11/26 Hetal Cooley  Scheduled to see your primary care physician? [address]  Do you have our contact information? Yes    STOPLIGHT TOOL      Were you happy with your care? Yes I was.  Could we have done anything better? I had 1 aide a couple of nights in a row that needed better training. Sharona WIGGINS was excellent and I relayed my concerns to her.

## 2019-10-03 ENCOUNTER — TELEPHONE (OUTPATIENT)
Dept: OTHER | Facility: CLINIC | Age: 59
End: 2019-10-03

## 2019-10-03 ENCOUNTER — ALLIED HEALTH/NURSE VISIT (OUTPATIENT)
Dept: PHARMACY | Facility: CLINIC | Age: 59
End: 2019-10-03
Payer: COMMERCIAL

## 2019-10-03 ENCOUNTER — HOSPITAL ENCOUNTER (OUTPATIENT)
Dept: CARDIAC REHAB | Facility: CLINIC | Age: 59
End: 2019-10-03
Attending: SURGERY
Payer: COMMERCIAL

## 2019-10-03 DIAGNOSIS — E78.5 HYPERLIPIDEMIA LDL GOAL <70: ICD-10-CM

## 2019-10-03 DIAGNOSIS — Z78.9 TAKES DIETARY SUPPLEMENTS: ICD-10-CM

## 2019-10-03 DIAGNOSIS — I10 ESSENTIAL HYPERTENSION, BENIGN: ICD-10-CM

## 2019-10-03 DIAGNOSIS — K21.00 GASTROESOPHAGEAL REFLUX DISEASE WITH ESOPHAGITIS: ICD-10-CM

## 2019-10-03 DIAGNOSIS — I25.709 CORONARY ARTERY DISEASE INVOLVING CORONARY BYPASS GRAFT OF NATIVE HEART WITH ANGINA PECTORIS (H): Primary | ICD-10-CM

## 2019-10-03 DIAGNOSIS — M25.511 PAIN, JOINT, SHOULDER REGION, RIGHT: ICD-10-CM

## 2019-10-03 DIAGNOSIS — Z95.1 S/P CABG (CORONARY ARTERY BYPASS GRAFT): ICD-10-CM

## 2019-10-03 PROCEDURE — 40000575 ZZH STATISTIC OP CARDIAC VISIT #2

## 2019-10-03 PROCEDURE — 99605 MTMS BY PHARM NP 15 MIN: CPT | Performed by: PHARMACIST

## 2019-10-03 PROCEDURE — 93797 PHYS/QHP OP CAR RHAB WO ECG: CPT | Mod: 59

## 2019-10-03 PROCEDURE — 99607 MTMS BY PHARM ADDL 15 MIN: CPT | Performed by: PHARMACIST

## 2019-10-03 PROCEDURE — 93798 PHYS/QHP OP CAR RHAB W/ECG: CPT

## 2019-10-03 PROCEDURE — 40000116 ZZH STATISTIC OP CR VISIT

## 2019-10-03 RX ORDER — MELATONIN/PYRIDOXINE HCL (B6) 10 MG-10MG
0.5 TABLET,IMMED, EXTENDED RELEASE, BIPHASIC ORAL
COMMUNITY
Start: 2019-10-03 | End: 2019-10-14

## 2019-10-03 NOTE — TELEPHONE ENCOUNTER
Patient called stating he is under a lot of stress related to his 17 yr old great niece living with them. His BP has intermittently been 180/90's, HR 90. Normally is BOP is running 120-140/70 HR 92 after Metoprolol XL 75 mg. Instructed him to attempt to avoid stressful encounters and monitor his BP pre and post Metoprolol today and am and call me again in the am. Cardiac rehab to start today.

## 2019-10-03 NOTE — PATIENT INSTRUCTIONS
"Recommendations from today's MTM visit:                                                    MTM (medication therapy management) is a service provided by a clinical pharmacist designed to help you get the most of out of your medicines.   Today we reviewed what your medicines are for, how to know if they are working, that your medicines are safe and how to make your medicine regimen as easy as possible.     Please discuss the following with your doctor.    1) Take amiodarone either with food, or without food and be consistent with regards to food.    2) Okay  to try melatonin, 4 to 5 hours before you would like to sleep.  You may wish to start at a lower dose by cutting the 10 mg tablets in half.    3) Vitamin D level due.     4) Ask your doctor for a referral to see a dietician.       It was great to speak with you today.  I value your experience and would be very thankful for your time with providing feedback on our clinic survey. You may receive a survey via email or text message in the next few days.     Next MTM visit: I will call you again to follow up on Thursday, October 17 th, at 10 am (I know we talked about 10:30, but I have a meeting at that time. Please let me know if 10 am does not work for you). This will appear as a \"telemed\" appointment. Please DO NOT come to this appointment.       To schedule another MTM appointment, please call the clinic directly or you may call the MTM scheduling line at [phone] or toll-free at [phone].     My Clinical Pharmacist's contact information:                                                      It was a pleasure talking with you and your wife today!  Please feel free to contact me with any questions or concerns you have.      Queta Soni, [facility] Pharmacist.   [phone]          "

## 2019-10-03 NOTE — LETTER
"Tess Wu,    It was so nice to speak with you and your wife Brooke on Thursday, October 3rd.  I hope I was able to give you some useful information during our Medication Therapy Management (MTM) visit. The purpose of this visit with a clinical pharmacist was to review the medicines you received when discharged from the hospital. We want to make sure that you know which medicines to take and what they are for. We also want to make sure all your medicines are working, safe, and as easy to take as possible.    Enclosed is a summary of the suggestions we talked about and any other thoughts I had. There is also a list of your medicines included. This information has also been shared with your primary care provider.    Feel free to call if you have any questions or concerns. By working together with you and your doctor, I hope to help you feel confident managing your medicines and improving your quality of life.    Next MTM visit: I will call you again to follow up on Thursday, October 17 th, at 10 am (I know we talked about 10:30, but I have a meeting at that time. Please let me know if 10 am does not work for you). This will appear as a \"telemed\" appointment. Please DO NOT come to this appointment.          Best wishes,         Queta Soni, [facility] Pharmacist.   [phone]                    "

## 2019-10-03 NOTE — PROGRESS NOTES
"SUBJECTIVE/OBJECTIVE:                Bryce Espinoza is a 59 year old male called for a transitions of care visit.  He was discharged from [facility] on 09/30/19 for:    Essential hypertension, benign     Malignant neoplasm of bladder (H)     Other specified idiopathic peripheral neuropathy     Pain, joint, shoulder region, right     Hyperlipidemia LDL goal <70     Coronary artery disease involving native coronary artery of native heart without angina pectoris     Status post coronary angiogram     CAD (coronary artery disease)  Paroxysmal atrial fibrillation  Anticoagulation with Eliquis      Wife Brooke, is also on the call today.     Chief Complaint: Having trouble sleeping and is wondering if he could take melatonin 10 mg that he has at home.  Personal Healthcare Goals: Wants to lose 20 pounds.    Allergies/ADRs: Reviewed in Epic  Tobacco: No tobacco use   Alcohol: not currently using, average one beer, sometimes more.  Caffeine: no caffeine  Activity: cardiac rehab.  PMH: Reviewed in Epic    Medication Adherence/Access:  no issues reported  Patient takes medications directly from bottles.  Patient takes medications 2 time(s) per day.   Per patient, misses medication 0 times per week.   Medication barriers: none.   The patient fills medications at [place]: YES. [facility].    S/P GABG (coronary artery bypass graft), hypertension, CAD:   Amiodarone 200 mg once daily.  Reports not taking with regard to food.  Apixaban 5 mg by mouth twice daily  Aspirin 81 mg once daily.  Plavix 5 mg by mouth once daily  Metoprolol 75 mg by mouth twice daily.  Reports use of monitoring daily blood pressures are running 120s over 70s.  Reports is measuring daily weights, reports  stable between 201 pounds and 204 pounds.    Lipids: Reports taking Crestor 10 mg by mouth once daily.  Reports no unusual side effects. Reports he could not tolerate atorvastatin. Reports \"I could not get out of bed\".    Pain: Reports takes Tylenol when " needed at bedtime for pain.  Reports is not taking oxycodone 80 mg.  Reports he is taking methocarbamol 750 mg by milligrams needed for muscle spasms.  Reports pain/spasms is stable.    GERD: Reports taking pantoprazole 40 mg by mouth in the morning, and will take for a total of 14 days.    Supplements: Reports takes cholecalciferol 2000 units by mouth once daily.    Today's Vitals: There were no vitals taken for this visit.    ASSESSMENT:                 Current medications were reviewed today.      Medication Adherence: excellent, no issues identified    S/P GABG (coronary artery bypass graft), hypertension,CAD: Appears stable. Pt is asked to take amiodarone with regards to food, either always with or without food. Pt instructed to monitor for unusual bleeding/bruising symptoms since on triple anticoagulation therapy.     Lipids: Assess at follow up, as do not have recent lipid panel. Per pt account, he is tolerating well.     Pain: Stable.     GERD: Stable.    Supplements: Can not assess Vit D, as there is not a value on file. MD may consider checking.       PLAN:                  Post Discharge Medication Reconciliation Status: discharge medications reconciled and changed, per note/orders (see AVS).    1) Recommended patient take amiodarone with food, or without food and be consistent with regard to food.    2) Okay for patient to try melatonin, 4 to 5 hours before he would like to sleep.  Suggested he start at a lower dose cutting the 10 mg tablets in half.    3) Vitamin D level due.     4) Ask your doctor for a referral to see a dietician.     I spent 45 minutes with this patient today. I offer these suggestions with the understanding that I don't fully understand Bryce's past medical history and the complexity of his health conditions. Bryce should make no changes without the approval of his physician. A copy of the visit note was provided to the patient's primary care and cardiology NP provider.    Will  follow up in two weeks with a phone call..    The patient was mailed a summary of these recommendations as an after visit summary.    [facility] Pharmacist.   [phone]

## 2019-10-04 ENCOUNTER — TELEPHONE (OUTPATIENT)
Dept: OTHER | Facility: CLINIC | Age: 59
End: 2019-10-04

## 2019-10-04 NOTE — TELEPHONE ENCOUNTER
Follow up call made for BP and HR monitoring. 136/88 with HR of 72.   Afib noted in rehab yesterday. Self limiting. Follow up appt made with EP for 11/1.

## 2019-10-08 ENCOUNTER — HOSPITAL ENCOUNTER (OUTPATIENT)
Dept: CARDIAC REHAB | Facility: CLINIC | Age: 59
End: 2019-10-08
Attending: SURGERY
Payer: COMMERCIAL

## 2019-10-08 PROCEDURE — 40000116 ZZH STATISTIC OP CR VISIT: Performed by: OCCUPATIONAL THERAPIST

## 2019-10-08 PROCEDURE — 93798 PHYS/QHP OP CAR RHAB W/ECG: CPT | Performed by: OCCUPATIONAL THERAPIST

## 2019-10-09 ENCOUNTER — HOSPITAL ENCOUNTER (OUTPATIENT)
Dept: CARDIAC REHAB | Facility: CLINIC | Age: 59
End: 2019-10-09
Attending: SURGERY
Payer: COMMERCIAL

## 2019-10-09 PROCEDURE — 40000116 ZZH STATISTIC OP CR VISIT: Performed by: OCCUPATIONAL THERAPIST

## 2019-10-09 PROCEDURE — 93798 PHYS/QHP OP CAR RHAB W/ECG: CPT | Performed by: OCCUPATIONAL THERAPIST

## 2019-10-11 ENCOUNTER — OFFICE VISIT (OUTPATIENT)
Dept: INTERNAL MEDICINE | Facility: CLINIC | Age: 59
End: 2019-10-11
Payer: COMMERCIAL

## 2019-10-11 VITALS
HEART RATE: 63 BPM | WEIGHT: 207 LBS | SYSTOLIC BLOOD PRESSURE: 112 MMHG | BODY MASS INDEX: 30.55 KG/M2 | DIASTOLIC BLOOD PRESSURE: 72 MMHG | OXYGEN SATURATION: 96 % | TEMPERATURE: 98 F | RESPIRATION RATE: 16 BRPM

## 2019-10-11 DIAGNOSIS — D64.9 ANEMIA, UNSPECIFIED TYPE: ICD-10-CM

## 2019-10-11 DIAGNOSIS — Z95.1 S/P CABG (CORONARY ARTERY BYPASS GRAFT): ICD-10-CM

## 2019-10-11 DIAGNOSIS — E78.5 HYPERLIPIDEMIA LDL GOAL <70: ICD-10-CM

## 2019-10-11 DIAGNOSIS — I48.91 ATRIAL FIBRILLATION, UNSPECIFIED TYPE (H): ICD-10-CM

## 2019-10-11 DIAGNOSIS — Z23 NEED FOR PROPHYLACTIC VACCINATION AGAINST STREPTOCOCCUS PNEUMONIAE (PNEUMOCOCCUS): ICD-10-CM

## 2019-10-11 PROCEDURE — 99495 TRANSJ CARE MGMT MOD F2F 14D: CPT | Mod: 25 | Performed by: INTERNAL MEDICINE

## 2019-10-11 PROCEDURE — 90732 PPSV23 VACC 2 YRS+ SUBQ/IM: CPT | Performed by: INTERNAL MEDICINE

## 2019-10-11 PROCEDURE — 90471 IMMUNIZATION ADMIN: CPT | Performed by: INTERNAL MEDICINE

## 2019-10-11 NOTE — PATIENT INSTRUCTIONS
Fasting labs in a couple weeks. Possible future titration upward of Rosuvastatin based on results of labs and tolerance  Continue rehab  Appt with cardiology and CV Surgery  Pneumococcal vaccine (pneumonia prevention)  Check with insurance or speak with your pharmacist re: Shingrix vaccine coverage for shingles prevention.  This is a 2 shot series done 2-6 months apart

## 2019-10-11 NOTE — PROGRESS NOTES
Subjective     Bryce Espinoza is a 59 year old male who presents to clinic today for the following health issues:    HPI       Hospital Follow-up Visit:    Hospital/Nursing Home/IP Rehab Facility: [facility]  Date of Admission: 09/25/2019  Date of Discharge: 09/30/2019  Reason(s) for Admission: CAGB, HTN  Telephone contact 10/1/19            Problems taking medications regularly:  None       Medication changes since discharge: None       Problems adhering to non-medication therapy:  None    Summary of hospitalization:  [facility] discharge summary reviewed  Diagnostic Tests/Treatments reviewed.  Follow up needed: labs, cardiac rehab  Other Healthcare Providers Involved in Patient s Care:         CV surgery and cardiology  Update since discharge: improved.     Post Discharge Medication Reconciliation: discharge medications reconciled, continue medications without change.  Plan of care communicated with patient     Coding guidelines for this visit:  Type of Medical   Decision Making Face-to-Face Visit       within 7 Days of discharge Face-to-Face Visit        within 14 days of discharge   Moderate Complexity [id number]   High Complexity 40848 [id number]          Pt's past medical history, family history, habits, medications and allergies were reviewed with the patient today.  See snap shot for  HCM status. Most recent lab results reviewed with pt. Problem list and histories reviewed & adjusted, as indicated.  Additional history as below:      Discharge summary reviewed. Part of the summary as below:           Additional ROS: Discharge Summary     rByce Espinoza MRN# [id number]   YOB: 1960 Age: 59 year old      Date of Admission:                         9/25/2019  Date of Discharge:                          9/30/2019  Admitting Physician:                      Magdi Turner MD  Discharge Physician:                      Magdi Turner,*  Discharging Service:                        Cardiovascular and Thoracic Surgery  Preoperative Weight:                    205 lbs  Discharge Weight:                          206 lbs        Primary Provider: Remington Riggs          Admission Diagnoses:   CORONARY ARTERY DISEASE  CAD (coronary artery disease)          Discharge Diagnosis:          Patient Active Problem List   Diagnosis     Essential hypertension, benign     Malignant neoplasm of bladder (H)     Other specified idiopathic peripheral neuropathy     Pain, joint, shoulder region, right     Hyperlipidemia LDL goal <70     Coronary artery disease involving native coronary artery of native heart without angina pectoris     Status post coronary angiogram     CAD (coronary artery disease)  Paroxysmal atrial fibrillation  Anticoagulation with Eliquis                Discharge Disposition:      Discharged to home           Condition on Discharge:      Discharge condition: Good   Discharge vitals: Blood pressure 113/79, pulse 96, temperature 97.5  F (36.4  C), temperature source Oral, resp. rate 18, weight 93.7 kg (206 lb 9.1 oz), SpO2 97 %.      Code status on discharge: Full Code           Procedures:   Coronary artery bypass grafting x 1 with left internal mammary artery to the left anterior descending, intraoperative HANK by on 9/25/2019 by Dr. Magdi Turner.       Medications Prior to Admission:      Prescriptions Prior to Admission           Medications Prior to Admission   Medication Sig Dispense Refill Last Dose     ASPIRIN 81 MG OR TABS 1 tab po QD (Once per day) 30 12 9/24/2019 at 2000     Cholecalciferol (VITAMIN D) 2000 UNITS tablet Take 2,000 Units by mouth daily     9/24/2019 at 2000     rosuvastatin (CRESTOR) 10 MG tablet Take 1 tablet (10 mg) by mouth daily 30 tablet 11 9/24/2019 at 2000     [DISCONTINUED] metoprolol tartrate (LOPRESSOR) 25 MG tablet Take 1 tablet (25 mg) by mouth 2 times daily (Patient taking differently: Take 25 mg by mouth every evening ) 60 tablet 3 9/24/2019  at 2000                 Discharge Medications:              Review of your medicines           START taking      Dose / Directions   acetaminophen 325 MG tablet  Commonly known as:  TYLENOL  Used for:  S/P CABG (coronary artery bypass graft)    Dose:  650 mg  Take 2 tablets (650 mg) by mouth every 4 hours as needed for other (multimodal surgical pain management along with NSAIDS and opioid medication as indicated based on pain control and physical function.)  Quantity:  1 Bottle  Refills:  1      amiodarone 200 MG tablet  Commonly known as:  PACERONE/CODARONE  Used for:  S/P CABG (coronary artery bypass graft)    Dose:  200 mg  Start taking on:  October 1, 2019  Take 1 tablet (200 mg) by mouth daily  Quantity:  30 tablet  Refills:  0      apixaban ANTICOAGULANT 5 MG tablet  Commonly known as:  ELIQUIS  Used for:  S/P CABG (coronary artery bypass graft)    Dose:  5 mg  Take 1 tablet (5 mg) by mouth 2 times daily  Quantity:  60 tablet  Refills:  0      clopidogrel 75 MG tablet  Commonly known as:  PLAVIX  Used for:  S/P CABG (coronary artery bypass graft)    Dose:  75 mg  Start taking on:  October 1, 2019  Take 1 tablet (75 mg) by mouth daily  Quantity:  30 tablet  Refills:  3      methocarbamol 750 MG tablet  Commonly known as:  ROBAXIN  Used for:  S/P CABG (coronary artery bypass graft)    Dose:  750 mg  Take 1 tablet (750 mg) by mouth 4 times daily as needed for muscle spasms  Quantity:  60 tablet  Refills:  1      metoprolol succinate ER 25 MG 24 hr tablet  Commonly known as:  TOPROL-XL  Used for:  S/P CABG (coronary artery bypass graft)    Dose:  75 mg  Take 3 tablets (75 mg) by mouth 2 times daily  Quantity:  60 tablet  Refills:  3      oxyCODONE 5 MG tablet  Commonly known as:  ROXICODONE  Used for:  S/P CABG (coronary artery bypass graft)    Dose:  5-10 mg  Take 1-2 tablets (5-10 mg) by mouth every 4 hours as needed for moderate to severe pain  Quantity:  30 tablet  Refills:  0      pantoprazole 40 MG EC  tablet  Commonly known as:  PROTONIX  Used for:  S/P CABG (coronary artery bypass graft)    Dose:  40 mg  Start taking on:  October 1, 2019  Take 1 tablet (40 mg) by mouth every morning (before breakfast) for 14 days  Quantity:  14 tablet  Refills:  0      senna-docusate 8.6-50 MG tablet  Commonly known as:  SENOKOT-S/PERICOLACE  Used for:  S/P CABG (coronary artery bypass graft)    Dose:  2 tablet  Take 2 tablets by mouth 2 times daily as needed for constipation  Quantity:  60 tablet  Refills:  0                  CONTINUE these medicines which have NOT CHANGED      Dose / Directions   aspirin 81 MG tablet  Commonly known as:  ASA    1 tab po QD (Once per day)  Quantity:  30  Refills:  12      rosuvastatin 10 MG tablet  Commonly known as:  CRESTOR  Used for:  Hyperlipidemia LDL goal <70    Dose:  10 mg  Take 1 tablet (10 mg) by mouth daily  Quantity:  30 tablet  Refills:  11      vitamin D3 2000 units (50 mcg) tablet  Commonly known as:  CHOLECALCIFEROL  Used for:  Hemoptysis, Cough    Dose:  2,000 Units  Take 2,000 Units by mouth daily  Refills:  0                      STOP taking          metoprolol tartrate 25 MG tablet  Commonly known as:  LOPRESSOR                          Where to get your medicines             These medications were sent to [facility] [place] - [address] [address]  [address] [address] [facility] [address]     Phone:  [phone]     amiodarone 200 MG tablet    apixaban ANTICOAGULANT 5 MG tablet    clopidogrel 75 MG tablet    methocarbamol 750 MG tablet    metoprolol succinate ER 25 MG 24 hr tablet    pantoprazole 40 MG EC tablet    senna-docusate 8.6-50 MG tablet      Some of these will need a paper prescription and others can be bought over the counter. Ask your nurse if you have questions.    Bring a paper prescription for each of these medications    acetaminophen 325 MG tablet    oxyCODONE 5 MG tablet                 Consultations:      Cardiac  Rehab  Intensivist             Brief History of Illness:   Mr. Espinoza is a very pleasant 59-year-old gentleman who was recently worked up for possible ischemic heart disease and was found to have severe left main disease.  Coronary angiogram performed earlier this month by Dr. Willson.  He was taken to the operating room today for coronary bypass grafting with LIMA to the LAD. Of note, he had a very small LCx system which was not graftable. His RCA lesion was not felt to be hemodynamically significant.          Hospital Course:      On September 25, 2019, Mr. Espinoza underwent successful coronary artery bypass grafting x 1 with left internal mammary artery to the left anterior descending, intraoperative HANK by Dr. Magdi Turner.     Intraoperative findings included that the patient had an overall normal LV systolic size and function.  His left internal mammary artery was excellent quality conduit with excellent flow, measuring 2.5 to 3 mm in diameter.  His LAD, however, was severely diseased proximally where we could access it.  In fact, this required a coronary endarterectomy so we could sew a graft onto it.  Distally, it was too small for bypass grafting and in the midsection, it was intramyocardial.  Therefore, we had to land a LIMA graft more proximally where it was significantly diseased, and as I mentioned, where we did the endarterectomy to make this technically feasible.      Postoperatively, patient was taken to the intensive care unit for recovery in stable condition. Patient was extubated within protocol.  Blood pressure and cardiac index were managed with vasopressors and inotropic agents which were continuously weaned until no longer needed.  Patient was subsequently  transferred to the surgical telemetry floor.     While on the surgical unit, the patient continued to progress well. Chest tubes and temporary pacemaker wires were removed when deemed appropriate.     Patient was fluid overloaded and  treated with diuretics. He remains up about 1 lb from preoperative weight and will not discharge with diuretic therapy; will re-evaluate need in clinic follow-up.      Patient was transiently hyperglycemic and treated with insulin infusion then transitioned to sliding scale insulin per protocol. Blood sugars remained stable. No further glycemic control agents needed at this time.     Mr. Espinoza developed paroxysmal atrial fibrillation in his postoperative course - only symptom was diaphoresis. He will discharge on Amiodarone and Eliquis (Chadsvasc 2 - HTN, CAD). He will follow up with electrophysiology on an outpatient basis.     Because LAD required endarterectomy during surgery, patient will discharge on daily plavix for 6 months.      On day of discharge, patient's pain was well controlled, was working well with therapies and stable for discharge to home on POD #5. He has coronary artery disease and will discharge on ASA, BB, statin. Follow up with cardiology and cardiac surgery have been arranged. Pt encouraged to follow up with PCP and cardiac rehab upon discharge.       Since undergoing bypass surgery, patient states his breathing is significantly better.  Denies chest pain with activity.  Postop surgical pain tolerated and only using Tylenol occasionally now.  Has stopped use of narcotics and stool softeners.  Has started cardiac rehab and tolerating well.  History of previous Lipitor intolerance.  Now on rosuvastatin 10 mg daily without side effects.  Has follow-up CV surgery and cardiology appointment scheduled.  Currently on amiodarone and Eliquis for some postop A. Fib.  Denies orthopnea or PND.  Constitutional, HEENT, Cardiovascular, Pulmonary, GI and , Neuro, MSK and Psych review of systems/symptoms are otherwise negative or unchanged from previous, except as noted above.      OBJECTIVE:  /72   Pulse 63   Temp 98  F (36.7  C) (Temporal)   Resp 16   Wt 93.9 kg (207 lb)   SpO2 96%   BMI  "30.55 kg/m     Estimated body mass index is 30.55 kg/m  as calculated from the following:    Height as of 9/6/19: 1.753 m (5' 9.02\").    Weight as of this encounter: 93.9 kg (207 lb).      Neck: no adenopathy. Thyroid normal to palpation. No bruits  Pulm: Lungs clear to auscultation   CV: Regular rates and rhythm  GI: Soft, nontender, Normal active bowel sounds, No hepatosplenomegaly or masses palpable  Ext: Peripheral pulses intact. No edema.  Neuro: Normal strength and tone, sensory exam grossly normal  Skin: sternal incision and abdominal incision sites clean/dry/intact.  No evidence of infection    Assessment/Plan: (See plan discussion below for further details)  1. S/P CABG (coronary artery bypass graft)  Doing well postop. Continue cardiac rehab.  Follow-up with cardiology and CV surgery as scheduled    2. Atrial fibrillation, unspecified type (H)  On amiodarone and Eliquis.  Will defer decision regarding how long to use above medications to cardiology.  If amiodarone use long-term, will defer lung function testing to them as they are prescribing.  Will check baseline thyroid function amiodarone use  - TSH with free T4 reflex; Future    3. Hyperlipidemia LDL goal <70  Tolerating rosuvastatin 10 mg daily.  Will repeat lipids in a month.  We will try to titrate up statin dose after that to high intensity level as tolerated  - Comprehensive metabolic panel; Future  - Lipid panel reflex to direct LDL Fasting; Future    4. Anemia, unspecified type  Acceptable anemia level postop due to blood loss.  Will recheck in 1 month  - CBC with platelets; Future    5. Need for prophylactic vaccination against Streptococcus pneumoniae (pneumococcus)  Candidate for pneumococcal vaccination with CAD history  - Pneumococcal vaccine 23 valent PPSV23  (Pneumovax) [62968]  - ADMIN: Vaccine, Initial (42244)    Plan discussion:   Fasting labs in a couple weeks. Possible future titration upward of Rosuvastatin based on results of labs " and tolerance  Continue rehab  Appt with cardiology and CV Surgery  Pneumococcal vaccine (pneumonia prevention)  Check with insurance or speak with your pharmacist re: Shingrix vaccine coverage for shingles prevention.  This is a 2 shot series done 2-6 months apart  Continue current medications         Remington Riggs MD  Internal Medicine Department  [facility]    (Chart documentation was completed, in part, with [employer] voice-recognition software. Even though reviewed, some grammatical, spelling, and word errors may remain.)

## 2019-10-12 PROBLEM — Z95.1 S/P CABG (CORONARY ARTERY BYPASS GRAFT): Status: ACTIVE | Noted: 2019-10-12

## 2019-10-12 PROBLEM — I48.91 ATRIAL FIBRILLATION, UNSPECIFIED TYPE (H): Status: ACTIVE | Noted: 2019-10-12

## 2019-10-14 ENCOUNTER — OFFICE VISIT (OUTPATIENT)
Dept: CARDIOLOGY | Facility: CLINIC | Age: 59
End: 2019-10-14
Payer: COMMERCIAL

## 2019-10-14 VITALS
HEART RATE: 60 BPM | DIASTOLIC BLOOD PRESSURE: 85 MMHG | HEIGHT: 69 IN | SYSTOLIC BLOOD PRESSURE: 132 MMHG | BODY MASS INDEX: 30.63 KG/M2 | WEIGHT: 206.8 LBS

## 2019-10-14 DIAGNOSIS — Z95.1 S/P CABG (CORONARY ARTERY BYPASS GRAFT): Primary | ICD-10-CM

## 2019-10-14 ASSESSMENT — MIFFLIN-ST. JEOR: SCORE: 1743.42

## 2019-10-14 NOTE — PROGRESS NOTES
CV Surgery Post Op Follow Up Note:     Assessment/Plan:     Mr. Espinoza is doing very well in his post operative period. He has seen his PCP and will see the nice Yolanda Pope with cards EP on 11/1. He is on amiodarone for his post op a.fib along with his Eliquis.   His LAD required endarterectomy thus he should be on Plavix for 6 months. After his plavix is stopped, his aspirin should be increased to 325 mg daily.   His surgical incisions are healing. He was given a list of restrictions and when they end.   He has been using his IS and pulling 2500. He has noticed some popping and clicking with movement. I have asked that is this happen that he stop his activity as it's his body telling him that it's too aggressive.  His appetite is normal. He is walking everyday.      All of the patient's questions were answered. Our contact information was given to him if they should have any further questions/concerns. No further follow-up is needed with us.    S: From discharge summary: On September 25, 2019, Mr. Espinoza underwent successful coronary artery bypass grafting x 1 with left internal mammary artery to the left anterior descending, intraoperative HANK by Dr. Magdi Turner.     Intraoperative findings included that the patient had an overall normal LV systolic size and function.  His left internal mammary artery was excellent quality conduit with excellent flow, measuring 2.5 to 3 mm in diameter.  His LAD, however, was severely diseased proximally where we could access it.  In fact, this required a coronary endarterectomy so we could sew a graft onto it.  Distally, it was too small for bypass grafting and in the midsection, it was intramyocardial.  Therefore, we had to land a LIMA graft more proximally where it was significantly diseased, and as I mentioned, where we did the endarterectomy to make this technically feasible.      Postoperatively, patient was taken to the intensive care unit for recovery in stable  condition. Patient was extubated within protocol.  Blood pressure and cardiac index were managed with vasopressors and inotropic agents which were continuously weaned until no longer needed.  Patient was subsequently  transferred to the surgical telemetry floor.     While on the surgical unit, the patient continued to progress well. Chest tubes and temporary pacemaker wires were removed when deemed appropriate.     Patient was fluid overloaded and treated with diuretics. He remains up about 1 lb from preoperative weight and will not discharge with diuretic therapy; will re-evaluate need in clinic follow-up.      Patient was transiently hyperglycemic and treated with insulin infusion then transitioned to sliding scale insulin per protocol. Blood sugars remained stable. No further glycemic control agents needed at this time.     Mr. Espinoza developed paroxysmal atrial fibrillation in his postoperative course - only symptom was diaphoresis. He will discharge on Amiodarone and Eliquis (Chadsvasc 2 - HTN, CAD). He will follow up with electrophysiology on an outpatient basis.     Because LAD required endarterectomy during surgery, patient will discharge on daily plavix for 6 months.      On day of discharge, patient's pain was well controlled, was working well with therapies and stable for discharge to home on POD #5. He has coronary artery disease and will discharge on ASA, BB, statin. Follow up with cardiology and cardiac surgery have been arranged. Pt encouraged to follow up with PCP and cardiac rehab upon discharge    Allergies:   Allergies   Allergen Reactions     Atorvastatin      Myalgias     Lisinopril      Body aches pt d/mylene  06/2015       Medications:   Current Outpatient Medications:      amiodarone (PACERONE/CODARONE) 200 MG tablet, Take 1 tablet (200 mg) by mouth daily, Disp: 30 tablet, Rfl: 0     apixaban ANTICOAGULANT (ELIQUIS) 5 MG tablet, Take 1 tablet (5 mg) by mouth 2 times daily, Disp: 60 tablet, Rfl:  "0     ASPIRIN 81 MG OR TABS, 1 tab po QD (Once per day), Disp: 30, Rfl: 12     Cholecalciferol (VITAMIN D) 2000 UNITS tablet, Take 2,000 Units by mouth daily, Disp: , Rfl:      clopidogrel (PLAVIX) 75 MG tablet, Take 1 tablet (75 mg) by mouth daily, Disp: 30 tablet, Rfl: 3     metoprolol succinate ER (TOPROL-XL) 25 MG 24 hr tablet, Take 3 tablets (75 mg) by mouth 2 times daily, Disp: 60 tablet, Rfl: 3     pantoprazole (PROTONIX) 40 MG EC tablet, Take 1 tablet (40 mg) by mouth every morning (before breakfast) for 14 days, Disp: 14 tablet, Rfl: 0     rosuvastatin (CRESTOR) 10 MG tablet, Take 1 tablet (10 mg) by mouth daily, Disp: 30 tablet, Rfl: 11    Physical Exam: Vital signs:      BP: 132/85 Pulse: 60           Height: 175.3 cm (5' 9\") Weight: 93.8 kg (206 lb 12.8 oz)  Estimated body mass index is 30.54 kg/m  as calculated from the following:    Height as of this encounter: 1.753 m (5' 9\").    Weight as of this encounter: 93.8 kg (206 lb 12.8 oz).  Constitutional: healthy, alert and no distress  Lungs: clear  CV: s1/s2, no m/r/g  Sternum: cdi  Extremities: no LE edema    Marycarmen Villalpando PA-C  CV Surgery  [facility]  Pager: [phone]    "

## 2019-10-14 NOTE — PATIENT INSTRUCTIONS
Date of surgery: 9/25/19    Driving: 10/23/19    10 lbs weight restriction ends: 11/20/19 then 20 lbs for 2 weeks then no restriction.     Cardiology follow up: Yolanda Pope 11/1 at 0830 electrophysiology.

## 2019-10-15 ENCOUNTER — HOSPITAL ENCOUNTER (OUTPATIENT)
Dept: CARDIAC REHAB | Facility: CLINIC | Age: 59
End: 2019-10-15
Attending: SURGERY
Payer: COMMERCIAL

## 2019-10-15 PROCEDURE — 93798 PHYS/QHP OP CAR RHAB W/ECG: CPT

## 2019-10-15 PROCEDURE — 40000116 ZZH STATISTIC OP CR VISIT

## 2019-10-17 ENCOUNTER — HOSPITAL ENCOUNTER (OUTPATIENT)
Dept: CARDIAC REHAB | Facility: CLINIC | Age: 59
End: 2019-10-17
Attending: SURGERY
Payer: COMMERCIAL

## 2019-10-17 ENCOUNTER — TELEPHONE (OUTPATIENT)
Dept: CARDIAC REHAB | Facility: CLINIC | Age: 59
End: 2019-10-17

## 2019-10-17 ENCOUNTER — ALLIED HEALTH/NURSE VISIT (OUTPATIENT)
Dept: PHARMACY | Facility: CLINIC | Age: 59
End: 2019-10-17
Payer: COMMERCIAL

## 2019-10-17 ENCOUNTER — HOSPITAL ENCOUNTER (OUTPATIENT)
Dept: GENERAL RADIOLOGY | Facility: CLINIC | Age: 59
Discharge: HOME | End: 2019-10-17
Attending: SURGERY | Admitting: SURGERY
Payer: COMMERCIAL

## 2019-10-17 DIAGNOSIS — Z78.9 TAKES DIETARY SUPPLEMENTS: ICD-10-CM

## 2019-10-17 DIAGNOSIS — I48.91 ATRIAL FIBRILLATION, UNSPECIFIED TYPE (H): ICD-10-CM

## 2019-10-17 DIAGNOSIS — Z95.1 S/P CABG (CORONARY ARTERY BYPASS GRAFT): ICD-10-CM

## 2019-10-17 DIAGNOSIS — M25.511 PAIN, JOINT, SHOULDER REGION, RIGHT: ICD-10-CM

## 2019-10-17 DIAGNOSIS — I10 ESSENTIAL HYPERTENSION, BENIGN: ICD-10-CM

## 2019-10-17 DIAGNOSIS — I25.709 CORONARY ARTERY DISEASE INVOLVING CORONARY BYPASS GRAFT OF NATIVE HEART WITH ANGINA PECTORIS (H): Primary | ICD-10-CM

## 2019-10-17 DIAGNOSIS — K21.00 GASTROESOPHAGEAL REFLUX DISEASE WITH ESOPHAGITIS: ICD-10-CM

## 2019-10-17 DIAGNOSIS — E78.5 HYPERLIPIDEMIA LDL GOAL <70: ICD-10-CM

## 2019-10-17 DIAGNOSIS — Z95.1 S/P CABG (CORONARY ARTERY BYPASS GRAFT): Primary | ICD-10-CM

## 2019-10-17 PROCEDURE — 99606 MTMS BY PHARM EST 15 MIN: CPT | Performed by: PHARMACIST

## 2019-10-17 PROCEDURE — 99607 MTMS BY PHARM ADDL 15 MIN: CPT | Performed by: PHARMACIST

## 2019-10-17 PROCEDURE — 71046 X-RAY EXAM CHEST 2 VIEWS: CPT

## 2019-10-17 PROCEDURE — 40000116 ZZH STATISTIC OP CR VISIT: Performed by: OCCUPATIONAL THERAPIST

## 2019-10-17 PROCEDURE — 93798 PHYS/QHP OP CAR RHAB W/ECG: CPT | Performed by: OCCUPATIONAL THERAPIST

## 2019-10-17 RX ORDER — AMIODARONE HYDROCHLORIDE 200 MG/1
200 TABLET ORAL DAILY
Qty: 14 TABLET | Refills: 0 | Status: SHIPPED | OUTPATIENT
Start: 2019-10-17 | End: 2019-10-24

## 2019-10-17 NOTE — PROGRESS NOTES
"SUBJECTIVE/OBJECTIVE:                Bryce Espinoza is a 59 year old male called for a follow-up visit for Medication Therapy Management.  He was referred to me from [facility]. He was discharged from [facility] on 09/30/19 for:    Essential hypertension, benign     Malignant neoplasm of bladder (H)     Other specified idiopathic peripheral neuropathy     Pain, joint, shoulder region, right     Hyperlipidemia LDL goal <70     Coronary artery disease involving native coronary artery of native heart without angina pectoris     Status post coronary angiogram     CAD (coronary artery disease)  Paroxysmal atrial fibrillation  Anticoagulation with Eliquis       Wife Brooke, is also on the call today.     Chief Complaint: Follow up from [facility] visit on 10/03/19.  Reports rosuvastatin costs $40 per month, and this is hard for them to afford. Reports is sleeping well and does not need Melatonin.   Personal Healthcare Goals: Stay comfortable.   Tobacco:  reports that he quit smoking about 6 years ago. He has a 33.00 pack-year smoking history. He has never used smokeless tobacco.  Alcohol: Social History    Substance and Sexual Activity      Alcohol use: Yes        Comment: 2 daily    Medication Adherence/Access:  no issues reported    S/P GABG (coronary artery bypass graft), hypertension, CAD: Reports continues to take the following medications:  Amiodarone 200 mg once daily.  Reports is taking with regard to food. (after food)  Apixaban 5 mg by mouth twice daily  Aspirin 81 mg once daily.  Plavix 5 mg by mouth once daily  Metoprolol XL 75 mg by mouth twice daily.  Reports continues to take blood pressures at home and they continue in the; 120s over 70s.  Reports is measuring daily weights, reports stable. Reports no unusual bleeding/bruising symptoms.      Lipids: Reports continues to take Crestor 10 mg by mouth once daily.  Continues with  no unusual side effects. Reports he could not tolerate atorvastatin. Reports \"I could not get " "out of bed\".     Pain: Reports has not had to take any, Tylenol, oxcodone 80 mg, or methocarbamol 750 mg.  Reports pain/spasms are resolved.     GERD: Reports finished therapy with pantoprazole 40 mg, and is not having any gerd symptoms.     Supplements: Reports continues taking cholecalciferol 2000 units by mouth once daily.  Today's Vitals: There were no vitals taken for this visit.      ASSESSMENT:                Medication Adherence: excellent, no issues identified    S/P GABG (coronary artery bypass graft), hypertension,CAD: Appears to continue as stable. Pt taking amiodarone after food consistently. Pt instructed to continue to monitor for unusual bleeding/bruising symptoms since on triple anticoagulation therapy.      Lipids: Unable to assess. It looks like PCP will be checking this at follow up. Per pt account, he is tolerating well. 1 coupon was emailed to pt and 1 was mailed.      Pain: Stable.     GERD: Stable.     Supplements: Can not assess Vit D, as there is not a value on file. MD may consider checking.        PLAN:                  1) Continue medication as directed.    2) Coupons mailed to patient.    3) Vit D level due.    I spent 30 minutes with this patient today. I offer these suggestions with the understanding that I don't fully understand Bryce's past medical history and the complexity of his health conditions. Bryce should make no changes without the approval of his physician. A copy of the visit note was provided to the patient's primary care provider.     Patient following up with PCP.     The patient was mailed a summary of these recommendations as an after visit summary.    Queta Soni, [facility] Pharmacist.   [phone]      "

## 2019-10-17 NOTE — TELEPHONE ENCOUNTER
FYI: Patient noted to be back in a-flutter today. Patient has not been in a-flutter since initial session on 10/3/19. CV responses WNL's. Patient asymptomatic. Patient is on the appropriate medications and has a follow up appointment scheduled with EP on 11/1/19. Rehab will continue to monitor unless otherwise notified.

## 2019-10-18 NOTE — PATIENT INSTRUCTIONS
Recommendations from today's MTM visit:                                                      1) Continue your medications as directed by your doctor.    2) Coupon for generic Crestor is included. Bring this to your pharmacy. I hope it helps.     It was great to speak with you today.  I value your experience and would be very thankful for your time with providing feedback on our clinic survey. You may receive a survey via email or text message in the next few days.       To schedule another [facility] appointment, please call the clinic directly or you may call the [facility] scheduling line at [phone] or toll-free at [phone].     My Clinical Pharmacist's contact information:                                                      It was a pleasure talking with you today!  Please feel free to contact me with any questions or concerns you have.      Queta Soni, [facility] Pharmacist.   [phone]

## 2019-10-23 ENCOUNTER — HOSPITAL ENCOUNTER (OUTPATIENT)
Dept: CARDIAC REHAB | Facility: CLINIC | Age: 59
End: 2019-10-23
Attending: SURGERY
Payer: COMMERCIAL

## 2019-10-23 PROCEDURE — 93798 PHYS/QHP OP CAR RHAB W/ECG: CPT | Performed by: REHABILITATION PRACTITIONER

## 2019-10-23 PROCEDURE — 40000116 ZZH STATISTIC OP CR VISIT: Performed by: REHABILITATION PRACTITIONER

## 2019-10-24 DIAGNOSIS — Z95.1 S/P CABG (CORONARY ARTERY BYPASS GRAFT): ICD-10-CM

## 2019-10-24 RX ORDER — AMIODARONE HYDROCHLORIDE 200 MG/1
200 TABLET ORAL DAILY
Qty: 10 TABLET | Refills: 0 | Status: SHIPPED | OUTPATIENT
Start: 2019-10-24 | End: 2019-11-08 | Stop reason: SINTOL

## 2019-10-25 ENCOUNTER — HOSPITAL ENCOUNTER (OUTPATIENT)
Dept: CARDIAC REHAB | Facility: CLINIC | Age: 59
End: 2019-10-25
Attending: SURGERY
Payer: COMMERCIAL

## 2019-10-25 PROCEDURE — 93798 PHYS/QHP OP CAR RHAB W/ECG: CPT | Performed by: REHABILITATION PRACTITIONER

## 2019-10-25 PROCEDURE — 40000116 ZZH STATISTIC OP CR VISIT: Performed by: REHABILITATION PRACTITIONER

## 2019-10-28 ENCOUNTER — TELEPHONE (OUTPATIENT)
Dept: INTERNAL MEDICINE | Facility: CLINIC | Age: 59
End: 2019-10-28

## 2019-10-28 ENCOUNTER — HOSPITAL ENCOUNTER (OUTPATIENT)
Dept: CARDIAC REHAB | Facility: CLINIC | Age: 59
End: 2019-10-28
Attending: SURGERY
Payer: COMMERCIAL

## 2019-10-28 DIAGNOSIS — I48.91 ATRIAL FIBRILLATION, UNSPECIFIED TYPE (H): ICD-10-CM

## 2019-10-28 DIAGNOSIS — D64.9 ANEMIA, UNSPECIFIED TYPE: ICD-10-CM

## 2019-10-28 DIAGNOSIS — E78.5 HYPERLIPIDEMIA LDL GOAL <70: ICD-10-CM

## 2019-10-28 LAB
ALBUMIN SERPL-MCNC: 3.2 G/DL (ref 3.4–5)
ALP SERPL-CCNC: 152 U/L (ref 40–150)
ALT SERPL W P-5'-P-CCNC: 31 U/L (ref 0–70)
ANION GAP SERPL CALCULATED.3IONS-SCNC: 4 MMOL/L (ref 3–14)
AST SERPL W P-5'-P-CCNC: 8 U/L (ref 0–45)
BILIRUB SERPL-MCNC: 0.4 MG/DL (ref 0.2–1.3)
BUN SERPL-MCNC: 14 MG/DL (ref 7–30)
CALCIUM SERPL-MCNC: 8.7 MG/DL (ref 8.5–10.1)
CHLORIDE SERPL-SCNC: 105 MMOL/L (ref 94–109)
CHOLEST SERPL-MCNC: 117 MG/DL
CK SERPL-CCNC: 25 U/L (ref 30–300)
CO2 SERPL-SCNC: 30 MMOL/L (ref 20–32)
CREAT SERPL-MCNC: 0.7 MG/DL (ref 0.66–1.25)
ERYTHROCYTE [DISTWIDTH] IN BLOOD BY AUTOMATED COUNT: 12.7 % (ref 10–15)
GFR SERPL CREATININE-BSD FRML MDRD: >90 ML/MIN/{1.73_M2}
GLUCOSE SERPL-MCNC: 100 MG/DL (ref 70–99)
HCT VFR BLD AUTO: 36.5 % (ref 40–53)
HDLC SERPL-MCNC: 31 MG/DL
HGB BLD-MCNC: 11.9 G/DL (ref 13.3–17.7)
LDLC SERPL CALC-MCNC: 67 MG/DL
MCH RBC QN AUTO: 30.7 PG (ref 26.5–33)
MCHC RBC AUTO-ENTMCNC: 32.6 G/DL (ref 31.5–36.5)
MCV RBC AUTO: 94 FL (ref 78–100)
NONHDLC SERPL-MCNC: 86 MG/DL
PLATELET # BLD AUTO: 286 10E9/L (ref 150–450)
POTASSIUM SERPL-SCNC: 4.1 MMOL/L (ref 3.4–5.3)
PROT SERPL-MCNC: 6.6 G/DL (ref 6.8–8.8)
RBC # BLD AUTO: 3.88 10E12/L (ref 4.4–5.9)
SODIUM SERPL-SCNC: 139 MMOL/L (ref 133–144)
TRIGL SERPL-MCNC: 93 MG/DL
TSH SERPL DL<=0.005 MIU/L-ACNC: 2.28 MU/L (ref 0.4–4)
WBC # BLD AUTO: 11 10E9/L (ref 4–11)

## 2019-10-28 PROCEDURE — 82550 ASSAY OF CK (CPK): CPT | Performed by: INTERNAL MEDICINE

## 2019-10-28 PROCEDURE — 93798 PHYS/QHP OP CAR RHAB W/ECG: CPT

## 2019-10-28 PROCEDURE — 36415 COLL VENOUS BLD VENIPUNCTURE: CPT | Performed by: INTERNAL MEDICINE

## 2019-10-28 PROCEDURE — 80061 LIPID PANEL: CPT | Performed by: INTERNAL MEDICINE

## 2019-10-28 PROCEDURE — 40000116 ZZH STATISTIC OP CR VISIT

## 2019-10-28 PROCEDURE — 80053 COMPREHEN METABOLIC PANEL: CPT | Performed by: INTERNAL MEDICINE

## 2019-10-28 PROCEDURE — 84443 ASSAY THYROID STIM HORMONE: CPT | Performed by: INTERNAL MEDICINE

## 2019-10-28 PROCEDURE — 85027 COMPLETE CBC AUTOMATED: CPT | Performed by: INTERNAL MEDICINE

## 2019-10-28 NOTE — TELEPHONE ENCOUNTER
Reason for Call:  Other call back    Detailed comments: pt wants a referral sent for a colonoscopy. Please call pt when referral is sent. Thanks.    Phone Number Patient can be reached at: Home number on file [phone] (home)    Best Time: anytime    Can we leave a detailed message on this number? YES    Call taken on 10/28/2019 at 12:53 PM by MILLIE NULL

## 2019-10-28 NOTE — TELEPHONE ENCOUNTER
Patient informed.     Pt expressed understanding and acceptance of the plan.  Pt had no further questions at this time.  Advised can call back to clinic at any time with concerns.     Keya CHAPA RN, BSN, PHN

## 2019-10-28 NOTE — TELEPHONE ENCOUNTER
Pt says it's just a routine colonoscopy he's asking for.  He said he thinks that Dr Riggs told him to get one. (Pended colonoscopy orders.) ARON Wooten LPN

## 2019-10-28 NOTE — TELEPHONE ENCOUNTER
Pt as hospitalized in  Late September and had heart bypass surgery. His LAD required endarterectomy thus he should be on Plavix for 6 months in addition to current aspirin. Therefore he cannot have procedures for that 6 mos time period as would have to be taken off of Plavix and aspirin before a colonoscopy in case a biopsy was needed. Pt to contact clinic again in Apri 2020 (after 6 mos out from surgery when he is transitioned to aspirin alone) and will then arrage for colonoscopy procedure when can temporarily hold aspirin. Inform pt

## 2019-10-30 ENCOUNTER — HOSPITAL ENCOUNTER (OUTPATIENT)
Dept: CARDIAC REHAB | Facility: CLINIC | Age: 59
End: 2019-10-30
Attending: SURGERY
Payer: COMMERCIAL

## 2019-10-30 PROCEDURE — 93798 PHYS/QHP OP CAR RHAB W/ECG: CPT | Performed by: CLINICAL EXERCISE PHYSIOLOGIST

## 2019-10-30 PROCEDURE — 40000116 ZZH STATISTIC OP CR VISIT: Performed by: CLINICAL EXERCISE PHYSIOLOGIST

## 2019-11-01 ENCOUNTER — HOSPITAL ENCOUNTER (OUTPATIENT)
Dept: CARDIAC REHAB | Facility: CLINIC | Age: 59
End: 2019-11-01
Attending: SURGERY
Payer: COMMERCIAL

## 2019-11-01 PROCEDURE — 93798 PHYS/QHP OP CAR RHAB W/ECG: CPT | Performed by: REHABILITATION PRACTITIONER

## 2019-11-01 PROCEDURE — 40000116 ZZH STATISTIC OP CR VISIT: Performed by: REHABILITATION PRACTITIONER

## 2019-11-01 PROCEDURE — 40000575 ZZH STATISTIC OP CARDIAC VISIT #2: Performed by: REHABILITATION PRACTITIONER

## 2019-11-01 PROCEDURE — 93797 PHYS/QHP OP CAR RHAB WO ECG: CPT | Performed by: REHABILITATION PRACTITIONER

## 2019-11-04 ENCOUNTER — HOSPITAL ENCOUNTER (OUTPATIENT)
Dept: CARDIAC REHAB | Facility: CLINIC | Age: 59
End: 2019-11-04
Attending: SURGERY
Payer: COMMERCIAL

## 2019-11-04 PROCEDURE — 40000116 ZZH STATISTIC OP CR VISIT

## 2019-11-04 PROCEDURE — 93798 PHYS/QHP OP CAR RHAB W/ECG: CPT

## 2019-11-06 ENCOUNTER — HOSPITAL ENCOUNTER (OUTPATIENT)
Dept: CARDIAC REHAB | Facility: CLINIC | Age: 59
End: 2019-11-06
Attending: SURGERY
Payer: COMMERCIAL

## 2019-11-06 PROCEDURE — 93798 PHYS/QHP OP CAR RHAB W/ECG: CPT | Performed by: CLINICAL EXERCISE PHYSIOLOGIST

## 2019-11-06 PROCEDURE — 40000116 ZZH STATISTIC OP CR VISIT: Performed by: CLINICAL EXERCISE PHYSIOLOGIST

## 2019-11-06 NOTE — PROGRESS NOTES
"HPI:   I had the pleasure of seeing Bryce when he came for follow up of post-operative atrial fibrillation.  This 59 year old sees Dr. Blake for his history of:    1. CAD s/p CABG x 1 9/25/2019, with NEGRO to LAD. Endarterectomy of pLAD required due to severe calcification. Plavix x 6 months recommended (until 3/25/2020). Post op AFib, started on AC and Amiodarone. Now in atrial flutter (typical)  2. Preserved EF on pre-op echo  3. Hypertension   4. Dyslipidemia      Bryce met with Dr. Blake in consultation 8/2019 to review his RFs for CAD including strong family history, as well as an abnl stress test and high calcium score. Angiogram 9/2019 showed severe CAD including dLM lesion of 60%.    He subsequently underwent CABG x 1 with NEGRO to LAD with Dr. Turner on 9/25/2019. It was noted that the LIMA itself was an excellent conduit, but LAD was severely diseased proximally and required endarterectomy to sew a graft onto it. Cx system was quite small and had no significant dz. RCA lesion was not hemodynamically significant.     He developed paroxysmal AFib, associated with diaphoresis and was discharged on amiodarone and Eliquis. He was not evaluated by EP or cardiology during hospitalization.  Due to the endarterectomy, Plavix was to be continued x 6 months (3/25/2020).He was discharged home 9/30 @ 206#.      I am meeting him today to review his post-op AFib.    Interval History:  Overall, feels \"fine\" from heart standpoint. No c/o CP, SOB, edema, orthopnea or PND.  No bleeding issues despite ASA, Plavix and Eliquis use.    Would really like to get off the amiodarone due to the \"terrible\" taste in the mouth. No melena, hematochezia, diarrhea or constipation.    Today's EKG shows that he's in controlled atrial flutter (likely typical).  He's unaware of this rhythm. Interestingly, in looking through Cardiac Rehab notes, he was in SR just a few days ago. He was in Aflutter on 10/17, but otherwise has been in SR during " his Rehab sessions.      Diagnostic Testing:  EKG today, which I overread, showed Atrial Flutter (likely typical) at 98 bpm  Carotid Doppler 9/2019 showed <50% stenosis bilaterally  Angiogram 9/2019 showed 60% dLM, dRCA lesion 50%, oPRDA 50% and 1RPLB 25%. LAD and Cx had no dz.  Echocardiogam 9/2019 showed normal EF 60-65% with grade I diastolic dysfunction. Normal RV. Normal atrial sizes. No significant valvular abnls.    Assessment & Plan:    1. Post-op AFib, now in atrial flutter    Noted following CABG x 1 on POD #2 and started on IV Amio with bolus. DCd on Amio 200 mg daily. I believe he was in AFib when he was discharged based on rounding note that day.    In review of Cardiac Rehab notes, looks like he converted to SR during his first session 10/3/2019. Brief review indicates he's been in SR since except 10/17 (AFlutter) and again today on EKG    EKG today shows AFlutter with controlled rate    D/w Dr. Mcgovern. Of note, he has not seen EP MD    PLAN:    STOP amiodarone 200 mg daily    In ~2 weeks, start 2 week ZioPatch    See us back to review. If has paroxysmal AFlutter, we can ablate this. If he has both AFib and AFlutter, may need different AAD (provided his sxs of terrible taste in his mouth improves off of amiodarone) or combination ablation     Continue Eliquis 5 mg BID. New Rx sent in today    Continue Metoprolol XL 75 mg BID      2. CAD    Cath results as above. Underwent CABG x 1 (LIMA - LAD) and LAD was heavily calcified requiring endarterectomy. RCA dz not thought to be hemodynamically significant. Cx system too small to bypass    Normal EF based on pre-op echo    Remains on BB, statin and ASA and Plavix x 6 months    PLAN:    Plavix 6 months (3/2020) due to endarterectomy, with plans to increase ASA to 325 mg daily following discontinuation of this. I sent Rx for Plavix in as well    Continues Cardiac Rehab    Sees Radha Blake 11/2019    Note decreased BS on L. Will see if this improves as he  continues cardiac rehab. CXR had shown some area of consolidation. Would consider CXR if this persists.      Nitza Thomson PA-C, MSPAS      Orders Placed This Encounter   Procedures     EKG 12-lead complete w/read - Clinics (performed today)     Orders Placed This Encounter   Medications     metoprolol succinate ER (TOPROL-XL) 50 MG 24 hr tablet     Sig: Take 1.5 tablets (75 mg) by mouth 2 times daily     Dispense:  90 tablet     Refill:  5     Replaces the 25 mg tab     apixaban ANTICOAGULANT (ELIQUIS) 5 MG tablet     Sig: Take 1 tablet (5 mg) by mouth 2 times daily     Dispense:  60 tablet     Refill:  5     clopidogrel (PLAVIX) 75 MG tablet     Sig: Take 1 tablet (75 mg) by mouth daily     Dispense:  30 tablet     Refill:  4     Medications Discontinued During This Encounter   Medication Reason     pantoprazole (PROTONIX) 40 MG EC tablet      metoprolol succinate ER (TOPROL-XL) 25 MG 24 hr tablet Reorder     apixaban ANTICOAGULANT (ELIQUIS) 5 MG tablet Reorder     amiodarone (PACERONE/CODARONE) 200 MG tablet Side effects     clopidogrel (PLAVIX) 75 MG tablet          Encounter Diagnoses   Name Primary?     Paroxysmal atrial fibrillation (H)      S/P CABG (coronary artery bypass graft)      Typical atrial flutter (H) Yes       CURRENT MEDICATIONS:  Current Outpatient Medications   Medication Sig Dispense Refill     apixaban ANTICOAGULANT (ELIQUIS) 5 MG tablet Take 1 tablet (5 mg) by mouth 2 times daily 60 tablet 5     ASPIRIN 81 MG OR TABS 1 tab po QD (Once per day) 30 12     Cholecalciferol (VITAMIN D) 2000 UNITS tablet Take 2,000 Units by mouth daily       clopidogrel (PLAVIX) 75 MG tablet Take 1 tablet (75 mg) by mouth daily 30 tablet 4     metoprolol succinate ER (TOPROL-XL) 50 MG 24 hr tablet Take 1.5 tablets (75 mg) by mouth 2 times daily 90 tablet 5     rosuvastatin (CRESTOR) 10 MG tablet Take 1 tablet (10 mg) by mouth daily 30 tablet 11       ALLERGIES     Allergies   Allergen Reactions     Atorvastatin       Myalgias     Lisinopril      Body aches pt d/mylene  06/2015       PAST MEDICAL HISTORY:  Past Medical History:   Diagnosis Date     Coronary artery disease involving native coronary artery of native heart without angina pectoris 7/4/2019    Per CT calcium score of 722.97 3/2019     Essential hypertension, benign      Hematuria      Malignant neoplasm of bladder, 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     Olecranon bursitis 3/01    right     Other and unspecified hyperlipidemia      RIGHT LUNG CALCIFIED GRANULOMA 2/02    RML     Tobacco use disorder     Quit 1/03     Unspecified hemorrhoids without mention of complication 8/01     Unspecified hereditary and idiopathic peripheral neuropathy        PAST SURGICAL HISTORY:  Past Surgical History:   Procedure Laterality Date     BYPASS GRAFT ARTERY CORONARY N/A 9/25/2019    Procedure: CORONARY ARTERY BYPASS GRAFTING x 1 (LIMA - LAD) WITH CORONARY ENDARTERECTOMY  (ON PUMP OXYGENATOR ; [facility] BY [facility]);  Surgeon: Magdi Turner MD;  Location:  OR      NONSPECIFIC PROCEDURE  2/00/02    EBCT     CV HEART CATHETERIZATION WITH POSSIBLE INTERVENTION N/A 9/6/2019    Procedure: Coronary Angiogram;  Surgeon: Nick Willson MD;  Location:  HEART CARDIAC CATH LAB     CV LEFT HEART CATH N/A 9/6/2019    Procedure: Left Heart Cath;  Surgeon: Nick Willson MD;  Location:  HEART CARDIAC CATH LAB     CV LEFT VENTRICULOGRAM N/A 9/6/2019    Procedure: Left Ventriculogram;  Surgeon: Nick Willson MD;  Location:  HEART CARDIAC CATH LAB       FAMILY HISTORY:  Family History   Problem Relation Age of Onset     Arthritis Mother         RA     Hypertension Mother      Breast Cancer Mother      Prostate Cancer Maternal Grandfather         80     Cancer Maternal Grandmother         Lung     Coronary Artery Disease Father      Other - See Comments Brother      Diabetes Brother      Genetic Disorder Son        SOCIAL HISTORY:  Social History  "    Socioeconomic History     Marital status:      Spouse name: None     Number of children: None     Years of education: None     Highest education level: None   Occupational History     None   Social Needs     Financial resource strain: None     Food insecurity:     Worry: None     Inability: None     Transportation needs:     Medical: None     Non-medical: None   Tobacco Use     Smoking status: Former Smoker     Packs/day: 1.00     Years: 33.00     Pack years: 33.00     Last attempt to quit: 2013     Years since quittin.7     Smokeless tobacco: Never Used   Substance and Sexual Activity     Alcohol use: Yes     Comment: 2 daily     Drug use: Yes     Comment: nica taylor     Sexual activity: Yes   Lifestyle     Physical activity:     Days per week: None     Minutes per session: None     Stress: None   Relationships     Social connections:     Talks on phone: None     Gets together: None     Attends Orthodox service: None     Active member of club or organization: None     Attends meetings of clubs or organizations: None     Relationship status: None     Intimate partner violence:     Fear of current or ex partner: None     Emotionally abused: None     Physically abused: None     Forced sexual activity: None   Other Topics Concern     Parent/sibling w/ CABG, MI or angioplasty before 65F 55M? Not Asked   Social History Narrative     None       Review of Systems:  Skin:  Negative     Eyes:  Negative    ENT:  Negative    Respiratory:  Negative for shortness of breath;dyspnea on exertion;cough  Cardiovascular:  Negative for;palpitations;chest pain;edema;dizziness;lightheadedness;fatigue    Gastroenterology: Negative for melena;hematochezia  Genitourinary:  Negative    Musculoskeletal:  Positive for nocturnal cramping  Neurologic:  Negative    Psychiatric:  Negative    Heme/Lymph/Imm:  Negative    Endocrine:  Negative      Physical Exam:  Vitals: /70   Pulse 86   Ht 1.753 m (5' 9\")   Wt 94.3 kg " (208 lb)   BMI 30.72 kg/m      Constitutional:  cooperative, alert and oriented, well developed, well nourished, in no acute distress        Skin:  warm and dry to the touch, no apparent skin lesions or masses noted        Head:  normocephalic, no masses or lesions        Eyes:  pupils equal and round;conjunctivae and lids unremarkable;sclera white        ENT:  no pallor or cyanosis, dentition good        Neck:  no carotid bruit        Chest:  healed median sternotomy scar diminished breath sounds left sided      Cardiac:   irregular rhythm                Abdomen:  abdomen soft        Vascular: pulses full and equal                                      Extremities and Back:  no deformities, clubbing, cyanosis, erythema observed;no edema        Neurological:  no gross motor deficits          Recent Lab Results:  LIPID RESULTS:  Lab Results   Component Value Date    CHOL 117 10/28/2019    HDL 31 (L) 10/28/2019    LDL 67 10/28/2019    TRIG 93 10/28/2019    CHOLHDLRATIO 5.3 (H) 04/08/2015       LIVER ENZYME RESULTS:  Lab Results   Component Value Date    AST 8 10/28/2019    ALT 31 10/28/2019       CBC RESULTS:  Lab Results   Component Value Date    WBC 11.0 10/28/2019    RBC 3.88 (L) 10/28/2019    HGB 11.9 (L) 10/28/2019    HCT 36.5 (L) 10/28/2019    MCV 94 10/28/2019    MCH 30.7 10/28/2019    MCHC 32.6 10/28/2019    RDW 12.7 10/28/2019     10/28/2019       BMP RESULTS:  Lab Results   Component Value Date     10/28/2019    POTASSIUM 4.1 10/28/2019    CHLORIDE 105 10/28/2019    CO2 30 10/28/2019    ANIONGAP 4 10/28/2019     (H) 10/28/2019    BUN 14 10/28/2019    CR 0.70 10/28/2019    GFRESTIMATED >90 10/28/2019    GFRESTBLACK >90 10/28/2019    KELLY 8.7 10/28/2019        A1C RESULTS:  Lab Results   Component Value Date    A1C 5.5 09/06/2019       INR RESULTS:  Lab Results   Component Value Date    INR 1.12 09/25/2019    INR 1.24 (H) 09/25/2019

## 2019-11-08 ENCOUNTER — HOSPITAL ENCOUNTER (OUTPATIENT)
Dept: CARDIAC REHAB | Facility: CLINIC | Age: 59
End: 2019-11-08
Attending: SURGERY
Payer: COMMERCIAL

## 2019-11-08 ENCOUNTER — OFFICE VISIT (OUTPATIENT)
Dept: CARDIOLOGY | Facility: CLINIC | Age: 59
End: 2019-11-08
Payer: COMMERCIAL

## 2019-11-08 ENCOUNTER — DOCUMENTATION ONLY (OUTPATIENT)
Dept: CARDIOLOGY | Facility: CLINIC | Age: 59
End: 2019-11-08

## 2019-11-08 VITALS
HEART RATE: 86 BPM | HEIGHT: 69 IN | WEIGHT: 208 LBS | DIASTOLIC BLOOD PRESSURE: 70 MMHG | SYSTOLIC BLOOD PRESSURE: 112 MMHG | BODY MASS INDEX: 30.81 KG/M2

## 2019-11-08 DIAGNOSIS — I48.0 PAROXYSMAL ATRIAL FIBRILLATION (H): ICD-10-CM

## 2019-11-08 DIAGNOSIS — I48.3 TYPICAL ATRIAL FLUTTER (H): Primary | ICD-10-CM

## 2019-11-08 DIAGNOSIS — Z95.1 S/P CABG (CORONARY ARTERY BYPASS GRAFT): ICD-10-CM

## 2019-11-08 PROCEDURE — 40000116 ZZH STATISTIC OP CR VISIT

## 2019-11-08 PROCEDURE — 93798 PHYS/QHP OP CAR RHAB W/ECG: CPT

## 2019-11-08 PROCEDURE — 99214 OFFICE O/P EST MOD 30 MIN: CPT | Performed by: PHYSICIAN ASSISTANT

## 2019-11-08 PROCEDURE — 93000 ELECTROCARDIOGRAM COMPLETE: CPT | Performed by: PHYSICIAN ASSISTANT

## 2019-11-08 RX ORDER — CLOPIDOGREL BISULFATE 75 MG/1
75 TABLET ORAL DAILY
Qty: 30 TABLET | Refills: 4 | Status: SHIPPED | OUTPATIENT
Start: 2019-11-08 | End: 2020-04-30

## 2019-11-08 RX ORDER — METOPROLOL SUCCINATE 50 MG/1
75 TABLET, EXTENDED RELEASE ORAL 2 TIMES DAILY
Qty: 90 TABLET | Refills: 5 | Status: SHIPPED | OUTPATIENT
Start: 2019-11-08 | End: 2019-11-26

## 2019-11-08 ASSESSMENT — MIFFLIN-ST. JEOR: SCORE: 1748.86

## 2019-11-08 NOTE — PATIENT INSTRUCTIONS
1. EKG today looks like you're in atrial flutter. I think this can be easily ablated but will discuss with electrical doctor and contact you.     2. I THINK we'll stop amiodarone today but want to discuss this with the doc.  I have sent new refills for the metoprolol and the Eliquis to the pharmacy.    3. My nurses are Latonia Chapin: [phone]

## 2019-11-08 NOTE — LETTER
"11/8/2019    Remington Riggs MD  [address] [facility] [address]    RE: Bryce CARMONA Alexis       Dear Colleague,    I had the pleasure of seeing Bryce Espinoza in the [facility].    HPI:   I had the pleasure of seeing Bryce when he came for follow up of post-operative atrial fibrillation.  This 59 year old sees Dr. Blake for his history of:    1. CAD s/p CABG x 1 9/25/2019, with NEGRO to LAD. Endarterectomy of pLAD required due to severe calcification. Plavix x 6 months recommended (until 3/25/2020). Post op AFib, started on AC and Amiodarone. Now in atrial flutter (typical)  2. Preserved EF on pre-op echo  3. Hypertension   4. Dyslipidemia      Bryce met with Dr. Blake in consultation 8/2019 to review his RFs for CAD including strong family history, as well as an abnl stress test and high calcium score. Angiogram 9/2019 showed severe CAD including dLM lesion of 60%.    He subsequently underwent CABG x 1 with NEGRO to LAD with Dr. Turner on 9/25/2019. It was noted that the LIMA itself was an excellent conduit, but LAD was severely diseased proximally and required endarterectomy to sew a graft onto it. Cx system was quite small and had no significant dz. RCA lesion was not hemodynamically significant.     He developed paroxysmal AFib, associated with diaphoresis and was discharged on amiodarone and Eliquis. He was not evaluated by EP or cardiology during hospitalization.  Due to the endarterectomy, Plavix was to be continued x 6 months (3/25/2020).He was discharged home 9/30 @ 206#.      I am meeting him today to review his post-op AFib.    Interval History:  Overall, feels \"fine\" from heart standpoint. No c/o CP, SOB, edema, orthopnea or PND.  No bleeding issues despite ASA, Plavix and Eliquis use.    Would really like to get off the amiodarone due to the \"terrible\" taste in the mouth. No melena, hematochezia, diarrhea or constipation.    Today's EKG shows that he's in controlled " atrial flutter (likely typical).  He's unaware of this rhythm. Interestingly, in looking through Cardiac Rehab notes, he was in SR just a few days ago. He was in Aflutter on 10/17, but otherwise has been in SR during his Rehab sessions.      Diagnostic Testing:  EKG today, which I overread, showed Atrial Flutter (likely typical) at 98 bpm  Carotid Doppler 9/2019 showed <50% stenosis bilaterally  Angiogram 9/2019 showed 60% dLM, dRCA lesion 50%, oPRDA 50% and 1RPLB 25%. LAD and Cx had no dz.  Echocardiogam 9/2019 showed normal EF 60-65% with grade I diastolic dysfunction. Normal RV. Normal atrial sizes. No significant valvular abnls.    Assessment & Plan:    1. Post-op AFib, now in atrial flutter    Noted following CABG x 1 on POD #2 and started on IV Amio with bolus. DCd on Amio 200 mg daily. I believe he was in AFib when he was discharged based on rounding note that day.    In review of Cardiac Rehab notes, looks like he converted to SR during his first session 10/3/2019. Brief review indicates he's been in SR since except 10/17 (AFlutter) and again today on EKG    EKG today shows AFlutter with controlled rate    D/w Dr. Mcgovern. Of note, he has not seen EP MD    PLAN:    STOP amiodarone 200 mg daily    In ~2 weeks, start 2 week ZioPatch    See us back to review. If has paroxysmal AFlutter, we can ablate this. If he has both AFib and AFlutter, may need different AAD (provided his sxs of terrible taste in his mouth improves off of amiodarone) or combination ablation     Continue Eliquis 5 mg BID. New Rx sent in today    Continue Metoprolol XL 75 mg BID      2. CAD    Cath results as above. Underwent CABG x 1 (LIMA - LAD) and LAD was heavily calcified requiring endarterectomy. RCA dz not thought to be hemodynamically significant. Cx system too small to bypass    Normal EF based on pre-op echo    Remains on BB, statin and ASA and Plavix x 6 months    PLAN:    Plavix 6 months (3/2020) due to endarterectomy, with plans to  increase ASA to 325 mg daily following discontinuation of this. I sent Rx for Plavix in as well    Continues Cardiac Rehab    Sees Radha for Dr. Blake 11/2019    Note decreased BS on L. Will see if this improves as he continues cardiac rehab. CXR had shown some area of consolidation. Would consider CXR if this persists.      Nitza Thomson PA-C, MSPAS      Orders Placed This Encounter   Procedures     EKG 12-lead complete w/read - Clinics (performed today)     Orders Placed This Encounter   Medications     metoprolol succinate ER (TOPROL-XL) 50 MG 24 hr tablet     Sig: Take 1.5 tablets (75 mg) by mouth 2 times daily     Dispense:  90 tablet     Refill:  5     Replaces the 25 mg tab     apixaban ANTICOAGULANT (ELIQUIS) 5 MG tablet     Sig: Take 1 tablet (5 mg) by mouth 2 times daily     Dispense:  60 tablet     Refill:  5     clopidogrel (PLAVIX) 75 MG tablet     Sig: Take 1 tablet (75 mg) by mouth daily     Dispense:  30 tablet     Refill:  4     Medications Discontinued During This Encounter   Medication Reason     pantoprazole (PROTONIX) 40 MG EC tablet      metoprolol succinate ER (TOPROL-XL) 25 MG 24 hr tablet Reorder     apixaban ANTICOAGULANT (ELIQUIS) 5 MG tablet Reorder     amiodarone (PACERONE/CODARONE) 200 MG tablet Side effects     clopidogrel (PLAVIX) 75 MG tablet          Encounter Diagnoses   Name Primary?     Paroxysmal atrial fibrillation (H)      S/P CABG (coronary artery bypass graft)      Typical atrial flutter (H) Yes       CURRENT MEDICATIONS:  Current Outpatient Medications   Medication Sig Dispense Refill     apixaban ANTICOAGULANT (ELIQUIS) 5 MG tablet Take 1 tablet (5 mg) by mouth 2 times daily 60 tablet 5     ASPIRIN 81 MG OR TABS 1 tab po QD (Once per day) 30 12     Cholecalciferol (VITAMIN D) 2000 UNITS tablet Take 2,000 Units by mouth daily       clopidogrel (PLAVIX) 75 MG tablet Take 1 tablet (75 mg) by mouth daily 30 tablet 4     metoprolol succinate ER (TOPROL-XL) 50 MG 24 hr tablet Take  1.5 tablets (75 mg) by mouth 2 times daily 90 tablet 5     rosuvastatin (CRESTOR) 10 MG tablet Take 1 tablet (10 mg) by mouth daily 30 tablet 11       ALLERGIES     Allergies   Allergen Reactions     Atorvastatin      Myalgias     Lisinopril      Body aches pt d/mylene  06/2015       PAST MEDICAL HISTORY:  Past Medical History:   Diagnosis Date     Coronary artery disease involving native coronary artery of native heart without angina pectoris 7/4/2019    Per CT calcium score of 722.97 3/2019     Essential hypertension, benign      Hematuria      Malignant neoplasm of bladder, 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     Olecranon bursitis 3/01    right     Other and unspecified hyperlipidemia      RIGHT LUNG CALCIFIED GRANULOMA 2/02    RML     Tobacco use disorder     Quit 1/03     Unspecified hemorrhoids without mention of complication 8/01     Unspecified hereditary and idiopathic peripheral neuropathy        PAST SURGICAL HISTORY:  Past Surgical History:   Procedure Laterality Date     BYPASS GRAFT ARTERY CORONARY N/A 9/25/2019    Procedure: CORONARY ARTERY BYPASS GRAFTING x 1 (LIMA - LAD) WITH CORONARY ENDARTERECTOMY  (ON PUMP OXYGENATOR ; HANK BY [facility]);  Surgeon: Magdi Turner MD;  Location:  OR      NONSPECIFIC PROCEDURE  2/00/02    EBCT     CV HEART CATHETERIZATION WITH POSSIBLE INTERVENTION N/A 9/6/2019    Procedure: Coronary Angiogram;  Surgeon: Nick Willson MD;  Location:  HEART CARDIAC CATH LAB     CV LEFT HEART CATH N/A 9/6/2019    Procedure: Left Heart Cath;  Surgeon: Nick Willson MD;  Location:  HEART CARDIAC CATH LAB     CV LEFT VENTRICULOGRAM N/A 9/6/2019    Procedure: Left Ventriculogram;  Surgeon: Nick Willson MD;  Location:  HEART CARDIAC CATH LAB       FAMILY HISTORY:  Family History   Problem Relation Age of Onset     Arthritis Mother         RA     Hypertension Mother      Breast Cancer Mother      Prostate Cancer Maternal Grandfather          80     Cancer Maternal Grandmother         Lung     Coronary Artery Disease Father      Other - See Comments Brother      Diabetes Brother      Genetic Disorder Son        SOCIAL HISTORY:  Social History     Socioeconomic History     Marital status:      Spouse name: None     Number of children: None     Years of education: None     Highest education level: None   Occupational History     None   Social Needs     Financial resource strain: None     Food insecurity:     Worry: None     Inability: None     Transportation needs:     Medical: None     Non-medical: None   Tobacco Use     Smoking status: Former Smoker     Packs/day: 1.00     Years: 33.00     Pack years: 33.00     Last attempt to quit: 2013     Years since quittin.7     Smokeless tobacco: Never Used   Substance and Sexual Activity     Alcohol use: Yes     Comment: 2 daily     Drug use: Yes     Comment: nica taylor     Sexual activity: Yes   Lifestyle     Physical activity:     Days per week: None     Minutes per session: None     Stress: None   Relationships     Social connections:     Talks on phone: None     Gets together: None     Attends Mandaen service: None     Active member of club or organization: None     Attends meetings of clubs or organizations: None     Relationship status: None     Intimate partner violence:     Fear of current or ex partner: None     Emotionally abused: None     Physically abused: None     Forced sexual activity: None   Other Topics Concern     Parent/sibling w/ CABG, MI or angioplasty before 65F 55M? Not Asked   Social History Narrative     None       Review of Systems:  Skin:  Negative     Eyes:  Negative    ENT:  Negative    Respiratory:  Negative for shortness of breath;dyspnea on exertion;cough  Cardiovascular:  Negative for;palpitations;chest pain;edema;dizziness;lightheadedness;fatigue    Gastroenterology: Negative for melena;hematochezia  Genitourinary:  Negative    Musculoskeletal:  Positive  "for nocturnal cramping  Neurologic:  Negative    Psychiatric:  Negative    Heme/Lymph/Imm:  Negative    Endocrine:  Negative      Physical Exam:  Vitals: /70   Pulse 86   Ht 1.753 m (5' 9\")   Wt 94.3 kg (208 lb)   BMI 30.72 kg/m       Constitutional:  cooperative, alert and oriented, well developed, well nourished, in no acute distress        Skin:  warm and dry to the touch, no apparent skin lesions or masses noted        Head:  normocephalic, no masses or lesions        Eyes:  pupils equal and round;conjunctivae and lids unremarkable;sclera white        ENT:  no pallor or cyanosis, dentition good        Neck:  no carotid bruit        Chest:  healed median sternotomy scar diminished breath sounds left sided      Cardiac:   irregular rhythm                Abdomen:  abdomen soft        Vascular: pulses full and equal                                      Extremities and Back:  no deformities, clubbing, cyanosis, erythema observed;no edema        Neurological:  no gross motor deficits          Recent Lab Results:  LIPID RESULTS:  Lab Results   Component Value Date    CHOL 117 10/28/2019    HDL 31 (L) 10/28/2019    LDL 67 10/28/2019    TRIG 93 10/28/2019    CHOLHDLRATIO 5.3 (H) 04/08/2015       LIVER ENZYME RESULTS:  Lab Results   Component Value Date    AST 8 10/28/2019    ALT 31 10/28/2019       CBC RESULTS:  Lab Results   Component Value Date    WBC 11.0 10/28/2019    RBC 3.88 (L) 10/28/2019    HGB 11.9 (L) 10/28/2019    HCT 36.5 (L) 10/28/2019    MCV 94 10/28/2019    MCH 30.7 10/28/2019    MCHC 32.6 10/28/2019    RDW 12.7 10/28/2019     10/28/2019       BMP RESULTS:  Lab Results   Component Value Date     10/28/2019    POTASSIUM 4.1 10/28/2019    CHLORIDE 105 10/28/2019    CO2 30 10/28/2019    ANIONGAP 4 10/28/2019     (H) 10/28/2019    BUN 14 10/28/2019    CR 0.70 10/28/2019    GFRESTIMATED >90 10/28/2019    GFRESTBLACK >90 10/28/2019    KELLY 8.7 10/28/2019        A1C RESULTS:  Lab " Results   Component Value Date    A1C 5.5 09/06/2019       INR RESULTS:  Lab Results   Component Value Date    INR 1.12 09/25/2019    INR 1.24 (H) 09/25/2019           Thank you for allowing me to participate in the care of your patient.      Sincerely,     Leah Thomson PA-C     [facility]    cc:   No referring provider defined for this encounter.

## 2019-11-08 NOTE — PROGRESS NOTES
Spoke with Dr. Mcgovern after Bryce left clinic    1) Stop amiodarone 200 mg daily    2) As arrhythmia has been paroxysmal (based on Cardiac Rehab notes) will plan to assess with 2 week ZP and follow-up with EP. Will ask Shadia to call him Monday to set this up.    3) Pt voiced understanding

## 2019-11-08 NOTE — LETTER
"11/8/2019    Remington Riggs MD  [address] [facility] [address]    RE: Bryce CARMONA Alexis       Dear Colleague,    I had the pleasure of seeing Bryce Espinoza in the [facility].    HPI:   I had the pleasure of seeing Bryce when he came for follow up of post-operative atrial fibrillation.  This 59 year old sees Dr. Blake for his history of:    1. CAD s/p CABG x 1 9/25/2019, with NEGRO to LAD. Endarterectomy of pLAD required due to severe calcification. Plavix x 6 months recommended (until 3/25/2020). Post op AFib, started on AC and Amiodarone. Now in atrial flutter (typical)  2. Preserved EF on pre-op echo  3. Hypertension   4. Dyslipidemia      Bryce met with Dr. Blake in consultation 8/2019 to review his RFs for CAD including strong family history, as well as an abnl stress test and high calcium score. Angiogram 9/2019 showed severe CAD including dLM lesion of 60%.    He subsequently underwent CABG x 1 with NEGRO to LAD with Dr. Turner on 9/25/2019. It was noted that the LIMA itself was an excellent conduit, but LAD was severely diseased proximally and required endarterectomy to sew a graft onto it. Cx system was quite small and had no significant dz. RCA lesion was not hemodynamically significant.     He developed paroxysmal AFib, associated with diaphoresis and was discharged on amiodarone and Eliquis. He was not evaluated by EP or cardiology during hospitalization.  Due to the endarterectomy, Plavix was to be continued x 6 months (3/25/2020).He was discharged home 9/30 @ 206#.      I am meeting him today to review his post-op AFib.    Interval History:  Overall, feels \"fine\" from heart standpoint. No c/o CP, SOB, edema, orthopnea or PND.  No bleeding issues despite ASA, Plavix and Eliquis use.    Would really like to get off the amiodarone due to the \"terrible\" taste in the mouth. No melena, hematochezia, diarrhea or constipation.    Today's EKG shows that he's in controlled " atrial flutter (likely typical).  He's unaware of this rhythm. Interestingly, in looking through Cardiac Rehab notes, he was in SR just a few days ago. He was in Aflutter on 10/17, but otherwise has been in SR during his Rehab sessions.      Diagnostic Testing:  EKG today, which I overread, showed Atrial Flutter (likely typical) at 98 bpm  Carotid Doppler 9/2019 showed <50% stenosis bilaterally  Angiogram 9/2019 showed 60% dLM, dRCA lesion 50%, oPRDA 50% and 1RPLB 25%. LAD and Cx had no dz.  Echocardiogam 9/2019 showed normal EF 60-65% with grade I diastolic dysfunction. Normal RV. Normal atrial sizes. No significant valvular abnls.    Assessment & Plan:    1. Post-op AFib, now in atrial flutter    Noted following CABG x 1 on POD #2 and started on IV Amio with bolus. DCd on Amio 200 mg daily. I believe he was in AFib when he was discharged based on rounding note that day.    In review of Cardiac Rehab notes, looks like he converted to SR during his first session 10/3/2019. Brief review indicates he's been in SR since except 10/17 (AFlutter) and again today on EKG    EKG today shows AFlutter with controlled rate    D/w Dr. Mcgovern. Of note, he has not seen EP MD    PLAN:    STOP amiodarone 200 mg daily    In ~2 weeks, start 2 week ZioPatch    See us back to review. If has paroxysmal AFlutter, we can ablate this. If he has both AFib and AFlutter, may need different AAD (provided his sxs of terrible taste in his mouth improves off of amiodarone) or combination ablation     Continue Eliquis 5 mg BID. New Rx sent in today    Continue Metoprolol XL 75 mg BID      2. CAD    Cath results as above. Underwent CABG x 1 (LIMA - LAD) and LAD was heavily calcified requiring endarterectomy. RCA dz not thought to be hemodynamically significant. Cx system too small to bypass    Normal EF based on pre-op echo    Remains on BB, statin and ASA and Plavix x 6 months    PLAN:    Plavix 6 months (3/2020) due to endarterectomy, with plans to  increase ASA to 325 mg daily following discontinuation of this. I sent Rx for Plavix in as well    Continues Cardiac Rehab    Sees Rad[facility] for Dr. Blake 11/2019    Note decreased BS on L. Will see if this improves as he continues cardiac rehab. CXR had shown some area of consolidation. Would consider CXR if this persists.      Nitza Thomson PA-C, MSPAS      Orders Placed This Encounter   Procedures     EKG 12-lead complete w/read - Clinics (performed today)     Orders Placed This Encounter   Medications     metoprolol succinate ER (TOPROL-XL) 50 MG 24 hr tablet     Sig: Take 1.5 tablets (75 mg) by mouth 2 times daily     Dispense:  90 tablet     Refill:  5     Replaces the 25 mg tab     apixaban ANTICOAGULANT (ELIQUIS) 5 MG tablet     Sig: Take 1 tablet (5 mg) by mouth 2 times daily     Dispense:  60 tablet     Refill:  5     clopidogrel (PLAVIX) 75 MG tablet     Sig: Take 1 tablet (75 mg) by mouth daily     Dispense:  30 tablet     Refill:  4     Medications Discontinued During This Encounter   Medication Reason     pantoprazole (PROTONIX) 40 MG EC tablet      metoprolol succinate ER (TOPROL-XL) 25 MG 24 hr tablet Reorder     apixaban ANTICOAGULANT (ELIQUIS) 5 MG tablet Reorder     amiodarone (PACERONE/CODARONE) 200 MG tablet Side effects     clopidogrel (PLAVIX) 75 MG tablet          Encounter Diagnoses   Name Primary?     Paroxysmal atrial fibrillation (H)      S/P CABG (coronary artery bypass graft)      Typical atrial flutter (H) Yes       CURRENT MEDICATIONS:  Current Outpatient Medications   Medication Sig Dispense Refill     apixaban ANTICOAGULANT (ELIQUIS) 5 MG tablet Take 1 tablet (5 mg) by mouth 2 times daily 60 tablet 5     ASPIRIN 81 MG OR TABS 1 tab po QD (Once per day) 30 12     Cholecalciferol (VITAMIN D) 2000 UNITS tablet Take 2,000 Units by mouth daily       clopidogrel (PLAVIX) 75 MG tablet Take 1 tablet (75 mg) by mouth daily 30 tablet 4     metoprolol succinate ER (TOPROL-XL) 50 MG 24 hr tablet Take  1.5 tablets (75 mg) by mouth 2 times daily 90 tablet 5     rosuvastatin (CRESTOR) 10 MG tablet Take 1 tablet (10 mg) by mouth daily 30 tablet 11       ALLERGIES     Allergies   Allergen Reactions     Atorvastatin      Myalgias     Lisinopril      Body aches pt d/mylene  06/2015       PAST MEDICAL HISTORY:  Past Medical History:   Diagnosis Date     Coronary artery disease involving native coronary artery of native heart without angina pectoris 7/4/2019    Per CT calcium score of 722.97 3/2019     Essential hypertension, benign      Hematuria      Malignant neoplasm of bladder, 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     Olecranon bursitis 3/01    right     Other and unspecified hyperlipidemia      RIGHT LUNG CALCIFIED GRANULOMA 2/02    RML     Tobacco use disorder     Quit 1/03     Unspecified hemorrhoids without mention of complication 8/01     Unspecified hereditary and idiopathic peripheral neuropathy        PAST SURGICAL HISTORY:  Past Surgical History:   Procedure Laterality Date     BYPASS GRAFT ARTERY CORONARY N/A 9/25/2019    Procedure: CORONARY ARTERY BYPASS GRAFTING x 1 (LIMA - LAD) WITH CORONARY ENDARTERECTOMY  (ON PUMP OXYGENATOR ; [facility] BY [facility]);  Surgeon: Magdi Turner MD;  Location:  OR      NONSPECIFIC PROCEDURE  2/00/02    EBCT     CV HEART CATHETERIZATION WITH POSSIBLE INTERVENTION N/A 9/6/2019    Procedure: Coronary Angiogram;  Surgeon: Nick Willson MD;  Location:  HEART CARDIAC CATH LAB     CV LEFT HEART CATH N/A 9/6/2019    Procedure: Left Heart Cath;  Surgeon: Nick Willson MD;  Location:  HEART CARDIAC CATH LAB     CV LEFT VENTRICULOGRAM N/A 9/6/2019    Procedure: Left Ventriculogram;  Surgeon: Nick Willson MD;  Location:  HEART CARDIAC CATH LAB       FAMILY HISTORY:  Family History   Problem Relation Age of Onset     Arthritis Mother         RA     Hypertension Mother      Breast Cancer Mother      Prostate Cancer Maternal Grandfather          80     Cancer Maternal Grandmother         Lung     Coronary Artery Disease Father      Other - See Comments Brother      Diabetes Brother      Genetic Disorder Son        SOCIAL HISTORY:  Social History     Socioeconomic History     Marital status:      Spouse name: None     Number of children: None     Years of education: None     Highest education level: None   Occupational History     None   Social Needs     Financial resource strain: None     Food insecurity:     Worry: None     Inability: None     Transportation needs:     Medical: None     Non-medical: None   Tobacco Use     Smoking status: Former Smoker     Packs/day: 1.00     Years: 33.00     Pack years: 33.00     Last attempt to quit: 2013     Years since quittin.7     Smokeless tobacco: Never Used   Substance and Sexual Activity     Alcohol use: Yes     Comment: 2 daily     Drug use: Yes     Comment: nica taylor     Sexual activity: Yes   Lifestyle     Physical activity:     Days per week: None     Minutes per session: None     Stress: None   Relationships     Social connections:     Talks on phone: None     Gets together: None     Attends Christianity service: None     Active member of club or organization: None     Attends meetings of clubs or organizations: None     Relationship status: None     Intimate partner violence:     Fear of current or ex partner: None     Emotionally abused: None     Physically abused: None     Forced sexual activity: None   Other Topics Concern     Parent/sibling w/ CABG, MI or angioplasty before 65F 55M? Not Asked   Social History Narrative     None       Review of Systems:  Skin:  Negative     Eyes:  Negative    ENT:  Negative    Respiratory:  Negative for shortness of breath;dyspnea on exertion;cough  Cardiovascular:  Negative for;palpitations;chest pain;edema;dizziness;lightheadedness;fatigue    Gastroenterology: Negative for melena;hematochezia  Genitourinary:  Negative    Musculoskeletal:  Positive  "for nocturnal cramping  Neurologic:  Negative    Psychiatric:  Negative    Heme/Lymph/Imm:  Negative    Endocrine:  Negative      Physical Exam:  Vitals: /70   Pulse 86   Ht 1.753 m (5' 9\")   Wt 94.3 kg (208 lb)   BMI 30.72 kg/m       Constitutional:  cooperative, alert and oriented, well developed, well nourished, in no acute distress        Skin:  warm and dry to the touch, no apparent skin lesions or masses noted        Head:  normocephalic, no masses or lesions        Eyes:  pupils equal and round;conjunctivae and lids unremarkable;sclera white        ENT:  no pallor or cyanosis, dentition good        Neck:  no carotid bruit        Chest:  healed median sternotomy scar diminished breath sounds left sided      Cardiac:   irregular rhythm                Abdomen:  abdomen soft        Vascular: pulses full and equal                                      Extremities and Back:  no deformities, clubbing, cyanosis, erythema observed;no edema        Neurological:  no gross motor deficits          Recent Lab Results:  LIPID RESULTS:  Lab Results   Component Value Date    CHOL 117 10/28/2019    HDL 31 (L) 10/28/2019    LDL 67 10/28/2019    TRIG 93 10/28/2019    CHOLHDLRATIO 5.3 (H) 04/08/2015       LIVER ENZYME RESULTS:  Lab Results   Component Value Date    AST 8 10/28/2019    ALT 31 10/28/2019       CBC RESULTS:  Lab Results   Component Value Date    WBC 11.0 10/28/2019    RBC 3.88 (L) 10/28/2019    HGB 11.9 (L) 10/28/2019    HCT 36.5 (L) 10/28/2019    MCV 94 10/28/2019    MCH 30.7 10/28/2019    MCHC 32.6 10/28/2019    RDW 12.7 10/28/2019     10/28/2019       BMP RESULTS:  Lab Results   Component Value Date     10/28/2019    POTASSIUM 4.1 10/28/2019    CHLORIDE 105 10/28/2019    CO2 30 10/28/2019    ANIONGAP 4 10/28/2019     (H) 10/28/2019    BUN 14 10/28/2019    CR 0.70 10/28/2019    GFRESTIMATED >90 10/28/2019    GFRESTBLACK >90 10/28/2019    KELLY 8.7 10/28/2019        A1C RESULTS:  Lab " Results   Component Value Date    A1C 5.5 09/06/2019       INR RESULTS:  Lab Results   Component Value Date    INR 1.12 09/25/2019    INR 1.24 (H) 09/25/2019         Thank you for allowing me to participate in the care of your patient.    Sincerely,     Leah Thomson PA-C     [facility]

## 2019-11-11 ENCOUNTER — HOSPITAL ENCOUNTER (OUTPATIENT)
Dept: CARDIAC REHAB | Facility: CLINIC | Age: 59
End: 2019-11-11
Attending: SURGERY
Payer: COMMERCIAL

## 2019-11-11 PROCEDURE — 93798 PHYS/QHP OP CAR RHAB W/ECG: CPT

## 2019-11-11 PROCEDURE — 40000116 ZZH STATISTIC OP CR VISIT

## 2019-11-13 ENCOUNTER — HOSPITAL ENCOUNTER (OUTPATIENT)
Dept: CARDIAC REHAB | Facility: CLINIC | Age: 59
End: 2019-11-13
Attending: SURGERY
Payer: COMMERCIAL

## 2019-11-13 PROCEDURE — 40000116 ZZH STATISTIC OP CR VISIT: Performed by: CLINICAL EXERCISE PHYSIOLOGIST

## 2019-11-13 PROCEDURE — 93798 PHYS/QHP OP CAR RHAB W/ECG: CPT | Performed by: CLINICAL EXERCISE PHYSIOLOGIST

## 2019-11-15 ENCOUNTER — HOSPITAL ENCOUNTER (OUTPATIENT)
Dept: CARDIAC REHAB | Facility: CLINIC | Age: 59
End: 2019-11-15
Attending: SURGERY
Payer: COMMERCIAL

## 2019-11-15 PROCEDURE — 93798 PHYS/QHP OP CAR RHAB W/ECG: CPT | Performed by: REHABILITATION PRACTITIONER

## 2019-11-15 PROCEDURE — 40000116 ZZH STATISTIC OP CR VISIT: Performed by: REHABILITATION PRACTITIONER

## 2019-11-18 ENCOUNTER — HOSPITAL ENCOUNTER (OUTPATIENT)
Dept: CARDIAC REHAB | Facility: CLINIC | Age: 59
End: 2019-11-18
Attending: SURGERY
Payer: COMMERCIAL

## 2019-11-18 PROCEDURE — 93798 PHYS/QHP OP CAR RHAB W/ECG: CPT | Performed by: OCCUPATIONAL THERAPIST

## 2019-11-18 PROCEDURE — 40000116 ZZH STATISTIC OP CR VISIT: Performed by: OCCUPATIONAL THERAPIST

## 2019-11-22 ENCOUNTER — HOSPITAL ENCOUNTER (OUTPATIENT)
Dept: CARDIAC REHAB | Facility: CLINIC | Age: 59
End: 2019-11-22
Attending: SURGERY
Payer: COMMERCIAL

## 2019-11-22 ENCOUNTER — HOSPITAL ENCOUNTER (OUTPATIENT)
Dept: CARDIOLOGY | Facility: CLINIC | Age: 59
Discharge: HOME | End: 2019-11-22
Attending: PHYSICIAN ASSISTANT | Admitting: PHYSICIAN ASSISTANT
Payer: COMMERCIAL

## 2019-11-22 DIAGNOSIS — I48.3 TYPICAL ATRIAL FLUTTER (H): ICD-10-CM

## 2019-11-22 PROCEDURE — 0298T ZIO PATCH HOLTER ADULT PEDIATRIC GREATER THAN 48 HRS: CPT | Performed by: INTERNAL MEDICINE

## 2019-11-22 PROCEDURE — 40000116 ZZH STATISTIC OP CR VISIT: Performed by: OCCUPATIONAL THERAPIST

## 2019-11-22 PROCEDURE — 93798 PHYS/QHP OP CAR RHAB W/ECG: CPT | Performed by: OCCUPATIONAL THERAPIST

## 2019-11-22 PROCEDURE — 0296T ZIO PATCH HOLTER ADULT PEDIATRIC GREATER THAN 48 HRS: CPT

## 2019-11-26 ENCOUNTER — OFFICE VISIT (OUTPATIENT)
Dept: CARDIOLOGY | Facility: CLINIC | Age: 59
End: 2019-11-26
Payer: COMMERCIAL

## 2019-11-26 VITALS
WEIGHT: 205.8 LBS | BODY MASS INDEX: 30.48 KG/M2 | DIASTOLIC BLOOD PRESSURE: 87 MMHG | SYSTOLIC BLOOD PRESSURE: 132 MMHG | HEART RATE: 60 BPM | HEIGHT: 69 IN

## 2019-11-26 DIAGNOSIS — E78.5 HYPERLIPIDEMIA LDL GOAL <70: ICD-10-CM

## 2019-11-26 DIAGNOSIS — I25.10 CORONARY ARTERY DISEASE INVOLVING NATIVE CORONARY ARTERY OF NATIVE HEART WITHOUT ANGINA PECTORIS: Primary | ICD-10-CM

## 2019-11-26 DIAGNOSIS — I10 ESSENTIAL HYPERTENSION, BENIGN: ICD-10-CM

## 2019-11-26 DIAGNOSIS — I48.91 ATRIAL FIBRILLATION, UNSPECIFIED TYPE (H): ICD-10-CM

## 2019-11-26 DIAGNOSIS — Z95.1 S/P CABG (CORONARY ARTERY BYPASS GRAFT): ICD-10-CM

## 2019-11-26 PROCEDURE — 99214 OFFICE O/P EST MOD 30 MIN: CPT | Performed by: NURSE PRACTITIONER

## 2019-11-26 RX ORDER — METOPROLOL SUCCINATE 50 MG/1
75 TABLET, EXTENDED RELEASE ORAL 2 TIMES DAILY
Qty: 270 TABLET | Refills: 3 | Status: SHIPPED | OUTPATIENT
Start: 2019-11-26 | End: 2020-05-11

## 2019-11-26 RX ORDER — ROSUVASTATIN CALCIUM 10 MG/1
10 TABLET, COATED ORAL DAILY
Qty: 90 TABLET | Refills: 3 | Status: SHIPPED | OUTPATIENT
Start: 2019-11-26 | End: 2020-04-16

## 2019-11-26 ASSESSMENT — MIFFLIN-ST. JEOR: SCORE: 1738.88

## 2019-11-26 NOTE — PROGRESS NOTES
Cardiology Clinic Progress Note  Bryce Espinoza MRN# [id number]   YOB: 1960 Age: 59 year old   Primary Cardiologist: Dr. Balke  Reason for visit: cardiology follow up             Assessment and Plan:   Bryce Espinoza is a very pleasant 59 year old male with a history of coronary artery disease s/p CABG x 1 9/25/19 (NEGRO to LAD), post-op paroxsymal atrial fibrillation/flutter, hypertension, hyperlipidemia, and bladder cancer. Patient here today for cardiology follow up. Doing well from a cardiovascular standpoint.       1. Coronary artery disease s/p CABG x 1 9/25/19 (NEGRO to LAD). LAD was noted to be severely disease proximally and required endarterectomy to sew a graft onto it. Due to endarterectomy patient to be continued on Plavix for 6 months (3/25/2020). Stable, no signs/symtpoms of ischemia. Echo 9/2019 EF 60-65%.    - Coronary angiogram 9/2019 showed 60% dLM, dRCA lesion 50%, oPRDA 50% and 1RPLB 25%. LAD and Cx had no disease.    - Continue aspirin, metoprolol and rosuvastatin   - Continue plavix for 6 months post revascularization (3/25/2020), after plavix is stopped recommende to increase aspirin to 325mg daily.    - Continue cardiac rehab   - Counseled patient on lifestyle modifications    2. Paroxsymal atrial fibrillation/flutter - was noted to have paroxsymal atrial fibrillation post-operative, started on amiodarone. Seen by EP the beginning of November noted to be in atrial flutter. Amiodarone was stopped. Zio monitor was placed.     - [id number] score 2 (HTN, vascular)   - Follow up with Dr. Mcgovern as scheduled for 12/18/2019    3. Hypertension - controlled, continue current medications    4. Hyperlipidemia - lipid panel 10/28/19 showed total cholesterol 117, HDL 31, LDL 67 and triglycerides 93. LDL goal < 70.    - Continue rosuvastatin 10mg daily         Changes today: none    Follow up plan:     Cardiology follow up in 4 months (Due to stop plavix 3/25/2020, CV surgery recommends  increasing aspirin to 325mg at that time).         History of Presenting Illness:    Bryce Espinoza is a very pleasant 59 year old male with a history of coronary artery disease s/p CABG x 1 9/25/19 (NEGRO to LAD), post-op paroxsymal atrial fibrillation/flutter, hypertension, hyperlipidemia, and bladder cancer.    Patient follows with Dr. Blake whom he first met in August 2019 at which time he had complaints of atypical angina, positive stress tests and significantly elevated calcium score of 700, coronary angiogram was recommended. Coronary angiogram 9/2019 showed severe coronary artery disease including distal LM disease (60%). He underwent CABG x 1 with LIMA to LAD 9/25/2019. LAD was noted to be severely disease proximally and required endarterectomy to sew a graft onto it. Due to endarterectomy patient to be continued on Plavix for 6 months (3/25/2020). Patient develop post-op paroxsymal atrial fibrillation, he was discharge on amiodarone and apixaban. Patient was hospitalized 9/25/19-9/30/19.     Echo 9/2019 showed EF 60-65%, no regional wall motion abnormalities, RV normal size/function and no significant valvular disease.     Patient saw Nitza Thomson PA-C, EP beginning of November at which point patient was noted to be in atrial flutter, amiodarone stopped, recommended 2 week zio monitor, plan for follow up with Dr. Mcgovern in December 2019.     Patient is here today for cardiology follow up.     Patient reports feeling good. Patient denies chest pain or chest tightness. Denies shortness of breath at rest. Notes when he is in atrial fibrillation his breathing gets difficult, has felt no atrial fibrillation in past 2 weeks. Denies exertional with current levels of activity, notes that if he pushes himself on the treadmill for 30 mins will get some dyspnea. Sleeping good at night. Denies orthopnea or PND. Denies lower extremity edema. Denies dizziness, lightheadedness or other presyncopal symptoms. Denies  tachycardia or palpitations.     Labs from 10/28/19 show stable kidney function and electrolytes, total cholesterol 117, HDL 31, LDL 67 and triglycerides 93. Hemoglobin 11.9. TSH 2.28. Blood pressure 132/87 and HR 60 in clinic today. Weight 205# which is stable since hospital discharge.     Appetite good. Eating meals at home. Not adding additional salt to foods. Attending cardiac rehab. Riding a stationary bicycle a couple times a day, typically 30 mins. Rare alcohol use. Denies tobacco use (quit 10 years ago).        Recent Hospitalizations   19-19 underwent CABG x 1 with LIMA to LAD 2019. LAD was noted to be severely disease proximally and required endarterectomy to sew a graft onto it.         Social History    , no children. Working as a .   Social History     Socioeconomic History     Marital status:      Spouse name: Not on file     Number of children: Not on file     Years of education: Not on file     Highest education level: Not on file   Occupational History     Not on file   Social Needs     Financial resource strain: Not on file     Food insecurity:     Worry: Not on file     Inability: Not on file     Transportation needs:     Medical: Not on file     Non-medical: Not on file   Tobacco Use     Smoking status: Former Smoker     Packs/day: 1.00     Years: 33.00     Pack years: 33.00     Last attempt to quit: 2013     Years since quittin.8     Smokeless tobacco: Never Used   Substance and Sexual Activity     Alcohol use: Yes     Comment: 2 daily     Drug use: Yes     Comment: nica taylor     Sexual activity: Yes   Lifestyle     Physical activity:     Days per week: Not on file     Minutes per session: Not on file     Stress: Not on file   Relationships     Social connections:     Talks on phone: Not on file     Gets together: Not on file     Attends Religion service: Not on file     Active member of club or organization: Not on file     Attends  "meetings of clubs or organizations: Not on file     Relationship status: Not on file     Intimate partner violence:     Fear of current or ex partner: Not on file     Emotionally abused: Not on file     Physically abused: Not on file     Forced sexual activity: Not on file   Other Topics Concern     Parent/sibling w/ CABG, MI or angioplasty before 65F 55M? Not Asked   Social History Narrative     Not on file            Review of Systems:   Skin:  Negative     Eyes:  Negative    ENT:  Negative    Respiratory:  Negative for shortness of breath;dyspnea on exertion;cough  Cardiovascular:  Negative for;palpitations;chest pain;edema;dizziness;lightheadedness;fatigue    Gastroenterology: Negative for melena;hematochezia  Genitourinary:  Negative    Musculoskeletal:  Positive for nocturnal cramping  Neurologic:  Negative    Psychiatric:  Negative    Heme/Lymph/Imm:  Negative    Endocrine:  Negative           Physical Exam:   Vitals: /87   Pulse 60   Ht 1.753 m (5' 9\")   Wt 93.4 kg (205 lb 12.8 oz)   BMI 30.39 kg/m     Wt Readings from Last 4 Encounters:   11/26/19 93.4 kg (205 lb 12.8 oz)   11/08/19 94.3 kg (208 lb)   10/14/19 93.8 kg (206 lb 12.8 oz)   10/11/19 93.9 kg (207 lb)     GEN: well nourished, in no acute distress.  HEENT:  Pupils equal, round. Sclerae nonicteric.   NECK: Supple, no masses appreciated.   C/V:  Regular rate and rhythm, no murmur, rub or gallop.    RESP: Respirations are unlabored. Clear to auscultation bilaterally without wheezing, rales, or rhonchi.  GI: Abdomen soft, nontender.  EXTREM: No LE edema.  NEURO: Alert and oriented, cooperative.  SKIN: Warm and dry.        Data:   ECHO 9/4/2019  1. Normal left ventricular size and systolic function. LVEF 60-65%.  2. No regional wall motion abnormalities.  3. Normal right ventricular size and systolic function.  4. No hemodynamically significant valve disease.     There is no comparison study available.    Cardiac catheterization " 9/6/2019    Ost RPDA lesion is 50% stenosed.    Dist RCA lesion is 50% stenosed.    1st RPLB lesion is 25% stenosed.    Dist LM lesion is 60% stenosed.    The ejection fraction is greater than 55% by visual estimate.     1.  Patient has a very large dominant right coronary artery with a 50% distal stenosis and a 50% stenosis in the ostium of the posterior descending artery.  2.  Focal distal left main coronary artery not involving the bifurcation.  60% stenosis.  3.  No significant disease and a small LAD that does not reach the apex and a small circumflex coronary artery.  4.  Hyperdynamic left ventricular function estimated ejection fraction of 65% without mitral regurgitation or aortic stenosis.  If patient should opt for intervention please schedule for Anamika or Janny    LIPID RESULTS:  Lab Results   Component Value Date    CHOL 117 10/28/2019    HDL 31 (L) 10/28/2019    LDL 67 10/28/2019    TRIG 93 10/28/2019    CHOLHDLRATIO 5.3 (H) 04/08/2015     LIVER ENZYME RESULTS:  Lab Results   Component Value Date    AST 8 10/28/2019    ALT 31 10/28/2019     CBC RESULTS:  Lab Results   Component Value Date    WBC 11.0 10/28/2019    RBC 3.88 (L) 10/28/2019    HGB 11.9 (L) 10/28/2019    HCT 36.5 (L) 10/28/2019    MCV 94 10/28/2019    MCH 30.7 10/28/2019    MCHC 32.6 10/28/2019    RDW 12.7 10/28/2019     10/28/2019     BMP RESULTS:  Lab Results   Component Value Date     10/28/2019    POTASSIUM 4.1 10/28/2019    CHLORIDE 105 10/28/2019    CO2 30 10/28/2019    ANIONGAP 4 10/28/2019     (H) 10/28/2019    BUN 14 10/28/2019    CR 0.70 10/28/2019    GFRESTIMATED >90 10/28/2019    GFRESTBLACK >90 10/28/2019    KELLY 8.7 10/28/2019      A1C RESULTS:  Lab Results   Component Value Date    A1C 5.5 09/06/2019     INR RESULTS:  Lab Results   Component Value Date    INR 1.12 09/25/2019    INR 1.24 (H) 09/25/2019            Medications     Current Outpatient Medications   Medication Sig Dispense Refill     apixaban  ANTICOAGULANT (ELIQUIS) 5 MG tablet Take 1 tablet (5 mg) by mouth 2 times daily 180 tablet 3     ASPIRIN 81 MG OR TABS 1 tab po QD (Once per day) 30 12     Cholecalciferol (VITAMIN D) 2000 UNITS tablet Take 2,000 Units by mouth daily       clopidogrel (PLAVIX) 75 MG tablet Take 1 tablet (75 mg) by mouth daily 30 tablet 4     metoprolol succinate ER (TOPROL-XL) 50 MG 24 hr tablet Take 1.5 tablets (75 mg) by mouth 2 times daily 270 tablet 3     rosuvastatin (CRESTOR) 10 MG tablet Take 1 tablet (10 mg) by mouth daily 90 tablet 3          Past Medical History     Past Medical History:   Diagnosis Date     Coronary artery disease involving native coronary artery of native heart without angina pectoris 7/4/2019    Per CT calcium score of 722.97 3/2019     Essential hypertension, benign      Hematuria      Malignant neoplasm of bladder, 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     Olecranon bursitis 3/01    right     Other and unspecified hyperlipidemia      RIGHT LUNG CALCIFIED GRANULOMA 2/02    RML     Tobacco use disorder     Quit 1/03     Unspecified hemorrhoids without mention of complication 8/01     Unspecified hereditary and idiopathic peripheral neuropathy      Past Surgical History:   Procedure Laterality Date     BYPASS GRAFT ARTERY CORONARY N/A 9/25/2019    Procedure: CORONARY ARTERY BYPASS GRAFTING x 1 (LIMA - LAD) WITH CORONARY ENDARTERECTOMY  (ON PUMP OXYGENATOR ; [facility] BY [facility]);  Surgeon: Magdi Turner MD;  Location:  OR      NONSPECIFIC PROCEDURE  2/00/02    EBCT     CV HEART CATHETERIZATION WITH POSSIBLE INTERVENTION N/A 9/6/2019    Procedure: Coronary Angiogram;  Surgeon: Nick Willson MD;  Location:  HEART CARDIAC CATH LAB     CV LEFT HEART CATH N/A 9/6/2019    Procedure: Left Heart Cath;  Surgeon: Nick Willson MD;  Location:  HEART CARDIAC CATH LAB     CV LEFT VENTRICULOGRAM N/A 9/6/2019    Procedure: Left Ventriculogram;  Surgeon: Nick Willson  MD;  Location:  HEART CARDIAC CATH LAB     Family History   Problem Relation Age of Onset     Arthritis Mother         RA     Hypertension Mother      Breast Cancer Mother      Prostate Cancer Maternal Grandfather         80     Cancer Maternal Grandmother         Lung     Coronary Artery Disease Father      Other - See Comments Brother      Diabetes Brother      Genetic Disorder Son             Allergies   Atorvastatin and Lisinopril        AURELIA Mccarthy [facility]  Pager: [phone]

## 2019-11-26 NOTE — PATIENT INSTRUCTIONS
1. No medication changes today   2. Please call with any questions/concerns [phone]  3. Follow up with Radha in 4 months

## 2019-11-26 NOTE — LETTER
11/26/2019    Remington Riggs MD  [address] [facility] [address]    RE: Bryce Espinoza       Dear Colleague,    I had the pleasure of seeing Bryce Espinoza in the [facility].    Cardiology Clinic Progress Note  Bryce Espinoza MRN# [id number]   YOB: 1960 Age: 59 year old   Primary Cardiologist: Dr. Blake  Reason for visit: cardiology follow up             Assessment and Plan:   Bryce Espinoza is a very pleasant 59 year old male with a history of coronary artery disease s/p CABG x 1 9/25/19 (NEGRO to LAD), post-op paroxsymal atrial fibrillation/flutter, hypertension, hyperlipidemia, and bladder cancer. Patient here today for cardiology follow up. Doing well from a cardiovascular standpoint.       1. Coronary artery disease s/p CABG x 1 9/25/19 (NEGRO to LAD). LAD was noted to be severely disease proximally and required endarterectomy to sew a graft onto it. Due to endarterectomy patient to be continued on Plavix for 6 months (3/25/2020). Stable, no signs/symtpoms of ischemia. Echo 9/2019 EF 60-65%.    - Coronary angiogram 9/2019 showed 60% dLM, dRCA lesion 50%, oPRDA 50% and 1RPLB 25%. LAD and Cx had no disease.    - Continue aspirin, metoprolol and rosuvastatin   - Continue plavix for 6 months post revascularization (3/25/2020), after plavix is stopped recommende to increase aspirin to 325mg daily.    - Continue cardiac rehab   - Counseled patient on lifestyle modifications    2. Paroxsymal atrial fibrillation/flutter - was noted to have paroxsymal atrial fibrillation post-operative, started on amiodarone. Seen by EP the beginning of November noted to be in atrial flutter. Amiodarone was stopped. Zio monitor was placed.     - [id number] score 2 (HTN, vascular)   - Follow up with Dr. Mcgovern as scheduled for 12/18/2019    3. Hypertension - controlled, continue current medications    4. Hyperlipidemia - lipid panel 10/28/19 showed total cholesterol 117, HDL 31, LDL 67  and triglycerides 93. LDL goal < 70.    - Continue rosuvastatin 10mg daily         Changes today: none    Follow up plan:     Cardiology follow up in 4 months (Due to stop plavix 3/25/2020, CV surgery recommends increasing aspirin to 325mg at that time).         History of Presenting Illness:    Bryce Espinoza is a very pleasant 59 year old male with a history of coronary artery disease s/p CABG x 1 9/25/19 (NEGRO to LAD), post-op paroxsymal atrial fibrillation/flutter, hypertension, hyperlipidemia, and bladder cancer.    Patient follows with Dr. Blake whom he first met in August 2019 at which time he had complaints of atypical angina, positive stress tests and significantly elevated calcium score of 700, coronary angiogram was recommended. Coronary angiogram 9/2019 showed severe coronary artery disease including distal LM disease (60%). He underwent CABG x 1 with LIMA to LAD 9/25/2019. LAD was noted to be severely disease proximally and required endarterectomy to sew a graft onto it. Due to endarterectomy patient to be continued on Plavix for 6 months (3/25/2020). Patient develop post-op paroxsymal atrial fibrillation, he was discharge on amiodarone and apixaban. Patient was hospitalized 9/25/19-9/30/19.     Echo 9/2019 showed EF 60-65%, no regional wall motion abnormalities, RV normal size/function and no significant valvular disease.     Patient saw Nitza Thomson PA-C, EP beginning of November at which point patient was noted to be in atrial flutter, amiodarone stopped, recommended 2 week zio monitor, plan for follow up with Dr. Mcgovern in December 2019.     Patient is here today for cardiology follow up.     Patient reports feeling good. Patient denies chest pain or chest tightness. Denies shortness of breath at rest. Notes when he is in atrial fibrillation his breathing gets difficult, has felt no atrial fibrillation in past 2 weeks. Denies exertional with current levels of activity, notes that if he pushes himself  on the treadmill for 30 mins will get some dyspnea. Sleeping good at night. Denies orthopnea or PND. Denies lower extremity edema. Denies dizziness, lightheadedness or other presyncopal symptoms. Denies tachycardia or palpitations.     Labs from 10/28/19 show stable kidney function and electrolytes, total cholesterol 117, HDL 31, LDL 67 and triglycerides 93. Hemoglobin 11.9. TSH 2.28. Blood pressure 132/87 and HR 60 in clinic today. Weight 205# which is stable since hospital discharge.     Appetite good. Eating meals at home. Not adding additional salt to foods. Attending cardiac rehab. Riding a stationary bicycle a couple times a day, typically 30 mins. Rare alcohol use. Denies tobacco use (quit 10 years ago).        Recent Hospitalizations   19-19 underwent CABG x 1 with LIMA to LAD 2019. LAD was noted to be severely disease proximally and required endarterectomy to sew a graft onto it.         Social History    , no children. Working as a .   Social History     Socioeconomic History     Marital status:      Spouse name: Not on file     Number of children: Not on file     Years of education: Not on file     Highest education level: Not on file   Occupational History     Not on file   Social Needs     Financial resource strain: Not on file     Food insecurity:     Worry: Not on file     Inability: Not on file     Transportation needs:     Medical: Not on file     Non-medical: Not on file   Tobacco Use     Smoking status: Former Smoker     Packs/day: 1.00     Years: 33.00     Pack years: 33.00     Last attempt to quit: 2013     Years since quittin.8     Smokeless tobacco: Never Used   Substance and Sexual Activity     Alcohol use: Yes     Comment: 2 daily     Drug use: Yes     Comment: nica taylor     Sexual activity: Yes   Lifestyle     Physical activity:     Days per week: Not on file     Minutes per session: Not on file     Stress: Not on file   Relationships  "    Social connections:     Talks on phone: Not on file     Gets together: Not on file     Attends Sabianism service: Not on file     Active member of club or organization: Not on file     Attends meetings of clubs or organizations: Not on file     Relationship status: Not on file     Intimate partner violence:     Fear of current or ex partner: Not on file     Emotionally abused: Not on file     Physically abused: Not on file     Forced sexual activity: Not on file   Other Topics Concern     Parent/sibling w/ CABG, MI or angioplasty before 65F 55M? Not Asked   Social History Narrative     Not on file            Review of Systems:   Skin:  Negative     Eyes:  Negative    ENT:  Negative    Respiratory:  Negative for shortness of breath;dyspnea on exertion;cough  Cardiovascular:  Negative for;palpitations;chest pain;edema;dizziness;lightheadedness;fatigue    Gastroenterology: Negative for melena;hematochezia  Genitourinary:  Negative    Musculoskeletal:  Positive for nocturnal cramping  Neurologic:  Negative    Psychiatric:  Negative    Heme/Lymph/Imm:  Negative    Endocrine:  Negative           Physical Exam:   Vitals: /87   Pulse 60   Ht 1.753 m (5' 9\")   Wt 93.4 kg (205 lb 12.8 oz)   BMI 30.39 kg/m      Wt Readings from Last 4 Encounters:   11/26/19 93.4 kg (205 lb 12.8 oz)   11/08/19 94.3 kg (208 lb)   10/14/19 93.8 kg (206 lb 12.8 oz)   10/11/19 93.9 kg (207 lb)     GEN: well nourished, in no acute distress.  HEENT:  Pupils equal, round. Sclerae nonicteric.   NECK: Supple, no masses appreciated.   C/V:  Regular rate and rhythm, no murmur, rub or gallop.    RESP: Respirations are unlabored. Clear to auscultation bilaterally without wheezing, rales, or rhonchi.  GI: Abdomen soft, nontender.  EXTREM: No LE edema.  NEURO: Alert and oriented, cooperative.  SKIN: Warm and dry.        Data:   ECHO 9/4/2019  1. Normal left ventricular size and systolic function. LVEF 60-65%.  2. No regional wall motion " abnormalities.  3. Normal right ventricular size and systolic function.  4. No hemodynamically significant valve disease.     There is no comparison study available.    Cardiac catheterization 9/6/2019    Ost RPDA lesion is 50% stenosed.    Dist RCA lesion is 50% stenosed.    1st RPLB lesion is 25% stenosed.    Dist LM lesion is 60% stenosed.    The ejection fraction is greater than 55% by visual estimate.     1.  Patient has a very large dominant right coronary artery with a 50% distal stenosis and a 50% stenosis in the ostium of the posterior descending artery.  2.  Focal distal left main coronary artery not involving the bifurcation.  60% stenosis.  3.  No significant disease and a small LAD that does not reach the apex and a small circumflex coronary artery.  4.  Hyperdynamic left ventricular function estimated ejection fraction of 65% without mitral regurgitation or aortic stenosis.  If patient should opt for intervention please schedule for Anamika or Janny    LIPID RESULTS:  Lab Results   Component Value Date    CHOL 117 10/28/2019    HDL 31 (L) 10/28/2019    LDL 67 10/28/2019    TRIG 93 10/28/2019    CHOLHDLRATIO 5.3 (H) 04/08/2015     LIVER ENZYME RESULTS:  Lab Results   Component Value Date    AST 8 10/28/2019    ALT 31 10/28/2019     CBC RESULTS:  Lab Results   Component Value Date    WBC 11.0 10/28/2019    RBC 3.88 (L) 10/28/2019    HGB 11.9 (L) 10/28/2019    HCT 36.5 (L) 10/28/2019    MCV 94 10/28/2019    MCH 30.7 10/28/2019    MCHC 32.6 10/28/2019    RDW 12.7 10/28/2019     10/28/2019     BMP RESULTS:  Lab Results   Component Value Date     10/28/2019    POTASSIUM 4.1 10/28/2019    CHLORIDE 105 10/28/2019    CO2 30 10/28/2019    ANIONGAP 4 10/28/2019     (H) 10/28/2019    BUN 14 10/28/2019    CR 0.70 10/28/2019    GFRESTIMATED >90 10/28/2019    GFRESTBLACK >90 10/28/2019    KELLY 8.7 10/28/2019      A1C RESULTS:  Lab Results   Component Value Date    A1C 5.5 09/06/2019     INR  RESULTS:  Lab Results   Component Value Date    INR 1.12 09/25/2019    INR 1.24 (H) 09/25/2019            Medications     Current Outpatient Medications   Medication Sig Dispense Refill     apixaban ANTICOAGULANT (ELIQUIS) 5 MG tablet Take 1 tablet (5 mg) by mouth 2 times daily 180 tablet 3     ASPIRIN 81 MG OR TABS 1 tab po QD (Once per day) 30 12     Cholecalciferol (VITAMIN D) 2000 UNITS tablet Take 2,000 Units by mouth daily       clopidogrel (PLAVIX) 75 MG tablet Take 1 tablet (75 mg) by mouth daily 30 tablet 4     metoprolol succinate ER (TOPROL-XL) 50 MG 24 hr tablet Take 1.5 tablets (75 mg) by mouth 2 times daily 270 tablet 3     rosuvastatin (CRESTOR) 10 MG tablet Take 1 tablet (10 mg) by mouth daily 90 tablet 3          Past Medical History     Past Medical History:   Diagnosis Date     Coronary artery disease involving native coronary artery of native heart without angina pectoris 7/4/2019    Per CT calcium score of 722.97 3/2019     Essential hypertension, benign      Hematuria      Malignant neoplasm of bladder, 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     Olecranon bursitis 3/01    right     Other and unspecified hyperlipidemia      RIGHT LUNG CALCIFIED GRANULOMA 2/02    RML     Tobacco use disorder     Quit 1/03     Unspecified hemorrhoids without mention of complication 8/01     Unspecified hereditary and idiopathic peripheral neuropathy      Past Surgical History:   Procedure Laterality Date     BYPASS GRAFT ARTERY CORONARY N/A 9/25/2019    Procedure: CORONARY ARTERY BYPASS GRAFTING x 1 (LIMA - LAD) WITH CORONARY ENDARTERECTOMY  (ON PUMP OXYGENATOR ; HANK BY [facility]);  Surgeon: Magdi Turner MD;  Location:  OR      NONSPECIFIC PROCEDURE  2/00/02    EBCT     CV HEART CATHETERIZATION WITH POSSIBLE INTERVENTION N/A 9/6/2019    Procedure: Coronary Angiogram;  Surgeon: Nick Willson MD;  Location:  HEART CARDIAC CATH LAB     CV LEFT HEART CATH N/A 9/6/2019     Procedure: Left Heart Cath;  Surgeon: Nick Willson MD;  Location:  HEART CARDIAC CATH LAB     CV LEFT VENTRICULOGRAM N/A 9/6/2019    Procedure: Left Ventriculogram;  Surgeon: Nick Willson MD;  Location:  HEART CARDIAC CATH LAB     Family History   Problem Relation Age of Onset     Arthritis Mother         RA     Hypertension Mother      Breast Cancer Mother      Prostate Cancer Maternal Grandfather         80     Cancer Maternal Grandmother         Lung     Coronary Artery Disease Father      Other - See Comments Brother      Diabetes Brother      Genetic Disorder Son             Allergies   Atorvastatin and Lisinopril        AURELIA Mccarthy CNP  [facility]  Pager: [phone]      Thank you for allowing me to participate in the care of your patient.    Sincerely,     AURELIA Mccarthy CNP     [facility]

## 2019-11-29 ENCOUNTER — HOSPITAL ENCOUNTER (OUTPATIENT)
Dept: CARDIAC REHAB | Facility: CLINIC | Age: 59
End: 2019-11-29
Attending: SURGERY
Payer: COMMERCIAL

## 2019-11-29 PROCEDURE — 40000116 ZZH STATISTIC OP CR VISIT

## 2019-11-29 PROCEDURE — 93798 PHYS/QHP OP CAR RHAB W/ECG: CPT

## 2019-12-02 ENCOUNTER — HOSPITAL ENCOUNTER (OUTPATIENT)
Dept: CARDIAC REHAB | Facility: CLINIC | Age: 59
End: 2019-12-02
Attending: SURGERY
Payer: COMMERCIAL

## 2019-12-02 PROCEDURE — 93798 PHYS/QHP OP CAR RHAB W/ECG: CPT | Performed by: REHABILITATION PRACTITIONER

## 2019-12-02 PROCEDURE — 40000116 ZZH STATISTIC OP CR VISIT: Performed by: REHABILITATION PRACTITIONER

## 2019-12-04 ENCOUNTER — HOSPITAL ENCOUNTER (OUTPATIENT)
Dept: CARDIAC REHAB | Facility: CLINIC | Age: 59
End: 2019-12-04
Attending: SURGERY
Payer: COMMERCIAL

## 2019-12-04 PROCEDURE — 40000116 ZZH STATISTIC OP CR VISIT

## 2019-12-04 PROCEDURE — 93798 PHYS/QHP OP CAR RHAB W/ECG: CPT

## 2019-12-06 ENCOUNTER — HOSPITAL ENCOUNTER (OUTPATIENT)
Dept: CARDIAC REHAB | Facility: CLINIC | Age: 59
End: 2019-12-06
Attending: SURGERY
Payer: COMMERCIAL

## 2019-12-06 PROCEDURE — 40000116 ZZH STATISTIC OP CR VISIT

## 2019-12-06 PROCEDURE — 93798 PHYS/QHP OP CAR RHAB W/ECG: CPT

## 2019-12-13 ENCOUNTER — HOSPITAL ENCOUNTER (OUTPATIENT)
Dept: CARDIAC REHAB | Facility: CLINIC | Age: 59
End: 2019-12-13
Attending: SURGERY
Payer: COMMERCIAL

## 2019-12-13 PROCEDURE — 93798 PHYS/QHP OP CAR RHAB W/ECG: CPT | Performed by: OCCUPATIONAL THERAPIST

## 2019-12-13 PROCEDURE — 40000116 ZZH STATISTIC OP CR VISIT: Performed by: OCCUPATIONAL THERAPIST

## 2019-12-18 ENCOUNTER — OFFICE VISIT (OUTPATIENT)
Dept: CARDIOLOGY | Facility: CLINIC | Age: 59
End: 2019-12-18
Attending: PHYSICIAN ASSISTANT
Payer: COMMERCIAL

## 2019-12-18 VITALS
SYSTOLIC BLOOD PRESSURE: 103 MMHG | WEIGHT: 216 LBS | DIASTOLIC BLOOD PRESSURE: 55 MMHG | BODY MASS INDEX: 31.99 KG/M2 | HEART RATE: 66 BPM | HEIGHT: 69 IN

## 2019-12-18 DIAGNOSIS — I25.10 CORONARY ARTERY DISEASE INVOLVING NATIVE CORONARY ARTERY OF NATIVE HEART WITHOUT ANGINA PECTORIS: ICD-10-CM

## 2019-12-18 DIAGNOSIS — I48.3 TYPICAL ATRIAL FLUTTER (H): ICD-10-CM

## 2019-12-18 DIAGNOSIS — I48.0 PAROXYSMAL ATRIAL FIBRILLATION (H): Primary | ICD-10-CM

## 2019-12-18 PROCEDURE — 99204 OFFICE O/P NEW MOD 45 MIN: CPT | Performed by: INTERNAL MEDICINE

## 2019-12-18 ASSESSMENT — MIFFLIN-ST. JEOR: SCORE: 1785.15

## 2019-12-18 NOTE — LETTER
"12/18/2019      Remington Riggs MD  [address] [facility] [address]      RE: Bryce Noer       Dear Colleague,    I had the pleasure of seeing Bryce Espinoza in the [facility].    Service Date: 12/18/2019      HISTORY OF PRESENT ILLNESS:    I had the pleasure of seeing Mr. Bryce Espinoza, a very nice 59-year-old gentleman who has been referred to EP for post-CABG atrial fibrillation and atrial flutter.      Mr. Espinoza has the following medical issues:   A.  Coronary artery disease.  CABG in 09/2019 (LIMA to LAD).  Endarterectomy of the proximal LAD was required due to severe calcification of the vessel.   B.  Post-CABG paroxysmal atrial fibrillation and typical atrial flutter.   C.  Normal left ventricular ejection fraction.   D.  Hypertension.   E.  Dyslipidemia.      Mr. Espinoza developed atrial arrhythmias after bypass.  As far as I can tell, these were minimally symptomatic (he had occasional awareness of irregular heartbeat and a \"pain\" in the chest associated with this).      He was treated with amiodarone for 2 months.  This was discontinued on 11/08/2019.  He has taken apixaban for anticoagulation.  He is also on aspirin and clopidogrel (clopidogrel has been ordered for 6 months after LAD endarterectomy).  Thankfully, he has not had bleeding issues.     Atrial arrhythmias, predominantly typical atrial flutter, have been intermittently documented in Cardiac Rehab.  The patient completed a 14-day cardiac monitor in late November/early December.  This revealed paroxysmal atrial fibrillation with burden of 1%, longest event of 4.5 hours.  This event occurred in the early a.m. hours.      Mr. Espinoza is ready to return to work.  He has done well with rehab.  He has a family history of coronary artery disease, but no atrial fibrillation that he knows of.  He lives with his wife in [place].      He drinks modest amounts of alcohol.  He occasionally snores at night, but he wakes up " "in the morning often feeling fatigued because he says \"I toss and turn all night long.\"        PHYSICAL EXAMINATION:   VITAL SIGNS:  Blood pressure 103/65, pulse 66 and regular, weight 98 kilos, height 175 cm.   GENERAL:  He is a pleasant, moderately overweight male in no distress.   HEENT:  Normocephalic, atraumatic.  Sclerae are anicteric.  Mouth, oropharynx clear.   NECK:  Thick, supple, no apparent jugular venous distention.  2+ carotid pulses, no bruits.   LUNGS:  With bilateral breath sounds.  No crackles or wheezes.   CARDIOVASCULAR:  Normal JVP, regular rhythm.  No apparent gallop, murmur, or rub.   ABDOMEN:  Moderately obese, soft, nontender.  Negative HJR.   EXTREMITIES:  No clubbing, cyanosis or edema, 2+ posterior tibial and radial pulses.   SKIN:  No rash.   BACK:  No CVA tenderness.   NEUROLOGIC:  Alert and oriented x3.      DIAGNOSTIC STUDIES:    -  For results of his recent cardiac monitor, see HPI.   -  Most recent electrocardiogram on 11/08/2019 showed typical atrial flutter with controlled ventricular rate, nonspecific ST-T abnormality.   -  Echocardiography before bypass showed normal LVEF of 60%-65% with no wall motion abnormalities.        IMPRESSION:     1.  Paroxysmal atrial fibrillation and typical atrial flutter post CABG.  Mr. Espinoza developed atrial arrhythmias after bypass.  It is unknown whether he had atrial arrhythmias before cardiac surgery.  Risk factors include probable sleep apnea and moderate alcohol use.  He was treated with amiodarone for 2 months.  He has been off amiodarone since 11/08/2019, but on a subsequent 14-day cardiac monitor there was paroxysmal atrial fibrillation documented, burden of 1%.  Ventricular rate was controlled.         Currently, Mr. Espinoza does not have symptoms from atrial fibrillation.         For the time being, I would continue metoprolol, essentially rate control treatment, for his infrequent atrial fibrillation.         I agree with " anticoagulation with Eliquis given his [id number] score of 2 (vascular disease, hypertension).  He is also on clopidogrel.  I think the aspirin can be stopped.      2.  Coronary artery disease with recent CABG.  BP is normal.  He is on a beta-blocker and a statin.       RECOMMENDATIONS:   A.  Stop aspirin, but continue Eliquis and clopidogrel.  Stop clopidogrel in 2020.   B.  We will refer for sleep consultation.  There is significant probability for obstructive sleep apnea which may be a trigger for AF.     C.  He was encouraged to lose weight and increase activity.   D.  Repeat a cardiac monitor in 4-5 months and see EP ABBEY thereafter.   E.  If he continues to have paroxysmal atrial arrhythmias, and depending on the burden and symptoms, we will discuss long-term treatment with rate versus rhythm control.      I appreciate the opportunity to be involved in this patient's care.  Please feel free to contact me with any questions.        SHUKRI LEACH MD, FACC         cc:   [place] [address]             D: 2019   T: 2019   MT: TIANA      Name:     RIA CHACON   MRN:      [id number]        Account:      [id number]   :      1960           Service Date: 2019      Document: [id number]           Outpatient Encounter Medications as of 2019   Medication Sig Dispense Refill     apixaban ANTICOAGULANT (ELIQUIS) 5 MG tablet Take 1 tablet (5 mg) by mouth 2 times daily 180 tablet 3     Cholecalciferol (VITAMIN D) 2000 UNITS tablet Take 2,000 Units by mouth daily       clopidogrel (PLAVIX) 75 MG tablet Take 1 tablet (75 mg) by mouth daily 30 tablet 4     metoprolol succinate ER (TOPROL-XL) 50 MG 24 hr tablet Take 1.5 tablets (75 mg) by mouth 2 times daily 270 tablet 3     rosuvastatin (CRESTOR) 10 MG tablet Take 1 tablet (10 mg) by mouth daily 90 tablet 3     [DISCONTINUED] ASPIRIN 81 MG OR TABS 1 tab po QD (Once  per day) 30 12     No facility-administered encounter medications on file as of 12/18/2019.      Again, thank you for allowing me to participate in the care of your patient.      Sincerely,    Martha Mcgovern MD     [facility]

## 2019-12-18 NOTE — PROGRESS NOTES
HPI and Plan:   See dictation    Orders Placed This Encounter   Procedures     SLEEP EVALUATION & MANAGEMENT REFERRAL - [facility] [phone]  (Age 18 and up)     Follow-Up with Cardiac Advanced Practice Provider     Payal Mccall [facility] Adult [facility] Greater than 48 hrs       No orders of the defined types were placed in this encounter.      Medications Discontinued During This Encounter   Medication Reason     ASPIRIN 81 MG OR TABS          Encounter Diagnoses   Name Primary?     Paroxysmal atrial fibrillation (H) Yes     Typical atrial flutter (H)      Coronary artery disease involving native coronary artery of native heart without angina pectoris        CURRENT MEDICATIONS:  Current Outpatient Medications   Medication Sig Dispense Refill     apixaban ANTICOAGULANT (ELIQUIS) 5 MG tablet Take 1 tablet (5 mg) by mouth 2 times daily 180 tablet 3     Cholecalciferol (VITAMIN D) 2000 UNITS tablet Take 2,000 Units by mouth daily       clopidogrel (PLAVIX) 75 MG tablet Take 1 tablet (75 mg) by mouth daily 30 tablet 4     metoprolol succinate ER (TOPROL-XL) 50 MG 24 hr tablet Take 1.5 tablets (75 mg) by mouth 2 times daily 270 tablet 3     rosuvastatin (CRESTOR) 10 MG tablet Take 1 tablet (10 mg) by mouth daily 90 tablet 3       ALLERGIES     Allergies   Allergen Reactions     Atorvastatin      Myalgias     Lisinopril      Body aches pt d/mylene  06/2015       PAST MEDICAL HISTORY:  Past Medical History:   Diagnosis Date     Coronary artery disease involving native coronary artery of native heart without angina pectoris 7/4/2019    Per CT calcium score of 722.97 3/2019     Essential hypertension, benign      Hematuria      Malignant neoplasm of bladder, 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     Olecranon bursitis 3/01    right     Other and unspecified hyperlipidemia      Paroxysmal atrial fibrillation (H)      Paroxysmal atrial flutter (H)      RIGHT LUNG CALCIFIED  GRANULOMA 2/02    RML     Tobacco use disorder     Quit 1/03     Unspecified hemorrhoids without mention of complication 8/01     Unspecified hereditary and idiopathic peripheral neuropathy        PAST SURGICAL HISTORY:  Past Surgical History:   Procedure Laterality Date     BYPASS GRAFT ARTERY CORONARY N/A 9/25/2019    Procedure: CORONARY ARTERY BYPASS GRAFTING x 1 (LIMA - LAD) WITH CORONARY ENDARTERECTOMY  (ON PUMP OXYGENATOR ; HANK BY [facility]);  Surgeon: Magdi Turner MD;  Location:  OR      NONSPECIFIC PROCEDURE  2/00/02    EBCT     CV HEART CATHETERIZATION WITH POSSIBLE INTERVENTION N/A 9/6/2019    Procedure: Coronary Angiogram;  Surgeon: Nick Willson MD;  Location:  HEART CARDIAC CATH LAB     CV LEFT HEART CATH N/A 9/6/2019    Procedure: Left Heart Cath;  Surgeon: Nick Willson MD;  Location:  HEART CARDIAC CATH LAB     CV LEFT VENTRICULOGRAM N/A 9/6/2019    Procedure: Left Ventriculogram;  Surgeon: Nick Willson MD;  Location:  HEART CARDIAC CATH LAB       FAMILY HISTORY:  Family History   Problem Relation Age of Onset     Arthritis Mother         RA     Hypertension Mother      Breast Cancer Mother      Prostate Cancer Maternal Grandfather         80     Cancer Maternal Grandmother         Lung     Coronary Artery Disease Father      Other - See Comments Brother      Diabetes Brother      Genetic Disorder Son        SOCIAL HISTORY:  Social History     Socioeconomic History     Marital status:      Spouse name: None     Number of children: None     Years of education: None     Highest education level: None   Occupational History     None   Social Needs     Financial resource strain: None     Food insecurity:     Worry: None     Inability: None     Transportation needs:     Medical: None     Non-medical: None   Tobacco Use     Smoking status: Former Smoker     Packs/day: 1.00     Years: 33.00     Pack years: 33.00     Last attempt to quit: 2/2/2013     Years since  quittin.8     Smokeless tobacco: Never Used   Substance and Sexual Activity     Alcohol use: Yes     Comment: 2 daily     Drug use: Yes     Comment: nica taylor     Sexual activity: Yes   Lifestyle     Physical activity:     Days per week: None     Minutes per session: None     Stress: None   Relationships     Social connections:     Talks on phone: None     Gets together: None     Attends Baptism service: None     Active member of club or organization: None     Attends meetings of clubs or organizations: None     Relationship status: None     Intimate partner violence:     Fear of current or ex partner: None     Emotionally abused: None     Physically abused: None     Forced sexual activity: None   Other Topics Concern     Parent/sibling w/ CABG, MI or angioplasty before 65F 55M? Not Asked   Social History Narrative     None       Review of Systems:  Skin:  Negative       Eyes:  Negative      ENT:  Negative      Respiratory:  Negative for dyspnea on exertion sometimes   Cardiovascular:  Negative      Gastroenterology: Negative      Genitourinary:  Negative      Musculoskeletal:  Negative      Neurologic:  Negative      Psychiatric:  Negative      Heme/Lymph/Imm:  Negative      Endocrine:  Negative        [id number]

## 2019-12-18 NOTE — PROGRESS NOTES
"Service Date: 12/18/2019      HISTORY OF PRESENT ILLNESS:    I had the pleasure of seeing Mr. Bryce Espinoza, a very nice 59-year-old gentleman who has been referred to EP for post-CABG atrial fibrillation and atrial flutter.      Mr. Espinoza has the following medical issues:   A.  Coronary artery disease.  CABG in 09/2019 (LIMA to LAD).  Endarterectomy of the proximal LAD was required due to severe calcification of the vessel.   B.  Post-CABG paroxysmal atrial fibrillation and typical atrial flutter.   C.  Normal left ventricular ejection fraction.   D.  Hypertension.   E.  Dyslipidemia.      Mr. Espinoza developed atrial arrhythmias after bypass.  As far as I can tell, these were minimally symptomatic (he had occasional awareness of irregular heartbeat and a \"pain\" in the chest associated with this).      He was treated with amiodarone for 2 months.  This was discontinued on 11/08/2019.  He has taken apixaban for anticoagulation.  He is also on aspirin and clopidogrel (clopidogrel has been ordered for 6 months after LAD endarterectomy).  Thankfully, he has not had bleeding issues.     Atrial arrhythmias, predominantly typical atrial flutter, have been intermittently documented in Cardiac Rehab.  The patient completed a 14-day cardiac monitor in late November/early December.  This revealed paroxysmal atrial fibrillation with burden of 1%, longest event of 4.5 hours.  This event occurred in the early a.m. hours.      Mr. Espinoza is ready to return to work.  He has done well with rehab.  He has a family history of coronary artery disease, but no atrial fibrillation that he knows of.  He lives with his wife in [place].      He drinks modest amounts of alcohol.  He occasionally snores at night, but he wakes up in the morning often feeling fatigued because he says \"I toss and turn all night long.\"        PHYSICAL EXAMINATION:   VITAL SIGNS:  Blood pressure 103/65, pulse 66 and regular, weight 98 kilos, height 175 cm. "   GENERAL:  He is a pleasant, moderately overweight male in no distress.   HEENT:  Normocephalic, atraumatic.  Sclerae are anicteric.  Mouth, oropharynx clear.   NECK:  Thick, supple, no apparent jugular venous distention.  2+ carotid pulses, no bruits.   LUNGS:  With bilateral breath sounds.  No crackles or wheezes.   CARDIOVASCULAR:  Normal JVP, regular rhythm.  No apparent gallop, murmur, or rub.   ABDOMEN:  Moderately obese, soft, nontender.  Negative HJR.   EXTREMITIES:  No clubbing, cyanosis or edema, 2+ posterior tibial and radial pulses.   SKIN:  No rash.   BACK:  No CVA tenderness.   NEUROLOGIC:  Alert and oriented x3.      DIAGNOSTIC STUDIES:    -  For results of his recent cardiac monitor, see HPI.   -  Most recent electrocardiogram on 11/08/2019 showed typical atrial flutter with controlled ventricular rate, nonspecific ST-T abnormality.   -  Echocardiography before bypass showed normal LVEF of 60%-65% with no wall motion abnormalities.        IMPRESSION:     1.  Paroxysmal atrial fibrillation and typical atrial flutter post CABG.  Mr. Espinoza developed atrial arrhythmias after bypass.  It is unknown whether he had atrial arrhythmias before cardiac surgery.  Risk factors include probable sleep apnea and moderate alcohol use.  He was treated with amiodarone for 2 months.  He has been off amiodarone since 11/08/2019, but on a subsequent 14-day cardiac monitor there was paroxysmal atrial fibrillation documented, burden of 1%.  Ventricular rate was controlled.         Currently, Mr. Espinoza does not have symptoms from atrial fibrillation.         For the time being, I would continue metoprolol, essentially rate control treatment, for his infrequent atrial fibrillation.         I agree with anticoagulation with Eliquis given his [id number] score of 2 (vascular disease, hypertension).  He is also on clopidogrel.  I think the aspirin can be stopped.      2.  Coronary artery disease with recent CABG.  BP is  normal.  He is on a beta-blocker and a statin.       RECOMMENDATIONS:   A.  Stop aspirin, but continue Eliquis and clopidogrel.  Stop clopidogrel in 2020.   B.  We will refer for sleep consultation.  There is significant probability for obstructive sleep apnea which may be a trigger for AF.     C.  He was encouraged to lose weight and increase activity.   D.  Repeat a cardiac monitor in 4-5 months and see EP ABBEY thereafter.   E.  If he continues to have paroxysmal atrial arrhythmias, and depending on the burden and symptoms, we will discuss long-term treatment with rate versus rhythm control.      I appreciate the opportunity to be involved in this patient's care.  Please feel free to contact me with any questions.        SHUKRI LEACH MD, FACC         cc:   [place] [address]             D: 2019   T: 2019   MT: TIANA      Name:     RIA CHACON   MRN:      [id number]        Account:      [id number]   :      1960           Service Date: 2019      Document: [id number]

## 2019-12-18 NOTE — LETTER
12/18/2019    Remington Riggs MD  [address] [facility] [address]    RE: Bryce Espinoza       Dear Colleague,    I had the pleasure of seeing Bryce Espinoza in the [facility].    HPI and Plan:   See dictation    Orders Placed This Encounter   Procedures     SLEEP EVALUATION & MANAGEMENT [place] [phone]  (Age 18 and up)     Follow-Up with Cardiac Advanced Practice Provider     Zio Patch Holter Adult Pediatric Greater than 48 hrs       No orders of the defined types were placed in this encounter.      Medications Discontinued During This Encounter   Medication Reason     ASPIRIN 81 MG OR TABS          Encounter Diagnoses   Name Primary?     Paroxysmal atrial fibrillation (H) Yes     Typical atrial flutter (H)      Coronary artery disease involving native coronary artery of native heart without angina pectoris        CURRENT MEDICATIONS:  Current Outpatient Medications   Medication Sig Dispense Refill     apixaban ANTICOAGULANT (ELIQUIS) 5 MG tablet Take 1 tablet (5 mg) by mouth 2 times daily 180 tablet 3     Cholecalciferol (VITAMIN D) 2000 UNITS tablet Take 2,000 Units by mouth daily       clopidogrel (PLAVIX) 75 MG tablet Take 1 tablet (75 mg) by mouth daily 30 tablet 4     metoprolol succinate ER (TOPROL-XL) 50 MG 24 hr tablet Take 1.5 tablets (75 mg) by mouth 2 times daily 270 tablet 3     rosuvastatin (CRESTOR) 10 MG tablet Take 1 tablet (10 mg) by mouth daily 90 tablet 3       ALLERGIES     Allergies   Allergen Reactions     Atorvastatin      Myalgias     Lisinopril      Body aches pt d/mylene  06/2015       PAST MEDICAL HISTORY:  Past Medical History:   Diagnosis Date     Coronary artery disease involving native coronary artery of native heart without angina pectoris 7/4/2019    Per CT calcium score of 722.97 3/2019     Essential hypertension, benign      Hematuria      Malignant neoplasm of bladder, part unspecified 11/02     Transitional Cell Carcinoma bladder     Olecranon bursitis 3/01    right     Other and unspecified hyperlipidemia      Paroxysmal atrial fibrillation (H)      Paroxysmal atrial flutter (H)      RIGHT LUNG CALCIFIED GRANULOMA 2/02    RML     Tobacco use disorder     Quit 1/03     Unspecified hemorrhoids without mention of complication 8/01     Unspecified hereditary and idiopathic peripheral neuropathy        PAST SURGICAL HISTORY:  Past Surgical History:   Procedure Laterality Date     BYPASS GRAFT ARTERY CORONARY N/A 9/25/2019    Procedure: CORONARY ARTERY BYPASS GRAFTING x 1 (LIMA - LAD) WITH CORONARY ENDARTERECTOMY  (ON PUMP OXYGENATOR ; [facility]);  Surgeon: Magdi Turner MD;  Location:  OR     C NONSPECIFIC PROCEDURE  2/00/02    EBCT     CV HEART CATHETERIZATION WITH POSSIBLE INTERVENTION N/A 9/6/2019    Procedure: Coronary Angiogram;  Surgeon: Nick Willson MD;  Location:  HEART CARDIAC CATH LAB     CV LEFT HEART CATH N/A 9/6/2019    Procedure: Left Heart Cath;  Surgeon: Nick Willson MD;  Location:  HEART CARDIAC CATH LAB     CV LEFT VENTRICULOGRAM N/A 9/6/2019    Procedure: Left Ventriculogram;  Surgeon: Nick Willson MD;  Location:  HEART CARDIAC CATH LAB       FAMILY HISTORY:  Family History   Problem Relation Age of Onset     Arthritis Mother         RA     Hypertension Mother      Breast Cancer Mother      Prostate Cancer Maternal Grandfather         80     Cancer Maternal Grandmother         Lung     Coronary Artery Disease Father      Other - See Comments Brother      Diabetes Brother      Genetic Disorder Son        SOCIAL HISTORY:  Social History     Socioeconomic History     Marital status:      Spouse name: None     Number of children: None     Years of education: None     Highest education level: None   Occupational History     None   Social Needs     Financial resource strain: None     Food insecurity:     Worry: None     Inability: None      Transportation needs:     Medical: None     Non-medical: None   Tobacco Use     Smoking status: Former Smoker     Packs/day: 1.00     Years: 33.00     Pack years: 33.00     Last attempt to quit: 2013     Years since quittin.8     Smokeless tobacco: Never Used   Substance and Sexual Activity     Alcohol use: Yes     Comment: 2 daily     Drug use: Yes     Comment: nica taylor     Sexual activity: Yes   Lifestyle     Physical activity:     Days per week: None     Minutes per session: None     Stress: None   Relationships     Social connections:     Talks on phone: None     Gets together: None     Attends Zoroastrian service: None     Active member of club or organization: None     Attends meetings of clubs or organizations: None     Relationship status: None     Intimate partner violence:     Fear of current or ex partner: None     Emotionally abused: None     Physically abused: None     Forced sexual activity: None   Other Topics Concern     Parent/sibling w/ CABG, MI or angioplasty before 65F 55M? Not Asked   Social History Narrative     None       Review of Systems:  Skin:  Negative       Eyes:  Negative      ENT:  Negative      Respiratory:  Negative for dyspnea on exertion sometimes   Cardiovascular:  Negative      Gastroenterology: Negative      Genitourinary:  Negative      Musculoskeletal:  Negative      Neurologic:  Negative      Psychiatric:  Negative      Heme/Lymph/Imm:  Negative      Endocrine:  Negative        [id number]            Thank you for allowing me to participate in the care of your patient.      Sincerely,     Martha Mcgovern MD     [facility]    cc:   Leah Thomson PA-C  [address] [place] [id number]

## 2019-12-18 NOTE — PATIENT INSTRUCTIONS
1.  Stop aspirin.  2.  Please see a sleep apnea doctor.  Your Afib may be caused by sleep apnea.   3.  Repeat a cardiac monitor in 4-5 months.  4.  We will see you again AFTER the monitor is completed.

## 2020-01-08 ENCOUNTER — TELEPHONE (OUTPATIENT)
Dept: OTHER | Facility: CLINIC | Age: 60
End: 2020-01-08

## 2020-01-08 ENCOUNTER — NURSE TRIAGE (OUTPATIENT)
Dept: INTERNAL MEDICINE | Facility: CLINIC | Age: 60
End: 2020-01-08

## 2020-01-08 NOTE — TELEPHONE ENCOUNTER
Call from patient today stating he has had ABD pain for the last month that has gotten increasing worse. Rates pain 6-7 out of 10 on lower/middle of the ABD.     OV scheduled with PCP 1/9/20    Will call the clinic if new symptoms develop, will go to the ED if fever, chest pain, SOD develops before OV.     Additional Information    Negative: Passed out (i.e., fainted, collapsed and was not responding)    Negative: Shock suspected (e.g., cold/pale/clammy skin, too weak to stand, low BP, rapid pulse)    Negative: Sounds like a life-threatening emergency to the triager    Chest pain    Negative: Severe difficulty breathing (e.g., struggling for each breath, speaks in single words)    Negative: Passed out (i.e., fainted, collapsed and was not responding)    Negative: Chest pain lasting longer than 5 minutes and ANY of the following:* Over 50 years old* Over 30 years old and at least one cardiac risk factor (i.e., high blood pressure, diabetes, high cholesterol, obesity, smoker or strong family history of heart disease)* Pain is crushing, pressure-like, or heavy * Took nitroglycerin and chest pain was not relieved* History of heart disease (i.e., angina, heart attack, bypass surgery, angioplasty, CHF)    Negative: Visible sweat on face or sweat dripping down face    Negative: Sounds like a life-threatening emergency to the triager    Negative: Followed an injury to chest    Negative: SEVERE chest pain    Negative: Pain also present in shoulder(s) or arm(s) or jaw    Negative: Difficulty breathing    Negative: Cocaine use within last 3 days    Negative: History of prior 'blood clot' in leg or lungs (i.e., deep vein thrombosis, pulmonary embolism)    Negative: Recent illness requiring prolonged bed rest (i.e., immobilization)    Negative: Hip or leg fracture in past 2 months (e.g, or had cast on leg or ankle)    Negative: Major surgery in the past month    Negative: Recent long-distance travel with prolonged time in car,  "bus, plane, or train (i.e., within past 2 weeks; 6 or more hours duration)    Negative: Heart beating irregularly or very rapidly    Negative: Chest pain lasting longer than 5 minutes    Negative: Intermittent chest pain and pain has been increasing in severity or frequency    Negative: Dizziness or lightheadedness    Negative: Coughing up blood    Negative: Patient sounds very sick or weak to the triager    Patient wants to be seen    Negative: Fever > 100.5 F (38.1 C)    Negative: Intermittent chest pains persist > 3 days    Negative: All other patients with chest pain    Answer Assessment - Initial Assessment Questions  1. LOCATION: \"Where does it hurt?\"       Middle, bottom of the ABD      Bypass surgery 9/2019    2. RADIATION: \"Does the pain shoot anywhere else?\" (e.g., chest, back)      no  3. ONSET: \"When did the pain begin?\" (Minutes, hours or days ago)       1 month.   4. SUDDEN: \"Gradual or sudden onset?\"      Came on suddenly, but has continually getting worse.   5. PATTERN \"Does the pain come and go, or is it constant?\"     - If constant: \"Is it getting better, staying the same, or worsening?\"       (Note: Constant means the pain never goes away completely; most serious pain is constant and it progresses)      - If intermittent: \"How long does it last?\" \"Do you have pain now?\"      (Note: Intermittent means the pain goes away completely between bouts)      Comes and goes  6. SEVERITY: \"How bad is the pain?\"  (e.g., Scale 1-10; mild, moderate, or severe)     - MILD (1-3): doesn't interfere with normal activities, abdomen soft and not tender to touch      - MODERATE (4-7): interferes with normal activities or awakens from sleep, tender to touch      - SEVERE (8-10): excruciating pain, doubled over, unable to do any normal activities        6-7 out of 10.   7. RECURRENT SYMPTOM: \"Have you ever had this type of abdominal pain before?\" If so, ask: \"When was the last time?\" and \"What happened that time?\"       " "no  8. CAUSE: \"What do you think is causing the abdominal pain?\"      unknown  9. RELIEVING/AGGRAVATING FACTORS: \"What makes it better or worse?\" (e.g., movement, antacids, bowel movement)      Bending down, put pressure on ABD it gets worse.   10. OTHER SYMPTOMS: \"Has there been any vomiting, diarrhea, constipation, or urine problems?\"        None    Protocols used: ABDOMINAL PAIN - MALE-A-[place], CHEST PAIN-A-[place]      "

## 2020-01-08 NOTE — TELEPHONE ENCOUNTER
Patient have abdominal pain for about a month that is getting worst. He would like to speak to a nurse.

## 2020-01-09 ENCOUNTER — OFFICE VISIT (OUTPATIENT)
Dept: INTERNAL MEDICINE | Facility: CLINIC | Age: 60
End: 2020-01-09
Payer: COMMERCIAL

## 2020-01-09 ENCOUNTER — ANCILLARY PROCEDURE (OUTPATIENT)
Dept: GENERAL RADIOLOGY | Facility: CLINIC | Age: 60
End: 2020-01-09
Attending: INTERNAL MEDICINE
Payer: COMMERCIAL

## 2020-01-09 VITALS
SYSTOLIC BLOOD PRESSURE: 112 MMHG | HEART RATE: 60 BPM | BODY MASS INDEX: 31.4 KG/M2 | RESPIRATION RATE: 16 BRPM | TEMPERATURE: 97.9 F | DIASTOLIC BLOOD PRESSURE: 76 MMHG | WEIGHT: 212 LBS | HEIGHT: 69 IN | OXYGEN SATURATION: 92 %

## 2020-01-09 DIAGNOSIS — C67.9 MALIGNANT NEOPLASM OF URINARY BLADDER, UNSPECIFIED SITE (H): ICD-10-CM

## 2020-01-09 DIAGNOSIS — K40.20 NON-RECURRENT BILATERAL INGUINAL HERNIA WITHOUT OBSTRUCTION OR GANGRENE: ICD-10-CM

## 2020-01-09 DIAGNOSIS — E78.5 HYPERLIPIDEMIA LDL GOAL <70: ICD-10-CM

## 2020-01-09 DIAGNOSIS — R10.84 ABDOMINAL PAIN, GENERALIZED: ICD-10-CM

## 2020-01-09 DIAGNOSIS — D64.9 ANEMIA, UNSPECIFIED TYPE: ICD-10-CM

## 2020-01-09 DIAGNOSIS — R10.84 ABDOMINAL PAIN, GENERALIZED: Primary | ICD-10-CM

## 2020-01-09 PROCEDURE — 99214 OFFICE O/P EST MOD 30 MIN: CPT | Performed by: INTERNAL MEDICINE

## 2020-01-09 PROCEDURE — 74019 RADEX ABDOMEN 2 VIEWS: CPT

## 2020-01-09 ASSESSMENT — MIFFLIN-ST. JEOR: SCORE: 1767.01

## 2020-01-09 NOTE — PATIENT INSTRUCTIONS
Mag Citrate 10oz bottle.  Take 1/2 botte tomorrow night when get home to clean out a lot of stool  If no stool propduction, then take the other 1/2 bottle the next morning  If pain goes away, then use some Metamucil  every 1-2 days to keep  stools moving well  I not better, then let me know (Mychart  or  nurseline  call)  and will get CT abdomen  Future colonoscopy in April once  have been on Plavix for 6+ months  Get cystoscopy with Dr Ventura as scheduled  Labs as ordered in next 1 week

## 2020-01-09 NOTE — PROGRESS NOTES
Subjective     Bryce Espinoza is a 59 year old male who presents to clinic today for the following health issues:    HPI   Chief Complaint   Patient presents with     Abdominal Pain     Increasing Abdominal Pain, after CBAG (Possible Hernia)        Pt's past medical history, family history, habits, medications and allergies were reviewed with the patient today.  See snap shot for  HCM status. Most recent lab results reviewed with pt. Problem list and histories reviewed & adjusted, as indicated.  Additional history as below:    Had increase pain 2 nights ago. Better after BM. BMs regular.  Bending over or coughing will increase abdominal discomfort.  Abdominal pain is present in the mid lower part of the abdomen.  Weight is up 9 pounds compared to 1 year ago.  Appetite good.  Does not change symptoms of abdominal pain with food intake.  Pain is not present currently sitting in clinic today.  Denies blood in his stools.  Due for colonoscopy.  Hx  Coronary artery disease.  CABG in 09/2019 (LIMA to LAD).  Endarterectomy of the proximal LAD was required due to severe calcification of the vessel.  Recent saw cardiology and ASA stopped but instructed to continue Eliquis and clopidogrel for now.  Stop clopidogrel in March 2020.  Therefore need to defer colonoscopy until the spring  Has cystoscopy scheduled for next week with hx bladder CA.  Denies any dysuria or hematuria    Component      Latest Ref Rng & Units 10/28/2019   Sodium      133 - 144 mmol/L 139   Potassium      3.4 - 5.3 mmol/L 4.1   Chloride      94 - 109 mmol/L 105   Carbon Dioxide      20 - 32 mmol/L 30   Anion Gap      3 - 14 mmol/L 4   Glucose      70 - 99 mg/dL 100 (H)   Urea Nitrogen      7 - 30 mg/dL 14   Creatinine      0.66 - 1.25 mg/dL 0.70   GFR Estimate      >60 mL/min/1.73:m2 >90   GFR Estimate If Black      >60 mL/min/1.73:m2 >90   Calcium      8.5 - 10.1 mg/dL 8.7   Bilirubin Total      0.2 - 1.3 mg/dL 0.4   Albumin      3.4 - 5.0 g/dL 3.2 (L)  "  Protein Total      6.8 - 8.8 g/dL 6.6 (L)   Alkaline Phosphatase      40 - 150 U/L 152 (H)   ALT      0 - 70 U/L 31   AST      0 - 45 U/L 8   WBC      4.0 - 11.0 10e9/L 11.0   RBC Count      4.4 - 5.9 10e12/L 3.88 (L)   Hemoglobin      13.3 - 17.7 g/dL 11.9 (L)   Hematocrit      40.0 - 53.0 % 36.5 (L)   MCV      78 - 100 fl 94   MCH      26.5 - 33.0 pg 30.7   MCHC      31.5 - 36.5 g/dL 32.6   RDW      10.0 - 15.0 % 12.7   Platelet Count      150 - 450 10e9/L 286   Cholesterol      <200 mg/dL 117   Triglycerides      <150 mg/dL 93   HDL Cholesterol      >39 mg/dL 31 (L)   LDL Cholesterol Calculated      <100 mg/dL 67   Non HDL Cholesterol      <130 mg/dL 86        Additional ROS:   Constitutional, HEENT, Cardiovascular, Pulmonary, GI and , Neuro, MSK and Psych review of systems/symptoms are otherwise negative or unchanged from previous, except as noted above.      OBJECTIVE:  /76   Pulse 60   Temp 97.9  F (36.6  C) (Temporal)   Resp 16   Ht 1.753 m (5' 9\")   Wt 96.2 kg (212 lb)   SpO2 92%   BMI 31.31 kg/m     Estimated body mass index is 31.31 kg/m  as calculated from the following:    Height as of this encounter: 1.753 m (5' 9\").    Weight as of this encounter: 96.2 kg (212 lb).     Pulm: Lungs clear to auscultation   CV: Regular rates and rhythm  GI: Soft, nontender, Normal active bowel sounds, No hepatosplenomegaly or masses palpable  Ext: Peripheral pulses intact. No edema.   : testicles normal without atrophy or masses and penis normal without urethral discharge.  Small bilateral reducible inguinal hernias palpable.  Nontender to palpation with Valsalva  so unlikely contributing to patient's overall symptoms    ABDOMEN TWO VIEWS    1/9/2020 4:19 PM      HISTORY: Abdominal pain, generalized     COMPARISON: None.                                                                      IMPRESSION: Nonobstructive bowel. Moderate stool in colon. No free  air.     JASBIR ALLEN MD      Assessment/Plan: " (See plan discussion below for further details)  1. Abdominal pain, generalized  Appears related to mild constipation.  Patient will drink a half bottle of magnesium citrate tomorrow to clear out stool.  If no success, will drink the other half bottle the next day.  If abdominal symptoms persist despite that, patient to inform physician  - XR Abdomen 2 Views; Future    2. Non-recurrent bilateral inguinal hernia without obstruction or gangrene  Minimal and unlikely be contributing to symptoms.  Will monitor    3. Malignant neoplasm of urinary bladder, unspecified site (H)  Patient has cystoscopy scheduled for next week with urology for follow-up.  Denies current symptoms    4. Hyperlipidemia LDL goal <70  At goal with current statin therapy.  Repeat fasting lipid labs October 2020   - Comprehensive metabolic panel; Future  - Lipid panel reflex to direct LDL Fasting; Future    5. Anemia, unspecified type  Denies hematuria or seeing blood in his stools.  Labs as ordered for follow-up  - Iron and iron binding capacity; Future  - Hemoglobin; Future  - Vitamin B12; Future  - Protein electrophoresis; Future    Plan discussion:   Mag Citrate 10oz bottle.  Take 1/2 botte tomorrow night when get home to clean out  stool  If no stool propduction, then take the other 1/2 bottle the next morning  If pain goes away, then use some Metamucil  every 1-2 days to keep  stools moving well  I not better, then let me know (Mychart  or  nurseline  call)  and will get CT abdomen  Future colonoscopy in April once  have been on Plavix for 6+ months  Get cystoscopy with Dr Ventura as scheduled  Labs as ordered in next 1 week    Remington Riggs MD  Internal Medicine Department  [facility]    (Chart documentation was completed, in part, with [employer] voice-recognition software. Even though reviewed, some grammatical, spelling, and word errors may remain.)

## 2020-01-13 ENCOUNTER — TELEPHONE (OUTPATIENT)
Dept: INTERNAL MEDICINE | Facility: CLINIC | Age: 60
End: 2020-01-13

## 2020-01-13 ENCOUNTER — TRANSFERRED RECORDS (OUTPATIENT)
Dept: HEALTH INFORMATION MANAGEMENT | Facility: CLINIC | Age: 60
End: 2020-01-13

## 2020-01-13 NOTE — TELEPHONE ENCOUNTER
----- Message from Remington Riggs MD sent at 1/12/2020 11:40 AM CST -----  Regarding: need for labs  Call patient.  Forgot to order labs when last seen in clinic for follow-up of mild anemia.  Patient to schedule a nonfasting lab appointment sometime in the next week.

## 2020-01-18 DIAGNOSIS — E78.5 HYPERLIPIDEMIA LDL GOAL <70: ICD-10-CM

## 2020-01-18 DIAGNOSIS — D64.9 ANEMIA, UNSPECIFIED TYPE: ICD-10-CM

## 2020-01-18 LAB
ALBUMIN SERPL-MCNC: 3.6 G/DL (ref 3.4–5)
ALP SERPL-CCNC: 110 U/L (ref 40–150)
ALT SERPL W P-5'-P-CCNC: 37 U/L (ref 0–70)
ANION GAP SERPL CALCULATED.3IONS-SCNC: 2 MMOL/L (ref 3–14)
AST SERPL W P-5'-P-CCNC: 16 U/L (ref 0–45)
BILIRUB SERPL-MCNC: 0.3 MG/DL (ref 0.2–1.3)
BUN SERPL-MCNC: 16 MG/DL (ref 7–30)
CALCIUM SERPL-MCNC: 8.9 MG/DL (ref 8.5–10.1)
CHLORIDE SERPL-SCNC: 111 MMOL/L (ref 94–109)
CHOLEST SERPL-MCNC: 125 MG/DL
CO2 SERPL-SCNC: 31 MMOL/L (ref 20–32)
CREAT SERPL-MCNC: 0.76 MG/DL (ref 0.66–1.25)
GFR SERPL CREATININE-BSD FRML MDRD: >90 ML/MIN/{1.73_M2}
GLUCOSE SERPL-MCNC: 101 MG/DL (ref 70–99)
HDLC SERPL-MCNC: 38 MG/DL
HGB BLD-MCNC: 14 G/DL (ref 13.3–17.7)
IRON SATN MFR SERPL: 19 % (ref 15–46)
IRON SERPL-MCNC: 59 UG/DL (ref 35–180)
LDLC SERPL CALC-MCNC: 72 MG/DL
NONHDLC SERPL-MCNC: 87 MG/DL
POTASSIUM SERPL-SCNC: 4.4 MMOL/L (ref 3.4–5.3)
PROT SERPL-MCNC: 6.6 G/DL (ref 6.8–8.8)
SODIUM SERPL-SCNC: 144 MMOL/L (ref 133–144)
TIBC SERPL-MCNC: 307 UG/DL (ref 240–430)
TRIGL SERPL-MCNC: 73 MG/DL
VIT B12 SERPL-MCNC: 289 PG/ML (ref 193–986)

## 2020-01-18 PROCEDURE — 82607 VITAMIN B-12: CPT | Performed by: INTERNAL MEDICINE

## 2020-01-18 PROCEDURE — 83550 IRON BINDING TEST: CPT | Performed by: INTERNAL MEDICINE

## 2020-01-18 PROCEDURE — 80061 LIPID PANEL: CPT | Performed by: INTERNAL MEDICINE

## 2020-01-18 PROCEDURE — 84165 PROTEIN E-PHORESIS SERUM: CPT | Performed by: INTERNAL MEDICINE

## 2020-01-18 PROCEDURE — 36415 COLL VENOUS BLD VENIPUNCTURE: CPT | Performed by: INTERNAL MEDICINE

## 2020-01-18 PROCEDURE — 00000402 ZZHCL STATISTIC TOTAL PROTEIN: Performed by: INTERNAL MEDICINE

## 2020-01-18 PROCEDURE — 80053 COMPREHEN METABOLIC PANEL: CPT | Performed by: INTERNAL MEDICINE

## 2020-01-18 PROCEDURE — 85018 HEMOGLOBIN: CPT | Performed by: INTERNAL MEDICINE

## 2020-01-18 PROCEDURE — 83540 ASSAY OF IRON: CPT | Performed by: INTERNAL MEDICINE

## 2020-01-20 LAB
ALBUMIN SERPL ELPH-MCNC: 4 G/DL (ref 3.7–5.1)
ALPHA1 GLOB SERPL ELPH-MCNC: 0.3 G/DL (ref 0.2–0.4)
ALPHA2 GLOB SERPL ELPH-MCNC: 0.8 G/DL (ref 0.5–0.9)
B-GLOBULIN SERPL ELPH-MCNC: 0.7 G/DL (ref 0.6–1)
GAMMA GLOB SERPL ELPH-MCNC: 0.6 G/DL (ref 0.7–1.6)
M PROTEIN SERPL ELPH-MCNC: 0 G/DL
PROT PATTERN SERPL ELPH-IMP: ABNORMAL

## 2020-02-03 ENCOUNTER — NURSE TRIAGE (OUTPATIENT)
Dept: INTERNAL MEDICINE | Facility: CLINIC | Age: 60
End: 2020-02-03

## 2020-02-03 NOTE — TELEPHONE ENCOUNTER
MD based on cough and pt having ongoing abdominal pain . Can you see tomorrow in a same day spot ?.Karime Treviño RN

## 2020-02-03 NOTE — TELEPHONE ENCOUNTER
Pt advised, stated understanding, and agreed to plan of care.  Is okay with scheduling appt for tomorrow.   But he is busy right now, and will call clinic back to schedule appt for tomorrow.

## 2020-02-03 NOTE — TELEPHONE ENCOUNTER
Additional Information    Negative: Bluish (or gray) lips or face    Negative: Severe difficulty breathing (e.g., struggling for each breath, speaks in single words)    Negative: Rapid onset of cough and has hives    Negative: Coughing started suddenly after medicine, an allergic food or bee sting    Negative: Difficulty breathing after exposure to flames, smoke, or fumes    Negative: Sounds like a life-threatening emergency to the triager    Negative: Previous asthma attacks and this feels like asthma attack    Negative: Chest pain present when not coughing    Negative: Difficulty breathing    Negative: Passed out (i.e., fainted, collapsed and was not responding)    Negative: Patient sounds very sick or weak to the triager    Negative: Coughed up > 1 tablespoon (15 ml) blood (Exception: blood-tinged sputum)    Negative: Fever > 103 F (39.4 C)    Negative: Fever > 101 F (38.3 C) and over 60 years of age    Negative: Fever > 100.0 F (37.8 C) and has diabetes mellitus or a weak immune system (e.g., HIV positive, cancer chemotherapy, organ transplant, splenectomy, chronic steroids)    Negative: Fever > 100.0 F (37.8 C) and bedridden (e.g., nursing home patient, stroke, chronic illness, recovering from surgery)    Negative: Increasing ankle swelling    Negative: Wheezing is present    Negative: SEVERE coughing spells (e.g., whooping sound after coughing, vomiting after coughing)    Negative: Coughing up kyle-colored (reddish-brown) or blood-tinged sputum    Negative: Fever present > 3 days (72 hours)    Negative: Fever returns after gone for over 24 hours and symptoms worse or not improved    Negative: Using nasal washes and pain medicine > 24 hours and sinus pain persists    Negative: Known COPD or other severe lung disease (i.e., bronchiectasis, cystic fibrosis, lung surgery) and worsening symptoms (i.e., increased sputum purulence or amount, increased breathing difficulty)    Continuous (nonstop) coughing  "interferes with work or school and no improvement using cough treatment per Care Advice    Patient wants to be seen    Negative: Exposure to TB (Tuberculosis)    Negative: Taking an ACE Inhibitor medication (e.g., benazepril/LOTENSIN, captopril/CAPOTEN, enalapril/VASOTEC, lisinopril/ZESTRIL)    Negative: Nasal discharge present > 10 days    Negative: Allergy symptoms are also present (e.g., itchy eyes, clear nasal discharge, postnasal drip)    Negative: Cough has been present for > 3 weeks    Negative: Cough with cold symptoms (e.g., runny nose, postnasal drip, throat clearing)    Negative: Cough with no complications    Answer Assessment - Initial Assessment Questions  1. ONSET: \"When did the cough begin?\"       Few days ago   2. SEVERITY: \"How bad is the cough today?\"       Coughed all night   3. RESPIRATORY DISTRESS: \"Describe your breathing.\"       Its ok   4. FEVER: \"Do you have a fever?\" If so, ask: \"What is your temperature, how was it measured, and when did it start?\"      no  5. HEMOPTYSIS: \"Are you coughing up any blood?\" If so ask: \"How much?\" (flecks, streaks, tablespoons, etc.)      no  6. TREATMENT: \"What have you done so far to treat the cough?\" (e.g., meds, fluids, humidifier)      OTC meds   7. CARDIAC HISTORY: \"Do you have any history of heart disease?\" (e.g., heart attack, congestive heart failure)       yes  8. LUNG HISTORY: \"Do you have any history of lung disease?\"  (e.g., pulmonary embolus, asthma, emphysema)      no  9. PE RISK FACTORS: \"Do you have a history of blood clots?\" (or: recent major surgery, recent prolonged travel, bedridden )      no  10. OTHER SYMPTOMS: \"Do you have any other symptoms? (e.g., runny nose, wheezing, chest pain)        no  11. PREGNANCY: \"Is there any chance you are pregnant?\" \"When was your last menstrual period?\"        no  12. TRAVEL: \"Have you traveled out of the country in the last month?\" (e.g., travel history, exposures)        no    Protocols used: " COUGH-A-[place]

## 2020-02-04 ENCOUNTER — ANCILLARY PROCEDURE (OUTPATIENT)
Dept: GENERAL RADIOLOGY | Facility: CLINIC | Age: 60
End: 2020-02-04
Attending: INTERNAL MEDICINE
Payer: COMMERCIAL

## 2020-02-04 ENCOUNTER — OFFICE VISIT (OUTPATIENT)
Dept: INTERNAL MEDICINE | Facility: CLINIC | Age: 60
End: 2020-02-04
Payer: COMMERCIAL

## 2020-02-04 VITALS
HEART RATE: 57 BPM | WEIGHT: 215 LBS | BODY MASS INDEX: 31.75 KG/M2 | DIASTOLIC BLOOD PRESSURE: 78 MMHG | SYSTOLIC BLOOD PRESSURE: 110 MMHG | OXYGEN SATURATION: 92 % | TEMPERATURE: 98.2 F | RESPIRATION RATE: 16 BRPM

## 2020-02-04 DIAGNOSIS — R10.30 LOWER ABDOMINAL PAIN: ICD-10-CM

## 2020-02-04 DIAGNOSIS — R06.02 SOB (SHORTNESS OF BREATH): ICD-10-CM

## 2020-02-04 DIAGNOSIS — J18.9 PNEUMONIA OF LEFT LOWER LOBE DUE TO INFECTIOUS ORGANISM: ICD-10-CM

## 2020-02-04 DIAGNOSIS — R19.7 DIARRHEA, UNSPECIFIED TYPE: ICD-10-CM

## 2020-02-04 LAB
ERYTHROCYTE [DISTWIDTH] IN BLOOD BY AUTOMATED COUNT: 17.1 % (ref 10–15)
HCT VFR BLD AUTO: 46.9 % (ref 40–53)
HGB BLD-MCNC: 15.2 G/DL (ref 13.3–17.7)
MCH RBC QN AUTO: 29.2 PG (ref 26.5–33)
MCHC RBC AUTO-ENTMCNC: 32.4 G/DL (ref 31.5–36.5)
MCV RBC AUTO: 90 FL (ref 78–100)
PLATELET # BLD AUTO: 207 10E9/L (ref 150–450)
RBC # BLD AUTO: 5.2 10E12/L (ref 4.4–5.9)
WBC # BLD AUTO: 8.8 10E9/L (ref 4–11)

## 2020-02-04 PROCEDURE — 85027 COMPLETE CBC AUTOMATED: CPT | Performed by: INTERNAL MEDICINE

## 2020-02-04 PROCEDURE — 36415 COLL VENOUS BLD VENIPUNCTURE: CPT | Performed by: INTERNAL MEDICINE

## 2020-02-04 PROCEDURE — 80053 COMPREHEN METABOLIC PANEL: CPT | Performed by: INTERNAL MEDICINE

## 2020-02-04 PROCEDURE — 71046 X-RAY EXAM CHEST 2 VIEWS: CPT

## 2020-02-04 PROCEDURE — 99214 OFFICE O/P EST MOD 30 MIN: CPT | Performed by: INTERNAL MEDICINE

## 2020-02-04 RX ORDER — CEFUROXIME AXETIL 500 MG/1
500 TABLET ORAL 2 TIMES DAILY
Qty: 20 TABLET | Refills: 0 | Status: SHIPPED | OUTPATIENT
Start: 2020-02-04 | End: 2020-02-20

## 2020-02-04 RX ORDER — AZITHROMYCIN 250 MG/1
TABLET, FILM COATED ORAL
Qty: 6 TABLET | Refills: 0 | Status: SHIPPED | OUTPATIENT
Start: 2020-02-04 | End: 2020-02-20

## 2020-02-04 NOTE — PATIENT INSTRUCTIONS
Azithromycin 2 tabs today, then 1 tab daily for 4 days along with Cefuroxime 500mg tab, 1 tab twice a day for 10 days for   pneumonia   CXR  If productive cough, can use OTC Mucinex 12 hrs version, 1 tab  twice a day as needed for expectorant   Warm liquids during the day  Along with usual hydration   Labs as ordered including stool studies. Bring stool labs back to lab  tomorrow  If stool labs positive for C diff bacteria, will treat and see how abdomen does. If c diff negative then will get CT abdomen  Repeat Chest Xray in 3-4 weeks to insure resolution of pneumonia. Call in 10 days if cough no better or earlier if increasing shortness of breath despite antibiotic therapy

## 2020-02-04 NOTE — PROGRESS NOTES
"Subjective     Bryce Espinoza is a 59 year old male who presents to clinic today for the following health issues:    HPI   Chief Complaint   Patient presents with     Follow Up     Abdominal Pain Patient c/o pain whenever he coughs     Cough     Productive cough with green sputum, wheezing     Diarrhea     Patient c/o diarrhea 5 to 6 times a day.         Pt's past medical history, family history, habits, medications and allergies were reviewed with the patient today.  See snap shot for  HCM status. Most recent lab results reviewed with pt. Problem list and histories reviewed & adjusted, as indicated.  Additional history as below:    Patient complaining of yellowish-greenish productive cough and mild shortness of breath.  Denies fever or chills.  Non-smoker.  Having some recent diarrhea after previous constipation.  Underwent recent cystoscopy with urology and was then given a single dose of Cipro after that.  No previous history of C. difficile.  Has pain bilateral lower quadrants when coughing.  No change with bowel movement.  Denies seeing blood in the stools.     Additional ROS:   Constitutional, HEENT, Cardiovascular, Pulmonary, GI and , Neuro, MSK and Psych review of systems/symptoms are otherwise negative or unchanged from previous, except as noted above.      OBJECTIVE:  /78   Pulse 57   Temp 98.2  F (36.8  C) (Temporal)   Resp 16   Wt 97.5 kg (215 lb)   SpO2 92%   BMI 31.75 kg/m     Estimated body mass index is 31.75 kg/m  as calculated from the following:    Height as of 1/9/20: 1.753 m (5' 9\").    Weight as of this encounter: 97.5 kg (215 lb).     Neck: no adenopathy. Thyroid normal to palpation. No bruits  Pulm: Lungs clear to auscultation anteriorly.  Mild rhonchi left lower base.  No wheeze  CV: Regular rates and rhythm  GI: Soft, mildly tender to palpation bilateral lower quadrant.  No rebound or guarding.  Normal active bowel sounds, No hepatosplenomegaly or masses palpable  Ext: " Peripheral pulses intact. No edema.  Neuro: Normal strength and tone, sensory exam grossly normal      CHEST TWO VIEWS   2/4/2020 4:29 PM    HISTORY: Cough, yellow sputum. Rhonchi on exam. Assess for pneumonia.  SOB (shortness of breath).   COMPARISON: Chest x-ray 10/17/2019.                                                                IMPRESSION: PA and lateral views of the chest. Calcified granuloma is  noted in the right midlung and is stable. Atelectatic left lung base  changes are stable. Left lower lobe infiltrate and/or pneumonia is  possible. Median sternotomy wires are again noted. Heart is normal in  size. No effusions are evident. No pneumothorax.   NURA SR MD      Assessment/Plan: (See plan discussion below for further details)  1. Pneumonia of left lower lobe due to infectious organism (H)  Chest x-ray showing infiltrate.  Antibiotic therapy as below.  Repeat chest x-ray and 3 to 4 weeks to ensure resolution.  Patient will call in 10 days if persisting or earlier if worsens and oxygenating well  - XR Chest 2 Views; Future  - azithromycin (ZITHROMAX) 250 MG tablet; Take 2 tablets (500 mg) by mouth daily for 1 day, THEN 1 tablet (250 mg) daily for 4 days.  Dispense: 6 tablet; Refill: 0  - cefuroxime (CEFTIN) 500 MG tablet; Take 1 tablet (500 mg) by mouth 2 times daily for 10 days  Dispense: 20 tablet; Refill: 0  - XR Chest 2 Views; Future    2. Diarrhea, unspecified type  Tenderness also Cipro.  No other exposures.  Family members not having diarrhea.  No recent travel.  Labs as ordered.  Further management based on results.  Maintain good hydration  - Enteric Bacteria and Virus Panel by [facility] Stool; Future  - Clostridium difficile toxin B PCR; Future  - Comprehensive metabolic panel  - CBC with platelets       Plan discussion:          Remington Riggs MD  Internal Medicine Department  [facility]    (Chart documentation was completed, in part, with [employer] voice-recognition software. Even  though reviewed, some grammatical, spelling, and word errors may remain.)

## 2020-02-05 DIAGNOSIS — R19.7 DIARRHEA, UNSPECIFIED TYPE: ICD-10-CM

## 2020-02-05 LAB
ALBUMIN SERPL-MCNC: 3.6 G/DL (ref 3.4–5)
ALP SERPL-CCNC: 119 U/L (ref 40–150)
ALT SERPL W P-5'-P-CCNC: 34 U/L (ref 0–70)
ANION GAP SERPL CALCULATED.3IONS-SCNC: 7 MMOL/L (ref 3–14)
AST SERPL W P-5'-P-CCNC: 21 U/L (ref 0–45)
BILIRUB SERPL-MCNC: 0.3 MG/DL (ref 0.2–1.3)
BUN SERPL-MCNC: 18 MG/DL (ref 7–30)
C COLI+JEJUNI+LARI FUSA STL QL NAA+PROBE: NOT DETECTED
C DIFF TOX B STL QL: NEGATIVE
CALCIUM SERPL-MCNC: 9.1 MG/DL (ref 8.5–10.1)
CHLORIDE SERPL-SCNC: 109 MMOL/L (ref 94–109)
CO2 SERPL-SCNC: 24 MMOL/L (ref 20–32)
CREAT SERPL-MCNC: 0.8 MG/DL (ref 0.66–1.25)
EC STX1 GENE STL QL NAA+PROBE: NOT DETECTED
EC STX2 GENE STL QL NAA+PROBE: NOT DETECTED
ENTERIC PATHOGEN COMMENT: NORMAL
GFR SERPL CREATININE-BSD FRML MDRD: >90 ML/MIN/{1.73_M2}
GLUCOSE SERPL-MCNC: 92 MG/DL (ref 70–99)
NOROV GI+II ORF1-ORF2 JNC STL QL NAA+PR: NOT DETECTED
POTASSIUM SERPL-SCNC: 4.4 MMOL/L (ref 3.4–5.3)
PROT SERPL-MCNC: 7.1 G/DL (ref 6.8–8.8)
RVA NSP5 STL QL NAA+PROBE: NOT DETECTED
SALMONELLA SP RPOD STL QL NAA+PROBE: NOT DETECTED
SHIGELLA SP+EIEC IPAH STL QL NAA+PROBE: NOT DETECTED
SODIUM SERPL-SCNC: 140 MMOL/L (ref 133–144)
SPECIMEN SOURCE: NORMAL
V CHOL+PARA RFBL+TRKH+TNAA STL QL NAA+PR: NOT DETECTED
Y ENTERO RECN STL QL NAA+PROBE: NOT DETECTED

## 2020-02-05 PROCEDURE — 87493 C DIFF AMPLIFIED PROBE: CPT | Performed by: INTERNAL MEDICINE

## 2020-02-05 PROCEDURE — 87506 IADNA-DNA/RNA PROBE TQ 6-11: CPT | Performed by: INTERNAL MEDICINE

## 2020-02-06 ENCOUNTER — TELEPHONE (OUTPATIENT)
Dept: INTERNAL MEDICINE | Facility: CLINIC | Age: 60
End: 2020-02-06

## 2020-02-06 NOTE — TELEPHONE ENCOUNTER
Results negative . Please advise plan of care based on the negative stool results . From clinic note states if negative C. Diff then order CT of abdomen Karime Treviño RN

## 2020-02-06 NOTE — TELEPHONE ENCOUNTER
Reason for Call:  Request for results:    Name of test or procedure: Lab tests for diarrhea    Date of test of procedure: 02/05/2020    Location of the test or procedure: [facility]    OK to leave the result message on voice mail or with a family member? YES    Phone number Patient can be reached at:  Home number on file [phone] (home)    Additional comments: Patient would like to have the results of the lab test from 02/05/2020    Call taken on 2/6/2020 at 1:37 PM by John Bernstein

## 2020-02-06 NOTE — TELEPHONE ENCOUNTER
Spoke with pt. Cough better with abx. Still having diarrhea and lower bilateral abd pain.  Stool labs negative. Will use Immodium OTC as needed for diarrhea for now and will get CT abd given persistent abd pain. Diarrhea preceded start of abx

## 2020-02-07 ENCOUNTER — HOSPITAL ENCOUNTER (OUTPATIENT)
Dept: CT IMAGING | Facility: CLINIC | Age: 60
Discharge: HOME | End: 2020-02-07
Attending: INTERNAL MEDICINE | Admitting: INTERNAL MEDICINE
Payer: COMMERCIAL

## 2020-02-07 DIAGNOSIS — R10.30 LOWER ABDOMINAL PAIN: ICD-10-CM

## 2020-02-07 PROCEDURE — 25000125 ZZHC RX 250: Performed by: INTERNAL MEDICINE

## 2020-02-07 PROCEDURE — 25000128 H RX IP 250 OP 636: Performed by: INTERNAL MEDICINE

## 2020-02-07 PROCEDURE — 74177 CT ABD & PELVIS W/CONTRAST: CPT

## 2020-02-07 RX ORDER — IOPAMIDOL 755 MG/ML
105 INJECTION, SOLUTION INTRAVASCULAR ONCE
Status: COMPLETED | OUTPATIENT
Start: 2020-02-07 | End: 2020-02-07

## 2020-02-07 RX ADMIN — SODIUM CHLORIDE 70 ML: 9 INJECTION, SOLUTION INTRAVENOUS at 17:26

## 2020-02-07 RX ADMIN — IOPAMIDOL 105 ML: 755 INJECTION, SOLUTION INTRAVENOUS at 17:27

## 2020-02-20 ENCOUNTER — OFFICE VISIT (OUTPATIENT)
Dept: SLEEP MEDICINE | Facility: CLINIC | Age: 60
End: 2020-02-20
Attending: INTERNAL MEDICINE
Payer: COMMERCIAL

## 2020-02-20 VITALS
DIASTOLIC BLOOD PRESSURE: 75 MMHG | HEART RATE: 63 BPM | RESPIRATION RATE: 16 BRPM | BODY MASS INDEX: 31.99 KG/M2 | OXYGEN SATURATION: 96 % | HEIGHT: 69 IN | SYSTOLIC BLOOD PRESSURE: 112 MMHG | WEIGHT: 216 LBS

## 2020-02-20 DIAGNOSIS — I48.0 PAROXYSMAL ATRIAL FIBRILLATION (H): ICD-10-CM

## 2020-02-20 DIAGNOSIS — R06.83 SNORING: ICD-10-CM

## 2020-02-20 DIAGNOSIS — R29.818 SUSPECTED SLEEP APNEA: Primary | ICD-10-CM

## 2020-02-20 DIAGNOSIS — I48.3 TYPICAL ATRIAL FLUTTER (H): ICD-10-CM

## 2020-02-20 DIAGNOSIS — I25.10 CORONARY ARTERY DISEASE INVOLVING NATIVE CORONARY ARTERY OF NATIVE HEART WITHOUT ANGINA PECTORIS: ICD-10-CM

## 2020-02-20 PROCEDURE — 99204 OFFICE O/P NEW MOD 45 MIN: CPT | Performed by: INTERNAL MEDICINE

## 2020-02-20 ASSESSMENT — MIFFLIN-ST. JEOR: SCORE: 1785.15

## 2020-02-20 NOTE — NURSING NOTE
"Chief Complaint   Patient presents with     Sleep Problem     AFIB       Initial /75   Pulse 63   Resp 16   Ht 1.753 m (5' 9\")   Wt 98 kg (216 lb)   SpO2 96%   BMI 31.90 kg/m   Estimated body mass index is 31.9 kg/m  as calculated from the following:    Height as of this encounter: 1.753 m (5' 9\").    Weight as of this encounter: 98 kg (216 lb).    Medication Reconciliation: complete     ESS 2  Neck 49cm  Arlene Billings MA        "

## 2020-02-20 NOTE — PROGRESS NOTES
Sleep Consultation:    Date on this visit: 2/20/2020    Bryce Espinoza is sent by Martha Coombs for a sleep consultation regarding possible sleep apnea.    Primary Physician: Remington Riggs     Chief complaint: snoring    Presenting History:     Bryce Espinoza has been sent for an evaluation of sleep disordered breathing by Cardiology. Patient was noted to have Atrial fibrillation post- CABG.     His medical history is significant for CAD s/p CABG, hypertension and dyslipidemia. Last echocardiogram from 9/04/19 showed an EF of 60-65%.     Patient has a history of frequent snoring for last 10 years.     Bryce does snore frequently. Patient does have a regular bed partner. There is report of snoring and poor quality of sleep.  He does not have witnessed apneas. They never sleep separately.  Patient sleeps on his back and side. He has occasional morning dry mouth, denies no morning headaches and restless legs. Bryce denies any bruxism, sleep walking, sleep talking, dream enactment, sleep paralysis, cataplexy and hypnogogic/hypnopompic hallucinations.    Bryce goes to sleep at 10:00 PM during the week. He wakes up at 6:30 AM without an alarm. He falls asleep in 10 minutes.  Bryce denies difficulty falling asleep.  He wakes up 2-4 times a night for 5 minutes before falling back to sleep.  Bryce wakes up to go to the bathroom and uncertain reasons.  On weekends, Bryce goes to sleep at 12:00 AM.  He wakes up at 8:00 AM without an alarm. He falls asleep in 10 minutes.  Patient gets an average of 8 hours of sleep per night.     Bryce denies difficulty breathing through his nose.      Patient's Clifford Sleepiness score 2/24 consistent with no daytime sleepiness.      Bryce naps 1-2 times per week for 30-60 minutes, feels refreshed after naps. He takes no inadvertant naps.  He denies closing eyes, dozing and falling asleep while driving.  Patient was counseled on the importance of driving while alert, to  pull over if drowsy, or nap before getting into the vehicle if sleepy.      He uses no caffeine.     Allergies:    Allergies   Allergen Reactions     Atorvastatin      Myalgias     Lisinopril      Body aches pt d/mylene  06/2015       Medications:    Current Outpatient Medications   Medication Sig Dispense Refill     apixaban ANTICOAGULANT (ELIQUIS) 5 MG tablet Take 1 tablet (5 mg) by mouth 2 times daily 180 tablet 3     Cholecalciferol (VITAMIN D) 2000 UNITS tablet Take 2,000 Units by mouth daily       clopidogrel (PLAVIX) 75 MG tablet Take 1 tablet (75 mg) by mouth daily 30 tablet 4     metoprolol succinate ER (TOPROL-XL) 50 MG 24 hr tablet Take 1.5 tablets (75 mg) by mouth 2 times daily 270 tablet 3     rosuvastatin (CRESTOR) 10 MG tablet Take 1 tablet (10 mg) by mouth daily 90 tablet 3       Problem List:  Patient Active Problem List    Diagnosis Date Noted     Paroxysmal atrial fibrillation (H)      Priority: Medium     S/P CABG (coronary artery bypass graft) 10/12/2019     Priority: Medium     Coronary artery disease involving native coronary artery of native heart without angina pectoris 07/04/2019     Priority: Medium     Per CT calcium score of 722.97 3/2019       Hyperlipidemia LDL goal <70 04/04/2019     Priority: Medium     Other specified idiopathic peripheral neuropathy 02/17/2004     Priority: Medium     Malignant neoplasm of bladder (H) 01/07/2003     Priority: Medium     Problem list name updated by automated process. Provider to review       Essential hypertension, benign 11/01/2002     Priority: Medium        Past Medical/Surgical History:  Past Medical History:   Diagnosis Date     Coronary artery disease involving native coronary artery of native heart without angina pectoris 7/4/2019    Per CT calcium score of 722.97 3/2019     Essential hypertension, benign      Hematuria      Malignant neoplasm of bladder, 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     Olecranon bursitis 3/01     right     Other and unspecified hyperlipidemia      Paroxysmal atrial fibrillation (H)      Paroxysmal atrial flutter (H)      RIGHT LUNG CALCIFIED GRANULOMA     RML     Tobacco use disorder     Quit      Unspecified hemorrhoids without mention of complication      Unspecified hereditary and idiopathic peripheral neuropathy      Past Surgical History:   Procedure Laterality Date     BYPASS GRAFT ARTERY CORONARY N/A 2019    Procedure: CORONARY ARTERY BYPASS GRAFTING x 1 (LIMA - LAD) WITH CORONARY ENDARTERECTOMY  (ON PUMP OXYGENATOR ; HANK BY [facility]);  Surgeon: Magdi Turner MD;  Location:  OR      NONSPECIFIC PROCEDURE      EBCT     CV HEART CATHETERIZATION WITH POSSIBLE INTERVENTION N/A 2019    Procedure: Coronary Angiogram;  Surgeon: Nick Willson MD;  Location:  HEART CARDIAC CATH LAB     CV LEFT HEART CATH N/A 2019    Procedure: Left Heart Cath;  Surgeon: Nick Willson MD;  Location:  HEART CARDIAC CATH LAB     CV LEFT VENTRICULOGRAM N/A 2019    Procedure: Left Ventriculogram;  Surgeon: Nick Willson MD;  Location:  HEART CARDIAC CATH LAB       Social History:  Social History     Socioeconomic History     Marital status:      Spouse name: Not on file     Number of children: Not on file     Years of education: Not on file     Highest education level: Not on file   Occupational History     Not on file   Social Needs     Financial resource strain: Not on file     Food insecurity:     Worry: Not on file     Inability: Not on file     Transportation needs:     Medical: Not on file     Non-medical: Not on file   Tobacco Use     Smoking status: Former Smoker     Packs/day: 1.00     Years: 33.00     Pack years: 33.00     Last attempt to quit: 2013     Years since quittin.0     Smokeless tobacco: Never Used   Substance and Sexual Activity     Alcohol use: Yes     Comment: 2 daily     Drug use: Yes     Comment: nica taylor     Sexual  activity: Yes   Lifestyle     Physical activity:     Days per week: Not on file     Minutes per session: Not on file     Stress: Not on file   Relationships     Social connections:     Talks on phone: Not on file     Gets together: Not on file     Attends [employer] service: Not on file     Active member of club or organization: Not on file     Attends meetings of clubs or organizations: Not on file     Relationship status: Not on file     Intimate partner violence:     Fear of current or ex partner: Not on file     Emotionally abused: Not on file     Physically abused: Not on file     Forced sexual activity: Not on file   Other Topics Concern     Parent/sibling w/ CABG, MI or angioplasty before 65F 55M? Not Asked   Social History Narrative     Not on file       Family History:  Family History   Problem Relation Age of Onset     Arthritis Mother         RA     Hypertension Mother      Breast Cancer Mother      Prostate Cancer Maternal Grandfather         80     Cancer Maternal Grandmother         Lung     Coronary Artery Disease Father      Other - See Comments Brother      Diabetes Brother      Genetic Disorder Son      - No family history of sleep disorders.     Review of Systems:  A complete review of systems reviewed by me is negative with the exeption of what has been mentioned in the history of present illness.  CONSTITUTIONAL: NEGATIVE for weight gain/loss, fever, chills, sweats or night sweats, drug allergies.  EYES: NEGATIVE for changes in vision, blind spots, double vision.  ENT: NEGATIVE for ear pain, sore throat, sinus pain, post-nasal drip, runny nose, bloody nose  CARDIAC: NEGATIVE for fast heartbeats or fluttering in chest, chest pain or pressure, breathlessness when lying flat, swollen legs or swollen feet.  NEUROLOGIC:  POSITIVE for  weakness or numbness in the arms or legs  DERMATOLOGIC: NEGATIVE for rashes, new moles or change in mole(s)  PULMONARY:  POSITIVE for  SOB with  "activity  GASTROINTESTINAL: NEGATIVE for nausea or vomitting, loose or watery stools, fat or grease in stools, constipation, abdominal pain, bowel movements black in color or blood noted.  GENITOURINARY: NEGATIVE for pain during urination, blood in urine, urinating more frequently than usual, irregular menstrual periods.  MUSCULOSKELETAL:  POSITIVE for  muscle pain and bone or joint pain  ENDOCRINE: NEGATIVE for increased thirst or urination, diabetes.  LYMPHATIC: NEGATIVE for swollen lymph nodes, lumps or bumps in the breasts or nipple discharge.    Physical Examination:  Vitals: /75   Pulse 63   Resp 16   Ht 1.753 m (5' 9\")   Wt 98 kg (216 lb)   SpO2 96%   BMI 31.90 kg/m    BMI= Body mass index is 31.9 kg/m .         Douglas Total Score 2/20/2020   Total score - Douglas 2            GENERAL APPEARANCE: healthy, alert and no distress  EYES: Eyes grossly normal to inspection, PERRL and conjunctivae and sclerae normal  HENT: nose and mouth without ulcers or lesions, oropharynx crowded, soft palate dependent and tongue base enlarged  NECK: no adenopathy, no asymmetry, masses, or scars and thyroid normal to palpation  RESP: lungs clear to auscultation - no rales, rhonchi or wheezes  CV: regular rates and rhythm, normal S1 S2, no S3 or S4 and no murmur, click or rub  ABDOMEN: soft, nontender, without hepatosplenomegaly or masses and bowel sounds normal  MS: extremities normal- no gross deformities noted  NEURO: Normal strength and tone, mentation intact and speech normal  PSYCH: mentation appears normal and affect normal/bright  Mallampati Class: IV.  Tonsillar Stage: 1  hidden by pillars.    Impression/Plan:    1. To rule out obstructive sleep apnea  2. Hypertension   3. Ischemic heart disease   4. Paroxysmal atrial fibrillation     - Patient is a 59 years old male, BMI 32, neck circumference 49 cm,  with comorobid HTN, Afib and CAD is sent by Cardiology for an evaluation of sleep apnea. There is an " intermediate risk for sleep apnea and an overnight sleep study is recommended for evaluation.     Plan:     1. Home sleep apnea testing     He will follow up with me in approximately two weeks after his sleep study has been competed to review the results and discuss plan of care.       Polysomnography & HST reviewed.  Limitations of HST reviewed.   Obstructive sleep apnea reviewed.  Complications of untreated sleep apnea were reviewed.    Dr. Rishi Gerard     CC: Martha Toribio Is*

## 2020-02-20 NOTE — PATIENT INSTRUCTIONS
Your BMI is Body mass index is 31.9 kg/m .  Weight management is a personal decision.  If you are interested in exploring weight loss strategies, the following discussion covers the approaches that may be successful. Body mass index (BMI) is one way to tell whether you are at a healthy weight, overweight, or obese. It measures your weight in relation to your height.  A BMI of 18.5 to 24.9 is in the healthy range. A person with a BMI of 25 to 29.9 is considered overweight, and someone with a BMI of 30 or greater is considered obese. More than two-thirds of American adults are considered overweight or obese.  Being overweight or obese increases the risk for further weight gain. Excess weight may lead to heart disease and diabetes.  Creating and following plans for healthy eating and physical activity may help you improve your health.  Weight control is part of healthy lifestyle and includes exercise, emotional health, and healthy eating habits. Careful eating habits lifelong are the mainstay of weight control. Though there are significant health benefits from weight loss, long-term weight loss with diet alone may be very difficult to achieve- studies show long-term success with dietary management in less than 10% of people. Attaining a healthy weight may be especially difficult to achieve in those with severe obesity. In some cases, medications, devices and surgical management might be considered.  What can you do?  If you are overweight or obese and are interested in methods for weight loss, you should discuss this with your provider.     Consider reducing daily calorie intake by 500 calories.     Keep a food journal.     Avoiding skipping meals, consider cutting portions instead.    Diet combined with exercise helps maintain muscle while optimizing fat loss. Strength training is particularly important for building and maintaining muscle mass. Exercise helps reduce stress, increase energy, and improves fitness.  Increasing exercise without diet control, however, may not burn enough calories to loose weight.       Start walking three days a week 10-20 minutes at a time    Work towards walking thirty minutes five days a week     Eventually, increase the speed of your walking for 1-2 minutes at time    In addition, we recommend that you review healthy lifestyles and methods for weight loss available through the National Institutes of Health patient information sites:  http://win.niddk.nih.gov/publications/index.htm    And look into health and wellness programs that may be available through your health insurance provider, employer, local community center, or [employer] club.    Weight management plan: Patient was referred to their PCP to discuss a diet and exercise plan.

## 2020-04-16 ENCOUNTER — TELEPHONE (OUTPATIENT)
Dept: INTERNAL MEDICINE | Facility: CLINIC | Age: 60
End: 2020-04-16

## 2020-04-16 ENCOUNTER — TELEPHONE (OUTPATIENT)
Dept: CARDIOLOGY | Facility: CLINIC | Age: 60
End: 2020-04-16

## 2020-04-16 NOTE — TELEPHONE ENCOUNTER
I don't see any discussion in chart about stopping pt's Eliquis and would not make sense to stop that with hx intermittent A fib. There was discussion re: stopping Clopidogrel in the future but cardiology did not make clear if then would be putting pt back on ASA then with hx coronary artery disease. Therefore pt to continue taking Clopidogrel now also until has f/u with cardiology.  Would be unlikely that pt has severe myalgias with Eliquis. If this is medication related  (there are other disease that can cause similar sx and not be related to meds), it would most likely be related to pt's  Rosuvastatin/Crestor given his hx of intolerance to Atorvastatin/Lipitor previously. With hx coronary artery disease and previous CABG, would be best to have pt on some type of statin longterm but for now to see if this is related to Rosuvastatin, I would recommend pt stop taking it for the next 2 weeks to see if his sx start to improve and then do f/u virtual visit with me in 2 weeks if doing better to discuss other treatment options. If pt not doing better, then would have pt seen in clinic by one of the providers doing face to face visits that week for exam and lab work-up to r/o things like polymyalgia rheumatica, etc

## 2020-04-16 NOTE — TELEPHONE ENCOUNTER
Call from patient today.     States that he is having full body joint pain and believes it is due to his blood thinner medication (Eliquis) and his cholesterol medication (crestor).     States that on a daily basis he has all over joint pain and he cannot stand the pain anymore. Trouble standing, sitting, walking, and going up and down stairs, He was told he would be able to stop his Eliquis medication, the next time he is in to see his Cardiologist, but the last 3 appointments have been cancelled so he has had to stay on the medication.     Wants to get off of his blood thinners   Wants to change his Crestor to Ezetimibe instead    [facility] [phone] Cardiology   Martha Mcgovern MD  Call placed and message left for cardiology to call the clinic back to discuss patient concern.     Sending to PCP for recommendations at this time.

## 2020-04-30 ENCOUNTER — DOCUMENTATION ONLY (OUTPATIENT)
Dept: CARDIOLOGY | Facility: CLINIC | Age: 60
End: 2020-04-30

## 2020-04-30 ENCOUNTER — VIRTUAL VISIT (OUTPATIENT)
Dept: CARDIOLOGY | Facility: CLINIC | Age: 60
End: 2020-04-30
Attending: INTERNAL MEDICINE
Payer: COMMERCIAL

## 2020-04-30 DIAGNOSIS — I25.10 CORONARY ARTERY DISEASE INVOLVING NATIVE CORONARY ARTERY OF NATIVE HEART WITHOUT ANGINA PECTORIS: ICD-10-CM

## 2020-04-30 DIAGNOSIS — I48.3 TYPICAL ATRIAL FLUTTER (H): ICD-10-CM

## 2020-04-30 DIAGNOSIS — I48.0 PAROXYSMAL ATRIAL FIBRILLATION (H): Primary | ICD-10-CM

## 2020-04-30 PROCEDURE — 99214 OFFICE O/P EST MOD 30 MIN: CPT | Mod: 95 | Performed by: PHYSICIAN ASSISTANT

## 2020-04-30 NOTE — PATIENT INSTRUCTIONS
Sherif Patel!  It was nice to speak with you today.    I'm glad you're feeling better off of the Crestor (rosuvastatin).  We do want you back on some cholesterol medication give the fact you have significant heart disease at such a young age, but will work with Dr. Riggs on finding something that your body tolerates.     1. As discussed, will STOP Plavix 75 mg daily (clopidogrel).  2. Start back on low dose aspirin for now (81 mg)    3. Continue metoprolol 75 mg twice a day. Metoprolol is to lower pulse (heart rate), protect/strengthen heart and decreases BP a bit.    4. Continue Eliquis 5 mg twice a day. This is to help prevent stroke/clot formation if/when you have recurrent AFib. As we discussed, our plan is to repeat a monitor to see if you're still having any AFib episodes (even when you're sleeping) and discuss it over the phone or in person (depending on infectious situation).    Agree with Sleep Study when Dr. Gerard's office believes it's prudent to do so.  CALL if issues before our next appt! [phone] (Karime Chapin Kathy)

## 2020-04-30 NOTE — PROGRESS NOTES
Note below is from cardiology updating me re: pt appt today. Call pt and schedule a video or tele visit with me in a couple weeks to review other treatment options for cholesterol with hx coronary artery disease. Will have pt remain off of statin therapy between now and then to get muscles fully back to baseline before considering new therapy. Assist with scheduling appt

## 2020-04-30 NOTE — LETTER
"4/30/2020      RE: Bryce Espinoza  [address] [facility] [address]       Dear Colleague,    Thank you for the opportunity to participate in the care of your patient, Bryce Espinoza, at the [facility]. Please see a copy of my visit note below.    58 y/o M who has seen Dr. Mcgovern and Dr. Blake for his h/o:    1. CAD s/p CABG x 1 9/25/2019, with NEGRO to LAD. Endarterectomy of pLAD required due to severe calcification. Plavix x 6 months recommended (until 3/25/2020).   2. Post-op pAFib and typical AFlutter off Amio since 11/2019. On rate control strategy with AC for CHADSVASc 2 (HTN, CAD)  3. Preserved EF on pre-op echo  4. Hypertension   5. Dyslipidemia    Dr. Mcgovern met Jorge 12/2019 at which time he reviewed his paroxysmal post-surgical arrhythmias. These were minimally sx'c. He was treated with amiodarone until 11/2019 and repeat monitor continued to show some pAFib, with 1% AFib burden. Given minimal sxs, unsure if he had any arrhythmias preoperatively.  Dr. Mcgovern rec'd he treat his arrhythmia with rate control and metoprolol was continued.  Eliquis was also continued. ASA was stopped as Eliquis and Plavix were continued.  Sleep eval was rec'd, along with weight loss and repeat monitoring in 4-5 m.    INTERVAL HISTORY:  States that he's done \"OK\" since seeing Dr. Mcgovern. C/o \"horrible all over joint pain\" for months and Dr. Riggs recently stopped Crestor x 2 weeks as a trial. States he's \"much better\" since stopping Crestor 10 mg daily.    No c/o CP, SOB, orthopnea, PND, edema. Has lost weight (~10# since 12/2019 OV). He doesn't think he's had any AFib (though ZioPatch 11/2019 showed AFib at night)    Feels \"tired all the time\" which he states started at the time of starting medications/bypass surgery. Met with Dr. Gerard from Sleep 2/20 and a Home Sleep Apnea Test was recommended. This has been postponed due to " "COVID19 restrictions.    He's frustrated that a follow-up ZioPatch was postponed due to COVID19 restrictions. He thinks Eliquis could have been contributing to joint aches (though less so no that they've improved off of Crestor) and fatigue    DIAGNOSTICS:  14 day ZioPatch worn 11/22-12/6/2020 after stopping amiodarone showed SR with pAFib (1% burden). In SR, avg HR 64 (range  bpm). Episode of AFib lasted 4.5 hours, with avg HR 84 bpm (range  bpm)  Carotids 9/2019 <50% bilateral  Echo 9/2019 with EF 60-65%. Grade I diastolic dysfunction. No sig valve issues    REVIEW OF SYSTEMS:  Negative except as noted above    PHYSICAL EXAM:  Deferred, telephone    ASSESSMENT/PLAN:    1. Atrial Arrhythmias    Remains on metoprolol 75 mg BID for rate control strategy. This may be contributing to fatigue.    As above, no real sxs with AFib but he points out he was sleeping while he had it on this last monitor; otherwise, he thinks he's not had any.    Remains on Eliquis for CHASDSVAsc 2 (CAD, HTN)    Needs sleep eval when current restrictions end     PLAN:    Continue metoprolol XL 75 mg BID and Eliquis 5 mg BID    Repeat monitor this summer    Virtual visit following to review    Call if issues prior - encouraged NOT to \"just stop everything\" as he states he will do if he continues to feel tired      2. CAD    S/p LIMA-LAD as above. Normal EF    Plavix OK to be stopped after 6 months    Remains on BB    Statin Holiday per Dr Riggs     PLAN:    OK to STOP Plavix    Will resume ASA 81 mg daily for now given <1 year out from bypass (despite concurrent Eliquis use)    Will continue to follow-up with Dr. Riggs re: statin holiday - he's feeling a lot better      3. Fatigue    Sleep study was postponed     PLAN:    Encouraged Sleep Eval with HST as Dr. Gerard recommended      I have reviewed the note as documented above.  This accurately captures the substance of my conversation with the patient.        Phone call contact " time  Call Started at 1103  Call Ended at 1122       Leah Thomson PA-C [facility]        Please do not hesitate to contact me if you have any questions/concerns.     Sincerely,     Leah Thomson PA-C

## 2020-04-30 NOTE — PROGRESS NOTES
"Bryce Espinoza is a 59 year old male who is being evaluated via a billable telephone visit.      The patient has been notified of following:     \"This telephone visit will be conducted via a call between you and your physician/provider. We have found that certain health care needs can be provided without the need for a physical exam.  This service lets us provide the care you need with a short phone conversation.  If a prescription is necessary we can send it directly to your pharmacy.  If lab work is needed we can place an order for that and you can then stop by our lab to have the test done at a later time.    Telephone visits are billed at different rates depending on your insurance coverage. During this emergency period, for some insurers they may be billed the same as an in-person visit.  Please reach out to your insurance provider with any questions.    If during the course of the call the physician/provider feels a telephone visit is not appropriate, you will not be charged for this service.\"  BP:135/70  HR: 60  Weight: 205lb    Review Of Systems  Skin: bruising  Eyes:Ears/Nose/Throat: NEGATIVE  Respiratory: NEGATIVE  Cardiovascular:feels tired  Gastrointestinal: NEGATIVE  Genitourinary:NEGATIVE   Musculoskeletal: NEGATIVE  Neurologic: NEGATIVE  Psychiatric: NEGATIVE  Hematologic/Lymphatic/Immunologic: easy bruising  Endocrine:  NEGATIVE  Temi Akhtar LPN    Patient has given verbal consent for Telephone visit?  Yes    How would you like to obtain your AVS? Mail a copy    CC:  Atrial Fibrillation    VITALS:  BP:135/70  HR: 60  Weight: 205lb    BRIEF HPI:  60 y/o M who has seen Dr. Mcgovern and Dr. Blake for his h/o:    1. CAD s/p CABG x 1 9/25/2019, with NEGRO to LAD. Endarterectomy of pLAD required due to severe calcification. Plavix x 6 months recommended (until 3/25/2020).   2. Post-op pAFib and typical AFlutter off Amio since 11/2019. On rate control strategy with AC for CHADSVASc 2 (HTN, CAD)  3. " "Preserved EF on pre-op echo  4. Hypertension   5. Dyslipidemia    Dr. Mcgovern met Jorge 12/2019 at which time he reviewed his paroxysmal post-surgical arrhythmias. These were minimally sx'c. He was treated with amiodarone until 11/2019 and repeat monitor continued to show some pAFib, with 1% AFib burden. Given minimal sxs, unsure if he had any arrhythmias preoperatively.  Dr. Mcgovern rec'd he treat his arrhythmia with rate control and metoprolol was continued.  Eliquis was also continued. ASA was stopped as Eliquis and Plavix were continued.  Sleep eval was rec'd, along with weight loss and repeat monitoring in 4-5 m.    INTERVAL HISTORY:  States that he's done \"OK\" since seeing Dr. Mcgovern. C/o \"horrible all over joint pain\" for months and Dr. Riggs recently stopped Crestor x 2 weeks as a trial. States he's \"much better\" since stopping Crestor 10 mg daily.    No c/o CP, SOB, orthopnea, PND, edema. Has lost weight (~10# since 12/2019 OV). He doesn't think he's had any AFib (though ZioPatch 11/2019 showed AFib at night)    Feels \"tired all the time\" which he states started at the time of starting medications/bypass surgery. Met with Dr. Gerard from Sleep 2/20 and a Home Sleep Apnea Test was recommended. This has been postponed due to COVID19 restrictions.    He's frustrated that a follow-up ZioPatch was postponed due to COVID19 restrictions. He thinks Eliquis could have been contributing to joint aches (though less so no that they've improved off of Crestor) and fatigue    DIAGNOSTICS:  14 day ZioPatch worn 11/22-12/6/2020 after stopping amiodarone showed SR with pAFib (1% burden). In SR, avg HR 64 (range  bpm). Episode of AFib lasted 4.5 hours, with avg HR 84 bpm (range  bpm)  Carotids 9/2019 <50% bilateral  Echo 9/2019 with EF 60-65%. Grade I diastolic dysfunction. No sig valve issues    REVIEW OF SYSTEMS:  Negative except as noted above    PHYSICAL EXAM:  Deferred, telephone    ASSESSMENT/PLAN:    1. Atrial " "Arrhythmias    Remains on metoprolol 75 mg BID for rate control strategy. This may be contributing to fatigue.    As above, no real sxs with AFib but he points out he was sleeping while he had it on this last monitor; otherwise, he thinks he's not had any.    Remains on Eliquis for CHASDSVAsc 2 (CAD, HTN)    Needs sleep eval when current restrictions end     PLAN:    Continue metoprolol XL 75 mg BID and Eliquis 5 mg BID    Repeat monitor this summer    Virtual visit following to review    Call if issues prior - encouraged NOT to \"just stop everything\" as he states he will do if he continues to feel tired      2. CAD    S/p LIMA-LAD as above. Normal EF    Plavix OK to be stopped after 6 months    Remains on BB    Statin Holiday per Dr Riggs     PLAN:    OK to STOP Plavix    Will resume ASA 81 mg daily for now given <1 year out from bypass (despite concurrent Eliquis use)    Will continue to follow-up with Dr. Riggs re: statin holiday - he's feeling a lot better      3. Fatigue    Sleep study was postponed     PLAN:    Encouraged Sleep Eval with HST as Dr. Gerard recommended      I have reviewed the note as documented above.  This accurately captures the substance of my conversation with the patient.        Phone call contact time  Call Started at 1103  Call Ended at 1122       SUZETTE Rosas      "

## 2020-04-30 NOTE — PROGRESS NOTES
"Dr. Riggs - quick update on Jorge.    I \"saw\" him today - his muscle/joint aches are A LOT better off the Crestor 10 mg daily. You recommended he see you again ~2 weeks to touch base but I don't think it's set up yet.    Still c/o fatigue, however, which may be the BB (agree with you that I don't think it's Eliquis).    Current plan from me:    * See you to review statin therapy (explained why he needs to be on something) as you requested (I don't see it's been set up yet)    * Stop Plavix as >6 m out from CABG  * ASA 81 mg  * Continue Eliquis 5 mg BID for pAFib/CHADSVASc 2  * Get HST for ?sleep apnea when things open up again  * Get 14 day ZioPatch ~6/2020 as ordered by Dr. Mcgovern  * Virtual visit with me following to review.    He's going to call if fatigue persists ... we might be able to back off of the metoprolol 75 mg BID ...    Elaine Goddard  "

## 2020-05-01 ENCOUNTER — TELEPHONE (OUTPATIENT)
Dept: CARDIOLOGY | Facility: CLINIC | Age: 60
End: 2020-05-01

## 2020-05-01 NOTE — TELEPHONE ENCOUNTER
Patient called wanting to know if it was ok to take vitamin C.  Reviewed medication list and no drug interactions noted with vitamin C and current meds.  FELICIANO Juarez

## 2020-05-06 NOTE — PROGRESS NOTES
Spoke with patient and gave message below. Patient prefers telephone visit. Patient scheduled for 6-2-20 at 1:20. Advised patient to take BP before appointment so we will have reading for vitals. Juany Valdez, CMA

## 2020-05-11 DIAGNOSIS — Z95.1 S/P CABG (CORONARY ARTERY BYPASS GRAFT): ICD-10-CM

## 2020-05-11 RX ORDER — METOPROLOL SUCCINATE 50 MG/1
75 TABLET, EXTENDED RELEASE ORAL 2 TIMES DAILY
Qty: 270 TABLET | Refills: 3 | Status: SHIPPED | OUTPATIENT
Start: 2020-05-11 | End: 2020-06-22

## 2020-05-12 ENCOUNTER — TELEPHONE (OUTPATIENT)
Dept: CARDIOLOGY | Facility: CLINIC | Age: 60
End: 2020-05-12

## 2020-05-12 NOTE — TELEPHONE ENCOUNTER
Spoke to Dr Awilda Duong (Medical Director) for [employer] (management company that follows patient through his health insurance) calling in follow up.  Purpose of call was  to review medication list patient is on s/p CABG 9/25/19.  Reviewed medication list with physician.  Updated her on recent visit on 4/30 with MARE Mc and follow up testing and reason for statin not being on medication list as patient was experiencing myalgia and PCP placed patient on drug holiday.  Patient does have following up with PCP on 6/2 to discuss statin therapy.   This writer did call patient per request of physician to verify that current medication list was accurate and patient is taking what is listed.  If needed we may reach out to Dr Awilda Duong anytime if needed.  Her contact number is [phone].  Will update MARE Mc regarding above.  FELICIANO Juarez

## 2020-06-02 ENCOUNTER — VIRTUAL VISIT (OUTPATIENT)
Dept: INTERNAL MEDICINE | Facility: CLINIC | Age: 60
End: 2020-06-02
Payer: COMMERCIAL

## 2020-06-02 VITALS — HEIGHT: 69 IN | WEIGHT: 215 LBS | BODY MASS INDEX: 31.84 KG/M2

## 2020-06-02 DIAGNOSIS — E78.5 HYPERLIPIDEMIA LDL GOAL <70: Primary | ICD-10-CM

## 2020-06-02 DIAGNOSIS — I48.0 PAROXYSMAL ATRIAL FIBRILLATION (H): ICD-10-CM

## 2020-06-02 DIAGNOSIS — R53.83 OTHER FATIGUE: ICD-10-CM

## 2020-06-02 PROCEDURE — 99214 OFFICE O/P EST MOD 30 MIN: CPT | Mod: 95 | Performed by: INTERNAL MEDICINE

## 2020-06-02 ASSESSMENT — MIFFLIN-ST. JEOR: SCORE: 1780.61

## 2020-06-02 NOTE — PROGRESS NOTES
"Bryce Espinoza is a 59 year old male who is being evaluated via a billable telephone visit.      The patient has been notified of following:     \"This telephone visit will be conducted via a call between you and your physician/provider. We have found that certain health care needs can be provided without the need for a physical exam.  This service lets us provide the care you need with a short phone conversation.  If a prescription is necessary we can send it directly to your pharmacy.  If lab work is needed we can place an order for that and you can then stop by our lab to have the test done at a later time.    Telephone visits are billed at different rates depending on your insurance coverage. During this emergency period, for some insurers they may be billed the same as an in-person visit.  Please reach out to your insurance provider with any questions.    If during the course of the call the physician/provider feels a telephone visit is not appropriate, you will not be charged for this service.\"    Patient has given verbal consent for Telephone visit?  Yes    What phone number would you like to be contacted at? [phone]    How would you like to obtain your AVS? Pablo Aguilar     Bryce Espinoza is a 59 year old male who presents via phone visit today for the following health issues:    HPI  Hypertension Follow-up      Do you check your blood pressure regularly outside of the clinic? Yes     Are you following a low salt diet? Yes    Are your blood pressures ever more than 140 on the top number (systolic) OR more   than 90 on the bottom number (diastolic), for example 140/90? No      How many servings of fruits and vegetables do you eat daily?  2-3    On average, how many sweetened beverages do you drink each day (Examples: soda, juice, sweet tea, etc.  Do NOT count diet or artificially sweetened beverages)?   1    How many days per week do you exercise enough to make your heart beat faster? 3 or " less    How many minutes a day do you exercise enough to make your heart beat faster? 20 - 29    How many days per week do you miss taking your medication? 0       Due to the current impact of the Covid19 virus and recommendations by [facility] administration, CDC, etc to limit  clinic visits and pt exposure risk, was recommend pt proceed with virtual phone visit to address acute/stable  medical needs with plan to defer other non-acute health maintenance issues/exam to a future date when less self-isolation is required.    I have reviewed the nursing note as documented above.   See below for other information/data  and my personal notes capturing the substance of my conversation with the patient.       HPI:    Patient complains of significant fatigue since starting on metoprolol which was started for paroxysmal atrial fibrillation after CABG surgery last fall.  Patient underwent Ziopatch study after being taken off of amiodarone and had 1% A. fib burden at that time.  Patient denies recent palpitations.  Has snoring history and probable sleep apnea also.  Has not been tested for this.  Denies chest pains or shortness of breath.  History of severe myalgias with Crestor which resolved with stopping the medication. Has had intolerance to atorvastatin also.     Additional ROS:   Constitutional, HEENT, Cardiovascular, Pulmonary, GI and , Neuro, MSK and Psych review of systems/symptoms are otherwise negative or unchanged from previous, except as noted above.      ASSESSMENT:   1. Hyperlipidemia LDL goal <70  Intolerant to statin trial x2.  Will treat with Zetia and recheck lipids in 1 month.  If not quite to goal, will see if can add statin potentially a few days a week at low-dose in addition to the Zetia.  Otherwise we will need to consider Repatha PA  - Lipid panel reflex to direct LDL Fasting; Future  - Hepatic panel; Future  - CK total; Future  - ezetimibe (ZETIA) 10 MG tablet; Take 1 tablet (10 mg) by mouth daily   "Dispense: 30 tablet; Refill: 11    2. Other fatigue  Possibly related to beta-blocker use along with probable sleep apnea patient has met with sleep clinic already recommended a sleep study but this has not been scheduled.. Patient to contact sleep clinic to get this arranged  - metoprolol succinate ER (TOPROL-XL) 50 MG 24 hr tablet; 1/2 tab twice a day    3. Paroxysmal atrial fibrillation (H)  Appears related to stress after  CABG surgery.  Has not been having recurrent palpitations and off amiodarone with minimal findings on follow-up [place]. On Eliquis. Should be OK to reduce metoprolol dosage without increase A fib burden  - metoprolol succinate ER (TOPROL-XL) 50 MG 24 hr tablet; 1/2 tab twice a day      PLAN:  Reduce Metoprolol XL 50mg tab to 1 tab twice a dayy for 2 days. Then reduce further to 1/2 tab twice a day   Send me update in [employer] later next week regarding your energy with the dose reduction and also report  your blood pressure and heart rate at that time in the message   Call [facility] [phone] to see if they are now doing home sleep studies for your possible sleep apnea as cause for your fatigue and, if so, get that done  As soon as possible  In 1 week, then start generic Zetia (Ezitimibe) 10mg tab, 1 tab daily for cholesterol lowering. This is not a statin medication and should not cause muscle achiness   Call our appointment desk at [phone] or use [employer] to schedule a \"lab only\" to have your CK, Lipid profile (Cholesterol Panel) and Liver Panel checked fasting in 5 weeks.  For fasting labs, please refrain from eating for 8 hours or more.  Be sure to  drink water and take your  medications the day of the test.     Phone call contact time  Call Started at 1:27 pm  Call Ended at 1:45 pm  Total minutes: 18 min    (Chart documentation was completed, in part, with [employer] voice-recognition software. Even though reviewed, some grammatical, spelling, and word errors may " remain.)    Remington Riggs MD  Internal Medicine Department  [facility]

## 2020-06-11 ENCOUNTER — MYC MEDICAL ADVICE (OUTPATIENT)
Dept: INTERNAL MEDICINE | Facility: CLINIC | Age: 60
End: 2020-06-11

## 2020-06-11 ENCOUNTER — TELEPHONE (OUTPATIENT)
Dept: INTERNAL MEDICINE | Facility: CLINIC | Age: 60
End: 2020-06-11

## 2020-06-11 DIAGNOSIS — E78.5 HYPERLIPIDEMIA LDL GOAL <70: Primary | ICD-10-CM

## 2020-06-11 DIAGNOSIS — Z95.1 S/P CABG (CORONARY ARTERY BYPASS GRAFT): ICD-10-CM

## 2020-06-11 NOTE — TELEPHONE ENCOUNTER
Pt was sent MC message by triage today with instructions from last virtual visit. Will await pt response. Blanca shoes that he read the MC message today

## 2020-06-11 NOTE — TELEPHONE ENCOUNTER
Reason for Call:  Other call back  Detailed comments: patient has questions on the visit on 06/03/2020  Phone Number Patient can be reached at: Home number on file [phone] (home)  Best Time: any  Can we leave a detailed message on this number? YES  Call taken on 6/11/2020 at 1:15 PM by SAL CASTRO

## 2020-06-22 RX ORDER — METOPROLOL SUCCINATE 50 MG/1
TABLET, EXTENDED RELEASE ORAL
Qty: 90 TABLET | Refills: 3 | Status: SHIPPED | OUTPATIENT
Start: 2020-06-22 | End: 2020-07-24

## 2020-06-22 RX ORDER — EZETIMIBE 10 MG/1
10 TABLET ORAL DAILY
Qty: 30 TABLET | Refills: 11 | Status: SHIPPED | OUTPATIENT
Start: 2020-06-22 | End: 2020-06-22

## 2020-06-22 RX ORDER — EZETIMIBE 10 MG/1
10 TABLET ORAL DAILY
Qty: 30 TABLET | Refills: 11 | Status: SHIPPED | OUTPATIENT
Start: 2020-06-22 | End: 2021-06-15

## 2020-06-23 NOTE — TELEPHONE ENCOUNTER
Rx faxed for Zetia trial given intolerance to multiple satin trials. If no signif drop in lipids with med, will then need to consider PCSK9 like Repatha.  Pt state energy doing much better since Metoprolol dose reduced to 1/2 tab BID. Had CP lasting a few secs only at work today with minimal activity.  Resolved without intervention and and was able to do more strenuous walking at work after without sx. Denies palpitations. Will gave pt check BP and HR BID for 4 days and email me the results in [employer] next week. Will have repeat lipids in 1 month as ordered

## 2020-07-18 RX ORDER — METOPROLOL SUCCINATE 50 MG/1
TABLET, EXTENDED RELEASE ORAL
Start: 2020-07-18 | End: 2020-07-18

## 2020-07-19 NOTE — PATIENT INSTRUCTIONS
"Reduce Metoprolol XL 50mg tab to 1 tab twice a dayy for 2 days. Then reduce further to 1/2 tab twice a day   Send me update in [employer] later next week regarding your energy with the dose reduction and also report  your blood pressure and heart rate at that time in the message   Call [facility] [phone] to see if they are now doing home sleep studies for your possible sleep apnea as cause for your fatigue and, if so, get that done  As soon as possible  In 1 week, then start generic Zetia (Ezitimibe) 10mg tab, 1 tab daily for cholesterol lowering. This is not a statin medication and should not cause muscle achiness   Call our appointment desk at [phone] or use [employer] to schedule a \"lab only\" to have your CK, Lipid profile (Cholesterol Panel) and Liver Panel checked fasting in 5 weeks.  For fasting labs, please refrain from eating for 8 hours or more.  Be sure to  drink water and take your  medications the day of the test.   "

## 2020-07-22 ENCOUNTER — MYC MEDICAL ADVICE (OUTPATIENT)
Dept: INTERNAL MEDICINE | Facility: CLINIC | Age: 60
End: 2020-07-22

## 2020-07-22 DIAGNOSIS — Z95.1 S/P CABG (CORONARY ARTERY BYPASS GRAFT): ICD-10-CM

## 2020-07-24 RX ORDER — METOPROLOL SUCCINATE 50 MG/1
TABLET, EXTENDED RELEASE ORAL
Qty: 180 TABLET | Refills: 3 | Status: SHIPPED | OUTPATIENT
Start: 2020-07-24 | End: 2021-05-13

## 2020-08-01 DIAGNOSIS — E78.5 HYPERLIPIDEMIA LDL GOAL <70: ICD-10-CM

## 2020-08-01 LAB
ALBUMIN SERPL-MCNC: 3.6 G/DL (ref 3.4–5)
ALP SERPL-CCNC: 103 U/L (ref 40–150)
ALT SERPL W P-5'-P-CCNC: 42 U/L (ref 0–70)
AST SERPL W P-5'-P-CCNC: 21 U/L (ref 0–45)
BILIRUB DIRECT SERPL-MCNC: 0.2 MG/DL (ref 0–0.2)
BILIRUB SERPL-MCNC: 0.6 MG/DL (ref 0.2–1.3)
CHOLEST SERPL-MCNC: 171 MG/DL
CK SERPL-CCNC: 73 U/L (ref 30–300)
HDLC SERPL-MCNC: 35 MG/DL
LDLC SERPL CALC-MCNC: 112 MG/DL
NONHDLC SERPL-MCNC: 136 MG/DL
PROT SERPL-MCNC: 6.9 G/DL (ref 6.8–8.8)
TRIGL SERPL-MCNC: 119 MG/DL

## 2020-08-01 PROCEDURE — 36415 COLL VENOUS BLD VENIPUNCTURE: CPT | Performed by: INTERNAL MEDICINE

## 2020-08-01 PROCEDURE — 80061 LIPID PANEL: CPT | Performed by: INTERNAL MEDICINE

## 2020-08-01 PROCEDURE — 82550 ASSAY OF CK (CPK): CPT | Performed by: INTERNAL MEDICINE

## 2020-08-01 PROCEDURE — 80076 HEPATIC FUNCTION PANEL: CPT | Performed by: INTERNAL MEDICINE

## 2020-11-12 ENCOUNTER — TELEPHONE (OUTPATIENT)
Dept: INTERNAL MEDICINE | Facility: CLINIC | Age: 60
End: 2020-11-12

## 2020-11-12 NOTE — TELEPHONE ENCOUNTER
"Called back patient. Difficult to use triage protocol as he was reporting multiple things. Says he had bypass September last year and was told he needed to be on blood thinners for a year. Has been \"way over a year\" and still on them, wants to get off them- aspirin and eliquis, wants to get off both if possible.    Also reports lower abdomen pain. Was supposed to have colonoscopy last year but says they \"blew him off because of COVID stuff\". Is wondering if could do cologuard or if needs colonoscopy, although then he reported that he will probably need colonoscopy to check for polyps and colon cancer, has had bladder cancer before. Wants colonsocopy as soon as possible. Lower abdominal pain off and on for months. Sometimes when he goes to bathroom and wipes after having BM, has a little leakage out of rectum. No blood in stools or black stools that he has seen except for a little bit of blood that he thinks is from hemorrhoid.     And having leg pain that slowly comes on. He thinks it is from his cholesterol med. Has to hang onto railing when wakes up because legs are so weak, has had this for a couple months. Leg pain in both legs. No discoloration, warmth, fever. No swelling.      Should patient start with in clinic or virtual appt? How soon? Patient says if he does not hear from Dr. Riggs he will just quit all his medicines and see if he gets better, advised not to do this without doctor advisement.   "

## 2020-11-12 NOTE — TELEPHONE ENCOUNTER
Patient would like to speak to a nurse about some abdominal and leg pain. He would also like to discuss getting a colonoscopy.

## 2020-11-12 NOTE — TELEPHONE ENCOUNTER
With multiple issues including abdominal pain and leg pains that  Come and go and have been present intermittently for months. Would  Be  best to see pt in clinic for examination  And to discuss all other concerns. Pt to STAY on all curent meids in the meantime until can asess pt further and then will be able to make recs re: which meds need to continue vs POTENTIALLY stopping. Assist pt with scheduling appt with me in clinic

## 2020-11-19 ENCOUNTER — OFFICE VISIT (OUTPATIENT)
Dept: INTERNAL MEDICINE | Facility: CLINIC | Age: 60
End: 2020-11-19
Payer: COMMERCIAL

## 2020-11-19 VITALS
TEMPERATURE: 98.2 F | RESPIRATION RATE: 16 BRPM | BODY MASS INDEX: 31.31 KG/M2 | DIASTOLIC BLOOD PRESSURE: 76 MMHG | SYSTOLIC BLOOD PRESSURE: 126 MMHG | HEART RATE: 63 BPM | WEIGHT: 212 LBS | OXYGEN SATURATION: 95 %

## 2020-11-19 DIAGNOSIS — Z12.11 SPECIAL SCREENING FOR MALIGNANT NEOPLASMS, COLON: ICD-10-CM

## 2020-11-19 DIAGNOSIS — M25.50 MULTIPLE JOINT PAIN: ICD-10-CM

## 2020-11-19 DIAGNOSIS — I48.0 PAROXYSMAL ATRIAL FIBRILLATION (H): ICD-10-CM

## 2020-11-19 DIAGNOSIS — Z12.5 SCREENING FOR PROSTATE CANCER: ICD-10-CM

## 2020-11-19 DIAGNOSIS — E78.5 HYPERLIPIDEMIA LDL GOAL <70: ICD-10-CM

## 2020-11-19 LAB
ERYTHROCYTE [SEDIMENTATION RATE] IN BLOOD BY WESTERGREN METHOD: 7 MM/H (ref 0–20)
URATE SERPL-MCNC: 5 MG/DL (ref 3.5–7.2)

## 2020-11-19 PROCEDURE — 36415 COLL VENOUS BLD VENIPUNCTURE: CPT | Performed by: INTERNAL MEDICINE

## 2020-11-19 PROCEDURE — 86431 RHEUMATOID FACTOR QUANT: CPT | Performed by: INTERNAL MEDICINE

## 2020-11-19 PROCEDURE — 86140 C-REACTIVE PROTEIN: CPT | Performed by: INTERNAL MEDICINE

## 2020-11-19 PROCEDURE — 85652 RBC SED RATE AUTOMATED: CPT | Performed by: INTERNAL MEDICINE

## 2020-11-19 PROCEDURE — 99214 OFFICE O/P EST MOD 30 MIN: CPT | Performed by: INTERNAL MEDICINE

## 2020-11-19 PROCEDURE — 84550 ASSAY OF BLOOD/URIC ACID: CPT | Performed by: INTERNAL MEDICINE

## 2020-11-19 RX ORDER — MULTIVIT-MIN/IRON/FOLIC ACID/K 18-600-40
CAPSULE ORAL
COMMUNITY
End: 2022-08-16

## 2020-11-19 NOTE — PROGRESS NOTES
"Subjective     Bryce Espinoza is a 60 year old male who presents to clinic today for the following health issues:    HPI   Chief Complaint   Patient presents with     Multiple Concerns      Pt's past medical history, family history, habits, medications and allergies were reviewed with the patient today.  See snap shot for  HCM status. Most recent lab results reviewed with pt. Problem list and histories reviewed & adjusted, as indicated.  Additional history as below:    Patient called nurse line 1 week ago with complaints as below:    \"Says he had bypass September last year and was told he needed to be on blood thinners for a year. Has been \"way over a year\" and still on them, wants to get off them- aspirin and eliquis, wants to get off both if possible.   Also reports lower abdomen pain. Was supposed to have colonoscopy last year but says they \"blew him off because of COVID stuff\". Is wondering if could do cologuard or if needs colonoscopy, although then he reported that he will probably need colonoscopy to check for polyps and colon cancer, has had bladder cancer before. Wants colonsocopy as soon as possible. Lower abdominal pain off and on for months. Sometimes when he goes to bathroom and wipes after having BM, has a little leakage out of rectum. No blood in stools or black stools that he has seen except for a little bit of blood that he thinks is from hemorrhoid.    And having leg pain that slowly comes on. He thinks it is from his cholesterol med. Has to hang onto railing when wakes up because legs are so weak, has had this for a couple months. Leg pain in both legs. No discoloration, warmth, fever. No swelling.\"    Patient last saw cardiology in April.  Was instructed at that time that he needed to continue Eliquis long-term with history of atrial fibrillation/flutter.  It was Plavix that patient was to take for 6 months and has now been changed back to aspirin therapy.  Patient due for colon cancer " "screening.  No recent blood seen in stools.  Previous slight blood on toilet paper from hemorrhoids but denies currently.  Intermittent pain in hips and knees.  Will sit around for a while and then get up and notices the soreness.  Symptoms significantly better however since stopping statin therapy.  Denies weakness in his legs today.  Denies back pain.  No bowel or bladder incontinence symptoms in the legs seem to come and go.  Not having symptoms in his arms.  Occasional cramping abdominal pain which resolves with bowel movements.  No change with urination.  Denies any abdominal pain today.  States problems of generally been regular.  Patient has been trying to watch his weight and is down 4 pounds from previous.  As previous order for colonoscopy at [facility].  Previous colonoscopy had been delayed due to restrictions with nonemergent procedures during Covid pandemic restrictions    Additional ROS:   Constitutional, HEENT, Cardiovascular, Pulmonary, GI and , Neuro, MSK and Psych review of systems/symptoms are otherwise negative or unchanged from previous, except as noted above.      OBJECTIVE:  /76   Pulse 63   Temp 98.2  F (36.8  C) (Temporal)   Resp 16   Wt 96.2 kg (212 lb)   SpO2 95%   BMI 31.31 kg/m     Estimated body mass index is 31.31 kg/m  as calculated from the following:    Height as of 6/2/20: 1.753 m (5' 9\").    Weight as of this encounter: 96.2 kg (212 lb).  Eye: PERRL, EOMI  HENT: ear canals and TM's normal and nose and mouth without ulcers or lesions   Neck: no adenopathy. Thyroid normal to palpation. No bruits  Pulm: Lungs clear to auscultation   CV: Regular rates and rhythm  GI: Soft, nontender, Normal active bowel sounds, No hepatosplenomegaly or masses palpable  Ext: Peripheral pulses intact. No edema.  Minimal tenderness to palpation bilateral knee joint line.  No effusion.  Ligament exam of the knee is grossly intact . No tenderness to ankle or hip range of motion.  No " localized joint warmth/erythema appreciated  Neuro: Normal strength and tone, sensory exam grossly normal  Rectal: prostate symmetric w/o nodularity, no masses palpated, stool guaiac negative and prostate 1+  : testicles normal without atrophy or masses, no hernias and penis normal without urethral discharge     Assessment/Plan: (See plan discussion below for further details)  1. Multiple joint pain  Minimal tenderness of the knees currently but symptoms come and go per patient.  Labs to look for evidence of inflammation, gout, rheumatoid arthritis.  No tenderness in ankles or hips currently though patient does have some hip pains at other times with no knee pain.  Labs as ordered to assess possible migratory arthritis.  If labs okay and symptoms reoccurring, will obtain imaging of the knees and hips refer to rheumatology if also unremarkable  - CRP inflammation  - Erythrocyte sedimentation rate auto  - Uric acid  - Rheumatoid factor    2. Paroxysmal atrial fibrillation (H)  Patient needs to continue Eliquis long-term as per previous cardiology recommendation    3. Special screening for malignant neoplasms, colon  Previous colonoscopy order has been placed with [facility].  Patient to contact them to schedule colonoscopy screening.  No bridging anticoagulation needed and patient will stop Eliquis 3 days prior to colonoscopy and then restart the day after procedure    4. Screening for prostate cancer  Normal KYA findings today.  Future PSA lab for screening  - Prostate spec antigen screen; Future    5. Hyperlipidemia LDL goal <70  Intolerant of statin trial x2.  We will continue Zetia.  Continue trying to reduce saturated fat intake as possible.  Most recent LDL elevated with current therapy.  Patient declines trial of Repatha due to cost.  Repeat lipids in 6 months  - Lipid panel reflex to direct LDL Fasting; Future  - Comprehensive metabolic panel; Future    Plan discussion:   Continue current meds  Labs today as  ordered  Call  [phone] or use [employer] to schedule a future lab appointment  fasting in 6 months.   For fasting labs, please refrain from eating for 8 hours or more.   Drink 2 glasses of water before your lab appointment. It is fine to take your  oral medications on the morning of the lab test as usual  Reduce saturated fats (red meats, fried and processed foods) in your diet and increase the amount of color on your plate with fruits and vegetables.  Call [facility] at [phone] to schedule colonoscopy screening  Hold Eliquis 3 days prior to colonoscopy and restart the day after colonoscopy procedure  Hold aspirin for 7 days prior to colonoscopy and then restart the day after the colonoscopy procedure       Remington Riggs MD  Internal Medicine Department  [facility]    (Chart documentation was completed, in part, with [employer] voice-recognition software. Even though reviewed, some grammatical, spelling, and word errors may remain.)

## 2020-11-20 LAB — CRP SERPL-MCNC: <2.9 MG/L (ref 0–8)

## 2020-11-24 LAB — RHEUMATOID FACT SER NEPH-ACNC: <7 IU/ML (ref 0–20)

## 2021-01-14 ENCOUNTER — HEALTH MAINTENANCE LETTER (OUTPATIENT)
Age: 61
End: 2021-01-14

## 2021-02-12 ENCOUNTER — TELEPHONE (OUTPATIENT)
Dept: INTERNAL MEDICINE | Facility: CLINIC | Age: 61
End: 2021-02-12

## 2021-02-12 NOTE — TELEPHONE ENCOUNTER
Pt to hold Eliquis days prior to colonoscopy as requested by [facility]. Hold ASA y days prior. Start both meds back the day after the colonoscopy. On Eliquis for A fib. No need for additional bridging anticoagulation. Inform [facility] and pt

## 2021-02-12 NOTE — TELEPHONE ENCOUNTER
TO PCP:     Call from [facility]     Upcoming Procedure: March 12 - colonoscopy     Requesting: HOLD and bridge orders for Eliquis     requesting 3 day hold prior     Call back: [phone]    Shadia WU RN

## 2021-02-16 NOTE — TELEPHONE ENCOUNTER
Patient has a history of intermittent/paroxysmal atrial fibrillation.  Given risks for clot formation and stroke when going into atrial fibrillation and given his    history of hypertension and coronary artery disease, patient should be on Eliquis for the rest of his life.  This is not something that would be used for only 1 year. OK to hold briefly around time of colonoscopy procedure without bridging.   I had some typos in my note earlier below.   Pt to hold ASA 7 days prior to colonoscopy and hold Eliquis 3 days prior to the colonoscopy. Restart both the day after the procedure

## 2021-02-16 NOTE — TELEPHONE ENCOUNTER
Left detailed message on [facility] voicemail, with hold orders.  & pt notified.     Dr. Riggs,   Pt wondering when he can stop taking the Eliquis?  He says that he was going to be on this for 1 year, and that year ended in Dec.    Are you okay with him stopping the eliquis?

## 2021-02-19 NOTE — TELEPHONE ENCOUNTER
LM on [facility] VM with PCP information. Advised to call back if has any questions.     Keya COLMENARESN, RN, PHN

## 2021-02-22 NOTE — PATIENT INSTRUCTIONS
Continue current meds  Labs today as ordered  Call  [phone] or use [employer] to schedule a future lab appointment  fasting in 6 months.   For fasting labs, please refrain from eating for 8 hours or more.   Drink 2 glasses of water before your lab appointment. It is fine to take your  oral medications on the morning of the lab test as usual  Reduce saturated fats (red meats, fried and processed foods) in your diet and increase the amount of color on your plate with fruits and vegetables.  Call [facility] at [phone] to schedule colonoscopy screening  Hold Eliquis 3 days prior to colonoscopy and restart the day after colonoscopy procedure  Hold aspirin for 7 days prior to colonoscopy and then restart the day after the colonoscopy procedure

## 2021-04-02 ENCOUNTER — TRANSFERRED RECORDS (OUTPATIENT)
Dept: HEALTH INFORMATION MANAGEMENT | Facility: CLINIC | Age: 61
End: 2021-04-02

## 2021-04-29 ENCOUNTER — TELEPHONE (OUTPATIENT)
Dept: SLEEP MEDICINE | Facility: CLINIC | Age: 61
End: 2021-04-29

## 2021-04-29 NOTE — TELEPHONE ENCOUNTER
Reason for call:  Other   Patient called regarding (reason for call): call back  Additional comments: patient is calling to have his HST  rescheduled. It was cancelled last year due to COVID, so he is in need of rescheduling. Had consult in February 2020.    Phone number to reach patient:  Home number on file [phone] (home)    Best Time:  Any time    Can we leave a detailed message on this number?  YES    Travel screening: Not Applicable

## 2021-05-13 ENCOUNTER — TELEPHONE (OUTPATIENT)
Dept: SLEEP MEDICINE | Facility: CLINIC | Age: 61
End: 2021-05-13

## 2021-05-13 DIAGNOSIS — R06.83 SNORING: ICD-10-CM

## 2021-05-13 DIAGNOSIS — R29.818 SUSPECTED SLEEP APNEA: Primary | ICD-10-CM

## 2021-05-13 DIAGNOSIS — Z95.1 S/P CABG (CORONARY ARTERY BYPASS GRAFT): ICD-10-CM

## 2021-05-13 DIAGNOSIS — I10 ESSENTIAL HYPERTENSION: ICD-10-CM

## 2021-05-13 DIAGNOSIS — I48.0 PAROXYSMAL ATRIAL FIBRILLATION (H): ICD-10-CM

## 2021-05-13 RX ORDER — METOPROLOL SUCCINATE 50 MG/1
TABLET, EXTENDED RELEASE ORAL
Qty: 180 TABLET | Refills: 0 | Status: SHIPPED | OUTPATIENT
Start: 2021-05-13 | End: 2021-08-13

## 2021-05-13 NOTE — TELEPHONE ENCOUNTER
Patient requesting to reschedule HST cancelled due to Covid. Orders in Epic have . Please renew orders of advise if virtual visit is needed.   
Patient/Caregiver provided printed discharge information.

## 2021-05-19 ENCOUNTER — APPOINTMENT (OUTPATIENT)
Dept: CT IMAGING | Facility: CLINIC | Age: 61
End: 2021-05-19
Attending: NURSE PRACTITIONER
Payer: COMMERCIAL

## 2021-05-19 ENCOUNTER — NURSE TRIAGE (OUTPATIENT)
Dept: INTERNAL MEDICINE | Facility: CLINIC | Age: 61
End: 2021-05-19

## 2021-05-19 ENCOUNTER — OFFICE VISIT (OUTPATIENT)
Dept: URGENT CARE | Facility: URGENT CARE | Age: 61
End: 2021-05-19
Payer: COMMERCIAL

## 2021-05-19 ENCOUNTER — HOSPITAL ENCOUNTER (EMERGENCY)
Facility: CLINIC | Age: 61
Discharge: HOME | End: 2021-05-19
Attending: NURSE PRACTITIONER | Admitting: NURSE PRACTITIONER
Payer: COMMERCIAL

## 2021-05-19 VITALS
RESPIRATION RATE: 16 BRPM | BODY MASS INDEX: 32.49 KG/M2 | DIASTOLIC BLOOD PRESSURE: 100 MMHG | TEMPERATURE: 99 F | HEART RATE: 72 BPM | OXYGEN SATURATION: 96 % | SYSTOLIC BLOOD PRESSURE: 158 MMHG | WEIGHT: 220 LBS

## 2021-05-19 VITALS
DIASTOLIC BLOOD PRESSURE: 105 MMHG | HEART RATE: 61 BPM | SYSTOLIC BLOOD PRESSURE: 173 MMHG | TEMPERATURE: 98.7 F | WEIGHT: 220 LBS | RESPIRATION RATE: 20 BRPM | BODY MASS INDEX: 32.58 KG/M2 | HEIGHT: 69 IN | OXYGEN SATURATION: 95 %

## 2021-05-19 DIAGNOSIS — G45.9 TIA (TRANSIENT ISCHEMIC ATTACK): ICD-10-CM

## 2021-05-19 DIAGNOSIS — H53.2 DIPLOPIA: ICD-10-CM

## 2021-05-19 DIAGNOSIS — H53.2 DOUBLE VISION: Primary | ICD-10-CM

## 2021-05-19 DIAGNOSIS — I10 HIGH BLOOD PRESSURE: ICD-10-CM

## 2021-05-19 LAB
ANION GAP SERPL CALCULATED.3IONS-SCNC: 7 MMOL/L (ref 3–14)
APTT PPP: 28 SEC (ref 22–37)
BASOPHILS # BLD AUTO: 0.1 10E9/L (ref 0–0.2)
BASOPHILS NFR BLD AUTO: 0.6 %
BUN SERPL-MCNC: 13 MG/DL (ref 7–30)
CALCIUM SERPL-MCNC: 8.9 MG/DL (ref 8.5–10.1)
CHLORIDE SERPL-SCNC: 108 MMOL/L (ref 94–109)
CHOLEST SERPL-MCNC: 158 MG/DL
CO2 SERPL-SCNC: 28 MMOL/L (ref 20–32)
CREAT SERPL-MCNC: 0.7 MG/DL (ref 0.66–1.25)
DIFFERENTIAL METHOD BLD: NORMAL
EOSINOPHIL # BLD AUTO: 0.2 10E9/L (ref 0–0.7)
EOSINOPHIL NFR BLD AUTO: 1.5 %
ERYTHROCYTE [DISTWIDTH] IN BLOOD BY AUTOMATED COUNT: 12.6 % (ref 10–15)
GFR SERPL CREATININE-BSD FRML MDRD: >90 ML/MIN/{1.73_M2}
GLUCOSE SERPL-MCNC: 100 MG/DL (ref 70–99)
HBA1C MFR BLD: 5.4 % (ref 0–5.6)
HCT VFR BLD AUTO: 45.3 % (ref 40–53)
HDLC SERPL-MCNC: 43 MG/DL
HGB BLD-MCNC: 15.1 G/DL (ref 13.3–17.7)
IMM GRANULOCYTES # BLD: 0 10E9/L (ref 0–0.4)
IMM GRANULOCYTES NFR BLD: 0.4 %
INR PPP: 0.99 (ref 0.86–1.14)
INTERPRETATION ECG - MUSE: NORMAL
LDLC SERPL CALC-MCNC: 82 MG/DL
LYMPHOCYTES # BLD AUTO: 2.1 10E9/L (ref 0.8–5.3)
LYMPHOCYTES NFR BLD AUTO: 20.5 %
MCH RBC QN AUTO: 31.3 PG (ref 26.5–33)
MCHC RBC AUTO-ENTMCNC: 33.3 G/DL (ref 31.5–36.5)
MCV RBC AUTO: 94 FL (ref 78–100)
MONOCYTES # BLD AUTO: 1.1 10E9/L (ref 0–1.3)
MONOCYTES NFR BLD AUTO: 10.3 %
NEUTROPHILS # BLD AUTO: 6.9 10E9/L (ref 1.6–8.3)
NEUTROPHILS NFR BLD AUTO: 66.7 %
NONHDLC SERPL-MCNC: 115 MG/DL
NRBC # BLD AUTO: 0 10*3/UL
NRBC BLD AUTO-RTO: 0 /100
PLATELET # BLD AUTO: 211 10E9/L (ref 150–450)
POTASSIUM SERPL-SCNC: 4.1 MMOL/L (ref 3.4–5.3)
RBC # BLD AUTO: 4.82 10E12/L (ref 4.4–5.9)
SODIUM SERPL-SCNC: 143 MMOL/L (ref 133–144)
TRIGL SERPL-MCNC: 163 MG/DL
TROPONIN I SERPL-MCNC: <0.015 UG/L (ref 0–0.04)
WBC # BLD AUTO: 10.3 10E9/L (ref 4–11)

## 2021-05-19 PROCEDURE — 99285 EMERGENCY DEPT VISIT HI MDM: CPT | Mod: 25

## 2021-05-19 PROCEDURE — 85610 PROTHROMBIN TIME: CPT | Performed by: NURSE PRACTITIONER

## 2021-05-19 PROCEDURE — 83036 HEMOGLOBIN GLYCOSYLATED A1C: CPT | Performed by: NURSE PRACTITIONER

## 2021-05-19 PROCEDURE — 99207 PR NO CHARGE LOS: CPT | Performed by: PHYSICIAN ASSISTANT

## 2021-05-19 PROCEDURE — 85730 THROMBOPLASTIN TIME PARTIAL: CPT | Performed by: NURSE PRACTITIONER

## 2021-05-19 PROCEDURE — 84484 ASSAY OF TROPONIN QUANT: CPT | Performed by: NURSE PRACTITIONER

## 2021-05-19 PROCEDURE — 70496 CT ANGIOGRAPHY HEAD: CPT

## 2021-05-19 PROCEDURE — 80061 LIPID PANEL: CPT | Performed by: NURSE PRACTITIONER

## 2021-05-19 PROCEDURE — 85025 COMPLETE CBC W/AUTO DIFF WBC: CPT | Performed by: NURSE PRACTITIONER

## 2021-05-19 PROCEDURE — 250N000009 HC RX 250: Performed by: NURSE PRACTITIONER

## 2021-05-19 PROCEDURE — 250N000011 HC RX IP 250 OP 636: Performed by: NURSE PRACTITIONER

## 2021-05-19 PROCEDURE — 70450 CT HEAD/BRAIN W/O DYE: CPT

## 2021-05-19 PROCEDURE — 80048 BASIC METABOLIC PNL TOTAL CA: CPT | Performed by: NURSE PRACTITIONER

## 2021-05-19 PROCEDURE — 93005 ELECTROCARDIOGRAM TRACING: CPT

## 2021-05-19 RX ORDER — IOPAMIDOL 755 MG/ML
70 INJECTION, SOLUTION INTRAVASCULAR ONCE
Status: COMPLETED | OUTPATIENT
Start: 2021-05-19 | End: 2021-05-19

## 2021-05-19 RX ADMIN — SODIUM CHLORIDE 90 ML: 9 INJECTION, SOLUTION INTRAVENOUS at 21:51

## 2021-05-19 RX ADMIN — IOPAMIDOL 70 ML: 755 INJECTION, SOLUTION INTRAVENOUS at 21:51

## 2021-05-19 ASSESSMENT — ENCOUNTER SYMPTOMS
NUMBNESS: 0
HEADACHES: 0
SPEECH DIFFICULTY: 0
WEAKNESS: 0
FATIGUE: 1

## 2021-05-19 ASSESSMENT — MIFFLIN-ST. JEOR: SCORE: 1798.29

## 2021-05-19 NOTE — TELEPHONE ENCOUNTER
"FYI-Pt does also have a hx of Afib . recommended ER declined but agreed to go to  .Karime Treviño RN      Reason for Disposition    Double vision    Additional Information    Negative: Weakness of the face, arm or leg on one side of the body    Negative: Followed getting substance in the eye    Negative: Foreign body or object is or was lodged in the eye    Negative: Followed an eye injury    Negative: Followed sun lamp or sun exposure (UV keratitis)    Negative: Yellow or green discharge (pus) in the eye    Negative: Pregnant    Negative: Complete loss of vision in 1 or both eyes    Negative: Severe eye pain    Negative: Severe headache    Answer Assessment - Initial Assessment Questions  1. DESCRIPTION: \"What is the vision loss like? 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 LOCATION: \"One or both eyes?\" If one, ask: \"Which eye?\"      Both     3. SEVERITY: \"Can you see anything?\" If so, ask: \"What can you see?\" (e.g., fine print)      Can see and was able to get his self inside  4. ONSET: \"When did this begin?\" \"Did it start suddenly or has this been gradual?\"      Sudden last night   5. PATTERN: \"Does this come and go, or has it been constant since it started?\"      Comes and goes   6. PAIN: \"Is there any pain in your eye(s)?\"  (Scale 1-10; or mild, moderate, severe)      no  7. CONTACTS-GLASSES: \"Do you wear contacts or glasses?\"      no  8. CAUSE: \"What do you think is causing this visual problem?\"      Unsure   9. OTHER SYMPTOMS: \"Do you have any other symptoms?\" (e.g., confusion, headache, arm or leg weakness, speech problems)      no  10. PREGNANCY: \"Is there any chance you are pregnant?\" \"When was your last menstrual period?\"        no    Protocols used: VISION LOSS OR CHANGE-A-OH      "

## 2021-05-19 NOTE — PROGRESS NOTES
Sent to ER for assessment  Episode of double vision Monday night  Abrupt onset, did not resolve until he went to bed and woke up yesterday morning  Currently asymptomatic      Sent to ER Otilio Francisco.

## 2021-05-19 NOTE — LETTER
May 19, 2021      To Whom It May Concern:      Bryce Espinoza was seen in our Emergency Department today, 05/19/21.  Bryce will need follow-up appointment and will need to be excused to have those appointments.  He may return to work when improved.    Sincerely,        Shayy Meredith, CNP

## 2021-05-19 NOTE — PROGRESS NOTES
VISION   No corrective lenses  Tool used: Christensen   Right eye:        10/16 (20/32)   Left eye:          10/10 (20/20)  Visual Acuity:     Jose Rafael Bright CMA on 5/19/2021 at 6:02 PM

## 2021-05-20 ENCOUNTER — HOSPITAL ENCOUNTER (OUTPATIENT)
Dept: MRI IMAGING | Facility: CLINIC | Age: 61
Discharge: HOME | End: 2021-05-20
Attending: NURSE PRACTITIONER | Admitting: NURSE PRACTITIONER
Payer: COMMERCIAL

## 2021-05-20 ENCOUNTER — PATIENT OUTREACH (OUTPATIENT)
Dept: CARE COORDINATION | Facility: CLINIC | Age: 61
End: 2021-05-20

## 2021-05-20 DIAGNOSIS — G45.9 TIA (TRANSIENT ISCHEMIC ATTACK): ICD-10-CM

## 2021-05-20 PROCEDURE — 70553 MRI BRAIN STEM W/O & W/DYE: CPT

## 2021-05-20 PROCEDURE — A9585 GADOBUTROL INJECTION: HCPCS | Performed by: NURSE PRACTITIONER

## 2021-05-20 PROCEDURE — 255N000002 HC RX 255 OP 636: Performed by: NURSE PRACTITIONER

## 2021-05-20 RX ORDER — GADOBUTROL 604.72 MG/ML
10 INJECTION INTRAVENOUS ONCE
Status: COMPLETED | OUTPATIENT
Start: 2021-05-20 | End: 2021-05-20

## 2021-05-20 RX ORDER — GADOBUTROL 604.72 MG/ML
10 INJECTION INTRAVENOUS ONCE
Status: DISCONTINUED | OUTPATIENT
Start: 2021-05-20 | End: 2021-05-20

## 2021-05-20 RX ADMIN — GADOBUTROL 10 ML: 604.72 INJECTION INTRAVENOUS at 11:53

## 2021-05-20 NOTE — PROGRESS NOTES
Good morning,     Patient has been contacted and all appointments scheduled and confirmed. MRI taking place today, Echo tomorrow and Neuro on Monday.     Thanks,  Ximena

## 2021-05-20 NOTE — CONSULTS
"[facility]    Stroke Telephone Note    I was called by Shayy Meredith on 05/19/21 regarding patient Bryce Espinoza. The patient is a 60 year old male with history of atrial fibrillation on apixaban and CAD on ASA who presents after a 15 minute episode of binocular double vision two days prior.    Stroke Code Data (for stroke code without tele)--- not a stroke code  Stroke code activated         Stroke provider first response                   Last known normal              Time of discovery   (or onset of symptoms)         Head CT read by Stroke Neuro Dr/Provider         Was stroke code de-escalated?                Thrombolytic Treatment   Not given due to minor/isolated/quickly resolving symptoms and DOAC dose within 48 hours or INR > 1.7.    Endovascular Treatment  Not initiated due to absence of proximal vessel occlusion    Impression  Transient Ischemic Attack    Recommendations   - discharge with TIA smartset  - continue aspirin 81 mg daily for secondary stroke prevention  - continue apixaban 5 mg BID  - Statin: okay to await LDL and continue zetia  - outpatient MRI Brain with and without contrast  - outpatient TTE (with Bubble Study if age 60 yrs or less)  - no need for event monitor since a fib dx is establised  - A1c, Lipid Panel added on to blood in lab from today  - care coordinator referral for TIA clinic    My recommendations are based on the information provided over the phone by Bryce Espinoza's in-person providers. They are not intended to replace the clinical judgment of his in-person providers. I was not requested to personally see or examine the patient at this time.    The Stroke Staff is Dr. Canchola.    Emili Tay MD  Vascular Neurology Fellow  To page me or covering stroke neurology team member, click here: [facility]COM   Choose \"On Call\" tab at top, then search dropdown box for \"Neurology Adult\", select location, press Enter, then look for stroke/neuro " ICU/telestroke.

## 2021-05-20 NOTE — ED PROVIDER NOTES
History   Chief Complaint:  Eye Problem     HPI   Bryce Espinoza is a 60 year old male on Eliquis for atrial fibrillation with history of hypertension, CAD, and hyperlipidemia who presents with eye problem. He reports having an episode of double vision of both eyes while on a fishing trip two days ago in the evening. He denies headaches, speech difficulty, weakness, or numbness prior or after this event. He denies any recent falls or trauma prior to this episode. He went to bed 15 minutes later that night and woke up the next morning at 0600 with resolved symptoms. He has not had any recurrences of visual changes since. He has never had this type of visual disturbance before. He arrived at urgent care this afternoon but they suggested he come into the emergency department for an evaluation. He has been consistent on taking his Eliquis medication. He denies any past problems with his kidneys. He reports being more fatigued than usual and this began approximately three months ago.     Complete Echo Adult: 09/04/2019  1. Normal left ventricular size and systolic function. LVEF 60-65%.  2. No regional wall motion abnormalities.  3. Normal right ventricular size and systolic function.  4. No hemodynamically significant valve disease.    Cardiac Catheterization: 09/06/2019  Ost RPDA lesion is 50% stenosed.  Dist RCA lesion is 50% stenosed.  1st RPLB lesion is 25% stenosed.  Dist LM lesion is 60% stenosed.  The ejection fraction is greater than 55% by visual estimate.  1.  Patient has a very large dominant right coronary artery with a 50% distal stenosis and a 50% stenosis in the ostium of the posterior descending artery.  2.  Focal distal left main coronary artery not involving the bifurcation.  60% stenosis.  3.  No significant disease and a small LAD that does not reach the apex and a small circumflex coronary artery.  4.  Hyperdynamic left ventricular function estimated ejection fraction of 65% without mitral  "regurgitation or aortic stenosis.  If patient should opt for intervention please schedule for Anamika or Janny.    BILATERAL CAROTID ULTRASOUND   9/6/2019 1:25 PM   1. Less than 50% diameter stenosis of the right ICA relative to the  distal ICA diameter.  2. Less than 50% diameter stenosis of the left ICA relative to the  distal ICA diameter.    Review of Systems   Constitutional: Positive for fatigue.   Eyes: Positive for visual disturbance (double vision).   Neurological: Negative for speech difficulty, weakness, numbness and headaches.   All other systems reviewed and are negative.    Allergies:  Atorvastatin  Crestor [Rosuvastatin]  Lisinopril    Medications:  Eliquis   Aspirin 81 mg   Zetia   Metoprolol    Past Medical History:    CAD   Hypertension   Bladder Cancer   Olecranon Bursitis   Hyperlipidemia   Paroxysmal Atrial Fibrillation   Right Lung Calcified Granuloma   Tobacco Use Disorder     Past Surgical History:    Bypass Graft Artery Coronary   CV Heart Cathereterization   EBCT     Family History:    Mother - RA, Hypertension, Breast Cancer   Father - CAD  Brother - Diabetes     Social History:  Arrives unaccompanied.   Wife's Phone NumberDalia: [phone]    Physical Exam     Patient Vitals for the past 24 hrs:   BP Temp Temp src Pulse Resp SpO2 Height Weight   05/19/21 2045 (!) 173/105 -- -- 61 -- 95 % -- --   05/19/21 1843 (!) 172/94 98.7  F (37.1  C) Temporal 57 20 97 % 1.753 m (5' 9\") 99.8 kg (220 lb)       Physical Exam  Nursing notes reviewed. Vitals reviewed.  General: Alert. Well kept.  Eyes:  Conjunctiva non-injected, non-icteric.  Neck/Throat: Moist mucous membranes. Normal voice.  Cardiac: Regular rhythm. Normal heart sounds.  Pulmonary: Clear and equal breath sounds bilaterally.   Abdomen: soft and non-tender.  Musculoskeletal: Normal gross range of motion of all 4 extremities.   Skin: Warm and dry. Normal appearance of visualized exposed skin without rashes or petechiae.  Psych: " Affect normal. Good eye contact.  Neurological:  Mental: alert and oriented x 4, follows commands, no aphasia or dysarthria.   Cranial Nerves:  CN II: visual acuity - able to accurately count fingers with each eye. Visual fields intact  CN III, IV, VI: EOM intact, pupils equal and reactive  CN V: facial sensation nl  CN VII: face symmetric, no facial droop  CN VIII: hearing normal  CN IX: palate elevation symmetric, uvula at midline  CN XI SCM normal, shoulder shrug nl  CN XII: tongue midline  Motor: Strength: 5/5 in all major groups of all extremities. Normal tone. No abnormal movements. No pronator drift b/l.  Sensory: temperature, light touch intact. Romberg: negative.  Coordination: FNF and heel-shin tests intact b/l.   Gait:  Normal.    Emergency Department Course   ECG  ECG taken at 2132, ECG read at 2136  Normal sinus rhythm with sinus arrhythmia   Possible Left atrial enlargement   Nonspecific ST abnormality   Abnormal ECG  Rate 62 bpm. VA interval 158 ms. QRS duration 88 ms. QT/QTc 418/424 ms. P-R-T axes 62 36 -15.     Imaging:  CTA Head Neck with Contrast  HEAD CT:  1.  Normal head CT.    HEAD CTA:   1. Mild intracranial atheromatous disease without high-grade stenosis, branch occlusion, or aneurysm.     NECK CTA:  1.  Mild atheromatous disease. No high-grade stenosis or dissection.  2.  Medialization of the right true vocal cord suggests the possibility of right vocal cord paralysis. Correlation suggested.  Reading per radiology    Head CT w/o Contrast  HEAD CT:  1.  Normal head CT.    HEAD CTA:   1. Mild intracranial atheromatous disease without high-grade stenosis, branch occlusion, or aneurysm.     NECK CTA:  1.  Mild atheromatous disease. No high-grade stenosis or dissection.  2.  Medialization of the right true vocal cord suggests the possibility of right vocal cord paralysis. Correlation suggested.  Reading per radiology    Laboratory:  CBC: WBC 10.3, HGB 15.1,    BMP: Glucose 100 (H), o/w  WNL (Creatinine: 0.70)    INR: 0.99  PTT: 28  Troponin (Collected 2123): <0.015    Emergency Department Course:  Reviewed:  I reviewed nursing notes, vitals and past medical history    Assessments:  2100 I obtained history and examined the patient as noted above.   2248 I rechecked the patient and explained findings.   2300 I rechecked on the patient.     Consults:   2116 I spoke with Dr. Cristy Tay of the Stroke Neurology service regarding patient's presentation, findings, and plan of care.   2245 I spoke with Dr. Cristy Tay of the Stroke Neurology.     Disposition:  The patient was discharged to home.     Impression & Plan     Medical Decision Making:  Bryce Espinoza is a 60 year old male on Eliquis for atrial fibrillation with history of hypertension, CAD, and hyperlipidemia who presents with eye problem.  Differential considered for symptoms included CVA, TIA, dissection, cerebral tumor, migraine, infection, metabolic derangement, demyelination, etc. His neurologic exam in the emergency department shows an intact exam without deficit and an NIH stroke scale of 0.  He is not a candidate for thrombolysis.  EKG shows sinus rhythm with nonspecific ST abnormality and troponin is negative making acute coronary syndrome unlikely.  He has been taking his anticoagulation without missing a dose.  Lab work today shows no acute abnormality.  I spoke with stroke neurology who recommended CT/CTA of the head and neck which returned showing no acute stroke, high-grade stenosis, occlusion, aneurysm, intracerebral bleed.  I reviewed the examination and findings with stroke neurology who recommended the patient be discharged home with no medication changes and with plan for scheduled outpatient MRI/MRA and echocardiogram.  I had a very detailed discussion with the patient regarding his findings and plan of care and is in agreement with this plan.  He will follow up with primary care regarding his symptoms and high blood pressure  and with stroke neurology as discussed.  He will return to the emergency department with any return of symptoms, new symptoms, concerns.    ABCD2 Score for TIA (calculator)  Background  Calculates overall risk of future TIA based on 5 factors: Age>=60, SBP>140/90, weakness, impaired speech, duration, diabetes.  Data  60 year old  has Essential hypertension, benign; Malignant neoplasm of bladder (H); Other specified idiopathic peripheral neuropathy; Hyperlipidemia LDL goal <70; Coronary artery disease involving native coronary artery of native heart without angina pectoris; S/P CABG (coronary artery bypass graft); and Paroxysmal atrial fibrillation (H) on their problem list.  Criteria   Of possible 7 points for 6 possible items  1 point: Systolic Blood pressure>=140/90  1 point: Duration 10-59 minutes  Interpretation  ABCD Score: 3  Points 0-3: Risk of CVA 1.0% within the subsequent 2 days of TIA    Diagnosis:    ICD-10-CM    1. TIA (transient ischemic attack)  G45.9 Echocardiogram Complete     MR Brain w/o & w Contrast     Care Coordination Referral   2. Diplopia  H53.2        Scribe Disclosure:  I, Laith Ohara, am serving as a scribe at 8:45 PM on 5/19/2021 to document services personally performed by Shayy Meredith, BETTYE based on my observations and the provider's statements to me.            Shayy Meredith, CNP  05/19/21 3293

## 2021-05-20 NOTE — PROGRESS NOTES
Date: 5/24/2021 Status: [facility]   Time: 1:00 PM Length: 60   Visit Type: VIDEO VISIT NEW [1702] CB: [id number]   Provider: Harper Singer CNP Department:  NEUROLOGY

## 2021-05-21 ENCOUNTER — HOSPITAL ENCOUNTER (OUTPATIENT)
Dept: CARDIOLOGY | Facility: CLINIC | Age: 61
Discharge: HOME | End: 2021-05-21
Attending: NURSE PRACTITIONER | Admitting: NURSE PRACTITIONER
Payer: COMMERCIAL

## 2021-05-21 DIAGNOSIS — G45.9 TIA (TRANSIENT ISCHEMIC ATTACK): ICD-10-CM

## 2021-05-21 PROCEDURE — 258N000001 HC RX 258: Performed by: NURSE PRACTITIONER

## 2021-05-21 PROCEDURE — 999N000208 ECHOCARDIOGRAM COMPLETE

## 2021-05-21 PROCEDURE — 93306 TTE W/DOPPLER COMPLETE: CPT | Mod: 26 | Performed by: INTERNAL MEDICINE

## 2021-05-21 RX ORDER — ACYCLOVIR 200 MG/1
30 CAPSULE ORAL ONCE
Status: COMPLETED | OUTPATIENT
Start: 2021-05-21 | End: 2021-05-21

## 2021-05-21 RX ADMIN — SODIUM CHLORIDE 30 ML: 9 INJECTION, SOLUTION INTRAMUSCULAR; INTRAVENOUS; SUBCUTANEOUS at 11:39

## 2021-05-24 ENCOUNTER — VIRTUAL VISIT (OUTPATIENT)
Dept: NEUROLOGY | Facility: CLINIC | Age: 61
End: 2021-05-24
Attending: NURSE PRACTITIONER
Payer: COMMERCIAL

## 2021-05-24 ENCOUNTER — TELEPHONE (OUTPATIENT)
Dept: SLEEP MEDICINE | Facility: CLINIC | Age: 61
End: 2021-05-24

## 2021-05-24 ENCOUNTER — TELEPHONE (OUTPATIENT)
Facility: CLINIC | Age: 61
End: 2021-05-24

## 2021-05-24 DIAGNOSIS — E78.5 HYPERLIPIDEMIA LDL GOAL <70: ICD-10-CM

## 2021-05-24 DIAGNOSIS — H53.9 VISUAL DISTURBANCE: Primary | ICD-10-CM

## 2021-05-24 PROCEDURE — 99205 OFFICE O/P NEW HI 60 MIN: CPT | Mod: GT | Performed by: NURSE PRACTITIONER

## 2021-05-24 NOTE — PROGRESS NOTES
Jorge is a 60 year old who is being evaluated via a billable video visit.      How would you like to obtain your AVS? MyChart  If the video visit is dropped, the invitation should be resent by: Send to e-mail at: [email or web address]  Will anyone else be joining your video visit? No      Video Start Time: 1302  Video-Visit Details    Type of service:  Video Visit    Video End Time:1323    Originating Location (pt. Location): Home    Distant Location (provider location):  [facility] [place]     Platform used for Video Visit: [employer]  _____________________________________________________________    [facility]    at [facility] [phone]  _____________________________________________________________      Chief Complaint: Patient presents with:  Tia (Transient Ischemic Attack): ED follow up      History of Present Illness: Bryce Espinoza is a 60 year old male presenting as a new patient for TIA follow-up. He was referred to our clinic via the TIA discharge pathway. He has a past medical history significant for CAD s/p CABG (on ASA), HTN, bladder cancer, HLD, and atrial fibrillation (on Eliquis). He presented to the ED 5/19 for evaluation of a 15 minute episode of binocular double vision two days prior. He reports that he was sitting outside at the cabin when he had sudden onset of horizontal diplopia. He did not close each eye individually when this occurred, but thinks it was binocular. He went to bed and when he awoke, the symptoms had resolved. He denies associated focal weakness, numbness, speech difficulty, or headache.     TIA Evaluation Summarized  MRI and/or Head CT: MRI without acute infarct, shows a small enhancing mass along the falx cerebri measuring up to 8 mm, likely a small meningioma. Also notes a nonspecific small C-shaped area of T2 hypointense signal along the paramedian cortical margin or subarachnoid space of the left  cingulate gyrus, of uncertain etiology.  Intracranial Vascular Imaging: CTA head unremarkable  Cervical Carotid and Vertebral Artery Vascular Imaging: CTA neck unremarkable  Echocardiogram: EF 55-60%, normal LA, negative bubble  EKG/Telemetry: sinus with sinus arrhythmia  LDL: 82  A1c: 5.4  Other testing: Not Applicable    ABCD2 Patients Score   Age ? 60 years 1 point 1   Blood Pressure     -SBP ? 140 or DBP ?  90    1 point 0   Clinical Features    -Unilateral weakness   -Speech disturbance w/o weakness    -Other    2 points  1 point    0 points 0   Duration of symptoms    ?60 minutes    10-59 minutes    <10 minutes   2 points  1 point  0 points 1   Diabetes  1 point 0   Patient s ABCD2 Score (0-7) = 2     He was discharged on his PTA ASA and Eliquis. He denies new or recurrent neurologic deficits.    Impression:   Problem List Items Addressed This Visit        Endocrine    Hyperlipidemia LDL goal <70      Other Visit Diagnoses     Visual disturbance    -  Primary        Acute onset diplopia without other focal neurologic deficits. Known history of atrial fibrillation on Eliquis (no reported missed doses). This could represent TIA vs primary ophthalmologic etiology.     Plan:   - Continue PTA aspirin 81 mg daily   - Continue PTA apixaban 5 mg BID  - LDL 82, documented history of statin intolerance. Continue PTA Zetia and dietary modifications to achieve goal 40-70. Recommend discussion with PCP regarding Repatha.   - A1c 5.4, within goal <7.0  - Goal BP <140/90 with tighter control associated with decreased overall CV risk, if tolerated  - Recommend follow-up with opthalmology to evaluate for alternative etiologies  - Given non-specific MRI findings in the left cingulate gyrus, recommend repeat MRI brain in 2-3 months  - Follow up in 3 months    Stroke Education provided.  He will call us with any questions.  For any acute neurologic deficits he was advised to  go directly to the hospital rather than call the  "clinic.    Harper Singer, CNP  Neurology  2021 11:04 AM  To page me or covering stroke neurology team member, click here: [facility]  Choose \"On Call\" tab at top, then search dropdown box for \"Neurology Adult\" & press Enter, look for Neuro ICU/Stroke    ___________________________________________________________________    Current Medications  Current Outpatient Medications   Medication Sig     apixaban ANTICOAGULANT (ELIQUIS) 5 MG tablet Take 1 tablet (5 mg) by mouth 2 times daily Please call [phone] for an appointment.     Ascorbic Acid (VITAMIN C) 500 MG CAPS      aspirin (ASA) 81 MG EC tablet Take 1 tablet (81 mg) by mouth daily     Cholecalciferol (VITAMIN D) 2000 UNITS tablet Take 2,000 Units by mouth daily     ezetimibe (ZETIA) 10 MG tablet Take 1 tablet (10 mg) by mouth daily     metoprolol succinate ER (TOPROL-XL) 50 MG 24 hr tablet 1  tab twice a day     No current facility-administered medications for this visit.        Past Medical History  Past Medical History:   Diagnosis Date     Coronary artery disease involving native coronary artery of native heart without angina pectoris 2019    Per CT calcium score of 722.97 3/2019     Essential hypertension, benign      Hematuria      Malignant neoplasm of bladder, part unspecified     Transitional Cell Carcinoma bladder     Olecranon bursitis 3/01    right     Other and unspecified hyperlipidemia      Paroxysmal atrial fibrillation (H)      Paroxysmal atrial flutter (H)      RIGHT LUNG CALCIFIED GRANULOMA     RML     Tobacco use disorder     Quit      Unspecified hemorrhoids without mention of complication      Unspecified hereditary and idiopathic peripheral neuropathy        Social History  Social History     Tobacco Use     Smoking status: Former Smoker     Packs/day: 1.00     Years: 33.00     Pack years: 33.00     Quit date: 2013     Years since quittin.3     Smokeless tobacco: Never Used   Substance Use Topics     " Alcohol use: Yes     Comment: 2 daily     Drug use: Yes     Comment: [email or web address]. occ       Family History  Family History   Problem Relation Age of Onset     Arthritis Mother         RA     Hypertension Mother      Breast Cancer Mother      Prostate Cancer Maternal Grandfather         80     Cancer Maternal Grandmother         Lung     Coronary Artery Disease Father      Other - See Comments Brother      Diabetes Brother      Genetic Disorder Son        Physical Exam    There were no vitals taken for this visit.    General:  no acute distress  HEENT:  normocephalic/atraumatic  Pulmonary:  no respiratory distress    Neurologic  Mental Status:  alert, oriented x 3, follows commands, speech clear and fluent, naming and repetition normal  Cranial Nerves:  EOMI with normal smooth pursuit, facial movements symmetric, hearing not formally tested but intact to conversation, no dysarthria, shoulder shrug equal bilaterally, tongue protrusion midline  Motor:  no abnormal movements, able to move all limbs antigravity spontaneously with no signs of hemiparesis observed, no pronator drift  Reflexes:  unable to test (telestroke)  Sensory:  unable to test (telestroke)  Coordination:  No obvious ataxia  Station/Gait:  unable to test (telestroke)    Neuroimaging: as per HPI. I personally reviewed those images    Labs:    Coagulation studies:  Recent Labs   Lab Test 21  1300 19  1125   INR 0.99 1.12 1.24*        Lipid panel:  Recent Labs   Lab Test 21  1051   CHOL 158 171   HDL 43 35*   LDL 82 112*   TRIG 163* 119       HbA1C:  Recent Labs   Lab Test 21  0656   A1C 5.4 5.5       Troponin:  Recent Labs   Lab Test 21   TROPI <0.015       Billin minutes spent on the date of the encounter doing chart review, review of outside records, review of test results, interpretation of tests, patient visit and documentation

## 2021-05-24 NOTE — TELEPHONE ENCOUNTER
LVM for patient to call back to schedule 3 month follow up appointment with [facility]. Recall added into chart so that appointment can be made if patient calls back.

## 2021-05-24 NOTE — LETTER
5/24/2021         RE: Bryce Espinoza  [address] [facility] [address]        Dear Colleague,    Thank you for referring your patient, Bryce Espinoza, to the [facility] [place]. Please see a copy of my visit note below.      Jorge is a 60 year old who is being evaluated via a billable video visit.      How would you like to obtain your AVS? MyChart  If the video visit is dropped, the invitation should be resent by: Send to e-mail at: [email or web address]  Will anyone else be joining your video visit? No      Video Start Time: 1302  Video-Visit Details    Type of service:  Video Visit    Video End Time:1323    Originating Location (pt. Location): Home    Distant Location (provider location):  [facility] [place]     Platform used for Video Visit: Wedding Spot  _____________________________________________________________    [facility]    at [facility] [phone]  _____________________________________________________________      Chief Complaint: Patient presents with:  Tia (Transient Ischemic Attack): ED follow up      History of Present Illness: Bryce Espinoza is a 60 year old male presenting as a new patient for TIA follow-up. He was referred to our clinic via the TIA discharge pathway. He has a past medical history significant for CAD s/p CABG (on ASA), HTN, bladder cancer, HLD, and atrial fibrillation (on Eliquis). He presented to the ED 5/19 for evaluation of a 15 minute episode of binocular double vision two days prior. He reports that he was sitting outside at the cabin when he had sudden onset of horizontal diplopia. He did not close each eye individually when this occurred, but thinks it was binocular. He went to bed and when he awoke, the symptoms had resolved. He denies associated focal weakness, numbness, speech difficulty, or headache.     TIA Evaluation Summarized  MRI and/or Head CT: MRI  without acute infarct, shows a small enhancing mass along the falx cerebri measuring up to 8 mm, likely a small meningioma. Also notes a nonspecific small C-shaped area of T2 hypointense signal along the paramedian cortical margin or subarachnoid space of the left cingulate gyrus, of uncertain etiology.  Intracranial Vascular Imaging: CTA head unremarkable  Cervical Carotid and Vertebral Artery Vascular Imaging: CTA neck unremarkable  Echocardiogram: EF 55-60%, normal LA, negative bubble  EKG/Telemetry: sinus with sinus arrhythmia  LDL: 82  A1c: 5.4  Other testing: Not Applicable    ABCD2 Patients Score   Age ? 60 years 1 point 1   Blood Pressure     -SBP ? 140 or DBP ?  90    1 point 0   Clinical Features    -Unilateral weakness   -Speech disturbance w/o weakness    -Other    2 points  1 point    0 points 0   Duration of symptoms    ?60 minutes    10-59 minutes    <10 minutes   2 points  1 point  0 points 1   Diabetes  1 point 0   Patient s ABCD2 Score (0-7) = 2     He was discharged on his PTA ASA and Eliquis. He denies new or recurrent neurologic deficits.    Impression:   Problem List Items Addressed This Visit        Endocrine    Hyperlipidemia LDL goal <70      Other Visit Diagnoses     Visual disturbance    -  Primary        Acute onset diplopia without other focal neurologic deficits. Known history of atrial fibrillation on Eliquis (no reported missed doses). This could represent TIA vs primary ophthalmologic etiology.     Plan:   - Continue PTA aspirin 81 mg daily   - Continue PTA apixaban 5 mg BID  - LDL 82, documented history of statin intolerance. Continue PTA Zetia and dietary modifications to achieve goal 40-70. Recommend discussion with PCP regarding Repatha.   - A1c 5.4, within goal <7.0  - Goal BP <140/90 with tighter control associated with decreased overall CV risk, if tolerated  - Recommend follow-up with opthalmology to evaluate for alternative etiologies  - Given non-specific MRI findings in  "the left cingulate gyrus, recommend repeat MRI brain in 2-3 months  - Follow up in 3 months    Stroke Education provided.  He will call us with any questions.  For any acute neurologic deficits he was advised to  go directly to the hospital rather than call the clinic.    Harper Singer, CNP  Neurology  05/26/2021 11:04 AM  To page me or covering stroke neurology team member, click here: AMCOM  Choose \"On Call\" tab at top, then search dropdown box for \"Neurology Adult\" & press Enter, look for Neuro ICU/Stroke    ___________________________________________________________________    Current Medications  Current Outpatient Medications   Medication Sig     apixaban ANTICOAGULANT (ELIQUIS) 5 MG tablet Take 1 tablet (5 mg) by mouth 2 times daily Please call [phone] for an appointment.     Ascorbic Acid (VITAMIN C) 500 MG CAPS      aspirin (ASA) 81 MG EC tablet Take 1 tablet (81 mg) by mouth daily     Cholecalciferol (VITAMIN D) 2000 UNITS tablet Take 2,000 Units by mouth daily     ezetimibe (ZETIA) 10 MG tablet Take 1 tablet (10 mg) by mouth daily     metoprolol succinate ER (TOPROL-XL) 50 MG 24 hr tablet 1  tab twice a day     No current facility-administered medications for this visit.        Past Medical History  Past Medical History:   Diagnosis Date     Coronary artery disease involving native coronary artery of native heart without angina pectoris 7/4/2019    Per CT calcium score of 722.97 3/2019     Essential hypertension, benign      Hematuria      Malignant neoplasm of bladder, 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     Olecranon bursitis 3/01    right     Other and unspecified hyperlipidemia      Paroxysmal atrial fibrillation (H)      Paroxysmal atrial flutter (H)      RIGHT LUNG CALCIFIED GRANULOMA 2/02    RML     Tobacco use disorder     Quit 1/03     Unspecified hemorrhoids without mention of complication 8/01     Unspecified hereditary and idiopathic peripheral neuropathy  "       Social History  Social History     Tobacco Use     Smoking status: Former Smoker     Packs/day: 1.00     Years: 33.00     Pack years: 33.00     Quit date: 2013     Years since quittin.3     Smokeless tobacco: Never Used   Substance Use Topics     Alcohol use: Yes     Comment: 2 daily     Drug use: Yes     Comment: nica taylor       Family History  Family History   Problem Relation Age of Onset     Arthritis Mother         RA     Hypertension Mother      Breast Cancer Mother      Prostate Cancer Maternal Grandfather         80     Cancer Maternal Grandmother         Lung     Coronary Artery Disease Father      Other - See Comments Brother      Diabetes Brother      Genetic Disorder Son        Physical Exam    There were no vitals taken for this visit.    General:  no acute distress  HEENT:  normocephalic/atraumatic  Pulmonary:  no respiratory distress    Neurologic  Mental Status:  alert, oriented x 3, follows commands, speech clear and fluent, naming and repetition normal  Cranial Nerves:  EOMI with normal smooth pursuit, facial movements symmetric, hearing not formally tested but intact to conversation, no dysarthria, shoulder shrug equal bilaterally, tongue protrusion midline  Motor:  no abnormal movements, able to move all limbs antigravity spontaneously with no signs of hemiparesis observed, no pronator drift  Reflexes:  unable to test (telestroke)  Sensory:  unable to test (telestroke)  Coordination:  No obvious ataxia  Station/Gait:  unable to test (telestroke)    Neuroimaging: as per HPI. I personally reviewed those images    Labs:    Coagulation studies:  Recent Labs   Lab Test 21  1300 19  1125   INR 0.99 1.12 1.24*        Lipid panel:  Recent Labs   Lab Test 21  1051   CHOL 158 171   HDL 43 35*   LDL 82 112*   TRIG 163* 119       HbA1C:  Recent Labs   Lab Test 21  0656   A1C 5.4 5.5       Troponin:  Recent Labs   Lab Test  21   TROPI <0.015       Billin minutes spent on the date of the encounter doing chart review, review of outside records, review of test results, interpretation of tests, patient visit and documentation                     Again, thank you for allowing me to participate in the care of your patient.        Sincerely,        Harper Singer, CNP

## 2021-06-14 DIAGNOSIS — E78.5 HYPERLIPIDEMIA LDL GOAL <70: ICD-10-CM

## 2021-06-15 RX ORDER — EZETIMIBE 10 MG/1
TABLET ORAL
Qty: 90 TABLET | Refills: 1 | Status: SHIPPED | OUTPATIENT
Start: 2021-06-15 | End: 2021-11-18

## 2021-07-15 ENCOUNTER — OFFICE VISIT (OUTPATIENT)
Dept: SLEEP MEDICINE | Facility: CLINIC | Age: 61
End: 2021-07-15
Payer: COMMERCIAL

## 2021-07-15 DIAGNOSIS — R29.818 SUSPECTED SLEEP APNEA: ICD-10-CM

## 2021-07-15 DIAGNOSIS — G47.33 OSA (OBSTRUCTIVE SLEEP APNEA): ICD-10-CM

## 2021-07-15 DIAGNOSIS — I10 ESSENTIAL HYPERTENSION: ICD-10-CM

## 2021-07-15 DIAGNOSIS — I48.0 PAROXYSMAL ATRIAL FIBRILLATION (H): ICD-10-CM

## 2021-07-15 DIAGNOSIS — R06.83 SNORING: ICD-10-CM

## 2021-07-15 PROCEDURE — G0399 HOME SLEEP TEST/TYPE 3 PORTA: HCPCS | Performed by: INTERNAL MEDICINE

## 2021-07-16 ENCOUNTER — DOCUMENTATION ONLY (OUTPATIENT)
Dept: SLEEP MEDICINE | Facility: CLINIC | Age: 61
End: 2021-07-16
Payer: COMMERCIAL

## 2021-07-16 PROBLEM — G47.33 OSA (OBSTRUCTIVE SLEEP APNEA): Status: ACTIVE | Noted: 2021-07-16

## 2021-07-16 NOTE — PROGRESS NOTES
This HSAT was performed using a Noxturnal T3 device which recorded snore, sound, movement activity, body position, nasal pressure, oronasal thermal airflow, pulse, oximetry and both chest and abdominal respiratory effort. HSAT data was restricted to the time patient states they were in bed.     HSAT was scored using 1B 4% hypopnea rule.     HST AHI (Non-PAT): 64  Snoring was reported as loud.  Time with SpO2 below 89% was 14.4 minutes.   Overall signal quality was good     Pt will follow up with sleep provider to determine appropriate therapy.       HST POST-STUDY QUESTIONNAIRE    1. What time did you go to bed?  10:00 p.m.  2. How long do you think it took to fall asleep?  10 min  3. What time did you wake up to start the day?  6:30 a.m.  4. Did you get up during the night at all?  yes  5. If you woke up, do you remember approximately what time(s)? 1:00a.m.  6. Did you have any difficulty with the equipment?  No  7. Did you us any type of treatment with this study?  None  8. Was the head of the bed elevated? No  9. Did you sleep in a recliner?  No  10. Did you stop using CPAP at least 3 days before this test?  NA  11. Any other information you'd like us to know? no

## 2021-07-16 NOTE — PROCEDURES
"HOME SLEEP STUDY INTERPRETATION    Patient: Bryce Espinoza  MRN: [id number]  YOB: 1960  Study Date: 7/15/2021  Referring Provider: Remington Riggs;   Ordering Provider: Rishi Gerard MD, MD     Indications for Home Study: Bryce Espinoza is a 60 year old male with a history of CAD, hypertension, dyslipidemia and Afib who presents with symptoms suggestive of obstructive sleep apnea.    Estimated body mass index is 32.49 kg/m  as calculated from the following:    Height as of 21: 1.753 m (5' 9\").    Weight as of 21: 99.8 kg (220 lb).  [place] Sleepiness Scale:   STOP-BAN/8    Data: A full night home sleep study was performed recording the standard physiologic parameters including body position, movement, sound, nasal pressure, thermal oral airflow, chest and abdominal movements with respiratory inductance plethysmography, and oxygen saturation by pulse oximetry. Pulse rate was estimated by oximetry recording. This study was considered adequate based on > 4 hours of quality oximetry and respiratory recording. As specified by the AASM Manual for the Scoring of Sleep and Associated events, version 2.3, Rule VIII.D 1B, 4% oxygen desaturation scoring for hypopneas is used as a standard of care on all home sleep apnea testing.    Analysis Time:  489.5 minutes    Respiration:   Sleep Associated Hypoxemia: sustained hypoxemia was present. Baseline oxygen saturation was 94%.  Time with saturation less than or equal to 88% was 14.4 minutes. The lowest oxygen saturation was 83%.   Snoring: Snoring was present.  Respiratory events: The home study revealed a presence of 399 obstructive apneas and 39 mixed and central apneas. There were 81 hypopneas resulting in a combined apnea/hypopnea index [AHI] of 64 events per hour.  AHI was 64.4 per hour supine, - per hour prone, - per hour on left side, and 63.9 per hour on right side.   Pattern: Excluding events noted above, respiratory rate and pattern was " Normal.    Position: Percent of time spent: supine - 8%, prone - 0%, on left - 0%, on right - 91%.    Heart Rate: By pulse oximetry normal rate was noted.     Assessment:   Severe obstructive sleep apnea.  Sleep associated hypoxemia was present.    Recommendations:  CPAP therapy is recommended for severe obstructive sleep apnea. Consider auto-CPAP at 5-15 cmH2O.  Suggest optimizing sleep hygiene and avoiding sleep deprivation.  Weight management.    Diagnosis Code(s): Obstructive Sleep Apnea G47.33, Hypoxemia G47.36    Rishi Gerard MD, July 16, 2021   Diplomate, American Board of Psychiatry and Neurology, Sleep Medicine

## 2021-07-27 VITALS — BODY MASS INDEX: 31.99 KG/M2 | WEIGHT: 216 LBS | HEIGHT: 69 IN

## 2021-07-27 ASSESSMENT — MIFFLIN-ST. JEOR: SCORE: 1780.4

## 2021-07-27 NOTE — PROGRESS NOTES
"Bryce Espinoza is a 60 year old male being evaluated via a billable telephone visit.     \"This telephone visit will be conducted via a call between you and your physician/provider. We have found that certain health care needs can be provided without the need for an in-person visit or physical exam.  This service lets us provide the care you need with a telephone conversation.  If a prescription is necessary we can send it directly to your pharmacy.  If lab work is needed we can place an order for that and you can then stop by our lab to have the test done at a later time.\"    Telephone visits are billed at different rates depending on your insurance coverage.  Please reach out to your insurance provider with any questions.    Patient has given verbal consent for  a Telephone visit? Yes    What telephone number would you like your provider to contact at at:  [phone]    How would you like to obtain your AVS? Pablo Canchola MA    Telephone Visit Details:     Telephone Visit Start Time: 10:11 AM    Telephone Visit End Time:10:20 AM      Sleep Study Follow-Up Visit:    Date on this visit: 7/29/2021    Bryce Espinoza comes in today for follow-up of his sleep study done on 7/15/21 at the [facility] for possible sleep apnea.    Analysis Time:  489.5 minutes     Respiration:   Sleep Associated Hypoxemia: sustained hypoxemia was present. Baseline oxygen saturation was 94%.  Time with saturation less than or equal to 88% was 14.4 minutes. The lowest oxygen saturation was 83%.   Snoring: Snoring was present.  Respiratory events: The home study revealed a presence of 399 obstructive apneas and 39 mixed and central apneas. There were 81 hypopneas resulting in a combined apnea/hypopnea index [AHI] of 64 events per hour.  AHI was 64.4 per hour supine, - per hour prone, - per hour on left side, and 63.9 per hour on right side.   Pattern: Excluding events noted above, respiratory rate and pattern " was Normal.     Position: Percent of time spent: supine - 8%, prone - 0%, on left - 0%, on right - 91%.     Heart Rate: By pulse oximetry normal rate was noted.      Assessment:   Severe obstructive sleep apnea.  Sleep associated hypoxemia was present.    These findings were reviewed with patient.     Past medical/surgical history, family history, social history, medications and allergies were reviewed.      Problem List:  Patient Active Problem List    Diagnosis Date Noted     DENYS (obstructive sleep apnea) 07/16/2021     Priority: Medium     Severe DENYS       Paroxysmal atrial fibrillation (H)      Priority: Medium     S/P CABG (coronary artery bypass graft) 10/12/2019     Priority: Medium     Coronary artery disease involving native coronary artery of native heart without angina pectoris 07/04/2019     Priority: Medium     Per CT calcium score of 722.97 3/2019       Hyperlipidemia LDL goal <70 04/04/2019     Priority: Medium     Other specified idiopathic peripheral neuropathy 02/17/2004     Priority: Medium     Malignant neoplasm of bladder (H) 01/07/2003     Priority: Medium     Problem list name updated by automated process. Provider to review       Essential hypertension, benign 11/01/2002     Priority: Medium        Impression/Plan:    1. Severe Obstructive sleep apnea    Home sleep test result was reviewed in detail. We discussed severe sleep apnea, consequences of untreated disease and management. CPAP therapy is recommended for severe sleep apnea. Patient is agreeable to start treatment.     Plan:     1. Start auto PAP therapy      He will follow up with me in about 3 month(s).     Eight minutes spent with patient, all of which were spent counseling, consulting, coordinating plan of care.      Dr. Rishi Gerard     CC: Remington Riggs

## 2021-07-27 NOTE — PATIENT INSTRUCTIONS
Your BMI is Body mass index is 31.88 kg/m .  Weight management is a personal decision.  If you are interested in exploring weight loss strategies, the following discussion covers the approaches that may be successful. Body mass index (BMI) is one way to tell whether you are at a healthy weight, overweight, or obese. It measures your weight in relation to your height.  A BMI of 18.5 to 24.9 is in the healthy range. A person with a BMI of 25 to 29.9 is considered overweight, and someone with a BMI of 30 or greater is considered obese. More than two-thirds of American adults are considered overweight or obese.  Being overweight or obese increases the risk for further weight gain. Excess weight may lead to heart disease and diabetes.  Creating and following plans for healthy eating and physical activity may help you improve your health.  Weight control is part of healthy lifestyle and includes exercise, emotional health, and healthy eating habits. Careful eating habits lifelong are the mainstay of weight control. Though there are significant health benefits from weight loss, long-term weight loss with diet alone may be very difficult to achieve- studies show long-term success with dietary management in less than 10% of people. Attaining a healthy weight may be especially difficult to achieve in those with severe obesity. In some cases, medications, devices and surgical management might be considered.  What can you do?  If you are overweight or obese and are interested in methods for weight loss, you should discuss this with your provider.     Consider reducing daily calorie intake by 500 calories.     Keep a food journal.     Avoiding skipping meals, consider cutting portions instead.    Diet combined with exercise helps maintain muscle while optimizing fat loss. Strength training is particularly important for building and maintaining muscle mass. Exercise helps reduce stress, increase energy, and improves fitness.  Increasing exercise without diet control, however, may not burn enough calories to loose weight.       Start walking three days a week 10-20 minutes at a time    Work towards walking thirty minutes five days a week     Eventually, increase the speed of your walking for 1-2 minutes at time    In addition, we recommend that you review healthy lifestyles and methods for weight loss available through the National Institutes of Health patient information sites:  http://win.niddk.nih.gov/publications/index.htm    And look into health and wellness programs that may be available through your health insurance provider, employer, local community center, or paula club.    Weight management plan: Patient was referred to their PCP to discuss a diet and exercise plan.

## 2021-07-29 ENCOUNTER — TELEPHONE (OUTPATIENT)
Dept: SLEEP MEDICINE | Facility: CLINIC | Age: 61
End: 2021-07-29

## 2021-07-29 ENCOUNTER — VIRTUAL VISIT (OUTPATIENT)
Dept: SLEEP MEDICINE | Facility: CLINIC | Age: 61
End: 2021-07-29
Payer: COMMERCIAL

## 2021-07-29 DIAGNOSIS — G47.33 OSA (OBSTRUCTIVE SLEEP APNEA): Primary | ICD-10-CM

## 2021-07-29 PROCEDURE — 99213 OFFICE O/P EST LOW 20 MIN: CPT | Mod: TEL | Performed by: INTERNAL MEDICINE

## 2021-07-29 NOTE — TELEPHONE ENCOUNTER
I called patient to discuss new cpap setup order. Left voicemail for him to call me back to discuss going on waitlist for when machine becomes available.

## 2021-07-30 ENCOUNTER — TELEPHONE (OUTPATIENT)
Dept: SLEEP MEDICINE | Facility: CLINIC | Age: 61
End: 2021-07-30

## 2021-07-30 NOTE — TELEPHONE ENCOUNTER
I called patient to let him know [facility] received paperwork for new cpap setup, and that I can put him on waitlist. He said I caught him at a bad time, I said I just needed to confirm he would like to be put on waitlist for [place] [facility] which he said he would. I told him he would be contacted once machine is ready.

## 2021-08-03 ENCOUNTER — TELEPHONE (OUTPATIENT)
Dept: SLEEP MEDICINE | Facility: CLINIC | Age: 61
End: 2021-08-03

## 2021-08-03 DIAGNOSIS — I48.3 TYPICAL ATRIAL FLUTTER (H): ICD-10-CM

## 2021-08-03 DIAGNOSIS — I48.0 PAROXYSMAL ATRIAL FIBRILLATION (H): Primary | ICD-10-CM

## 2021-08-03 NOTE — TELEPHONE ENCOUNTER
CALLED PT AND SCHEDULED THE PT FOR A PAP SETUP AT THE [place] [facility] 08/10/2021 2:30 PM. CONFIRMED LOCATION, TIME AND DATE WITH THE PT.

## 2021-08-10 ENCOUNTER — DOCUMENTATION ONLY (OUTPATIENT)
Dept: SLEEP MEDICINE | Facility: CLINIC | Age: 61
End: 2021-08-10
Payer: COMMERCIAL

## 2021-08-10 DIAGNOSIS — G47.33 OSA (OBSTRUCTIVE SLEEP APNEA): ICD-10-CM

## 2021-08-10 NOTE — PROGRESS NOTES
Patient was offered choice of vendor and chose [facility].  Patient Bryce Espinoza was set up at [facility]  on August 10, 2021. Patient received a Resmed AirSense 10 Auto. Pressures were set at 5-15 cm H2O.   Patient s ramp is 5 cm H2O for Auto and FLEX/EPR is 2.  Patient received a Ena Respironics Mask name: DreamWear (under the nose)  Nasal mask size Std/ Fit Pack, heated tubing and heated humidifier.  Patient does not need to meet compliance.   Carla Echavarria

## 2021-08-12 ENCOUNTER — HOSPITAL ENCOUNTER (OUTPATIENT)
Dept: CARDIOLOGY | Facility: CLINIC | Age: 61
Discharge: HOME | End: 2021-08-12
Attending: INTERNAL MEDICINE | Admitting: INTERNAL MEDICINE
Payer: COMMERCIAL

## 2021-08-12 DIAGNOSIS — I48.0 PAROXYSMAL ATRIAL FIBRILLATION (H): ICD-10-CM

## 2021-08-12 DIAGNOSIS — Z95.1 S/P CABG (CORONARY ARTERY BYPASS GRAFT): ICD-10-CM

## 2021-08-12 DIAGNOSIS — I48.3 TYPICAL ATRIAL FLUTTER (H): ICD-10-CM

## 2021-08-12 PROCEDURE — 93248 EXT ECG>7D<15D REV&INTERPJ: CPT | Performed by: INTERNAL MEDICINE

## 2021-08-12 PROCEDURE — 93246 EXT ECG>7D<15D RECORDING: CPT

## 2021-08-13 ENCOUNTER — DOCUMENTATION ONLY (OUTPATIENT)
Dept: SLEEP MEDICINE | Facility: CLINIC | Age: 61
End: 2021-08-13
Payer: COMMERCIAL

## 2021-08-13 DIAGNOSIS — G47.33 OSA (OBSTRUCTIVE SLEEP APNEA): ICD-10-CM

## 2021-08-13 DIAGNOSIS — Z95.1 S/P CABG (CORONARY ARTERY BYPASS GRAFT): ICD-10-CM

## 2021-08-13 RX ORDER — METOPROLOL SUCCINATE 50 MG/1
TABLET, EXTENDED RELEASE ORAL
Qty: 180 TABLET | Refills: 0 | Status: SHIPPED | OUTPATIENT
Start: 2021-08-13 | End: 2021-11-18

## 2021-08-13 NOTE — PROGRESS NOTES
3 day Sleep therapy management telephone visit    Diagnostic AHI:  64  HST    Confirmed with patient at time of call- N/A Patient is still interested in STM service       Message left for patient to return call.        Objective data     Order Settings for PAP  CPAP min 5    CPAP max 15          Assessment: Nightly usage over four hours      Action plan: Patient to have 14 day STM visit. Patient has a follow up visit scheduled:   no    Replacement device: No  STM ordered by provider: Yes     Total time spent on accessing and  interpreting remote patient PAP therapy data  10 minutes    Total time spent counseling, coaching  and reviewing PAP therapy data with patient  1 minutes    [id number] no

## 2021-08-25 ENCOUNTER — DOCUMENTATION ONLY (OUTPATIENT)
Dept: SLEEP MEDICINE | Facility: CLINIC | Age: 61
End: 2021-08-25

## 2021-08-25 DIAGNOSIS — G47.33 OSA (OBSTRUCTIVE SLEEP APNEA): ICD-10-CM

## 2021-08-25 NOTE — PROGRESS NOTES
14  DAY STM VISIT    Diagnostic AHI:  64 HST    Subjective measures:   Patient said things are going well but his teeth are hurting. Encouraged patient to try a nasal pillow style mask.     Assessment: Pt meeting objective benchmarks.  Patient meeting subjective benchmarks. mask discomfort     Action plan: pt to have 30 day STM visit.      Device type: Auto-CPAP    PAP settings: CPAP min 5.0 cm  H20       CPAP max 15.0 cm  H20      95th% pressure 11.2 cm  H20        RESMED EPR level Setting: TWO    RESMED Soft response setting:  OFF    Mask type:  Nasal Mask    Objective measures: 14 day rolling measures      Compliance  100 %      Leak  26.49  lpm  last  upload      AHI 3.93   last  upload      Average number of minutes 463      Objective measure goal  Compliance   Goal >70%  Leak   Goal < 24 lpm  AHI  Goal < 5  Usage  Goal >240        Total time spent on accessing and interpreting remote patient PAP therapy data  2 minutes    Total time spent counseling, coaching  and reviewing PAP therapy data with patient  5 minutes    [id number]  [id number]  no (3 day STM)

## 2021-08-26 ENCOUNTER — HOSPITAL ENCOUNTER (OUTPATIENT)
Dept: MRI IMAGING | Facility: CLINIC | Age: 61
Discharge: HOME | End: 2021-08-26
Attending: NURSE PRACTITIONER | Admitting: NURSE PRACTITIONER
Payer: COMMERCIAL

## 2021-08-26 DIAGNOSIS — H53.9 VISUAL DISTURBANCE: ICD-10-CM

## 2021-08-26 PROCEDURE — A9585 GADOBUTROL INJECTION: HCPCS | Performed by: NURSE PRACTITIONER

## 2021-08-26 PROCEDURE — 255N000002 HC RX 255 OP 636: Performed by: NURSE PRACTITIONER

## 2021-08-26 PROCEDURE — 70553 MRI BRAIN STEM W/O & W/DYE: CPT

## 2021-08-26 RX ORDER — GADOBUTROL 604.72 MG/ML
10 INJECTION INTRAVENOUS ONCE
Status: COMPLETED | OUTPATIENT
Start: 2021-08-26 | End: 2021-08-26

## 2021-08-26 RX ADMIN — GADOBUTROL 10 ML: 604.72 INJECTION INTRAVENOUS at 18:17

## 2021-09-10 ENCOUNTER — DOCUMENTATION ONLY (OUTPATIENT)
Dept: SLEEP MEDICINE | Facility: CLINIC | Age: 61
End: 2021-09-10
Payer: COMMERCIAL

## 2021-09-10 DIAGNOSIS — G47.33 OSA (OBSTRUCTIVE SLEEP APNEA): ICD-10-CM

## 2021-09-10 NOTE — PROGRESS NOTES
30 DAY [facility] VISIT    Diagnostic AHI:  64 HST    Subjective measures:   Patient  Looking to get a new mask because his teeth hurt patient to call [facility].   Assessment: Pt meeting objective benchmarks.  Patient failing following subjective benchmarks: mask discomfort   Action plan: schedule mask fit appointment  Patient has not scheduled a follow up.   Device type: Auto-CPAP  PAP settings: CPAP min 5.0 cm  H20     CPAP max 15.0 cm  H20    95th% pressure 10.7 cm  H20      RESMED EPR level Setting: TWO    RESMED Soft response setting:  OFF  Mask type:  Nasal Mask  Objective measures: 14 day rolling measures      Compliance  71 %      Leak  28.11 lpm  last  upload      AHI 3.9   last  upload      Average number of minutes 331      Objective measure goal  Compliance   Goal >70%  Leak   Goal < 24 lpm  AHI  Goal < 5  Usage  Goal >240        Total time spent on accessing and interpreting remote patient PAP therapy data  10 minutes    Total time spent counseling, coaching  and reviewing PAP therapy data with patient  6 minutes     [id number] this call  [id number] no  at 3 or 14 day [facility]

## 2021-09-13 ENCOUNTER — TELEPHONE (OUTPATIENT)
Dept: CARDIOLOGY | Facility: CLINIC | Age: 61
End: 2021-09-13

## 2021-09-13 DIAGNOSIS — I25.10 CORONARY ARTERY DISEASE INVOLVING NATIVE CORONARY ARTERY OF NATIVE HEART WITHOUT ANGINA PECTORIS: Primary | ICD-10-CM

## 2021-09-16 ENCOUNTER — DOCUMENTATION ONLY (OUTPATIENT)
Dept: SLEEP MEDICINE | Facility: CLINIC | Age: 61
End: 2021-09-16

## 2021-09-16 DIAGNOSIS — G47.33 OSA (OBSTRUCTIVE SLEEP APNEA): ICD-10-CM

## 2021-10-22 ENCOUNTER — OFFICE VISIT (OUTPATIENT)
Dept: CARDIOLOGY | Facility: CLINIC | Age: 61
End: 2021-10-22
Payer: COMMERCIAL

## 2021-10-22 VITALS
HEART RATE: 74 BPM | SYSTOLIC BLOOD PRESSURE: 133 MMHG | WEIGHT: 219 LBS | HEIGHT: 69 IN | BODY MASS INDEX: 32.44 KG/M2 | DIASTOLIC BLOOD PRESSURE: 85 MMHG

## 2021-10-22 DIAGNOSIS — I25.10 CORONARY ARTERY DISEASE INVOLVING NATIVE CORONARY ARTERY OF NATIVE HEART WITHOUT ANGINA PECTORIS: ICD-10-CM

## 2021-10-22 DIAGNOSIS — I48.0 PAROXYSMAL ATRIAL FIBRILLATION (H): Primary | ICD-10-CM

## 2021-10-22 PROCEDURE — 93000 ELECTROCARDIOGRAM COMPLETE: CPT | Performed by: INTERNAL MEDICINE

## 2021-10-22 PROCEDURE — 99214 OFFICE O/P EST MOD 30 MIN: CPT | Performed by: INTERNAL MEDICINE

## 2021-10-22 ASSESSMENT — MIFFLIN-ST. JEOR: SCORE: 1788.76

## 2021-10-22 NOTE — LETTER
10/22/2021    Remington Riggs MD  [address] [facility] [address]    RE: Bryce Noer       Dear Colleague,    I had the pleasure of seeing Bryce Espinoza in the [facility].    PHYSICIAN NOTE:  This visit was completed in person at the [facility].      I had the pleasure evaluating Mr. Jorge Espinoza today.  He is a 61-year-old gentleman whom I am seeing in follow-up of post-CABG atrial arrhythmias.      Mr. Espinoza has the following medical issues:   A.  Coronary artery disease.    Severe left main disease.  CABG in 09/2019 (LIMA to LAD, very small circumflex-ungrafted).  Endarterectomy of the proximal LAD was required due to severe calcification of the vessel.   B.  Post-CABG paroxysmal atrial fibrillation and typical atrial flutter.  [id number] score of 2.  See below.  C.  Normal left ventricular ejection fraction.   D.  Hypertension.   E.  Dyslipidemia.   F.  DENYS, on CPAP.   G.  Intolerance of several statins.  He is on ezetimibe.    The patient had AF documented on a cardiac event monitor 2 months after CABG in September 2019.  The arrhythmia was asymptomatic.  Therefore, he was maintained on apixaban.  He also takes a baby aspirin.    He is doing well.  No chest pain with exertion.  No palpitation, syncope or near syncope.  No bleeding issues on anticoagulation.      PHYSICAL EXAMINATION:  General:  Initially hypertensive, later BP normalized at 133/85.  In no apparent distress, moderately overweight (BMI 32.3, weight 99 kg).    ENT/Mouth:  no nasal discharge.  Eyes:  normal conjunctivae.  No jaundice.  Neck:  no thyromegaly or lymphadenopathy.  Chest/Lungs:  patient is not dyspneic.  Lungs CTA, without rales or wheezing.    Cardiovascular: Normal JVP, rate is regular.  No gallop, murmur or rub.    Abdomen:  obese.   Extremities:  no apparent cyanosis.  Skin:  no xanthelasma.  No facial laceration.  Neurologic:  alert & oriented x 3.  "  Vascular:  2+ carotids without bruits.       DIAGNOSTIC STUDIES:  Echocardiogram: Normal LV and RV function, normal atrial size, no clinically significant valvular abnormalities (05/2021).  ECG: Sinus rhythm with PACs, nonspecific T abnormality.  Cardiac monitor: No AF, but frequent PACs and few brief runs of SVT.      IMPRESSION:  1. Atrial fibrillation.  This patient had atrial fibrillation after the typical 6-week window following CABG.  He may have had paroxysmal AF even before cardiac surgery.  His recent cardiac monitor showed no AF.  2. Need for long-term anticoagulation?  [id number] score 2.  Interesting clinical dilemma.  He may be an \"unusual\" post CABG patient in whom AF lasted a bit longer than the typical few weeks after surgery.  I asked him to stop aspirin right away, as he does not need both apixaban and aspirin.  Whether to continue apixaban is a gray area.  Will repeat a cardiac monitor in 6 months.  If we do not document AF we could make a reasonable case for stopping Eliquis and resuming aspirin.    RECOMMENDATIONS:  1. Repeat 14-day leadless ECG monitor in 6 months.  2. See Nitza in the clinic thereafter.  3. For long-term care, please schedule with general cardiology.    It was my pleasure seeing this delightful patient.      Martha Mcgovern MD, [facility]            Orders Placed This Encounter   Procedures     Follow-Up with Cardiac Advanced Practice Provider     EKG 12-lead complete w/read - Clinics (performed today)     Leadless EKG Monitor 8 to 14 Days       No orders of the defined types were placed in this encounter.      Medications Discontinued During This Encounter   Medication Reason     aspirin (ASA) 81 MG EC tablet          Encounter Diagnoses   Name Primary?     Paroxysmal atrial fibrillation (H) Yes     Coronary artery disease involving native coronary artery of native heart without angina pectoris        CURRENT MEDICATIONS:  Current Outpatient Medications   Medication Sig " Dispense Refill     apixaban ANTICOAGULANT (ELIQUIS) 5 MG tablet Take 1 tablet (5 mg) by mouth 2 times daily Please call [phone] for an appointment. 180 tablet 0     Cholecalciferol (VITAMIN D) 2000 UNITS tablet Take 2,000 Units by mouth daily       ezetimibe (ZETIA) 10 MG tablet TAKE 1 TABLET(10 MG) BY MOUTH DAILY 90 tablet 1     metoprolol succinate ER (TOPROL-XL) 50 MG 24 hr tablet 1  tab twice a day 180 tablet 0     Ascorbic Acid (VITAMIN C) 500 MG CAPS  (Patient not taking: Reported on 10/22/2021)         ALLERGIES     Allergies   Allergen Reactions     Atorvastatin      Myalgias     Crestor [Rosuvastatin]      Myalgias       Lisinopril      Body aches pt d/mylene  06/2015       PAST MEDICAL HISTORY:  Past Medical History:   Diagnosis Date     Coronary artery disease involving native coronary artery of native heart without angina pectoris 7/4/2019    Per CT calcium score of 722.97 3/2019     Essential hypertension, benign      Hematuria      Malignant neoplasm of bladder, 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     Olecranon bursitis 3/01    right     Other and unspecified hyperlipidemia      Paroxysmal atrial fibrillation (H)      Paroxysmal atrial flutter (H)      RIGHT LUNG CALCIFIED GRANULOMA 2/02    RML     Tobacco use disorder     Quit 1/03     Unspecified hemorrhoids without mention of complication 8/01     Unspecified hereditary and idiopathic peripheral neuropathy        PAST SURGICAL HISTORY:  Past Surgical History:   Procedure Laterality Date     BYPASS GRAFT ARTERY CORONARY N/A 9/25/2019    Procedure: CORONARY ARTERY BYPASS GRAFTING x 1 (LIMA - LAD) WITH CORONARY ENDARTERECTOMY  (ON PUMP OXYGENATOR ; HANK BY [facility]);  Surgeon: Magdi Turner MD;  Location:  OR     CV HEART CATHETERIZATION WITH POSSIBLE INTERVENTION N/A 9/6/2019    Procedure: Coronary Angiogram;  Surgeon: Nick Willson MD;  Location:  HEART CARDIAC CATH LAB     CV LEFT HEART CATH N/A 9/6/2019     Procedure: Left Heart Cath;  Surgeon: Nick Willson MD;  Location:  HEART CARDIAC CATH LAB     CV LEFT VENTRICULOGRAM N/A 2019    Procedure: Left Ventriculogram;  Surgeon: Nick Willson MD;  Location:  HEART CARDIAC CATH LAB     ZZC NONSPECIFIC PROCEDURE      EBCT       FAMILY HISTORY:  Family History   Problem Relation Age of Onset     Arthritis Mother         RA     Hypertension Mother      Breast Cancer Mother      Prostate Cancer Maternal Grandfather         80     Cancer Maternal Grandmother         Lung     Coronary Artery Disease Father      Other - See Comments Brother      Diabetes Brother      Genetic Disorder Son        SOCIAL HISTORY:  Social History     Socioeconomic History     Marital status:      Spouse name: None     Number of children: None     Years of education: None     Highest education level: None   Occupational History     None   Tobacco Use     Smoking status: Former Smoker     Packs/day: 1.00     Years: 33.00     Pack years: 33.00     Quit date: 2013     Years since quittin.7     Smokeless tobacco: Never Used   Substance and Sexual Activity     Alcohol use: Yes     Comment: 2 daily     Drug use: Yes     Comment: nica taylor     Sexual activity: Yes   Other Topics Concern     Parent/sibling w/ CABG, MI or angioplasty before 65F 55M? Not Asked   Social History Narrative     None     Social Determinants of Health     Financial Resource Strain:      Difficulty of Paying Living Expenses:    Food Insecurity:      Worried About Running Out of Food in the Last Year:      Ran Out of Food in the Last Year:    Transportation Needs:      Lack of Transportation (Medical):      Lack of Transportation (Non-Medical):    Physical Activity:      Days of Exercise per Week:      Minutes of Exercise per Session:    Stress:      Feeling of Stress :    Social Connections:      Frequency of Communication with Friends and Family:      Frequency of Social Gatherings with  "Friends and Family:      Attends Yarsani Services:      Active Member of Clubs or Organizations:      Attends Club or Organization Meetings:      Marital Status:    Intimate Partner Violence:      Fear of Current or Ex-Partner:      Emotionally Abused:      Physically Abused:      Sexually Abused:        Review of Systems:  Skin:  Negative       Eyes:  Negative      ENT:  Negative      Respiratory:  Negative for dyspnea on exertion sometimes   Cardiovascular:  Negative      Gastroenterology: Negative melena;hematochezia Terrible taste in the mouth  Genitourinary:  Negative      Musculoskeletal:  Negative nocturnal cramping    Neurologic:  Negative      Psychiatric:  Negative      Heme/Lymph/Imm:  Negative      Endocrine:  Negative        Physical Exam:  Vitals: /85   Pulse 74   Ht 1.753 m (5' 9\")   Wt 99.3 kg (219 lb)   BMI 32.34 kg/m      Constitutional:           Skin:             Head:           Eyes:           Lymph:      ENT:           Neck:           Respiratory:            Cardiac:                                                           GI:           Extremities and Muscular Skeletal:                 Neurological:           Psych:           CC  No referring provider defined for this encounter.                  Thank you for allowing me to participate in the care of your patient.      Sincerely,     Martha Mcgovern MD     [facility]  cc:   No referring provider defined for this encounter.        "

## 2021-10-22 NOTE — PROGRESS NOTES
PHYSICIAN NOTE:  This visit was completed in person at the [facility].      I had the pleasure evaluating Mr. Jorge Espinoza today.  He is a 61-year-old gentleman whom I am seeing in follow-up of post-CABG atrial arrhythmias.      Mr. Espinoza has the following medical issues:   A.  Coronary artery disease.    Severe left main disease.  CABG in 09/2019 (LIMA to LAD, very small circumflex-ungrafted).  Endarterectomy of the proximal LAD was required due to severe calcification of the vessel.   B.  Post-CABG paroxysmal atrial fibrillation and typical atrial flutter.  [id number] score of 2.  See below.  C.  Normal left ventricular ejection fraction.   D.  Hypertension.   E.  Dyslipidemia.   F.  DENYS, on CPAP.   G.  Intolerance of several statins.  He is on ezetimibe.    The patient had AF documented on a cardiac event monitor 2 months after CABG in September 2019.  The arrhythmia was asymptomatic.  Therefore, he was maintained on apixaban.  He also takes a baby aspirin.    He is doing well.  No chest pain with exertion.  No palpitation, syncope or near syncope.  No bleeding issues on anticoagulation.      PHYSICAL EXAMINATION:  General:  Initially hypertensive, later BP normalized at 133/85.  In no apparent distress, moderately overweight (BMI 32.3, weight 99 kg).    ENT/Mouth:  no nasal discharge.  Eyes:  normal conjunctivae.  No jaundice.  Neck:  no thyromegaly or lymphadenopathy.  Chest/Lungs:  patient is not dyspneic.  Lungs CTA, without rales or wheezing.    Cardiovascular: Normal JVP, rate is regular.  No gallop, murmur or rub.    Abdomen:  obese.   Extremities:  no apparent cyanosis.  Skin:  no xanthelasma.  No facial laceration.  Neurologic:  alert & oriented x 3.   Vascular:  2+ carotids without bruits.       DIAGNOSTIC STUDIES:  Echocardiogram: Normal LV and RV function, normal atrial size, no clinically significant valvular abnormalities (05/2021).  ECG: Sinus rhythm with PACs, nonspecific T  "abnormality.  Cardiac monitor: No AF, but frequent PACs and few brief runs of SVT.      IMPRESSION:  1. Atrial fibrillation.  This patient had atrial fibrillation after the typical 6-week window following CABG.  He may have had paroxysmal AF even before cardiac surgery.  His recent cardiac monitor showed no AF.  2. Need for long-term anticoagulation?  [id number] score 2.  Interesting clinical dilemma.  He may be an \"unusual\" post CABG patient in whom AF lasted a bit longer than the typical few weeks after surgery.  I asked him to stop aspirin right away, as he does not need both apixaban and aspirin.  Whether to continue apixaban is a gray area.  Will repeat a cardiac monitor in 6 months.  If we do not document AF we could make a reasonable case for stopping Eliquis and resuming aspirin.    RECOMMENDATIONS:  1. Repeat 14-day leadless ECG monitor in 6 months.  2. See Nitza in the clinic thereafter.  3. For long-term care, please schedule with general cardiology.    It was my pleasure seeing this delightful patient.      Martha Mcgovern MD, [facility]            Orders Placed This Encounter   Procedures     Follow-Up with Cardiac Advanced Practice Provider     EKG 12-lead complete w/read - Clinics (performed today)     Leadless EKG Monitor 8 to 14 Days       No orders of the defined types were placed in this encounter.      Medications Discontinued During This Encounter   Medication Reason     aspirin (ASA) 81 MG EC tablet          Encounter Diagnoses   Name Primary?     Paroxysmal atrial fibrillation (H) Yes     Coronary artery disease involving native coronary artery of native heart without angina pectoris        CURRENT MEDICATIONS:  Current Outpatient Medications   Medication Sig Dispense Refill     apixaban ANTICOAGULANT (ELIQUIS) 5 MG tablet Take 1 tablet (5 mg) by mouth 2 times daily Please call [phone] for an appointment. 180 tablet 0     Cholecalciferol (VITAMIN D) 2000 UNITS tablet Take 2,000 Units by " mouth daily       ezetimibe (ZETIA) 10 MG tablet TAKE 1 TABLET(10 MG) BY MOUTH DAILY 90 tablet 1     metoprolol succinate ER (TOPROL-XL) 50 MG 24 hr tablet 1  tab twice a day 180 tablet 0     Ascorbic Acid (VITAMIN C) 500 MG CAPS  (Patient not taking: Reported on 10/22/2021)         ALLERGIES     Allergies   Allergen Reactions     Atorvastatin      Myalgias     Crestor [Rosuvastatin]      Myalgias       Lisinopril      Body aches pt d/mylene  06/2015       PAST MEDICAL HISTORY:  Past Medical History:   Diagnosis Date     Coronary artery disease involving native coronary artery of native heart without angina pectoris 7/4/2019    Per CT calcium score of 722.97 3/2019     Essential hypertension, benign      Hematuria      Malignant neoplasm of bladder, 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     Olecranon bursitis 3/01    right     Other and unspecified hyperlipidemia      Paroxysmal atrial fibrillation (H)      Paroxysmal atrial flutter (H)      RIGHT LUNG CALCIFIED GRANULOMA 2/02    RML     Tobacco use disorder     Quit 1/03     Unspecified hemorrhoids without mention of complication 8/01     Unspecified hereditary and idiopathic peripheral neuropathy        PAST SURGICAL HISTORY:  Past Surgical History:   Procedure Laterality Date     BYPASS GRAFT ARTERY CORONARY N/A 9/25/2019    Procedure: CORONARY ARTERY BYPASS GRAFTING x 1 (LIMA - LAD) WITH CORONARY ENDARTERECTOMY  (ON PUMP OXYGENATOR ; HANK BY [facility]);  Surgeon: Magdi Turner MD;  Location:  OR     CV HEART CATHETERIZATION WITH POSSIBLE INTERVENTION N/A 9/6/2019    Procedure: Coronary Angiogram;  Surgeon: Nick Willson MD;  Location:  HEART CARDIAC CATH LAB     CV LEFT HEART CATH N/A 9/6/2019    Procedure: Left Heart Cath;  Surgeon: Nick Willson MD;  Location:  HEART CARDIAC CATH LAB     CV LEFT VENTRICULOGRAM N/A 9/6/2019    Procedure: Left Ventriculogram;  Surgeon: Nick Willson MD;  Location:  HEART  CARDIAC CATH LAB     C NONSPECIFIC PROCEDURE      EBCT       FAMILY HISTORY:  Family History   Problem Relation Age of Onset     Arthritis Mother         RA     Hypertension Mother      Breast Cancer Mother      Prostate Cancer Maternal Grandfather         80     Cancer Maternal Grandmother         Lung     Coronary Artery Disease Father      Other - See Comments Brother      Diabetes Brother      Genetic Disorder Son        SOCIAL HISTORY:  Social History     Socioeconomic History     Marital status:      Spouse name: None     Number of children: None     Years of education: None     Highest education level: None   Occupational History     None   Tobacco Use     Smoking status: Former Smoker     Packs/day: 1.00     Years: 33.00     Pack years: 33.00     Quit date: 2013     Years since quittin.7     Smokeless tobacco: Never Used   Substance and Sexual Activity     Alcohol use: Yes     Comment: 2 daily     Drug use: Yes     Comment: nica taylor     Sexual activity: Yes   Other Topics Concern     Parent/sibling w/ CABG, MI or angioplasty before 65F 55M? Not Asked   Social History Narrative     None     Social Determinants of Health     Financial Resource Strain:      Difficulty of Paying Living Expenses:    Food Insecurity:      Worried About Running Out of Food in the Last Year:      Ran Out of Food in the Last Year:    Transportation Needs:      Lack of Transportation (Medical):      Lack of Transportation (Non-Medical):    Physical Activity:      Days of Exercise per Week:      Minutes of Exercise per Session:    Stress:      Feeling of Stress :    Social Connections:      Frequency of Communication with Friends and Family:      Frequency of Social Gatherings with Friends and Family:      Attends Buddhist Services:      Active Member of Clubs or Organizations:      Attends Club or Organization Meetings:      Marital Status:    Intimate Partner Violence:      Fear of Current or Ex-Partner:   "    Emotionally Abused:      Physically Abused:      Sexually Abused:        Review of Systems:  Skin:  Negative       Eyes:  Negative      ENT:  Negative      Respiratory:  Negative for dyspnea on exertion sometimes   Cardiovascular:  Negative      Gastroenterology: Negative melena;hematochezia Terrible taste in the mouth  Genitourinary:  Negative      Musculoskeletal:  Negative nocturnal cramping    Neurologic:  Negative      Psychiatric:  Negative      Heme/Lymph/Imm:  Negative      Endocrine:  Negative        Physical Exam:  Vitals: /85   Pulse 74   Ht 1.753 m (5' 9\")   Wt 99.3 kg (219 lb)   BMI 32.34 kg/m      Constitutional:           Skin:             Head:           Eyes:           Lymph:      ENT:           Neck:           Respiratory:            Cardiac:                                                           GI:           Extremities and Muscular Skeletal:                 Neurological:           Psych:           CC  No referring provider defined for this encounter.              "

## 2021-10-23 ENCOUNTER — HEALTH MAINTENANCE LETTER (OUTPATIENT)
Age: 61
End: 2021-10-23

## 2021-11-05 ENCOUNTER — TELEPHONE (OUTPATIENT)
Dept: INTERNAL MEDICINE | Facility: CLINIC | Age: 61
End: 2021-11-05

## 2021-11-05 NOTE — TELEPHONE ENCOUNTER
How can I protect others?     If you have symptoms: stay home and away from others (self-isolate) until:    You've had no fever--and no medicine that reduces fever--for 1 full day (24 hours). And       Your other symptoms have gotten better. For example, your cough or breathing has improved. And     At least 10 days have passed since your symptoms started. (If you've been told by a doctor that you have a weak immune system, wait 20 days.)      If you don't have symptoms: Stay home and away from others (self-isolate) until at least 10 days have passed since your first positive COVID-19 test. (Date test collected)     During this time:    Stay in your own room, including for meals. Use your own bathroom if you can.    Stay away from others in your home. No hugging, kissing or shaking hands. No visitors.     Don't go to work, school or anywhere else.     Clean  high touch  surfaces often (doorknobs, counters, handles, etc.). Use a household cleaning spray or wipes. You'll find a full list on the EPA website at www.epa.gov/pesticide-registration/list-n-disinfectants-use-against-sars-cov-2.     Cover your mouth and nose with a mask, tissue or other face covering to avoid spreading germs.    Wash your hands and face often with soap and water.    Caregivers should wear gloves while washing dishes, handling laundry and cleaning bedrooms and bathrooms.    Wash and dry laundry with special caution. Don't shake dirty laundry, and use the warmest water setting you can.    If you have a weakened immune system, ask your doctor about other actions you should take.    For more tips, go to www.cdc.gov/coronavirus/2019-ncov/downloads/10Things.pdf.     You should not go back to work until you meet the guidelines above for ending your home isolation. You don't need to be retested for COVID-19 before going back to work--studies show that you won't spread the virus if it's been at least 10 days since your symptoms started (or 20 days, if  you have a weak immune system).        How can I take care of myself?     1. Get lots of rest. Drink extra fluids (unless a doctor has told you not to).     2. Take Tylenol (acetaminophen) for fever or pain. If you have liver or kidney problems, ask your family doctor if it's okay to take Tylenol.      Take either:     650 mg (two 325 mg pills) every 4 to 6 hours, or     1,000 mg (two 500 mg pills) every 8 hours as needed.     Note: Don't take more than 3,000 mg in one day. Acetaminophen is found in many medicines (both prescribed and over-the-counter medicines). Read all labels to be sure you don't take too much.    For children, check the Tylenol bottle for the right dose (based on their age or weight).     3. If you have other health problems (like cancer, heart failure, an organ transplant or severe kidney disease): Call your specialty clinic if you don't feel better in the next 2 days.     4. Know when to call 911: Emergency warning signs include:    Trouble breathing or shortness of breath    Pain or pressure in the chest that doesn't go away    Feeling confused like you haven't felt before, or not being able to wake up    Bluish-colored lips or face        With pt's hx CAD, he would be a candidate for monoclonal tristen therapy and would recommend pt sign up for it at the [facility] website to see if he is chosen based current randomization sine small supply. Not guaranteed that he would be treated.  If treated with monoclonal tristen, then will have to wait 90 days before could consider vaccine. Otherwise would recommend vaccination  approx 2 weeks from now with Pfizer or Moderna if willing and no longer symptomatic

## 2021-11-05 NOTE — TELEPHONE ENCOUNTER
Dr. Riggs,    Patient tested positive on 11/3/21, he's doing well. Denies any SOB or heaviness in chest. Lost taste and smell, runny nose, body aches, cough, mild fever. Patient did not get the vaccine.     Patient given covid info and MD mono antibody info.     Any other advice?     Can we leave a detailed message on this number? YES  Phone number patient can be reached at: Cell number on file:    Telephone Information:   Mobile [phone]       Svetlana Hall RN  [facility] Triage        Svetlana Hall RN

## 2021-11-14 ENCOUNTER — NURSE TRIAGE (OUTPATIENT)
Dept: NURSING | Facility: CLINIC | Age: 61
End: 2021-11-14
Payer: COMMERCIAL

## 2021-11-14 NOTE — TELEPHONE ENCOUNTER
"Patient calling in today stating he is having   11/3/21 had COVID19 per patient report he was \"[facility]\".   His symptoms started on 11/1/21 Body aches, cough,\"flu\" like symptoms.  When he eats he has diarrhea after. He has 3-4 episode of diarrhea.   Brown stool and runny, he stated his stool was also black but he took pepto bismolol 2 days ago. Slight cough which is not new or worsened.  His wife is hospitalized with COVID19  Eating cereal. Per RN protocol patient should be seen virtually within 24 hours.   Home care suggestions reviewed with caller per RN protocol.   Transferred to scheduling.    How can I protect others?  If you have symptoms (fever, cough, body aches or trouble breathing):  1. Stay home and away from others (self-isolate) until:  ? Your other symptoms have resolved (gotten better). And   ? You've had no fever--and no medicine that reduces fever--for 1 full day (24 hours). And   ? At least 10 days have passed since your symptoms started. (You may need to wait 20 days. Follow the advice of your care team.)    Anna Armando RN, BSN [facility] Triage Nurse    Reason for Disposition    [1] MODERATE diarrhea (e.g., 4-6 times / day more than normal) AND [2] present > 48 hours (2 days)    [1] PERSISTING SYMPTOMS OF COVID-19 AND [2] symptoms SAME AND [3] medical visit for COVID-19 in past 2 weeks    Additional Information    Negative: Shock suspected (e.g., cold/pale/clammy skin, too weak to stand, low BP, rapid pulse)    Negative: Difficult to awaken or acting confused (e.g., disoriented, slurred speech)    Negative: Sounds like a life-threatening emergency to the triager    Negative: Vomiting also present and worse than the diarrhea    Negative: [1] Blood in stool AND [2] without diarrhea    Negative: Diarrhea in a cancer patient who is currently (or recently) receiving chemotherapy or radiation therapy, or cancer patient who has metastatic or end-stage cancer and is receiving palliative " care    Negative: [1] Blood in the stool AND [2] moderate or large amount of blood    Negative: [1] SEVERE abdominal pain AND [2] age > 60    Negative: [1] SEVERE abdominal pain (e.g., excruciating) AND [2] present > 1 hour    Negative: Black or tarry bowel movements  (Exception: chronic-unchanged  black-grey bowel movements AND is taking iron pills or Pepto-bismol)    Negative: [1] Drinking very little AND [2] dehydration suspected (e.g., no urine > 12 hours, very dry mouth, very lightheaded)    Negative: Patient sounds very sick or weak to the triager    Negative: [1] SEVERE diarrhea (e.g., 7 or more times / day more than normal) AND [2] age > 60 years    Negative: [1] Constant abdominal pain AND [2] present > 2 hours    Negative: [1] Fever > 103 F (39.4 C) AND [2] not able to get the fever down using Fever Care Advice    Negative: [1] SEVERE diarrhea (e.g., 7 or more times / day more than normal) AND [2] present > 24 hours (1 day)    Protocols used: DIARRHEA-A-, CORONAVIRUS (COVID-19) PERSISTING SYMPTOMS [facility]

## 2021-11-15 ENCOUNTER — TELEPHONE (OUTPATIENT)
Dept: INTERNAL MEDICINE | Facility: CLINIC | Age: 61
End: 2021-11-15

## 2021-11-15 NOTE — TELEPHONE ENCOUNTER
Patient called again requesting the letter ASAP as he cannot return to work without the letter. He would like to return to work tomorrow.    Triage DID repeatedly tell patient we cannot guarantee the timing on completion of any form or letter.    Dinah Guadarrama, MSN, RN   [facility] Triage

## 2021-11-15 NOTE — TELEPHONE ENCOUNTER
Patient called the clinic requesting a letter for work explaining that he was absent from work due to COVID Nov 2nd - Nov 15th. He was tested positive on Nov 3th at [facility] in [place]. He had mild symptoms, but no fever or symptoms now. Letter to be emailed to [email or web address]. Pt will also like a personal phone call from Dr. Riggs in regards to [facility]. He refused to make an appt as he said this is not related to her symptoms.

## 2021-11-15 NOTE — TELEPHONE ENCOUNTER
I am seeing patents through the day and not able to dictate a letter this afternoon. Also, I have no documentation  That pt had positive test and have not seen pt at time of sx or since then. Therefore the only thing that I could write is that pt reported a positive test to me and that he says he is no longer having symptoms but has not been examined so I cannt say anything further re: his workability or  that he has full medical clearance to return to  work or that  any restrictions are necessary.  To do that, pt would have to be  Seen ar at least have video virtual visit. I also cannot email from my private work or home email account. If pt gets fax number, will then fax it by 7 am  tomorrow AM simply stating what pt has reported to us and nothing more.

## 2021-11-17 DIAGNOSIS — E78.5 HYPERLIPIDEMIA LDL GOAL <70: ICD-10-CM

## 2021-11-17 NOTE — TELEPHONE ENCOUNTER
Routing refill request to provider for review/approval because:  Labs not current:    Lab Results   Component Value Date    ALT 42 08/01/2020       Muriel Nelosn RN

## 2021-11-18 RX ORDER — EZETIMIBE 10 MG/1
TABLET ORAL
Qty: 90 TABLET | Refills: 2 | Status: SHIPPED | OUTPATIENT
Start: 2021-11-18 | End: 2022-08-14

## 2021-11-19 DIAGNOSIS — Z95.1 S/P CABG (CORONARY ARTERY BYPASS GRAFT): ICD-10-CM

## 2021-11-24 ENCOUNTER — TRANSFERRED RECORDS (OUTPATIENT)
Dept: HEALTH INFORMATION MANAGEMENT | Facility: CLINIC | Age: 61
End: 2021-11-24
Payer: COMMERCIAL

## 2022-02-12 ENCOUNTER — HEALTH MAINTENANCE LETTER (OUTPATIENT)
Age: 62
End: 2022-02-12

## 2022-02-28 ENCOUNTER — HOSPITAL ENCOUNTER (OUTPATIENT)
Dept: CARDIOLOGY | Facility: CLINIC | Age: 62
Discharge: HOME | End: 2022-02-28
Attending: INTERNAL MEDICINE | Admitting: INTERNAL MEDICINE
Payer: COMMERCIAL

## 2022-02-28 DIAGNOSIS — I48.0 PAROXYSMAL ATRIAL FIBRILLATION (H): ICD-10-CM

## 2022-02-28 PROCEDURE — 93246 EXT ECG>7D<15D RECORDING: CPT

## 2022-02-28 PROCEDURE — 93248 EXT ECG>7D<15D REV&INTERPJ: CPT | Performed by: INTERNAL MEDICINE

## 2022-08-10 ENCOUNTER — NURSE TRIAGE (OUTPATIENT)
Dept: INTERNAL MEDICINE | Facility: CLINIC | Age: 62
End: 2022-08-10

## 2022-08-10 NOTE — TELEPHONE ENCOUNTER
Small calcified benign granuloma seen previously on chest x-ray 2020 wouold have nothing to do with any current back pain symptoms as it has been present in the lungs and unchanged dating back 20 years.  Likely a musculoskeletal issue from the back or thoracic nerve irritation.  Occurring at rest intermittently and present for 2 weeks so doubt cardiac cause and no mention of pulmonary symptoms with triage note.  Patient will need to be seen for examination to help determine cause.  Next appointment availability is Tuesday, 8/16/2022 at 10:30 AM which is a current same-day appointment slot.  Patient may be seen at that time or by partner earlier or urgent care.  If wishing to see me, then please schedule patient with me next Tuesday

## 2022-08-10 NOTE — TELEPHONE ENCOUNTER
Patient Contact    Attempt # 1    Was call answered?  Yes.  Pt verified name and date of birth.    Provider message relayed.   Future Appointments 8/10/2022 - 2/6/2023              Date Visit Type Length Department Provider     8/16/2022 10:30 AM OFFICE VISIT 30 min  INTERNAL MEDICINE VeRemington dawson MD                 Future appointment scheduled.   Pt agrees to plan of care, verbalizes understanding.

## 2022-08-10 NOTE — TELEPHONE ENCOUNTER
"Nurse Triage SBAR    Is this a 2nd Level Triage? YES, LICENSED PRACTITIONER REVIEW IS REQUIRED    Situation: Pt called the clinic asking what side the nodule was on with his lungs (x-ray from a year ago). Pt was informed what the xray stated (R side). Pt stated that's where his pain is. Pt stated he has pain on his right side under his shoulder blade (mid to upper back) and has been happening for about 2 weeks. Pt also was looking to get the shingles vacc (provided pt with pharmacy #). And blood work done.      Background: Pain has been happening for about 2 weeks. No known injury or overuse of back.     Assessment: Pain on the right side under his shoulder blade (mid to upper back). Currently 2-3/10. Wakes pt up when hes sleeping from the pain. Pain is intermittent. Pain does not radiate anywhere else. No difficulty breathing.      Protocol Recommended Disposition:   See in Office Today- No open appts. Advised pt to go to UC. Pt stated he would like writer to send this to Dr. Riggs to get his recommendations. Advised pt he is out of the office today but that I would send it to him. Pt stated \"that's fine\". Also routing to provider pool. Advised pt to go to UC/ED if his symptoms get worse before we can contact him with provider's recommendations. Pt agreed to plan. Routing to PCP for recommendations.      Please call pt back with providers recommendations.     Can we leave a detailed message on this number? YES  Phone number patient can be reached at: Home number on file [phone] (home)    Abby Rocha RN  [facility] Triage      Routed to provider    Does the patient meet one of the following criteria for ADS visit consideration? 16+ years old, with an [facility] PCP     TIP  Providers, please consider if this condition is appropriate for management at one of our Acute and Diagnostic Services sites.     If patient is a good candidate, please use dotphrase <dot>triageresponse and select Refer to ADS " "to document.          Pain- R side \"under shoulder blade\"   Started 2 weeks ago.   No difficulty breathing      1. ONSET: \"When did the pain begin?\"       2 weeks aog  2. LOCATION: \"Where does it hurt?\" (upper, mid or lower back)      Mid- upper back pain on R  side  3. SEVERITY: \"How bad is the pain?\"  (e.g., Scale 1-10; mild, moderate, or severe)    - MILD (1-3): doesn't interfere with normal activities     - MODERATE (4-7): interferes with normal activities or awakens from sleep     - SEVERE (8-10): excruciating pain, unable to do any normal activities       3-4/10 now. But when pt lays on it- it wakes the pt up.   4. PATTERN: \"Is the pain constant?\" (e.g., yes, no; constant, intermittent)       Intermittent   5. RADIATION: \"Does the pain shoot into your legs or elsewhere?\"      No   6. CAUSE:  \"What do you think is causing the back pain?\"       Unknown  7. BACK OVERUSE:  \"Any recent lifting of heavy objects, strenuous work or exercise?\"      No  8. MEDICATIONS: \"What have you taken so far for the pain?\" (e.g., nothing, acetaminophen, NSAIDS)      Pt has taken some acetaminophen- hasnt really helped   9. NEUROLOGIC SYMPTOMS: \"Do you have any weakness, numbness, or problems with bowel/bladder control?\"      No  10. OTHER SYMPTOMS: \"Do you have any other symptoms?\" (e.g., fever, abdominal pain, burning with urination, blood in urine)        No      Reason for Disposition    High-risk adult (e.g., history of cancer, history of HIV, or history of IV Drug Use)    Additional Information    Negative: Passed out (i.e., fainted, collapsed and was not responding)    Negative: Shock suspected (e.g., cold/pale/clammy skin, too weak to stand, low BP, rapid pulse)    Negative: Sounds like a life-threatening emergency to the triager    Negative: Major injury to the back (e.g., MVA, fall > 10 feet or 3 meters, penetrating injury, etc.)    Negative: Pain in the upper back over the ribs (rib cage) that radiates (travels) into the " chest    Negative: Pain in the upper back over the ribs (rib cage) and worsened by coughing (or clearly increases with breathing)    Negative: Back pain during pregnancy    Negative: SEVERE back pain of sudden onset and age > 60 years    Negative: SEVERE abdominal pain (e.g., excruciating)    Negative: Abdominal pain and age > 60 years    Negative: Unable to urinate (or only a few drops) and bladder feels very full    Negative: Loss of bladder or bowel control (urine or bowel incontinence; wetting self, leaking stool) of new-onset    Negative: Numbness (loss of sensation) in groin or rectal area    Negative: Pain radiates into groin, scrotum    Negative: Blood in urine (red, pink, or tea-colored)    Negative: Vomiting and pain over lower ribs of back (i.e., flank - kidney area)    Negative: Weakness of a leg or foot (e.g., unable to bear weight, dragging foot)    Negative: Patient sounds very sick or weak to the triager    Negative: Fever > 100.4 F (38.0 C) and flank pain    Negative: Pain or burning with passing urine (urination)    Negative: SEVERE back pain (e.g., excruciating, unable to do any normal activities) and not improved after pain medicine and CARE ADVICE    Negative: Numbness in an arm or hand (i.e., loss of sensation) and upper back pain    Negative: Numbness in a leg or foot (i.e., loss of sensation)    Protocols used: BACK PAIN-A-OH

## 2022-08-13 DIAGNOSIS — E78.5 HYPERLIPIDEMIA LDL GOAL <70: ICD-10-CM

## 2022-08-14 RX ORDER — EZETIMIBE 10 MG/1
TABLET ORAL
Qty: 90 TABLET | Refills: 0 | Status: SHIPPED | OUTPATIENT
Start: 2022-08-14 | End: 2023-01-13

## 2022-08-16 ENCOUNTER — OFFICE VISIT (OUTPATIENT)
Dept: INTERNAL MEDICINE | Facility: CLINIC | Age: 62
End: 2022-08-16
Payer: COMMERCIAL

## 2022-08-16 DIAGNOSIS — K40.90 RIGHT INGUINAL HERNIA: ICD-10-CM

## 2022-08-16 DIAGNOSIS — E78.5 HYPERLIPIDEMIA LDL GOAL <70: ICD-10-CM

## 2022-08-16 DIAGNOSIS — I48.0 PAROXYSMAL ATRIAL FIBRILLATION (H): ICD-10-CM

## 2022-08-16 DIAGNOSIS — Z12.5 SCREENING FOR PROSTATE CANCER: ICD-10-CM

## 2022-08-16 DIAGNOSIS — M54.6 RIGHT-SIDED THORACIC BACK PAIN, UNSPECIFIED CHRONICITY: ICD-10-CM

## 2022-08-16 LAB
ALBUMIN SERPL-MCNC: 3.8 G/DL (ref 3.4–5)
ALP SERPL-CCNC: 121 U/L (ref 40–150)
ALT SERPL W P-5'-P-CCNC: 27 U/L (ref 0–70)
ANION GAP SERPL CALCULATED.3IONS-SCNC: 6 MMOL/L (ref 3–14)
AST SERPL W P-5'-P-CCNC: 14 U/L (ref 0–45)
BILIRUB SERPL-MCNC: 0.6 MG/DL (ref 0.2–1.3)
BUN SERPL-MCNC: 12 MG/DL (ref 7–30)
CALCIUM SERPL-MCNC: 9.3 MG/DL (ref 8.5–10.1)
CHLORIDE BLD-SCNC: 105 MMOL/L (ref 94–109)
CHOLEST SERPL-MCNC: 170 MG/DL
CO2 SERPL-SCNC: 26 MMOL/L (ref 20–32)
CREAT SERPL-MCNC: 0.7 MG/DL (ref 0.66–1.25)
FASTING STATUS PATIENT QL REPORTED: YES
GFR SERPL CREATININE-BSD FRML MDRD: >90 ML/MIN/1.73M2
GLUCOSE BLD-MCNC: 106 MG/DL (ref 70–99)
HDLC SERPL-MCNC: 44 MG/DL
LDLC SERPL CALC-MCNC: 97 MG/DL
NONHDLC SERPL-MCNC: 126 MG/DL
POTASSIUM BLD-SCNC: 4.2 MMOL/L (ref 3.4–5.3)
PROT SERPL-MCNC: 7.3 G/DL (ref 6.8–8.8)
PSA SERPL-MCNC: 1.5 UG/L (ref 0–4)
SODIUM SERPL-SCNC: 137 MMOL/L (ref 133–144)
TRIGL SERPL-MCNC: 146 MG/DL

## 2022-08-16 PROCEDURE — 99214 OFFICE O/P EST MOD 30 MIN: CPT | Performed by: INTERNAL MEDICINE

## 2022-08-16 PROCEDURE — 80053 COMPREHEN METABOLIC PANEL: CPT | Performed by: INTERNAL MEDICINE

## 2022-08-16 PROCEDURE — 80061 LIPID PANEL: CPT | Performed by: INTERNAL MEDICINE

## 2022-08-16 PROCEDURE — G0103 PSA SCREENING: HCPCS | Performed by: INTERNAL MEDICINE

## 2022-08-16 PROCEDURE — 93000 ELECTROCARDIOGRAM COMPLETE: CPT | Performed by: INTERNAL MEDICINE

## 2022-08-16 PROCEDURE — 36415 COLL VENOUS BLD VENIPUNCTURE: CPT | Performed by: INTERNAL MEDICINE

## 2022-08-16 NOTE — PROGRESS NOTES
ASSESSMENT:    1. Right-sided thoracic back pain, unspecified chronicity  Positional.  Appears musculoskeletal.  Not related to exertional activity and no respiratory symptoms.  Discussed improved posture when sitting and stretching exercises    2. Paroxysmal atrial fibrillation (H)  Patient remains in sinus rhythm.  EKG today shows sinus rhythm with slight rate variation.  No recent palpitations.  Previous cardiology note discussed possible discontinuation of Eliquis.  April 2022 cardiology appointment canceled for unknown reasons when patient was to discuss this with Nitza GARVEY.  Message routed to cardiology to contact patient to discuss further  - ASPIRIN NOT PRESCRIBED (INTENTIONAL); Please choose reason not prescribed from choices below.  - EKG 12-lead complete w/read - Clinics    3. Right inguinal hernia  Mildly symptomatic.  Will refer to general surgery to discuss surgical treatment  - Adult General Surg Referral; Future    4. Hyperlipidemia LDL goal <70  On Zetia.  Intolerant of statin trials.  Patient declines Repatha.  Labs today as ordered  - Lipid panel reflex to direct LDL Fasting  - Comprehensive metabolic panel    5. Screening for prostate cancer  Candidate for screening  - Prostate spec antigen screen      PLAN:   EKG now  Referral to [facility] to discuss right inguinal hernia repair. Call for apt with Dr Nuno Lowe  Labs as ordered  Stretching of right scapular area. Better posture with sitting  Will send note to cardiology later today re: Eliquis  and whether OK to discontinue  Check with insurance or speak with your pharmacist re: Shingrix vaccine coverage for shingles prevention.  This is a 2 shot series done 2-6 months apart  I would recommend you receive an influenza (flu) vaccine  this Fall (mid/late October)   I would recommend a covid booster vaccination. You may have it done at any pharmacy. If you wish to have it done at a [place] pharmacy, then go to [email or web address] to  schedule a vaccination appointment  Continue current medications for now      (Chart documentation was completed, in part, with Vast[employer] voice-recognition software. Even though reviewed, some grammatical, spelling, and word errors may remain.)    Remington Riggs MD  Internal Medicine Department  [facility] ORIANA Patel is a 61 year old, presenting for the following health issues:  Back Pain      History of Present Illness       Back Pain:  He presents for follow up of back pain. Patient's back pain is a new problem.    Original cause of back pain: not sure  First noticed back pain: 1-4 weeks ago  Patient feels back pain: a few times per weekLocation of back pain:  Right upper back  Description of back pain: gnawing  Back pain spreads: nowhere    Since patient first noticed back pain, pain is: unchanged  Does back pain interfere with his job:  No  On a scale of 1-10 (10 being the worst), patient describes pain as:  5  What makes back pain worse: lying down and sitting  Acupuncture: not tried  Acetaminophen: not helpful  Activity or exercise: not tried  Chiropractor:  Not tried  Cold: not tried  Heat: not tried  Massage: not tried  Muscle relaxants: not tried  NSAIDS: not tried  Opioids: not tried  Physical Therapy: not tried  Rest: not tried  Steroid Injection: not tried  Stretching: not tried  Surgery: not tried  TENS unit: not tried  Topical pain relievers: not tried  Other healthcare providers patient is seeing for back pain: None    He eats 2-3 servings of fruits and vegetables daily.He consumes 1 sweetened beverage(s) daily.He exercises with enough effort to increase his heart rate 20 to 29 minutes per day.  He exercises with enough effort to increase his heart rate 5 days per week. He is missing 1 dose(s) of medications per week.  He is not taking prescribed medications regularly due to remembering to take.     Other concerns:  1. Discuss discontinuing eliquis        Most  "recent lab results reviewed with pt.       Intermittent right scapular area pain.  Generally occurs the patient is laying back on a couch when thoracic spine slightly hyperextended.  No radiation to the extremities.  No trauma.  No cough, shortness of breath.  Does not occur with exertion.  No pain today.  Does not occur when patient is at work with exertional activities.  On Zetia.  Intolerant of previous statin trials.  Due for lab follow-up.  History of paroxysmal A. Fib  After CABG. cardiology note from October 2021 discussed repeating Ziopatch in 2022 which patient had done and did not show A. fib recurrence.  No without study from Dr Mcgovern recommends patient have appointment with Nitza GARVEY to discuss further.  That appointment was scheduled for 4/7/2022 but was then canceled as \"not needed\".  Patient does not have follow-up with cardiology since that time and asking if he can discontinue Eliquis therapy and restart aspirin.  Patient denies recent palpitations.  Denies chest pain, shortness of breath, abdominal pain.  Has not been vaccinated for COVID and declines vaccination.  Blood pressure was quite elevated when patient first came into the clinic but dropped in the normal range when I rechecked after patient sitting and relaxing.  Patient states he was nervous coming into the clinic       Additional ROS:   Constitutional, HEENT, Cardiovascular, Pulmonary, GI and , Neuro, MSK and Psych review of systems/symptoms are otherwise negative or unchanged from previous, except as noted above.      OBJECTIVE:  /78   Pulse 74   Temp 97.9  F (36.6  C) (Oral)   Wt 98.4 kg (217 lb)   SpO2 97%   BMI 32.05 kg/m     Estimated body mass index is 32.05 kg/m  as calculated from the following:    Height as of 10/22/21: 1.753 m (5' 9\").    Weight as of this encounter: 98.4 kg (217 lb).     Neck: no adenopathy. Thyroid normal to palpation. No bruits  Pulm: Lungs clear to auscultation   CV: Regular rates.  Slight " rate variability  GI: Soft, mildly obese, nontender, Normal active bowel sounds, No hepatosplenomegaly or masses palpable  Ext: Peripheral pulses intact. No edema.  MSK: Minimal tenderness to palpation right periscapular musculature.  Tenderness to right shoulder range of motion.  No tenderness to palpation along the vertebral processes.  Neck range of motion intact without tenderness  : testicles normal without atrophy or masses, mild sized reducible nontender right inguinal hernia palpable. Penis normal without urethral discharge

## 2022-08-16 NOTE — PATIENT INSTRUCTIONS
EKG now  Referral to [facility] to discuss right inguinal hernia repair. Call for apt with Dr Nuno Galvan as ordered  Stretching of right scapular area. Better posture with sitting  Will send note to cardiology later today re: Eliquis  and whether OK to discontinue  Check with insurance or speak with your pharmacist re: Shingrix vaccine coverage for shingles prevention.  This is a 2 shot series done 2-6 months apart  I would recommend you receive an influenza (flu) vaccine  this Fall (mid/late October)   I would recommend a covid booster vaccination. You may have it done at any pharmacy. If you wish to have it done at a [place] pharmacy, then go to [email or web address] to schedule a vaccination appointment  Continue current medications for now

## 2022-08-17 ENCOUNTER — TELEPHONE (OUTPATIENT)
Dept: INTERNAL MEDICINE | Facility: CLINIC | Age: 62
End: 2022-08-17

## 2022-08-17 VITALS
WEIGHT: 217 LBS | BODY MASS INDEX: 32.05 KG/M2 | TEMPERATURE: 97.9 F | HEART RATE: 74 BPM | OXYGEN SATURATION: 97 % | SYSTOLIC BLOOD PRESSURE: 132 MMHG | DIASTOLIC BLOOD PRESSURE: 78 MMHG

## 2022-08-17 NOTE — TELEPHONE ENCOUNTER
" Patient seen by Dr. Mcgovern 10/22/21 with discussion about possibly stopping apixaban/Eliquis as below:       IMPRESSION:  1. Atrial fibrillation.  This patient had atrial fibrillation after the typical 6-week window following CABG.  He may have had paroxysmal AF even before cardiac surgery.  His recent cardiac monitor showed no AF.  2. Need for long-term anticoagulation?  [id number] score 2.  Interesting clinical dilemma.  He may be an \"unusual\" post CABG patient in whom AF lasted a bit longer than the typical few weeks after surgery.  I asked him to stop aspirin right away, as he does not need both apixaban and aspirin.  Whether to continue apixaban is a gray area.  Will repeat a cardiac monitor in 6 months.  If we do not document AF we could make a reasonable case for stopping Eliquis and resuming aspirin.    Patient underwent repeat ZIOpatch in February 2022.  Patient had PACs and a few brief runs of SVT but no A. fib.  Note attached to report from 3/24/2022 by Dr Mcgovern requested patient have follow-up appointment with Nitza Thomson to discuss \"pros/cons\" further stopping anticoagulation.  Patient initially had appointments scheduled with cardiology for 4/7/2022 discussed but that was canceled with comment \" appointment not needed\".  Question whether to possibly discontinue Eliquis has still not been addressed.  EKG 8/16/2022 showed sinus rhythm with some rate variation.  Will route this message to Nitza Thomson to arrange clinic [facility]  [facility] further    "

## 2022-08-18 NOTE — TELEPHONE ENCOUNTER
Sherif Riggs. I will ask our Schedulers to give him a call to arrange follow-up to discuss AC.     Nitza

## 2022-09-02 ENCOUNTER — TELEPHONE (OUTPATIENT)
Dept: SURGERY | Facility: CLINIC | Age: 62
End: 2022-09-02

## 2022-09-02 ENCOUNTER — HOSPITAL ENCOUNTER (OUTPATIENT)
Facility: CLINIC | Age: 62
End: 2022-09-02
Attending: SURGERY | Admitting: SURGERY
Payer: COMMERCIAL

## 2022-09-02 ENCOUNTER — OFFICE VISIT (OUTPATIENT)
Dept: SURGERY | Facility: CLINIC | Age: 62
End: 2022-09-02
Payer: COMMERCIAL

## 2022-09-02 VITALS
HEART RATE: 62 BPM | SYSTOLIC BLOOD PRESSURE: 132 MMHG | BODY MASS INDEX: 32.14 KG/M2 | DIASTOLIC BLOOD PRESSURE: 80 MMHG | OXYGEN SATURATION: 94 % | WEIGHT: 217 LBS | HEIGHT: 69 IN | RESPIRATION RATE: 16 BRPM

## 2022-09-02 DIAGNOSIS — K40.90 RIGHT INGUINAL HERNIA: Primary | ICD-10-CM

## 2022-09-02 PROCEDURE — 99203 OFFICE O/P NEW LOW 30 MIN: CPT | Performed by: SURGERY

## 2022-09-02 RX ORDER — TAMSULOSIN HYDROCHLORIDE 0.4 MG/1
0.4 CAPSULE ORAL DAILY
Qty: 7 CAPSULE | Refills: 0 | Status: SHIPPED | OUTPATIENT
Start: 2022-09-02 | End: 2022-10-31

## 2022-09-02 NOTE — LETTER
[facility]    [address], [address]  [place], [place] [address]  Phone [phone]  Fax [phone]    [address], [address]  [place] [address]  Phone [phone]  Fax [phone]    [email or web address]   2022       Bryce CARMONA Alexis    RE: [id number]  : 1960    Bryce CARMONA Alexis has been scheduled for surgery on 22 at 10:00 am at [facility] with Dr. Lowe.  The hospital is located at [address] in [place].      Please check in at the Surgery reception desk at 8:00 am. This is located in the back of the [facility] on the East side, just past the Emergency Room entrance.       DO NOT EAT OR DRINK ANYTHING AFTER MIDNIGHT THE NIGHT BEFORE YOUR  SURGERY.   You may have sips of clear liquids up until 2 hours before your surgery.   If you have been advised to take your medication, please do this early in the morning with just sips of clear liquid.          If you are on a blood thinner such as ELIQUIS, please stop taking this 48 hours before your surgery.           Hospital regulations require an updated pre-operative examination to be completed within 30 days of the procedure. This can be done by your primary care provider. Please ask them to fax documentation to [phone]. We also recommend you bring a copy with you.       During the current pandemic, a COVID-19 at home rapid antigen test is required   1-2 days before your procedure per hospital regulations. You can buy these at many pharmacy stores. Or you can order free, at-home tests at covid.gov/tests  ? If your test is negative, please take a photo of your test. Show the photo to the nurse when you come in for your procedure.  ? If your test is positive, please call our office at [phone].      You should shower before your surgery with Hibiclens or Exidine soap.  This can be found at your local pharmacy or you  can pick it up from our office for free.  Please call our office if you have any questions.       You will be required to have an Adult (friend or family member) drive you home after your surgery and arrange for an adult to stay with you until the next morning.       You will receive several calls from our staff 3-7 days prior to your scheduled procedure with further details and to answer any questions you may have.      It is sometimes necessary to adjust the surgery schedule due to emergencies and additions to the schedule.  If your surgery is affected by this, we greatly appreciate your flexibility and understanding in this matter      It is best if you call regarding post-operative questions between the hours of 8:00 am & 3:00 pm Monday-Friday, so you have access to the daytime care team that know you best.  ? Prescription refills are accepted during regular office hours only.      Please do not bring and Disability or FMLA papers to the hospital.  They need to be either faxed ([phone]), mailed or hand delivered to our office by you or a family member for completion.  ? Please allow 14 business days to complete paperwork.        If you have questions or concerns, please contact our office at [phone].

## 2022-09-02 NOTE — LETTER
September 2, 2022          Remington Riggs MD  [address] [place] [address]      RE:   Bryce Espinoza 1960      Dear Colleague,    Thank you for referring your patient, Bryce Espinoza, to [facility]. Please see a copy of my visit note below.    HPI:  Bryce is a 61 year old male who presents for evaluation of right inguinal bulging.  He believes this came on after some severe constipation following cardiac surgery.  Employment does require heavy lifting, specifically moving dumpsters.  He has had some mild discomfort in the right groin, though he has not noticed much in the way of a bulge.  He denies any left-sided symptoms.               Assessment: right inguinal hernia     Plan: This is a patient with a reducible right inguinal hernia.  I have recommended robotic assisted right inguinal hernia repair with mesh with possible left inguinal hernia repair with mesh.  We have discussed observation, reduction techniques and importance, incarceration and strangulation signs, symptoms and importance as well as need to seek emergency treatment.    We have discussed surgery in detail, including risk, benefits, complications, mesh, infection, nerve and cord damage and their sequelae including chronic pain and testicular loss, lifting and activity limits after surgery. He has been given literature to review. We will schedule surgery at patient's convenience.    Again, thank you for allowing me to participate in the care of your patient.      Sincerely,      You Lowe MD

## 2022-09-02 NOTE — PROGRESS NOTES
HPI:  Bryce is a 61 year old male who presents for evaluation of right inguinal bulging.  He believes this came on after some severe constipation following cardiac surgery.  Employment does require heavy lifting, specifically moving dumpsters.  He has had some mild discomfort in the right groin, though he has not noticed much in the way of a bulge.  He denies any left-sided symptoms.    Past Medical History:   has a past medical history of Coronary artery disease involving native coronary artery of native heart without angina pectoris (7/4/2019), Essential hypertension, benign, Hematuria, Malignant neoplasm of bladder, part unspecified (11/02), Olecranon bursitis (3/01), Other and unspecified hyperlipidemia, Paroxysmal atrial fibrillation (H), Paroxysmal atrial flutter (H), RIGHT LUNG CALCIFIED GRANULOMA (2/02), Tobacco use disorder, Unspecified hemorrhoids without mention of complication (8/01), and Unspecified hereditary and idiopathic peripheral neuropathy.    Past Surgical History:  Past Surgical History:   Procedure Laterality Date     BYPASS GRAFT ARTERY CORONARY N/A 9/25/2019    Procedure: CORONARY ARTERY BYPASS GRAFTING x 1 (LIMA - LAD) WITH CORONARY ENDARTERECTOMY  (ON PUMP OXYGENATOR ; HANK BY [facility]);  Surgeon: Magdi Turner MD;  Location:  OR     CV HEART CATHETERIZATION WITH POSSIBLE INTERVENTION N/A 9/6/2019    Procedure: Coronary Angiogram;  Surgeon: Nick Willson MD;  Location:  HEART CARDIAC CATH LAB     CV LEFT HEART CATH N/A 9/6/2019    Procedure: Left Heart Cath;  Surgeon: Nick Willson MD;  Location:  HEART CARDIAC CATH LAB     CV LEFT VENTRICULOGRAM N/A 9/6/2019    Procedure: Left Ventriculogram;  Surgeon: Nick Willson MD;  Location:  HEART CARDIAC CATH LAB     ZZC NONSPECIFIC PROCEDURE  2/00/02    EBCT        Social History:  Social History     Socioeconomic History     Marital status:      Spouse name: Not on file     Number of children: Not on  file     Years of education: Not on file     Highest education level: Not on file   Occupational History     Not on file   Tobacco Use     Smoking status: Former Smoker     Packs/day: 1.00     Years: 33.00     Pack years: 33.00     Quit date: 2013     Years since quittin.5     Smokeless tobacco: Never Used   Substance and Sexual Activity     Alcohol use: Yes     Comment: 2 daily     Drug use: Yes     Comment: robin. claudia     Sexual activity: Yes   Other Topics Concern     Parent/sibling w/ CABG, MI or angioplasty before 65F 55M? Not Asked   Social History Narrative     Not on file     Social Determinants of Health     Financial Resource Strain: Not on file   Food Insecurity: Not on file   Transportation Needs: Not on file   Physical Activity: Not on file   Stress: Not on file   Social Connections: Not on file   Intimate Partner Violence: Not on file   Housing Stability: Not on file        Family History:  Family History   Problem Relation Age of Onset     Arthritis Mother         RA     Hypertension Mother      Breast Cancer Mother      Prostate Cancer Maternal Grandfather         80     Cancer Maternal Grandmother         Lung     Coronary Artery Disease Father      Other - See Comments Brother      Diabetes Brother      Genetic Disorder Son          ROS:  The 10 point review of systems is negative other than noted in the HPI and above.    PE:    General- Well-developed, well-nourished, patient able to get up on table without difficulty.  HEENT- Normocephalic and atraumatic. Pupils equal and round.  Mucous membranes moist.  Sclera are nonicteric.  Neck- No lymphadenopathy or masses   Respirations- are regular and non labored  Abdomen is abdomen is soft without significant tenderness, masses, organomegaly or guarding  Hernia- Left inguinal hernia is not obviously present with valsalva.  There is some questionable vague bulging high in the canal.              Right inguinal hernia is present with valsalva               The hernia is reducible   Umbilical hernia is not present.              External genitalia are normal               Assessment: right inguinal hernia    Plan: This is a patient with a reducible right inguinal hernia.  I have recommended robotic assisted right inguinal hernia repair with mesh with possible left inguinal hernia repair with mesh.  We have discussed observation, reduction techniques and importance, incarceration and strangulation signs, symptoms and importance as well as need to seek emergency treatment.    We have discussed surgery in detail, including risk, benefits, complications, mesh, infection, nerve and cord damage and their sequelae including chronic pain and testicular loss, lifting and activity limits after surgery. He has been given literature to review. We will schedule surgery at patient's convenience.    A total of 30 minutes was spent today in chart review, patient examination and discussion, and documentation.    You Lowe MD    Please route or send letter to:  Referring Provider

## 2022-09-02 NOTE — TELEPHONE ENCOUNTER
Type of surgery: ROBOTIC ASSISTED RIGHT INGUINAL HERNIA REPAIR WITH MESH, POSSIBLE LEFT INGUINAL HERNIA   Location of surgery: [facility]  Date and time of surgery: 9-26-22, 10 AM  Surgeon: DR. MALDONADO   Pre-Op Appt Date: PATIENT TO SCHEDULE   Post-Op Appt Date: NA    Packet sent out: GIVEN TO PATIENT   Pre-cert/Authorization completed:  Not Applicable  Date: 9-2-22        ROBOTIC ASSISTED RIGHT INGUINAL HERNIA REPAIR WITH MESH, POSSIBLE LEFT INGUINAL HERNIA REPAIR   GENERAL   PT INST TO HAVE H&P   90 MINS REQ  PA ASSIST DFB   ALW

## 2022-09-07 NOTE — PROGRESS NOTES
"Vitals - Patient Reported  Systolic (Patient Reported):  (pt did not take)  Diastolic (Patient Reported):  (pt did not take)  Weight (Patient Reported): 95.3 kg (210 lb)  Height (Patient Reported): 175.3 cm (5' 9\")  BMI (Based on Pt Reported Ht/Wt): 31.01    Review Of Systems--Nitza will complete.    Janes Olguin,  NREMT      Bryce Espinoza is a 61 year old male who is being evaluated via a billable telephone visit.      What phone number would you like to be contacted at? [phone]  How would you like to obtain your AVS? MyChart    CC:  Atrial arrhythmias    VITALS:  210#  130s/80s      BRIEF HPI:  Jorge is a 61 year old yo M who sees Dr. Mcgovern and Dr. Blake for h/o:    1.  CAD - had severe LM disease s/p 1v CABG (LIMA/LAD requiring endarterectomy of pLAD d/t extensive calcification) 9/2019.  Small Cx without bypass  2.  Postoperative atrial arrhythmias - noted after stopping amiodarone 11/2019.  Less than 1% burden, all at night.  Repeat ZP 2/2022 without recurrent AFib  3.   HTN  4.  Small meningioma - MRI brain 8/2021 noted this was unchanged c/w CT 5/2021. Also with nonspecific small area of hypointense signal of uncertain etiology (see report).  Last saw Neurology 5/2021  5.  Dyslipidemia - on Zetia d/t statin interolerance  6.  DENYS - on CPAP therapy  7.  Superficial Bladder Cancer -  Sees Dr. Nguyen in Urology.    I had a virtual visit with Jorge 4/2020 at which time he noted improved joint discomfort after stopping rosuvastatin therapy.  He remained fatigued.  Dr. Mcgovern saw him on 10/5/2021, at which time he was doing well.  We reviewed his need for long-term anticoagulation.  His [id number] was 2 (CAD, HTN) and Dr. Mcgovern reviewed that he was noted to have pAFib back with a typical window post CABG in which this is seen.  He recommended repeat 6-month monitor, with consideration of stopping AC and resuming ASA at that time.  No EP follow-up required after he saw me for review [facility]; recommended for " PT RESTING IN BED WITH NO REQUESTS NO SIGNS OF DISTRESS AND BREATHING UNLABORED. general cardiology follow-up.     ZioPatch was done 2/2022.  He had no recurrent AFib, with frequent PACs and a few SVT runs.  An appointment was made with me to review pros/cons of stopping anticoagulation, Jorge canceled this 4/2022 as he did not feel this was needed.  Dr. Riggs, his PCP, reached out to reschedule this after seeing him 8/15.    He has also seen Surgery, with plans to fix a R inguinal hernia.    Of note, in 5/2021, he presented to ER with c/o resolved double vision. CT/MRI negative for acute stroke, though did show left cingulate gyrus abnormality was recommended.  Diagnosis was unclear, with TIA vs primary ophthalmologic etiology in differential.  Jorge confirms today that ultimately, it was not felt he had a TIA. Follow-up with Ophthalmology recommended, with tighter control of dyslipidemia and hypertension recommended.  Repeat MRI 8/2021 showed stability of this abnormality, probably 6-12 months follow-up recommended.    INTERVAL HISTORY:  Overall, Jorge has felt well from a cardiac standpoint.  He remains active, and notes no problems with chest pain, pressure or tightness.  No reduction in exercise tolerance.    Denies any problems with edema, orthopnea, PND.  No palpitations or awareness of irregular heartbeats.  He does not check his heart rate or BP at home very often, but when he does, BPs are typically 130s/80s similar to what he has gotten during recent clinic visits.    No issues with syncope.  Today, he really has no concerns.    DIAGNOSTICS:  EKG 8/16/2022- SR with frequent PACs  ZioPatch 2/28-3/14/2022 no AFib. SR with 4 runs of SVT and 8.6% PAC burden. In SR, avg HR 63 bpm (range  bpm). 4 runs of SVT, longest 16 beats. 2 runs of VT, longest 60. Triggered sxs a/w SR and SR/PACs  Echo 5/2021 LVEF 55-60%.Nl RV.  Trace MR.  1+ TR.  Mild aortic sclerosis.  1+ NM  ZioPatch 11/22-12/6/2020 after stopping amiodarone showed SR with pAFib (1% burden). In SR, avg HR 64 (range  bpm).  "Episode of AFib lasted 4.5 hours, with avg HR 84 bpm (range  bpm)  Carotids 9/2019 <50% bilateral  Echo 9/2019 with EF 60-65%. Grade I diastolic dysfunction. No sig valve issues    REVIEW OF SYSTEMS:  Negative with the exception of that noted above    PHYSICAL EXAM:  210#  130s/80s    Deferred, telephone    PLAN:  1. Stop Eliquis 5 mg BID  2. Restart ASA 81 mg daily  3. Routine follow-up with Dr. Blake (last saw prior to bypass 2019) with updated echo.  4. No EP follow-up required given no recurrent arrhythmias      ASSESSMENT/PLAN:    1.  Atrial arrhythmias    Noted while hospitalized shortly after CABG 9/2019, treated with amiodarone until 11/2019.     Follow-up ZioPatch after stopping amiodarone showed nocturnal AFib, <1% 11/2019    Repeat Zio patch 2/2022 without recurrent AFib, but frequent PACs and runs of SVT    Dr. Mcgovern noted AC was a lovell zone, given he had AFib documented outside of the \"typical window\" of postop AFib (11/2021), which could just be an \"unusual\" case    His [id number] score is 2 (HTN, CAD).  Reviewed his ER visit 5/2021 for diplopia and subsequent visit with NP in Neurology with TIA in the differential.  Per his understanding, NOT dx'd with TIA/CVA.  Confirms he has had no vision issues since     PLAN:    Reviewed risk/benefits of continuing vs stopping anticoagulation.  He has not had any overt recurrent atrial fibrillation that he is aware of nor has it been documented on most recent ZioPatch.  Dr. Mcgovern notes that his episode of this on monitor 11/2019 could be just an \"unusual case.\"    As he confirms that TIA was not diagnosed 5/2021, [id number] remains 2 (HTN, CAD).  This roughly correlates with a 2.2% annual stroke risk    At this time, he will come off of anticoagulation with Eliquis    Restart ASA 81 mg daily    Contact us if he were to have any recurrent symptoms of atrial fibrillation      2.  CAD    S/p 1 v CABG (LIMA/LAD) 9/2019    Remains on Zetia (statin " intolerant) and BB therapy    Last LDL 97 mg/dL 8/2022.    Last echo 5/5/2021 with normal LVEF     PLAN:    Will get back into Dr. Blake at general cardiologist (last saw pre-op 2019)    Aim for LDL closer to 70 mg/dL; has been statin intolerant.         CURRENT MEDICATIONS:  Current Outpatient Medications   Medication Sig Dispense Refill     aspirin (ASA) 81 MG EC tablet Take 1 tablet (81 mg) by mouth daily       ASPIRIN NOT PRESCRIBED (INTENTIONAL) Please choose reason not prescribed from choices below.       Cholecalciferol (VITAMIN D) 2000 UNITS tablet Take 2,000 Units by mouth daily       ezetimibe (ZETIA) 10 MG tablet TAKE 1 TABLET(10 MG) BY MOUTH DAILY 90 tablet 0     metoprolol succinate ER (TOPROL-XL) 50 MG 24 hr tablet TAKE 1 TABLET BY MOUTH TWICE DAILY 180 tablet 0     tamsulosin (FLOMAX) 0.4 MG capsule Take 1 capsule (0.4 mg) by mouth daily Begin 4 days before surgery, including morning of surgery 7 capsule 0         ORDERS PLACED:  Orders Placed This Encounter   Procedures     Follow-Up with Cardiology     Echocardiogram Complete     Orders Placed This Encounter   Medications     aspirin (ASA) 81 MG EC tablet     Sig: Take 1 tablet (81 mg) by mouth daily     Medications Discontinued During This Encounter   Medication Reason     apixaban ANTICOAGULANT (ELIQUIS) 5 MG tablet          Encounter Diagnoses   Name Primary?     Paroxysmal atrial fibrillation (H)      S/P CABG (coronary artery bypass graft) Yes         ALLERGIES     Allergies   Allergen Reactions     Atorvastatin      Myalgias     Crestor [Rosuvastatin]      Myalgias       Lisinopril      Body aches pt d/mylene  06/2015       PAST MEDICAL HISTORY:  Past Medical History:   Diagnosis Date     Coronary artery disease involving native coronary artery of native heart without angina pectoris 7/4/2019    Per CT calcium score of 722.97 3/2019     Essential hypertension, benign      Hematuria      Malignant neoplasm of bladder, part unspecified 11/02     Transitional Cell Carcinoma bladder     Olecranon bursitis 3/01    right     Other and unspecified hyperlipidemia      Paroxysmal atrial fibrillation (H)      Paroxysmal atrial flutter (H)      RIGHT LUNG CALCIFIED GRANULOMA     RML     Tobacco use disorder     Quit      Unspecified hemorrhoids without mention of complication      Unspecified hereditary and idiopathic peripheral neuropathy        PAST SURGICAL HISTORY:  Past Surgical History:   Procedure Laterality Date     BYPASS GRAFT ARTERY CORONARY N/A 2019    Procedure: CORONARY ARTERY BYPASS GRAFTING x 1 (LIMA - LAD) WITH CORONARY ENDARTERECTOMY  (ON PUMP OXYGENATOR ; HANK BY [facility]);  Surgeon: Magdi Turner MD;  Location:  OR     CV HEART CATHETERIZATION WITH POSSIBLE INTERVENTION N/A 2019    Procedure: Coronary Angiogram;  Surgeon: Nick Willson MD;  Location:  HEART CARDIAC CATH LAB     CV LEFT HEART CATH N/A 2019    Procedure: Left Heart Cath;  Surgeon: Nick Willson MD;  Location:  HEART CARDIAC CATH LAB     CV LEFT VENTRICULOGRAM N/A 2019    Procedure: Left Ventriculogram;  Surgeon: Nick Willson MD;  Location:  HEART CARDIAC CATH LAB     ZZC NONSPECIFIC PROCEDURE      EBCT       FAMILY HISTORY:  Family History   Problem Relation Age of Onset     Arthritis Mother         RA     Hypertension Mother      Breast Cancer Mother      Prostate Cancer Maternal Grandfather         80     Cancer Maternal Grandmother         Lung     Coronary Artery Disease Father      Other - See Comments Brother      Diabetes Brother      Genetic Disorder Son        SOCIAL HISTORY:  Social History     Socioeconomic History     Marital status:    Tobacco Use     Smoking status: Former Smoker     Packs/day: 1.00     Years: 33.00     Pack years: 33.00     Quit date: 2013     Years since quittin.6     Smokeless tobacco: Never Used   Substance and Sexual Activity     Alcohol use: Yes     Comment: 2  daily     Drug use: Yes     Comment: nica taylor     Sexual activity: Yes       I have reviewed the note as documented above.  This accurately captures the substance of my conversation with the patient.     Phone call contact time  Call Started at 1424  Call Ended at 1436  Duration 12 minutes     SUZETTE RosasAS

## 2022-09-08 ENCOUNTER — VIRTUAL VISIT (OUTPATIENT)
Dept: CARDIOLOGY | Facility: CLINIC | Age: 62
End: 2022-09-08
Attending: INTERNAL MEDICINE
Payer: COMMERCIAL

## 2022-09-08 DIAGNOSIS — I48.0 PAROXYSMAL ATRIAL FIBRILLATION (H): ICD-10-CM

## 2022-09-08 DIAGNOSIS — Z95.1 S/P CABG (CORONARY ARTERY BYPASS GRAFT): Primary | ICD-10-CM

## 2022-09-08 PROCEDURE — 99214 OFFICE O/P EST MOD 30 MIN: CPT | Mod: TEL | Performed by: PHYSICIAN ASSISTANT

## 2022-09-08 NOTE — PATIENT INSTRUCTIONS
"Jorge-it was nice to speak with you today!    Reviewed that your monitor 2/2022 showed no evidence of atrial fibrillation.  Dr. Mcgovern had previously noted that your anticoagulation status was \"in a gray zone\" given that you were noted to have atrial fibrillation since 11/2019, 2 months after your bypass surgery  Reviewed that as far as you are aware, you have not had recurrent atrial arrhythmias.  Glad to hear that the episode 5/2021 of double vision was NOT felt to be related to mini stroke and has not happened again    PLAN:  1.  Agreed that we would start aspirin 81 mg daily and stop Eliquis 5 mg twice daily    2.  Contact us if you are having any concerns for recurrent atrial fibrillation (palpitations, unusual fatigue, unusual shortness of breath).  [phone]    3.  Otherwise, you are due to be seen in General Cardiology with updated echocardiogram (ultrasound of heart). I have an order for you to see Dr. Blake ~3/2023 (6 months from now) with updated echo and will ask Scheduling to contact you to set this up.    4.  I will let Dr. Riggs know I talked to you today    "

## 2022-09-08 NOTE — LETTER
"9/8/2022    Remington Riggs MD  [address] [facility] [address]    RE: Bryce CARMONA Alexis       Dear Colleague,     I had the pleasure of seeing Bryce Espinoza in the [facility].  Vitals - Patient Reported  Systolic (Patient Reported):  (pt did not take)  Diastolic (Patient Reported):  (pt did not take)  Weight (Patient Reported): 95.3 kg (210 lb)  Height (Patient Reported): 175.3 cm (5' 9\")  BMI (Based on Pt Reported Ht/Wt): 31.01    Review Of Systems--Nitza will complete.    Janes Olguin,  NREMT      Bryce Espinoza is a 61 year old male who is being evaluated via a billable telephone visit.      What phone number would you like to be contacted at? [phone]  How would you like to obtain your AVS? MyChart    CC:  Atrial arrhythmias    VITALS:  210#  130s/80s      BRIEF HPI:  Jorge is a 61 year old yo M who sees Dr. Mcgovern and Dr. Blake for h/o:    1.  CAD - had severe LM disease s/p 1v CABG (LIMA/LAD requiring endarterectomy of pLAD d/t extensive calcification) 9/2019.  Small Cx without bypass  2.  Postoperative atrial arrhythmias - noted after stopping amiodarone 11/2019.  Less than 1% burden, all at night.  Repeat ZP 2/2022 without recurrent AFib  3.   HTN  4.  Small meningioma - MRI brain 8/2021 noted this was unchanged c/w CT 5/2021. Also with nonspecific small area of hypointense signal of uncertain etiology (see report).  Last saw Neurology 5/2021  5.  Dyslipidemia - on Zetia d/t statin interolerance  6.  DENYS - on CPAP therapy  7.  Superficial Bladder Cancer -  Sees Dr. Nguyen in Urology.    I had a virtual visit with Jorge 4/2020 at which time he noted improved joint discomfort after stopping rosuvastatin therapy.  He remained fatigued.  Dr. Mcgovern saw him on 10/5/2021, at which time he was doing well.  We reviewed his need for long-term anticoagulation.  His [id number] was 2 (CAD, HTN) and Dr. Iskos reviewed that he was noted to have pAFib back with a typical window post CABG in which this " is seen.  He recommended repeat 6-month monitor, with consideration of stopping AC and resuming ASA at that time.  No EP follow-up required after he saw me for review [facility]; recommended for general cardiology follow-up.     ZioPatch was done 2/2022.  He had no recurrent AFib, with frequent PACs and a few SVT runs.  An appointment was made with me to review pros/cons of stopping anticoagulation, Jorge canceled this 4/2022 as he did not feel this was needed.  Dr. Riggs, his PCP, reached out to reschedule this after seeing him 8/15.    He has also seen Surgery, with plans to fix a R inguinal hernia.    Of note, in 5/2021, he presented to ER with c/o resolved double vision. CT/MRI negative for acute stroke, though did show left cingulate gyrus abnormality was recommended.  Diagnosis was unclear, with TIA vs primary ophthalmologic etiology in differential.  Jorge confirms today that ultimately, it was not felt he had a TIA. Follow-up with Ophthalmology recommended, with tighter control of dyslipidemia and hypertension recommended.  Repeat MRI 8/2021 showed stability of this abnormality, probably 6-12 months follow-up recommended.    INTERVAL HISTORY:  Overall, Jorge has felt well from a cardiac standpoint.  He remains active, and notes no problems with chest pain, pressure or tightness.  No reduction in exercise tolerance.    Denies any problems with edema, orthopnea, PND.  No palpitations or awareness of irregular heartbeats.  He does not check his heart rate or BP at home very often, but when he does, BPs are typically 130s/80s similar to what he has gotten during recent clinic visits.    No issues with syncope.  Today, he really has no concerns.    DIAGNOSTICS:  EKG 8/16/2022- SR with frequent PACs  ZioPatch 2/28-3/14/2022 no AFib. SR with 4 runs of SVT and 8.6% PAC burden. In SR, avg HR 63 bpm (range  bpm). 4 runs of SVT, longest 16 beats. 2 runs of VT, longest 60. Triggered sxs a/w SR and SR/PACs  Echo 5/2021 LVEF  "55-60%.Nl RV.  Trace MR.  1+ TR.  Mild aortic sclerosis.  1+ MI  ZioPatch 11/22-12/6/2020 after stopping amiodarone showed SR with pAFib (1% burden). In SR, avg HR 64 (range  bpm). Episode of AFib lasted 4.5 hours, with avg HR 84 bpm (range  bpm)  Carotids 9/2019 <50% bilateral  Echo 9/2019 with EF 60-65%. Grade I diastolic dysfunction. No sig valve issues    REVIEW OF SYSTEMS:  Negative with the exception of that noted above    PHYSICAL EXAM:  210#  130s/80s    Deferred, telephone    PLAN:  1. Stop Eliquis 5 mg BID  2. Restart ASA 81 mg daily  3. Routine follow-up with Dr. Blake (last saw prior to bypass 2019) with updated echo.  4. No EP follow-up required given no recurrent arrhythmias      ASSESSMENT/PLAN:    1.  Atrial arrhythmias    Noted while hospitalized shortly after CABG 9/2019, treated with amiodarone until 11/2019.     Follow-up ZioPatch after stopping amiodarone showed nocturnal AFib, <1% 11/2019    Repeat Zio patch 2/2022 without recurrent AFib, but frequent PACs and runs of SVT    Dr. Mcgovern noted AC was a lovell zone, given he had AFib documented outside of the \"typical window\" of postop AFib (11/2021), which could just be an \"unusual\" case    His [id number] score is 2 (HTN, CAD).  Reviewed his ER visit 5/2021 for diplopia and subsequent visit with NP in Neurology with TIA in the differential.  Per his understanding, NOT dx'd with TIA/CVA.  Confirms he has had no vision issues since     PLAN:    Reviewed risk/benefits of continuing vs stopping anticoagulation.  He has not had any overt recurrent atrial fibrillation that he is aware of nor has it been documented on most recent ZioPatch.  Dr. Mcgovern notes that his episode of this on monitor 11/2019 could be just an \"unusual case.\"    As he confirms that TIA was not diagnosed 5/2021, [id number] remains 2 (HTN, CAD).  This roughly correlates with a 2.2% annual stroke risk    At this time, he will come off of anticoagulation with " Eliquis    Restart ASA 81 mg daily    Contact us if he were to have any recurrent symptoms of atrial fibrillation      2.  CAD    S/p 1 v CABG (LIMA/LAD) 9/2019    Remains on Zetia (statin intolerant) and BB therapy    Last LDL 97 mg/dL 8/2022.    Last echo 5/5/2021 with normal LVEF     PLAN:    Will get back into Dr. Blake at general cardiologist (last saw pre-op 2019)    Aim for LDL closer to 70 mg/dL; has been statin intolerant.         CURRENT MEDICATIONS:  Current Outpatient Medications   Medication Sig Dispense Refill     aspirin (ASA) 81 MG EC tablet Take 1 tablet (81 mg) by mouth daily       ASPIRIN NOT PRESCRIBED (INTENTIONAL) Please choose reason not prescribed from choices below.       Cholecalciferol (VITAMIN D) 2000 UNITS tablet Take 2,000 Units by mouth daily       ezetimibe (ZETIA) 10 MG tablet TAKE 1 TABLET(10 MG) BY MOUTH DAILY 90 tablet 0     metoprolol succinate ER (TOPROL-XL) 50 MG 24 hr tablet TAKE 1 TABLET BY MOUTH TWICE DAILY 180 tablet 0     tamsulosin (FLOMAX) 0.4 MG capsule Take 1 capsule (0.4 mg) by mouth daily Begin 4 days before surgery, including morning of surgery 7 capsule 0         ORDERS PLACED:  Orders Placed This Encounter   Procedures     Follow-Up with Cardiology     Echocardiogram Complete     Orders Placed This Encounter   Medications     aspirin (ASA) 81 MG EC tablet     Sig: Take 1 tablet (81 mg) by mouth daily     Medications Discontinued During This Encounter   Medication Reason     apixaban ANTICOAGULANT (ELIQUIS) 5 MG tablet          Encounter Diagnoses   Name Primary?     Paroxysmal atrial fibrillation (H)      S/P CABG (coronary artery bypass graft) Yes         ALLERGIES     Allergies   Allergen Reactions     Atorvastatin      Myalgias     Crestor [Rosuvastatin]      Myalgias       Lisinopril      Body aches pt d/mylene  06/2015       PAST MEDICAL HISTORY:  Past Medical History:   Diagnosis Date     Coronary artery disease involving native coronary artery of native heart  without angina pectoris 7/4/2019    Per CT calcium score of 722.97 3/2019     Essential hypertension, benign      Hematuria      Malignant neoplasm of bladder, 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     Olecranon bursitis 3/01    right     Other and unspecified hyperlipidemia      Paroxysmal atrial fibrillation (H)      Paroxysmal atrial flutter (H)      RIGHT LUNG CALCIFIED GRANULOMA 2/02    RML     Tobacco use disorder     Quit 1/03     Unspecified hemorrhoids without mention of complication 8/01     Unspecified hereditary and idiopathic peripheral neuropathy        PAST SURGICAL HISTORY:  Past Surgical History:   Procedure Laterality Date     BYPASS GRAFT ARTERY CORONARY N/A 9/25/2019    Procedure: CORONARY ARTERY BYPASS GRAFTING x 1 (LIMA - LAD) WITH CORONARY ENDARTERECTOMY  (ON PUMP OXYGENATOR ; HANK BY [facility]);  Surgeon: Magdi Turner MD;  Location:  OR     CV HEART CATHETERIZATION WITH POSSIBLE INTERVENTION N/A 9/6/2019    Procedure: Coronary Angiogram;  Surgeon: Nick Willson MD;  Location:  HEART CARDIAC CATH LAB     CV LEFT HEART CATH N/A 9/6/2019    Procedure: Left Heart Cath;  Surgeon: Nick Willson MD;  Location:  HEART CARDIAC CATH LAB     CV LEFT VENTRICULOGRAM N/A 9/6/2019    Procedure: Left Ventriculogram;  Surgeon: Nick Willson MD;  Location:  HEART CARDIAC CATH LAB     ZZC NONSPECIFIC PROCEDURE  2/00/02    EBCT       FAMILY HISTORY:  Family History   Problem Relation Age of Onset     Arthritis Mother         RA     Hypertension Mother      Breast Cancer Mother      Prostate Cancer Maternal Grandfather         80     Cancer Maternal Grandmother         Lung     Coronary Artery Disease Father      Other - See Comments Brother      Diabetes Brother      Genetic Disorder Son        SOCIAL HISTORY:  Social History     Socioeconomic History     Marital status:    Tobacco Use     Smoking status: Former Smoker     Packs/day: 1.00      Years: 33.00     Pack years: 33.00     Quit date: 2013     Years since quittin.6     Smokeless tobacco: Never Used   Substance and Sexual Activity     Alcohol use: Yes     Comment: 2 daily     Drug use: Yes     Comment: nica taylor     Sexual activity: Yes       I have reviewed the note as documented above.  This accurately captures the substance of my conversation with the patient.     Phone call contact time  Call Started at 1424  Call Ended at 1436  Duration 12 minutes     Leah Thomson PA-C [facility]    Thank you for allowing me to participate in the care of your patient.      Sincerely,     Leah Thomson PA-C     [facility]  cc:   Martha Mcgovern MD  [address]  [place] [address]

## 2022-09-13 ENCOUNTER — NURSE TRIAGE (OUTPATIENT)
Dept: INTERNAL MEDICINE | Facility: CLINIC | Age: 62
End: 2022-09-13

## 2022-09-13 DIAGNOSIS — I10 HYPERTENSION, UNSPECIFIED TYPE: Primary | ICD-10-CM

## 2022-09-13 RX ORDER — CLONIDINE HYDROCHLORIDE 0.1 MG/1
0.1 TABLET ORAL 2 TIMES DAILY PRN
Qty: 30 TABLET | Refills: 0 | Status: SHIPPED | OUTPATIENT
Start: 2022-09-13 | End: 2022-09-21

## 2022-09-13 NOTE — TELEPHONE ENCOUNTER
"Received a call from the patient stating he had his eye exam this morning and his blood pressure was 210/120. Patient states he forgot to take his BP medications prior to his eye exam. Patient took his BP medication about 40 minutes ago but the blood pressure continues to be elevated (patient taking Metoprolol ER 50 mg 2 times daily). Last /116, HR 61. Headache yesterday, no headache today. No chest pain, no blurred vision, and no difficulty breathing.     Patient stopped Eliquis 3-4 days ago per Dr. Thomson. Patient states he was not feeling good yesterday and patient took 2 COVID tests and came back negative. Patient states he is currently experiencing a stomach ache: Pepto Bismol helped with the symptoms.     1. BLOOD PRESSURE: \"What is the blood pressure?\" \"Did you take at least two measurements 5 minutes apart?\"      Last /116, HR 61  2. ONSET: \"When did you take your blood pressure?\"      This morning  3. HOW: \"How did you obtain the blood pressure?\" (e.g., visiting nurse, automatic home BP monitor)      Automatic home BP monitor   4. HISTORY: \"Do you have a history of high blood pressure?\"      Yes  5. MEDICATIONS: \"Are you taking any medications for blood pressure?\" \"Have you missed any doses recently?\"      Metoprolol   6. OTHER SYMPTOMS: \"Do you have any symptoms?\" (e.g., headache, chest pain, blurred vision, difficulty breathing, weakness)      Headache yesterday, no chest pain, no blurred vision, no difficulty breathing  7. PREGNANCY: \"Is there any chance you are pregnant?\" \"When was your last menstrual period?\"      N/A    Triage protocol recommending patient be seen today in the office. Triage nurse informed the patient to try to relax over the next 30 minutes and recheck his blood pressure. If the blood pressure increases, call the clinic back ASAP. Also, triage nurse informed the patient to either go to  or emergency room if he starts to experience other symptoms (blurred vision, " headache, chest pain). Patient expressed understanding. Will also send to PCP for review.     Reason for Disposition    Systolic BP >= 180 OR Diastolic >= 110    Additional Information    Negative: Sounds like a life-threatening emergency to the triager    Negative: Pregnant > 20 weeks or postpartum (< 6 weeks after delivery) and new hand or face swelling    Negative: Pregnant > 20 weeks and BP > 140/90    Negative: Systolic BP >= 160 OR Diastolic >= 100, and any cardiac or neurologic symptoms (e.g., chest pain, difficulty breathing, unsteady gait, blurred vision)    Negative: Patient sounds very sick or weak to the triager    Negative: BP Systolic BP >= 140 OR Diastolic >= 90 and postpartum (from 0 to 6 weeks after delivery)    Negative: Systolic BP >= 180 OR Diastolic >= 110, and missed most recent dose of blood pressure medication    Protocols used: HIGH BLOOD PRESSURE-A-OH    Mae Silva RN

## 2022-09-13 NOTE — TELEPHONE ENCOUNTER
Spoke with patient and his wife.  Current blood pressure 164/101.  Was 160/89 at 430 today.  Heart rate 68.  Patient has recent URI episode.  COVID test negative x2.  Patient states the URI symptoms are better.  Denies current fevers, chest pain, shortness of breath, headache or current vision changes.  Patient took some Zyrtec yesterday for URI symptoms.  Denies any decongestant use.  Blood pressure was normal with clinic visits 9/2/2022 and 8/16/2022.  Denies recent alcohol use.  We will treat temporarily with clonidine 0.1 mg twice daily as needed for blood pressure greater than 180/95.  Patient will get the prescription filled now and take a dose now and then update me regarding blood pressures tomorrow.  If worsening of symptoms despite medication, patient will be seen in the ER

## 2022-09-14 NOTE — TELEPHONE ENCOUNTER
"Wife called back with BP readings    Took 1 pill last nightat 830  930 bp  Pt wife called in the following BP's    Last night   2130 /104, pulse 72  2230 /98, pulse 66     Went to bed  At this point    This morning took metoprolol at 730  830 /120, 73 - Denies any sx.   830 gave the 1mg clonidine.  1015 \"miracle happened\" 121/63, 69   1300 154/81, Pulse 76   1337  138/72, pulse 72  1500  104/66, pulse 58  1501 112/64, pulse 51     Pt is planning to go to work tomorrow, feels fine/no symptoms.     Wondering what plan should be regarding clonidine tonight.Discussed the plan would probably be same, which would be to take BP at 8pm and take Clonidine with BP >180/85    Please advise/confirm.  Anabel Barroso, RN  [facility] RN Triage Team            "

## 2022-09-14 NOTE — TELEPHONE ENCOUNTER
Michael blood pressure has come down well into normal range.  Patient to monitor blood pressure twice a day (AM and PM) with next 1 week and to use clonidine if BP > 180/95. If blood pressure is below that level, then do not take the clonidine.  Patient to update me via [facility] regarding blood pressure status overall in the week and when he last had to take a clonidine dose

## 2022-09-17 DIAGNOSIS — Z95.1 S/P CABG (CORONARY ARTERY BYPASS GRAFT): ICD-10-CM

## 2022-09-18 RX ORDER — METOPROLOL SUCCINATE 50 MG/1
TABLET, EXTENDED RELEASE ORAL
Qty: 180 TABLET | Refills: 3 | Status: SHIPPED | OUTPATIENT
Start: 2022-09-18 | End: 2023-03-01

## 2022-09-20 ENCOUNTER — OFFICE VISIT (OUTPATIENT)
Dept: INTERNAL MEDICINE | Facility: CLINIC | Age: 62
End: 2022-09-20
Payer: COMMERCIAL

## 2022-09-20 ENCOUNTER — TELEPHONE (OUTPATIENT)
Dept: INTERNAL MEDICINE | Facility: CLINIC | Age: 62
End: 2022-09-20

## 2022-09-20 VITALS
HEART RATE: 68 BPM | RESPIRATION RATE: 14 BRPM | BODY MASS INDEX: 31.63 KG/M2 | WEIGHT: 214.2 LBS | SYSTOLIC BLOOD PRESSURE: 144 MMHG | OXYGEN SATURATION: 99 % | DIASTOLIC BLOOD PRESSURE: 82 MMHG | TEMPERATURE: 98.2 F

## 2022-09-20 DIAGNOSIS — I25.10 CORONARY ARTERY DISEASE INVOLVING NATIVE CORONARY ARTERY OF NATIVE HEART WITHOUT ANGINA PECTORIS: ICD-10-CM

## 2022-09-20 DIAGNOSIS — I10 ESSENTIAL HYPERTENSION, BENIGN: ICD-10-CM

## 2022-09-20 DIAGNOSIS — Z01.818 PRE-OPERATIVE GENERAL PHYSICAL EXAMINATION: Primary | ICD-10-CM

## 2022-09-20 DIAGNOSIS — I10 HYPERTENSION, UNSPECIFIED TYPE: ICD-10-CM

## 2022-09-20 DIAGNOSIS — K40.90 UNILATERAL INGUINAL HERNIA WITHOUT OBSTRUCTION OR GANGRENE, RECURRENCE NOT SPECIFIED: ICD-10-CM

## 2022-09-20 DIAGNOSIS — G47.33 OSA (OBSTRUCTIVE SLEEP APNEA): ICD-10-CM

## 2022-09-20 DIAGNOSIS — I48.0 PAROXYSMAL ATRIAL FIBRILLATION (H): ICD-10-CM

## 2022-09-20 LAB
ERYTHROCYTE [DISTWIDTH] IN BLOOD BY AUTOMATED COUNT: 13 % (ref 10–15)
HCT VFR BLD AUTO: 43.9 % (ref 40–53)
HGB BLD-MCNC: 14.3 G/DL (ref 13.3–17.7)
MCH RBC QN AUTO: 31.3 PG (ref 26.5–33)
MCHC RBC AUTO-ENTMCNC: 32.6 G/DL (ref 31.5–36.5)
MCV RBC AUTO: 96 FL (ref 78–100)
PLATELET # BLD AUTO: 224 10E3/UL (ref 150–450)
RBC # BLD AUTO: 4.57 10E6/UL (ref 4.4–5.9)
WBC # BLD AUTO: 10.1 10E3/UL (ref 4–11)

## 2022-09-20 PROCEDURE — 99214 OFFICE O/P EST MOD 30 MIN: CPT | Performed by: INTERNAL MEDICINE

## 2022-09-20 PROCEDURE — 85027 COMPLETE CBC AUTOMATED: CPT | Performed by: INTERNAL MEDICINE

## 2022-09-20 PROCEDURE — 36415 COLL VENOUS BLD VENIPUNCTURE: CPT | Performed by: INTERNAL MEDICINE

## 2022-09-20 NOTE — TELEPHONE ENCOUNTER
Patient seen for pre-op exam with Dr. Crenshaw today. Brought in BP readings for PCP as requested. Readings in providers folder for review.

## 2022-09-20 NOTE — PROGRESS NOTES
[address] [address]  Phone: [phone]  Primary Provider: Remington Riggs  Pre-op Performing Provider: JESUS NEGRO    [id number]}  PREOPERATIVE EVALUATION:  Today's date: 9/20/2022    Bryce Espinoza is a 62 year old male who presents for a preoperative evaluation.    Surgical Information:  Surgery/Procedure: Right inguinal hernia repair, robotic with mesh  Surgery Location: [facility]   Surgeon: Dr. Lowe  Surgery Date: 9/26/22  Time of Surgery: 9:55 am  Where patient plans to recover: At home with family  Fax number for surgical facility: Note does not need to be faxed, will be available electronically in Epic.    Type of Anesthesia Anticipated: General    Assessment & Plan     The proposed surgical procedure is considered LOW risk.    Pre-operative general physical examination  Surgery to proceed as scheduled with a routine preoperative COVID screening per protocol.  - CBC with platelets; Future    Unilateral inguinal hernia without obstruction or gangrene, recurrence not specified  Routine postoperative course with lifting precautions for Dr. Lowe.  Patient was given instructions on use of Flomax preoperatively    Paroxysmal atrial fibrillation (H)  Stable and continue with current medical management.  Following with cardiology clinic as directed.    Postoperative atrial arrhythmias - noted after stopping amiodarone 11/2019.  Less than 1% burden, all at night.      DENYS (obstructive sleep apnea)  Note is baseline for ongoing issues of anesthesia concerns    Coronary artery disease involving native coronary artery of native heart without angina pectoris  Stable without any distinct active complaints.    CAD - had severe LM disease s/p 1v CABG (LIMA/LAD requiring endarterectomy of pLAD d/t extensive calcification) 9/2019.  Small Cx without bypass      Essential hypertension, benign  Patient is noted to have periodic spiking of blood pressure most  commonly in the morning for which she was given clonidine.  Discussed the use of this and parameters thereof preoperatively prior to his surgical procedure.  He will follow-up with Dr. SANTIAGO for blood pressure check.  Suggest close intraoperative and postoperative blood pressure monitoring.       Risks and Recommendations:  The patient has the following additional risks and recommendations for perioperative complications:   - No identified additional risk factors other than previously addressed    Medication Instructions:   - aspirin: Discontinue aspirin 5-7 days prior to procedure to reduce bleeding risk. It should be resumed postoperatively.      Patient was advised to hold all traditional over-the-counter supplements, fish oil, multivitamin, glucosamine products if taking prior to procedure    RECOMMENDATION:  APPROVAL GIVEN to proceed with proposed procedure, without further diagnostic evaluation.    30 minutes spent on the date of the encounter doing chart review, review of outside records, review of test results, interpretation of tests, patient visit and documentation     Subjective :    HPI related to upcoming procedure: Right inguinal hernia repair, robotic with mesh    Preop Questions 9/13/2022   1. Have you ever had a heart attack or stroke? No   2. Have you ever had surgery on your heart or blood vessels, such as a stent placement, a coronary artery bypass, or surgery on an artery in your head, neck, heart, or legs? YES -    3. Do you have chest pain with activity? No   4. Do you have a history of  heart failure? No   5. Do you currently have a cold, bronchitis or symptoms of other infection? No   6. Do you have a cough, shortness of breath, or wheezing? No   7. Do you or anyone in your family have previous history of blood clots? No   8. Do you or does anyone in your family have a serious bleeding problem such as prolonged bleeding following surgeries or cuts? No   9. Have you ever had problems with anemia  or been told to take iron pills? No   10. Have you had any abnormal blood loss such as black, tarry or bloody stools? No   11. Have you ever had a blood transfusion? No   12. Are you willing to have a blood transfusion if it is medically needed before, during, or after your surgery? Yes   13. Have you or any of your relatives ever had problems with anesthesia? No   14. Do you have sleep apnea, excessive snoring or daytime drowsiness? YES -    14a. Do you have a CPAP machine? Yes   15. Do you have any artifical heart valves or other implanted medical devices like a pacemaker, defibrillator, or continuous glucose monitor? No   16. Do you have artificial joints? No   17. Are you allergic to latex? No       Health Care Directive:    Patient does not have a Health Care Directive or Living Will: Advance Directive received and scanned. Click on Code in the patient header to view.[id number]}    Status of Chronic Conditions:  See problem list for active medical problems.  Problems all longstanding and stable, except as noted/documented.  See ROS for pertinent symptoms related to these conditions.      Review of Systems  CONSTITUTIONAL: NEGATIVE for fever, chills, change in weight  EYES: NEGATIVE for vision changes or irritation  ENT/MOUTH: NEGATIVE for ear, mouth and throat problems  RESP: NEGATIVE for significant cough or SOB  CV: NEGATIVE for chest pain, palpitations or peripheral edema  : NEGATIVE for frequency, dysuria, or hematuria  MUSCULOSKELETAL: NEGATIVE for significant arthralgias or myalgia  NEURO: NEGATIVE for weakness, dizziness or paresthesias  HEME: NEGATIVE for bleeding problems  PSYCHIATRIC: NEGATIVE for changes in mood or affect    Patient Active Problem List    Diagnosis Date Noted     DENYS (obstructive sleep apnea) 07/16/2021     Priority: Medium     Severe DENYS       Paroxysmal atrial fibrillation (H)      Priority: Medium     S/P CABG (coronary artery bypass graft) 10/12/2019     Priority: Medium     Coronary  artery disease involving native coronary artery of native heart without angina pectoris 07/04/2019     Priority: Medium     Per CT calcium score of 722.97 3/2019       Hyperlipidemia LDL goal <70 04/04/2019     Priority: Medium     Other specified idiopathic peripheral neuropathy 02/17/2004     Priority: Medium     Malignant neoplasm of bladder (H) 01/07/2003     Priority: Medium     Problem list name updated by automated process. Provider to review       Essential hypertension, benign 11/01/2002     Priority: Medium      Past Medical History:   Diagnosis Date     Coronary artery disease involving native coronary artery of native heart without angina pectoris 07/04/2019    Per CT calcium score of 722.97 3/2019     Essential hypertension, benign      Hematuria      Malignant neoplasm of bladder, 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     Olecranon bursitis 03/2001    right     Other and unspecified hyperlipidemia      Paroxysmal atrial fibrillation (H)      Paroxysmal atrial flutter (H)      RIGHT LUNG CALCIFIED GRANULOMA 02/2002    RML     Tobacco use disorder     Quit 1/03     Unspecified hemorrhoids without mention of complication 08/2001     Unspecified hereditary and idiopathic peripheral neuropathy      Past Surgical History:   Procedure Laterality Date     BYPASS GRAFT ARTERY CORONARY N/A 9/25/2019    Procedure: CORONARY ARTERY BYPASS GRAFTING x 1 (LIMA - LAD) WITH CORONARY ENDARTERECTOMY  (ON PUMP OXYGENATOR ; HANK BY [facility]);  Surgeon: Magdi Turner MD;  Location:  OR     CV HEART CATHETERIZATION WITH POSSIBLE INTERVENTION N/A 9/6/2019    Procedure: Coronary Angiogram;  Surgeon: Nick Willson MD;  Location:  HEART CARDIAC CATH LAB     CV LEFT HEART CATH N/A 9/6/2019    Procedure: Left Heart Cath;  Surgeon: Nick Willson MD;  Location:  HEART CARDIAC CATH LAB     CV LEFT VENTRICULOGRAM N/A 9/6/2019    Procedure: Left Ventriculogram;  Surgeon: Anamika  Nick EDMONDS MD;  Location:  HEART CARDIAC CATH LAB     [facility] NONSPECIFIC PROCEDURE      EBCT     Current Outpatient Medications   Medication Sig Dispense Refill     aspirin (ASA) 81 MG EC tablet Take 1 tablet (81 mg) by mouth daily       Cholecalciferol (VITAMIN D) 2000 UNITS tablet Take 2,000 Units by mouth daily       cloNIDine (CATAPRES) 0.1 MG tablet Take 1 tablet (0.1 mg) by mouth 2 times daily as needed (SBP > 180 or DBP > 95) 30 tablet 0     ezetimibe (ZETIA) 10 MG tablet TAKE 1 TABLET(10 MG) BY MOUTH DAILY 90 tablet 0     metoprolol succinate ER (TOPROL XL) 50 MG 24 hr tablet TAKE 1 TABLET BY MOUTH TWICE DAILY 180 tablet 3     tamsulosin (FLOMAX) 0.4 MG capsule Take 1 capsule (0.4 mg) by mouth daily Begin 4 days before surgery, including morning of surgery 7 capsule 0       Allergies   Allergen Reactions     Atorvastatin      Myalgias     Crestor [Rosuvastatin]      Myalgias       Lisinopril      Body aches pt d/mylene  2015        Social History     Tobacco Use     Smoking status: Former Smoker     Packs/day: 1.00     Years: 33.00     Pack years: 33.00     Quit date: 2013     Years since quittin.6     Smokeless tobacco: Never Used   Substance Use Topics     Alcohol use: Yes     Comment: rare       History   Drug Use Unknown     Comment: nica occ         Objective     BP (!) 144/82   Pulse 68   Temp 98.2  F (36.8  C) (Temporal)   Resp 14   Wt 97.2 kg (214 lb 3.2 oz)   SpO2 99%   BMI 31.63 kg/m      Physical Exam    GENERAL APPEARANCE: alert and no distress     EYES: EOMI,  PERRL     HENT: ear canals and TM's normal and nose and mouth without ulcers or lesions     NECK: no adenopathy, no asymmetry, masses, or scars and thyroid normal to palpation     RESP: lungs clear to auscultation - no rales, rhonchi or wheezes     CV: regular rates and rhythm, normal S1 S2, no S3 or S4 and no click or rub     ABDOMEN:  soft, nontender, no HSM or masses and bowel sounds normal     GU_male: Right  inguinal hernia.     NEURO: No focal changes     PSYCH: mentation appears normal and affect normal/bright    Recent Labs   Lab Test 08/16/22  1152 05/19/21  2123   HGB  --  15.1   PLT  --  211   INR  --  0.99    143   POTASSIUM 4.2 4.1   CR 0.70 0.70   A1C  --  5.4        Diagnostics:  Labs pending at this time.  Results will be reviewed when available.   Recent EKG already performed and not repeated due to low risk procedure.  See copy of EKG available., EKG showed sinus rhythm with slight rate variation.   See also recent cardiology note    Revised Cardiac Risk Index (RCRI):  The patient has the following serious cardiovascular risks for perioperative complications:   - Coronary Artery Disease (MI, positive stress test, angina, Qs on EKG) = 1 point   RCRI Interpretation: 1 point: Class II (low risk - 0.9% complication rate)       Signed Electronically by: Amandeep Crenshaw MD  Copy of this evaluation report is provided to requesting physician, Dr. Lowe.

## 2022-09-21 ENCOUNTER — MYC MEDICAL ADVICE (OUTPATIENT)
Dept: INTERNAL MEDICINE | Facility: CLINIC | Age: 62
End: 2022-09-21

## 2022-09-21 DIAGNOSIS — I10 ESSENTIAL HYPERTENSION, BENIGN: Primary | ICD-10-CM

## 2022-09-21 RX ORDER — CLONIDINE HYDROCHLORIDE 0.1 MG/1
0.1 TABLET ORAL 2 TIMES DAILY
Qty: 60 TABLET | Refills: 11 | Status: SHIPPED | OUTPATIENT
Start: 2022-09-21 | End: 2022-10-31

## 2022-09-21 NOTE — TELEPHONE ENCOUNTER
Pt turned in recent blood pressure readings. BP quite high in AM. Takes Clonidine 0.1mg tab and blood pressure readings come down well rest of the day but then quite high again the next morning requiring dose of Clonidine. Med seems to be ging pt 12-15 hrs of good BP control before wearing of and BP rises again. Would therefore suggest pt take Clonidine scheduled 1 tab twice a day AM and later PM about 12 hrs apart. Rx sent to pt's [facility] pharmacy. Pt to update me in a couple weeks re: BP status or earlier if side effects with the BID dosing

## 2022-09-21 NOTE — TELEPHONE ENCOUNTER
message sent    Patient Contact    Attempt # 1     Was call answered?  No.  Left message on voicemail with information to call me back.

## 2022-09-22 ENCOUNTER — NURSE TRIAGE (OUTPATIENT)
Dept: INTERNAL MEDICINE | Facility: CLINIC | Age: 62
End: 2022-09-22

## 2022-09-22 NOTE — TELEPHONE ENCOUNTER
Reason for Disposition    Systolic BP >= 200 OR Diastolic >= 120 and having NO cardiac or neurologic symptoms    Additional Information    Negative: Sounds like a life-threatening emergency to the triager    Negative: Symptom is main concern (e.g., headache, chest pain)    Negative: Low blood pressure is main concern    Negative: Systolic BP >= 160 OR Diastolic >= 100, and any cardiac or neurologic symptoms (e.g., chest pain, difficulty breathing, unsteady gait, blurred vision)    Negative: Pregnant 20 or more weeks (or postpartum < 6 weeks) with new hand or face swelling    Negative: Pregnant 20 or more weeks (or postpartum < 6 weeks) and Systolic BP >= 160 OR Diastolic >= 100    Negative: Patient sounds very sick or weak to the triager    Protocols used: BLOOD PRESSURE - HIGH-A-OH

## 2022-09-22 NOTE — TELEPHONE ENCOUNTER
Pt called requesting doctor Keely's advise if he should cancel his hernia surgery 09/26/2022. He has been taking metoprolol 50 mg tablet BID & Clonidine 0.7 mg tablet once tablet daily. He will not take Clonidine more than once a day because it makes him jittery.     Pt's BP at 5:49 PM today was 181/99 and this morning was 181/99. Pt denies any symptoms.     Pt was advised to seek care tonight at /ER if chest pain , breathing or vision problems.     Routing message to provider to advice on surgery. Pt will need a call back at [phone]. Ok to leave a detailed voice message.

## 2022-09-23 ENCOUNTER — TELEPHONE (OUTPATIENT)
Dept: OTHER | Facility: CLINIC | Age: 62
End: 2022-09-23

## 2022-09-23 ENCOUNTER — NURSE TRIAGE (OUTPATIENT)
Dept: NURSING | Facility: CLINIC | Age: 62
End: 2022-09-23

## 2022-09-23 RX ORDER — LOSARTAN POTASSIUM AND HYDROCHLOROTHIAZIDE 12.5; 5 MG/1; MG/1
1 TABLET ORAL DAILY
Qty: 30 TABLET | Refills: 11 | Status: SHIPPED | OUTPATIENT
Start: 2022-09-23 | End: 2022-10-18

## 2022-09-23 NOTE — TELEPHONE ENCOUNTER
Patient called stating he has been struggling with hypertension, Dr Riggs managing his medications. Informed Dr Riggs of our conversation and recommended pt call his office and or make an appointment.

## 2022-09-23 NOTE — TELEPHONE ENCOUNTER
Spoke with pt. BPs remain elevated. No caffeine intake or decongestants. Recent GFR normal. Hx body achiness with Lisinopril in 2015.  Cannot find documentation in chart from that time discussing side effect issues of the medication.  Patient took clonidine last night and this morning and blood pressure still ranging in the 170/90 range.  Has surgery scheduled for next Monday for elective procedure for hernia repair.  We will therefore cancel surgery for next week.  As patient did not have any severe reaction to lisinopril such as angioedema, will start losartan/hydrochlorothiazide 50/12.5 mg tablet, 1 tablet daily for blood pressure control.  Patient is to continue metoprolol twice daily.  Patient to use clonidine twice daily only as needed with same parameters as previous rather than taking the clonidine schedule as just very recently instructed.  Hopefully blood pressure will come down with losartan/hydrochlorothiazide and metoprolol and clonidine will not be needed.  Patient to update being early next week regarding blood pressure status.  Follow-up with Dr. Mcknight on call from Dr Lowe from [facility] that surgery needs to be canceled for next Monday and he will arrange for Dr. Lowe to be notified and further surgical office on hospital to cancel the surgery for now.  Pres[facility]

## 2022-09-23 NOTE — TELEPHONE ENCOUNTER
Spoke w/ pt and wife. They have concerns about pt's BP. The past 2 days pt woke up in the morning w/ elevated BPs of 220/110 yesterday and 199.110 today. He informed PCP Dr Riggs of the increased BPs. Dr Riggs increased his clonidine 0.1 mg from one tab daily to one tab 2x/day. Yesterday was first day of incr'd dose. Tonight /90 and 151/93. Denies chest discomfort, SOB, walking difficulty, headache, or other new sx. They are concerned that BP was still high this morning. They called clinic. Gave them response from clinic - Dr Riggs advised make appt to discuss. Pt scheduled for inguinal hernia repair 9/26 (Mon) asks if he can still go through w/ surgery. Advised see provider within 3 days.   Provided reassurance, BP looks better tonight. Continue clonidine BID as ordered and let us see what happens over next 2 days. Would plan to proceed w/ surgery unless surgeon decided otherwise - this is up to the surgeon. Advised call back if new sx or BP increasing again.     Reason for Disposition    [1] Systolic BP  >= 130 OR Diastolic >= 80 AND [2] pregnant    Additional Information    Negative: Difficult to awaken or acting confused (e.g., disoriented, slurred speech)    Negative: Severe difficulty breathing (e.g., struggling for each breath, speaks in single words)    Negative: [1] Weakness of the face, arm or leg on one side of the body AND [2] new onset    Negative: [1] Numbness (i.e., loss of sensation) of the face, arm or leg on one side of the body AND [2] new onset    Negative: [1] Chest pain lasts > 5 minutes AND [2] history of heart disease  (i.e., heart attack, bypass surgery, angina, angioplasty, CHF)    Negative: [1] Chest pain AND [2] took nitrogylcerin AND [3] pain was not relieved    Negative: Sounds like a life-threatening emergency to the triager    Negative: Symptom is main concern  (e.g., headache, chest pain)    Negative: Low blood pressure is main concern    Negative: [1] Systolic BP  >= 160 OR  Diastolic >= 100 AND [2] cardiac or neurologic symptoms (e.g., chest pain, difficulty breathing, unsteady gait, blurred vision)    Negative: [1] Pregnant > 20 weeks (or postpartum < 6 weeks) AND [2] new hand or face swelling    Negative: [1] Pregnant > 20 weeks AND [2] BP Systolic BP  >= 140 OR Diastolic >= 90    Negative: [1] Systolic BP  >= 200 OR Diastolic >= 120  AND [2] having NO cardiac or neurologic symptoms    Negative: [1] Postpartum < 6 weeks AND [2] BP Systolic BP  >= 140 OR Diastolic >= 90    Negative: [1] Systolic BP  >= 180 OR Diastolic >= 110 AND [2] missed most recent dose of blood pressure medication    Negative: Systolic BP  >= 180 OR Diastolic >= 110    Negative: Ran out of BP medications    Negative: Systolic BP  >= 160 OR Diastolic >= 100    Negative: [1] Taking BP medications AND [2] feels is having side effects (e.g., impotence, cough, dizzy upon standing)    Protocols used: HIGH BLOOD PRESSURE-A-AH

## 2022-10-10 ENCOUNTER — HEALTH MAINTENANCE LETTER (OUTPATIENT)
Age: 62
End: 2022-10-10

## 2022-10-12 ENCOUNTER — TELEPHONE (OUTPATIENT)
Dept: INTERNAL MEDICINE | Facility: CLINIC | Age: 62
End: 2022-10-12

## 2022-10-12 NOTE — TELEPHONE ENCOUNTER
Blood pressure concerns  with Losartan-hydrochlorothiazide.    Blood pressure readings in the mornin/106  170/103     Low pressures like last night. 90/60 . Pressures have been running  90/50-60 range    With low blood pressure readings in the evening:  he has  body pains and feels weird.  No dizziness and has been well hydrated  everyday.     Patient is concerned with his dose.  Patient thinks if he can split the dose to take 1/2 tab in the am and 1/2 tab in the evening would be best.       Mare Mosher RN  -[facility]

## 2022-10-17 ENCOUNTER — MYC MEDICAL ADVICE (OUTPATIENT)
Dept: CARDIOLOGY | Facility: CLINIC | Age: 62
End: 2022-10-17

## 2022-10-17 ENCOUNTER — TELEPHONE (OUTPATIENT)
Dept: CARDIOLOGY | Facility: CLINIC | Age: 62
End: 2022-10-17

## 2022-10-17 ENCOUNTER — TELEPHONE (OUTPATIENT)
Dept: OTHER | Facility: CLINIC | Age: 62
End: 2022-10-17

## 2022-10-17 DIAGNOSIS — I10 ESSENTIAL HYPERTENSION, BENIGN: Primary | ICD-10-CM

## 2022-10-17 DIAGNOSIS — I10 ESSENTIAL HYPERTENSION, BENIGN: ICD-10-CM

## 2022-10-17 NOTE — TELEPHONE ENCOUNTER
fSpoke to patient who is requesting to be seen by cardiology for management of his BP.  Reports his BP readings have been very erratic and elevated.  Has two home BP cuffs both are battery operated and patient reports batteries are fresh.  Reviewed medication list with patient.  He is not taking the clonidine as listed as his BP dropped too low.  Asked patient to send BP readings through my chart so this can be reviewed.  also scheduled patient to see MARE Mc on 10/31.  He was asked to bring his BP cuffs to appt.  Will wait to see BP reading that patient is sending through my chart.  FELICIANO Juarez

## 2022-10-17 NOTE — TELEPHONE ENCOUNTER
[facility] Call Center    Phone Message    May a detailed message be left on voicemail: yes     Reason for Call: Other: Patient was calling to be seen sooner than his appointment already scheduled due to BP issues. Please call back and discuss.      Action Taken: Other: Cardiology    Travel Screening: Not Applicable

## 2022-10-18 RX ORDER — LOSARTAN POTASSIUM AND HYDROCHLOROTHIAZIDE 12.5; 5 MG/1; MG/1
TABLET ORAL
COMMUNITY
Start: 2022-10-18 | End: 2022-10-19

## 2022-10-19 DIAGNOSIS — I10 ESSENTIAL HYPERTENSION, BENIGN: ICD-10-CM

## 2022-10-19 RX ORDER — LOSARTAN POTASSIUM AND HYDROCHLOROTHIAZIDE 12.5; 5 MG/1; MG/1
TABLET ORAL
Qty: 90 TABLET | Refills: 0 | Status: SHIPPED | OUTPATIENT
Start: 2022-10-19 | End: 2022-10-31

## 2022-10-19 NOTE — TELEPHONE ENCOUNTER
Pt needs this script sent to pharmacy. Dr. Riggs out of office so routing to providers.  Dose change, but when dose change was updated historical in chart by cards, it sent a cancel to pharmacy.  Verified dose with pt, and this is usually ordered by Keely.     Pending Prescriptions:                       Disp   Refills    losartan-hydrochlorothiazide (HYZAAR) 50-*90 tab*0            Sig: Take 1/2 tablet twice a dayTake 1/2 tablet twice           a day Strength: 50-12.5 mg    Anabel Barroso, FELICIANO  [facility] RN Triage Team

## 2022-10-28 ENCOUNTER — MYC MEDICAL ADVICE (OUTPATIENT)
Dept: CARDIOLOGY | Facility: CLINIC | Age: 62
End: 2022-10-28

## 2022-10-29 NOTE — PROGRESS NOTES
"[facility]    I had the pleasure of seeing Jorge when he came for follow up of AFib and HTN.  This 62 year old sees Dr. Mcgovern & Dr. Blake for his history of:    1.  CAD - had severe LM disease s/p 1v CABG (LIMA/LAD requiring endarterectomy of pLAD d/t extensive calcification) 9/2019.  Small Cx without bypass  2.  Postoperative atrial arrhythmias - noted after stopping amiodarone 11/2019.  Less than 1% burden, all at night.  Repeat ZP 2/2022 without recurrent AFib  3.   HTN - home BP cuff found to be accurate 10/31/2022  4.  Small meningioma - MRI brain 8/2021 noted this was unchanged c/w CT 5/2021. Also with nonspecific small area of hypointense signal of uncertain etiology (see report).  Last saw Neurology 5/2021  5.  Dyslipidemia - on Zetia d/t statin interolerance  6.  DENYS - not currently on CPAP therapy  7.  Superficial Bladder Cancer -  Sees Dr. Nguyen in Urology.      I had a virtual visit with Jorge 9/8/2022 at which time we reviewed he had been doing well from a cardiac perspective, without awareness of any recurrent atrial fibrillation.  We discussed his follow-up ZioPatch 2/2022 showing no evidence of AFib.  Dr. Mcgovern noted AC was in the gray zone, given that he had had AFib documented outside of the \"typical window\" of postoperative atrial fibrillation (11/2021).  [id number] 2 (HTN, CAD).  He opted to stop anticoagulation.  For his CAD, we restarted ASA.  I recommended he get back in with his General Cardiologist Dr. Blake given his h/o CAD.    He sent us a [employer] message 10/17 with c/o erratic BPs and asked for a follow-up appointment.  He was taking losartan HCTZ 50/12.5 BID and metoprolol XL 50 mg (0700, 1900), with SBP's 180s first thing in the morning, down to 100s by late afternoon.  I asked him to move his PM medications closer to bedtime, but this did not seem to improve his BPs at all, now ranging 100-200s.  Of note, Dr. SANTIAGO had tried him on clonidine for BP >180/95.    Interval " "History:  Overall, Jorge is feeling good.  He denies any problems with chest discomfort, tightness, pressure.  No dizziness, lightheadedness.  No palpitations or awareness of any recurrent atrial fibrillation.    He notes that he stopped using his CPAP to see if it made any difference in his sleep quality.  He said that his wife told him that he did not have any change in his breathing while off CPAP therapy    We reviewed his BPs.  Despite losartan/HCTZ 50/12.5 1/2 tab BID and metoprolol XL 50 mg BID, his AM BPs are still incredibly high, 180-210s.  He brought his blood pressure cuff today and measured on against ours, and is very accurate.    After he takes his morning medications, he notes that his BPs during the day are much better, ranging 110-130s.      VITALS:  Vitals: /78 (Cuff Size: Adult Regular)   Pulse 71   Ht 1.753 m (5' 9.02\")   Wt 99.1 kg (218 lb 8 oz)   SpO2 94%   BMI 32.25 kg/m      Diagnostic Testing:  ZioPatch 2/28-3/14/2022 no AFib. SR with 4 runs of SVT and 8.6% PAC burden. In SR, avg HR 63 bpm (range  bpm). 4 runs of SVT, longest 16 beats. 2 runs of VT, longest 60. Triggered sxs a/w SR and SR/PACs  Echo 5/2021 LVEF 55-60%.Nl RV.  Trace MR.  1+ TR.  Mild aortic sclerosis.  1+ [place]  ZioPatch 11/22-12/6/2020 after stopping amiodarone showed SR with pAFib (1% burden). In SR, avg HR 64 (range  bpm). Episode of AFib lasted 4.5 hours, with avg HR 84 bpm (range  bpm)  Carotids 9/2019 <50% bilateral  Echo 9/2019 with EF 60-65%. Grade I diastolic dysfunction. No sig valve issues  Component      Latest Ref Rng & Units 5/19/2021 9/20/2022   WBC      4.0 - 11.0 10e3/uL 10.3 10.1   RBC Count      4.40 - 5.90 10e6/uL 4.82 4.57   Hemoglobin      13.3 - 17.7 g/dL 15.1 14.3   Hematocrit      40.0 - 53.0 % 45.3 43.9   MCV      78 - 100 fL 94 96   MCH      26.5 - 33.0 pg 31.3 31.3   MCHC      31.5 - 36.5 g/dL 33.3 32.6   RDW      10.0 - 15.0 % 12.6 13.0   Platelet Count      150 - 450 " 10e3/uL 211 224     Component      Latest Ref Rng & Units 5/19/2021 8/16/2022   Sodium      133 - 144 mmol/L 143 137   Potassium      3.4 - 5.3 mmol/L 4.1 4.2   Chloride      94 - 109 mmol/L 108 105   Carbon Dioxide      20 - 32 mmol/L 28 26   Anion Gap      3 - 14 mmol/L 7 6   Urea Nitrogen      7 - 30 mg/dL 13 12   Creatinine      0.66 - 1.25 mg/dL 0.70 0.70   Calcium      8.5 - 10.1 mg/dL 8.9 9.3   Glucose      70 - 99 mg/dL 100 (H) 106 (H)       Plan:  Move entire losartan/HCTZ 50/12.5 mg daily to bedtime  Send [employer] message in ~10 days with update.    See Dr. Blake with echo 3/2023 as arranged    Assessment/Plan:    1. HTN    Currently on  losartanHCTZ 50/12.5, 1/2 tab BID and metoprolol XL 50 mg BID    BPs are largely under good control except for St. in the morning    He states this coincided with discontinuation of Eliquis therapy, though he understands pharmacologically, this would have no effect    PLAN:    Restart CPAP therapy    Move entire losartan/HCTZ 50/12.5 to bedtime.     Continue metoprolol XL 50 mg BID    Send [employer] message in ~10 days with update on BPs    2. CAD    S/p 1v CABG (LIMA/LAD) 9/2019    Remains on Zetia (statin intolerant), BB, ASA    Last LDL 97 mg/dL 8/2022    PLAN:    See Dr. Blake as planned 3/2022 with updated echo    They may discuss PCSK9 inhibitors at that time        Nitza Thomson PA-C, MSPAS      No orders of the defined types were placed in this encounter.    Orders Placed This Encounter   Medications     DISCONTD: losartan-hydrochlorothiazide (HYZAAR) 50-12.5 MG tablet     Sig: Take 1/2 tablet twice a day     Dispense:  90 tablet     Refill:  0     losartan-hydrochlorothiazide (HYZAAR) 50-12.5 MG tablet     Sig: Take 1 tablet by mouth daily     Medications Discontinued During This Encounter   Medication Reason     cloNIDine (CATAPRES) 0.1 MG tablet Stopped by Patient     tamsulosin (FLOMAX) 0.4 MG capsule Therapy completed     losartan-hydrochlorothiazide (HYZAAR)  "50-12.5 MG tablet      losartan-hydrochlorothiazide (HYZAAR) 50-12.5 MG tablet Reorder         Encounter Diagnosis   Name Primary?     Essential hypertension, benign        CURRENT MEDICATIONS:  Current Outpatient Medications   Medication Sig Dispense Refill     aspirin (ASA) 81 MG EC tablet Take 1 tablet (81 mg) by mouth daily       Cholecalciferol (VITAMIN D) 2000 UNITS tablet Take 2,000 Units by mouth daily       ezetimibe (ZETIA) 10 MG tablet TAKE 1 TABLET(10 MG) BY MOUTH DAILY 90 tablet 0     losartan-hydrochlorothiazide (HYZAAR) 50-12.5 MG tablet Take 1 tablet by mouth daily       metoprolol succinate ER (TOPROL XL) 50 MG 24 hr tablet TAKE 1 TABLET BY MOUTH TWICE DAILY 180 tablet 3       ALLERGIES     Allergies   Allergen Reactions     Atorvastatin      Myalgias     Crestor [Rosuvastatin]      Myalgias       Lisinopril      Body aches pt d/mylene  06/2015         Review of Systems:  Skin:  Negative     Eyes:  Negative    ENT:  Negative    Respiratory:  Negative for dyspnea on exertion  Cardiovascular:  Negative for;palpitations;chest pain;dizziness;lightheadedness;fatigue;edema    Gastroenterology: Negative for melena;hematochezia  Genitourinary:  Negative    Musculoskeletal:  Negative nocturnal cramping  Neurologic:  Negative    Psychiatric:  Negative    Heme/Lymph/Imm:  Negative    Endocrine:  Negative      Physical Exam:  Vitals: /78 (Cuff Size: Adult Regular)   Pulse 71   Ht 1.753 m (5' 9.02\")   Wt 99.1 kg (218 lb 8 oz)   SpO2 94%   BMI 32.25 kg/m      Constitutional:  cooperative, alert and oriented, well developed, well nourished, in no acute distress        Skin:  warm and dry to the touch, no apparent skin lesions or masses noted        Head:  normocephalic, no masses or lesions        Eyes:           ENT:  not assessed this visit        Neck:  not assessed this visit        Chest:  healed median sternotomy scar;not assessed this visit        Cardiac: regular rhythm                  Abdomen:  " not assessed this visit        Vascular: not assessed this visit                                      Extremities and Back:  no deformities, clubbing, cyanosis, erythema observed;no edema        Neurological:  no gross motor deficits            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     Olecranon bursitis 03/2001    right     Other and unspecified hyperlipidemia      Paroxysmal atrial fibrillation (H)      Paroxysmal atrial flutter (H)      RIGHT LUNG CALCIFIED GRANULOMA 02/2002    RML     Tobacco use disorder     Quit 1/03     Unspecified hemorrhoids without mention of complication 08/2001     Unspecified hereditary and idiopathic peripheral neuropathy        PAST SURGICAL HISTORY:  Past Surgical History:   Procedure Laterality Date     BYPASS GRAFT ARTERY CORONARY N/A 9/25/2019    Procedure: CORONARY ARTERY BYPASS GRAFTING x 1 (LIMA - LAD) WITH CORONARY ENDARTERECTOMY  (ON PUMP OXYGENATOR ; HANK BY [facility]);  Surgeon: Magdi Turenr MD;  Location:  OR     CV HEART CATHETERIZATION WITH POSSIBLE INTERVENTION N/A 9/6/2019    Procedure: Coronary Angiogram;  Surgeon: Nick Willson MD;  Location:  HEART CARDIAC CATH LAB     CV LEFT HEART CATH N/A 9/6/2019    Procedure: Left Heart Cath;  Surgeon: Nick Willson MD;  Location:  HEART CARDIAC CATH LAB     CV LEFT VENTRICULOGRAM N/A 9/6/2019    Procedure: Left Ventriculogram;  Surgeon: Nick Willson MD;  Location:  HEART CARDIAC CATH LAB     ZZC NONSPECIFIC PROCEDURE  2/00/02    EBCT       FAMILY HISTORY:  Family History   Problem Relation Age of Onset     Arthritis Mother         RA     Hypertension Mother      Breast  Cancer Mother      Prostate Cancer Maternal Grandfather         80     Cancer Maternal Grandmother         Lung     Coronary Artery Disease Father      Other - See Comments Brother      Diabetes Brother      Genetic Disorder Son        SOCIAL HISTORY:  Social History     Socioeconomic History     Marital status:      Spouse name: None     Number of children: None     Years of education: None     Highest education level: None   Tobacco Use     Smoking status: Former     Packs/day: 1.00     Years: 33.00     Pack years: 33.00     Types: Cigarettes     Quit date: 2013     Years since quittin.7     Smokeless tobacco: Never   Substance and Sexual Activity     Alcohol use: Yes     Comment: rare     Drug use: Yes     Comment: nica taylor     Sexual activity: Yes   Other Topics Concern     Parent/sibling w/ CABG, MI or angioplasty before 65F 55M? No

## 2022-10-31 ENCOUNTER — OFFICE VISIT (OUTPATIENT)
Dept: CARDIOLOGY | Facility: CLINIC | Age: 62
End: 2022-10-31
Payer: COMMERCIAL

## 2022-10-31 ENCOUNTER — TRANSCRIBE ORDERS (OUTPATIENT)
Dept: OTHER | Age: 62
End: 2022-10-31

## 2022-10-31 VITALS
OXYGEN SATURATION: 94 % | WEIGHT: 218.5 LBS | HEIGHT: 69 IN | DIASTOLIC BLOOD PRESSURE: 78 MMHG | HEART RATE: 71 BPM | BODY MASS INDEX: 32.36 KG/M2 | SYSTOLIC BLOOD PRESSURE: 115 MMHG

## 2022-10-31 DIAGNOSIS — H53.40 VFD (VISUAL FIELD DEFECT): Primary | ICD-10-CM

## 2022-10-31 DIAGNOSIS — I10 ESSENTIAL HYPERTENSION, BENIGN: ICD-10-CM

## 2022-10-31 PROCEDURE — 99214 OFFICE O/P EST MOD 30 MIN: CPT | Performed by: PHYSICIAN ASSISTANT

## 2022-10-31 RX ORDER — LOSARTAN POTASSIUM AND HYDROCHLOROTHIAZIDE 12.5; 5 MG/1; MG/1
TABLET ORAL
Qty: 90 TABLET | Refills: 0 | COMMUNITY
Start: 2022-10-31 | End: 2022-10-31

## 2022-10-31 RX ORDER — LOSARTAN POTASSIUM AND HYDROCHLOROTHIAZIDE 12.5; 5 MG/1; MG/1
1 TABLET ORAL DAILY
Start: 2022-10-31 | End: 2022-11-09 | Stop reason: DRUGHIGH

## 2022-10-31 NOTE — PATIENT INSTRUCTIONS
"Jorge - it was good to see you today!    Reviewed that the BPs are still super high in the AM but OK the rest of the time  Reviewed you did not tolerate the clonidine with \"jitteriness\"       PLAN:  Get back on CPAP  Agree we will have you move the WHOLE losartan/hydrochlorothiazide to right before bed  Check BP only a few times a day and write down  In 10 days, give an update. Pablo or call [phone]  "

## 2022-10-31 NOTE — LETTER
"10/31/2022    Remington Riggs MD  [address] [facility] [address]    RE: Bryce Espinoza       Dear Colleague,     I had the pleasure of seeing Bryce Espinoza in the [facility].  [facility]    I had the pleasure of seeing Jorge when he came for follow up of AFib and HTN.  This 62 year old sees Dr. Mcgovern & Dr. Blake for his history of:    1.  CAD - had severe LM disease s/p 1v CABG (LIMA/LAD requiring endarterectomy of pLAD d/t extensive calcification) 9/2019.  Small Cx without bypass  2.  Postoperative atrial arrhythmias - noted after stopping amiodarone 11/2019.  Less than 1% burden, all at night.  Repeat ZP 2/2022 without recurrent AFib  3.   HTN - home BP cuff found to be accurate 10/31/2022  4.  Small meningioma - MRI brain 8/2021 noted this was unchanged c/w CT 5/2021. Also with nonspecific small area of hypointense signal of uncertain etiology (see report).  Last saw Neurology 5/2021  5.  Dyslipidemia - on Zetia d/t statin interolerance  6.  DENYS - not currently on CPAP therapy  7.  Superficial Bladder Cancer -  Sees Dr. Nguyen in Urology.      I had a virtual visit with Jorge 9/8/2022 at which time we reviewed he had been doing well from a cardiac perspective, without awareness of any recurrent atrial fibrillation.  We discussed his follow-up ZioPatch 2/2022 showing no evidence of AFib.  Dr. Mcgovern noted AC was in the gray zone, given that he had had AFib documented outside of the \"typical window\" of postoperative atrial fibrillation (11/2021).  [id number] 2 (HTN, CAD).  He opted to stop anticoagulation.  For his CAD, we restarted ASA.  I recommended he get back in with his General Cardiologist Dr. Blake given his h/o CAD.    He sent us a [employer] message 10/17 with c/o erratic BPs and asked for a follow-up appointment.  He was taking losartan HCTZ 50/12.5 BID and metoprolol XL 50 mg (0700, 1900), with SBP's 180s first thing in the morning, down to 100s by late afternoon.  " "I asked him to move his PM medications closer to bedtime, but this did not seem to improve his BPs at all, now ranging 100-200s.  Of note, Dr. SANTIAGO had tried him on clonidine for BP >180/95.    Interval History:  Overall, Jorge is feeling good.  He denies any problems with chest discomfort, tightness, pressure.  No dizziness, lightheadedness.  No palpitations or awareness of any recurrent atrial fibrillation.    He notes that he stopped using his CPAP to see if it made any difference in his sleep quality.  He said that his wife told him that he did not have any change in his breathing while off CPAP therapy    We reviewed his BPs.  Despite losartan/HCTZ 50/12.5 1/2 tab BID and metoprolol XL 50 mg BID, his AM BPs are still incredibly high, 180-210s.  He brought his blood pressure cuff today and measured on against ours, and is very accurate.    After he takes his morning medications, he notes that his BPs during the day are much better, ranging 110-130s.      VITALS:  Vitals: /78 (Cuff Size: Adult Regular)   Pulse 71   Ht 1.753 m (5' 9.02\")   Wt 99.1 kg (218 lb 8 oz)   SpO2 94%   BMI 32.25 kg/m      Diagnostic Testing:  ZioPatch 2/28-3/14/2022 no AFib. SR with 4 runs of SVT and 8.6% PAC burden. In SR, avg HR 63 bpm (range  bpm). 4 runs of SVT, longest 16 beats. 2 runs of VT, longest 60. Triggered sxs a/w SR and SR/PACs  Echo 5/2021 LVEF 55-60%.Nl RV.  Trace MR.  1+ TR.  Mild aortic sclerosis.  1+ [place]  ZioPatch 11/22-12/6/2020 after stopping amiodarone showed SR with pAFib (1% burden). In SR, avg HR 64 (range  bpm). Episode of AFib lasted 4.5 hours, with avg HR 84 bpm (range  bpm)  Carotids 9/2019 <50% bilateral  Echo 9/2019 with EF 60-65%. Grade I diastolic dysfunction. No sig valve issues  Component      Latest Ref Rng & Units 5/19/2021 9/20/2022   WBC      4.0 - 11.0 10e3/uL 10.3 10.1   RBC Count      4.40 - 5.90 10e6/uL 4.82 4.57   Hemoglobin      13.3 - 17.7 g/dL 15.1 14.3   Hematocrit     "  40.0 - 53.0 % 45.3 43.9   MCV      78 - 100 fL 94 96   MCH      26.5 - 33.0 pg 31.3 31.3   MCHC      31.5 - 36.5 g/dL 33.3 32.6   RDW      10.0 - 15.0 % 12.6 13.0   Platelet Count      150 - 450 10e3/uL 211 224     Component      Latest Ref Rng & Units 5/19/2021 8/16/2022   Sodium      133 - 144 mmol/L 143 137   Potassium      3.4 - 5.3 mmol/L 4.1 4.2   Chloride      94 - 109 mmol/L 108 105   Carbon Dioxide      20 - 32 mmol/L 28 26   Anion Gap      3 - 14 mmol/L 7 6   Urea Nitrogen      7 - 30 mg/dL 13 12   Creatinine      0.66 - 1.25 mg/dL 0.70 0.70   Calcium      8.5 - 10.1 mg/dL 8.9 9.3   Glucose      70 - 99 mg/dL 100 (H) 106 (H)       Plan:  Move entire losartan/HCTZ 50/12.5 mg daily to bedtime  Send [employer] message in ~10 days with update.    See Dr. Blake with echo 3/2023 as arranged    Assessment/Plan:    1. HTN    Currently on  losartanHCTZ 50/12.5, 1/2 tab BID and metoprolol XL 50 mg BID    BPs are largely under good control except for St. in the morning    He states this coincided with discontinuation of Eliquis therapy, though he understands pharmacologically, this would have no effect    PLAN:    Restart CPAP therapy    Move entire losartan/HCTZ 50/12.5 to bedtime.     Continue metoprolol XL 50 mg BID    Send [employer] message in ~10 days with update on BPs    2. CAD    S/p 1v CABG (LIMA/LAD) 9/2019    Remains on Zetia (statin intolerant), BB, ASA    Last LDL 97 mg/dL 8/2022    PLAN:    See Dr. Blake as planned 3/2022 with updated echo    They may discuss PCSK9 inhibitors at that time        Nitza Thomson PA-C, MSPAS      No orders of the defined types were placed in this encounter.    Orders Placed This Encounter   Medications     DISCONTD: losartan-hydrochlorothiazide (HYZAAR) 50-12.5 MG tablet     Sig: Take 1/2 tablet twice a day     Dispense:  90 tablet     Refill:  0     losartan-hydrochlorothiazide (HYZAAR) 50-12.5 MG tablet     Sig: Take 1 tablet by mouth daily     Medications Discontinued During  "This Encounter   Medication Reason     cloNIDine (CATAPRES) 0.1 MG tablet Stopped by Patient     tamsulosin (FLOMAX) 0.4 MG capsule Therapy completed     losartan-hydrochlorothiazide (HYZAAR) 50-12.5 MG tablet      losartan-hydrochlorothiazide (HYZAAR) 50-12.5 MG tablet Reorder         Encounter Diagnosis   Name Primary?     Essential hypertension, benign        CURRENT MEDICATIONS:  Current Outpatient Medications   Medication Sig Dispense Refill     aspirin (ASA) 81 MG EC tablet Take 1 tablet (81 mg) by mouth daily       Cholecalciferol (VITAMIN D) 2000 UNITS tablet Take 2,000 Units by mouth daily       ezetimibe (ZETIA) 10 MG tablet TAKE 1 TABLET(10 MG) BY MOUTH DAILY 90 tablet 0     losartan-hydrochlorothiazide (HYZAAR) 50-12.5 MG tablet Take 1 tablet by mouth daily       metoprolol succinate ER (TOPROL XL) 50 MG 24 hr tablet TAKE 1 TABLET BY MOUTH TWICE DAILY 180 tablet 3       ALLERGIES     Allergies   Allergen Reactions     Atorvastatin      Myalgias     Crestor [Rosuvastatin]      Myalgias       Lisinopril      Body aches pt d/mylene  06/2015         Review of Systems:  Skin:  Negative     Eyes:  Negative    ENT:  Negative    Respiratory:  Negative for dyspnea on exertion  Cardiovascular:  Negative for;palpitations;chest pain;dizziness;lightheadedness;fatigue;edema    Gastroenterology: Negative for melena;hematochezia  Genitourinary:  Negative    Musculoskeletal:  Negative nocturnal cramping  Neurologic:  Negative    Psychiatric:  Negative    Heme/Lymph/Imm:  Negative    Endocrine:  Negative      Physical Exam:  Vitals: /78 (Cuff Size: Adult Regular)   Pulse 71   Ht 1.753 m (5' 9.02\")   Wt 99.1 kg (218 lb 8 oz)   SpO2 94%   BMI 32.25 kg/m      Constitutional:  cooperative, alert and oriented, well developed, well nourished, in no acute distress        Skin:  warm and dry to the touch, no apparent skin lesions or masses noted        Head:  normocephalic, no masses or lesions        Eyes:           ENT: "  not assessed this visit        Neck:  not assessed this visit        Chest:  healed median sternotomy scar;not assessed this visit        Cardiac: regular rhythm                  Abdomen:  not assessed this visit        Vascular: not assessed this visit                                      Extremities and Back:  no deformities, clubbing, cyanosis, erythema observed;no edema        Neurological:  no gross motor deficits           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     Olecranon bursitis 03/2001    right     Other and unspecified hyperlipidemia      Paroxysmal atrial fibrillation (H)      Paroxysmal atrial flutter (H)      RIGHT LUNG CALCIFIED GRANULOMA 02/2002    RML     Tobacco use disorder     Quit 1/03     Unspecified hemorrhoids without mention of complication 08/2001     Unspecified hereditary and idiopathic peripheral neuropathy        PAST SURGICAL HISTORY:  Past Surgical History:   Procedure Laterality Date     BYPASS GRAFT ARTERY CORONARY N/A 9/25/2019    Procedure: CORONARY ARTERY BYPASS GRAFTING x 1 (LIMA - LAD) WITH CORONARY ENDARTERECTOMY  (ON PUMP OXYGENATOR ; HANK BY [facility]);  Surgeon: Magdi Turner MD;  Location:  OR     CV HEART CATHETERIZATION WITH POSSIBLE INTERVENTION N/A 9/6/2019    Procedure: Coronary Angiogram;  Surgeon: Nick Willson MD;  Location:  HEART CARDIAC CATH LAB     CV LEFT HEART CATH N/A 9/6/2019    Procedure: Left Heart Cath;  Surgeon: Nick Willson MD;  Location:  HEART CARDIAC CATH LAB     CV LEFT VENTRICULOGRAM N/A 9/6/2019    Procedure: Left Ventriculogram;  Surgeon: Nick Willson MD;  Location:  HEART CARDIAC CATH  LAB     ZZC NONSPECIFIC PROCEDURE      EBCT       FAMILY HISTORY:  Family History   Problem Relation Age of Onset     Arthritis Mother         RA     Hypertension Mother      Breast Cancer Mother      Prostate Cancer Maternal Grandfather         80     Cancer Maternal Grandmother         Lung     Coronary Artery Disease Father      Other - See Comments Brother      Diabetes Brother      Genetic Disorder Son        SOCIAL HISTORY:  Social History     Socioeconomic History     Marital status:      Spouse name: None     Number of children: None     Years of education: None     Highest education level: None   Tobacco Use     Smoking status: Former     Packs/day: 1.00     Years: 33.00     Pack years: 33.00     Types: Cigarettes     Quit date: 2013     Years since quittin.7     Smokeless tobacco: Never   Substance and Sexual Activity     Alcohol use: Yes     Comment: rare     Drug use: Yes     Comment: [email or web address]     Sexual activity: Yes   Other Topics Concern     Parent/sibling w/ CABG, MI or angioplasty before 65F 55M? No       Thank you for allowing me to participate in the care of your patient.      Sincerely,     Leah Thomson PA-C     [facility]  cc:   Leah Thomson PA-C  [address] [place] [id number]

## 2022-11-08 ENCOUNTER — MYC MEDICAL ADVICE (OUTPATIENT)
Dept: CARDIOLOGY | Facility: CLINIC | Age: 62
End: 2022-11-08

## 2022-11-08 DIAGNOSIS — I10 ESSENTIAL HYPERTENSION, BENIGN: ICD-10-CM

## 2022-11-09 RX ORDER — LOSARTAN POTASSIUM AND HYDROCHLOROTHIAZIDE 25; 100 MG/1; MG/1
1 TABLET ORAL DAILY
Qty: 30 TABLET | Refills: 5 | Status: SHIPPED | OUTPATIENT
Start: 2022-11-09 | End: 2023-01-12

## 2022-12-12 NOTE — TELEPHONE ENCOUNTER
Mom called he was seen Saturday and was proscribed anti biotics  Mom stating that the symptoms have gotten worse since saturday  Fever got worse on Sunday and he now has a cough, runny nose, and body chills  Mom is wanting him seen again but we have no same days left in [facility]  Please Advise  Patient called stating he is continuing to have hypertension issues. He has left messages with his PCP, Dr. Riggs and not getting responses. He would like to follow with Cardiology. He sees Dr. Mcgovern for his Atrial Fibrillation and has a new pot appt with Dr Blake in March. I gave him the number to Cardiology and suggested he speak with them about getting seen sooner.

## 2022-12-13 ENCOUNTER — MYC MEDICAL ADVICE (OUTPATIENT)
Dept: CARDIOLOGY | Facility: CLINIC | Age: 62
End: 2022-12-13

## 2022-12-16 ENCOUNTER — MYC MEDICAL ADVICE (OUTPATIENT)
Dept: CARDIOLOGY | Facility: CLINIC | Age: 62
End: 2022-12-16

## 2022-12-16 DIAGNOSIS — I48.0 PAROXYSMAL ATRIAL FIBRILLATION (H): Primary | ICD-10-CM

## 2022-12-16 NOTE — TELEPHONE ENCOUNTER
LM for pt to ask if they can come in for an EKG to be done between 2:30 to 3:00. [employer] message sent also. Lluvia

## 2022-12-16 NOTE — TELEPHONE ENCOUNTER
Spoke to pt who states that the fit bit alerted pt that he was probably in A Fib for about 2 hours this AM. Pt was not symptomatic at that time and currently is sinus. Pt also states that he does not know when he is in A fib. Pt and sig other are going to purchase a Kardia device in the case this happens again. Pt continues on Metoprolol XL 50 mg bid. Will make Nitza aware. Jagruti

## 2022-12-19 NOTE — TELEPHONE ENCOUNTER
If we're able to document AFib on his FitBit, I think it would be prudent to restart Eliquis 5 mg BID for CHADSVASc 2 (HTN, CAD). Stop ASA. Update Epic.    If we can't actually document AFib (and it was just an alert for irregular HR), would have him wear a 2 week ZP to assess as it's possible he had alert for PACs, etc. Pls order if needed and continue ASA (unless he feels strongly about starting AC).    We were able to stop his Eliquis back in 9/2022 after ZP 2/2022 showed no recurrent post-op AFib. His CHADSVASc is 2.     Make sure wearing his CPAP, given this occurred in early AM.    Keep me posted.    Elaine Goddard

## 2022-12-19 NOTE — TELEPHONE ENCOUNTER
Spoke to patient regarding recommendations per MARE Mc:  If we're able to document AFib on his FitBit, I think it would be prudent to restart Eliquis 5 mg BID for CHADSVASc 2 (HTN, CAD). Stop ASA. Update Epic.  We did not capture what he was having    If we can't actually document AFib (and it was just an alert for irregular HR), would have him wear a 2 week ZP to assess as it's possible he had alert for PACs, etc. Pls order if needed and continue ASA (unless he feels strongly about starting AC).  Patient agreed with wearing leadless monitor for 2 weeks.  He wants to start the eliquis until he gets results of the monitor.  Patient will stop asa.  Medication list updated in epic.      We were able to stop his Eliquis back in 9/2022 after ZP 2/2022 showed no recurrent post-op AFib. His CHADSVASc is 2.     Make sure wearing his CPAP, given this occurred in early AM.  Confirmed he is indeed using his CPAP.    Will update MARE Mc regarding above.  FELICIANO Juarez

## 2022-12-20 ENCOUNTER — HOSPITAL ENCOUNTER (OUTPATIENT)
Dept: CARDIOLOGY | Facility: CLINIC | Age: 62
Discharge: HOME | End: 2022-12-20
Attending: PHYSICIAN ASSISTANT | Admitting: PHYSICIAN ASSISTANT
Payer: COMMERCIAL

## 2022-12-20 DIAGNOSIS — I48.0 PAROXYSMAL ATRIAL FIBRILLATION (H): ICD-10-CM

## 2022-12-20 PROCEDURE — 93246 EXT ECG>7D<15D RECORDING: CPT

## 2022-12-20 PROCEDURE — 93248 EXT ECG>7D<15D REV&INTERPJ: CPT | Performed by: INTERNAL MEDICINE

## 2023-01-04 ENCOUNTER — TELEPHONE (OUTPATIENT)
Dept: CARDIOLOGY | Facility: CLINIC | Age: 63
End: 2023-01-04

## 2023-01-04 NOTE — TELEPHONE ENCOUNTER
This pt was schedule for ZP monitor but not EKG please review - if no longer needed remove order if he still needs it please send msg to scheduling to do.    Thank you-SL

## 2023-01-12 ENCOUNTER — MYC MEDICAL ADVICE (OUTPATIENT)
Dept: CARDIOLOGY | Facility: CLINIC | Age: 63
End: 2023-01-12

## 2023-01-12 ENCOUNTER — DOCUMENTATION ONLY (OUTPATIENT)
Dept: CARDIOLOGY | Facility: CLINIC | Age: 63
End: 2023-01-12

## 2023-01-12 DIAGNOSIS — I10 ESSENTIAL HYPERTENSION, BENIGN: Primary | ICD-10-CM

## 2023-01-12 DIAGNOSIS — E78.5 HYPERLIPIDEMIA LDL GOAL <70: ICD-10-CM

## 2023-01-12 DIAGNOSIS — I25.10 CORONARY ARTERY DISEASE INVOLVING NATIVE CORONARY ARTERY OF NATIVE HEART WITHOUT ANGINA PECTORIS: ICD-10-CM

## 2023-01-12 RX ORDER — LOSARTAN POTASSIUM AND HYDROCHLOROTHIAZIDE 25; 100 MG/1; MG/1
TABLET ORAL
Qty: 90 TABLET | Refills: 3 | Status: SHIPPED | OUTPATIENT
Start: 2023-01-12 | End: 2023-03-01

## 2023-01-12 RX ORDER — AMLODIPINE BESYLATE 5 MG/1
5 TABLET ORAL DAILY
Qty: 90 TABLET | Refills: 3 | Status: SHIPPED | OUTPATIENT
Start: 2023-01-12 | End: 2023-03-01

## 2023-01-12 RX ORDER — ASPIRIN 81 MG/1
81 TABLET, CHEWABLE ORAL DAILY
COMMUNITY
Start: 2023-01-12 | End: 2024-03-06 | Stop reason: ALTCHOICE

## 2023-01-12 NOTE — PROGRESS NOTES
"There is a lot of artifact on the tracing that he sent, however much of it has a regular RR interval, so I do not think that it is atrial fibrillation.  He is always welcome to come in for an EKG, as well      As noted below:  1. Unsure what his \"irregular\" warning on watch was ... could have been PACs.  2.  If he would like to stay on anticoagulation as he continues to monitor his HR, this is okay, but at this time, I do not have reason for him to stay on this.  He remains at risk of AFib given he had it after surgery and has lots of PACs however we haven't shown he's had this since bypass. For peace of mind, he may want to continue it. If stops Eliquis, restart ASA and update Epic med list  3. Let me know about 3s pause  - awake?  What time does he take the Metoprolol XL 50 mg BID?    He also sent a list of BPs in his [employer] message.  Despite taking the metoprolol XL 50 mg BID and losartan 100/25, 1/2 tab twice a day, BP is still too high, with only a few of them in the 110-120s.  I think we need to add medication, along with him continuing to limit sodium and get routine exercise.    Would recommend restart amlodipine.  It looks like he was on this back in 7/2019, but stopped it because he had diffuse joint pain.  The urgent care doctor note says that he stopped both atorvastatin and amlodipine at that time, and did not want to restart them even though it did not seem to make his symptoms any better.    Would he be willing to restart amlodipine at 5 mg daily?  If so, please Rx, check BPs at home for about 3-4 weeks, then send an update.      "

## 2023-01-12 NOTE — PROGRESS NOTES
"Pls let Jorge know that I reviewed Ziopatch. This was placed d/t an alert from his watch that he had an \"irregular rhythm\" but we weren't able to confirm AFib (see 12/16 MyChart).      He restarted Eliquis 5 mg BID 12/19 d/t concerns that he could have AFib, and stopped ASA.    He wore this 12/20-1/3 and is on Metoprolol XL 50 mg BID    This showed NO AFib or AFlutter - great news!   He had SR (avg HR 65 bpm, range  bpm), with 9.8% PVC burden, 2 runs of SVT (longest 4 beats). Pause up to 3s and episodes of Wenckebach.    ** 3.0s pause was ~1230 PM on Monday 12/26. Was he asleep/napping? Any sxs if awake?    ** Episodes of Wenkebach looked to be during middle of night (0300).    1. Therefore, unsure what his \"irregular\" warning on watch was ... could have been PACs.  2. OK to stop Eliquis if he'd like. He remains at risk of AFib given he had it after surgery and has lots of PACs however we haven't shown he's had this since bypass. For peace of mind, he may want to continue it. If stops Eliquis, restart ASA and update Epic med list  3. Let me know about 3s pause  - awake?  What time does he take the Metoprolol XL 50 mg BID?'      Elaine Goddard  "

## 2023-01-13 RX ORDER — EZETIMIBE 10 MG/1
TABLET ORAL
Qty: 90 TABLET | Refills: 1 | Status: SHIPPED | OUTPATIENT
Start: 2023-01-13 | End: 2023-03-01

## 2023-01-13 NOTE — PROGRESS NOTES
"Spoke to patient regarding recommendations per MARE Mc after review of kardia tracing and leadless monitor worn.      There is a lot of artifact on the tracing that he sent, however much of it has a regular RR interval, so I do not think that it is atrial fibrillation.  He is always welcome to come in for an EKG, as well  Patient will practice with his kardia so less artifact.   He is aware he can come in for EKG if needed      As noted below:  1. Unsure what his \"irregular\" warning on watch was ... could have been PACs.  2.  If he would like to stay on anticoagulation as he continues to monitor his HR, this is okay, but at this time, I do not have reason for him to stay on this.  He remains at risk of AFib given he had it after surgery and has lots of PACs however we haven't shown he's had this since bypass. For peace of mind, he may want to continue it. If stops Eliquis, restart ASA and update Epic med list   Patient will stop eliquis and go on asa 81mg po every day.   Medication list updated in epic.    3. Let me know about 3s pause  - awake?  What time does he take the Metoprolol XL 50 mg BID?    Was awake and asymptomatic.  Takes metoprolol at 9am and 9pm.      He also sent a list of BPs in his [employer] message.  Despite taking the metoprolol XL 50 mg BID and losartan 100/25, 1/2 tab twice a day, BP is still too high, with only a few of them in the 110-120s.  I think we need to add medication, along with him continuing to limit sodium and get routine exercise.    Would recommend restart amlodipine.  It looks like he was on this back in 7/2019, but stopped it because he had diffuse joint pain.  The urgent care doctor note says that he stopped both atorvastatin and amlodipine at that time, and did not want to restart them even though it did not seem to make his symptoms any better.    Would he be willing to restart amlodipine at 5 mg daily?  If so, please Rx, check BPs at home for about 3-4 weeks, then send " "an update.  Patient agreed to start amlodipine.  He will take in the am.  He will continue to check BP bid and update us in 3 weeks or sooner if issues.        Reviewed the results with patient.  Addressed above.  Pls let Jorge know that I reviewed Hunter. This was placed d/t an alert from his watch that he had an \"irregular rhythm\" but we weren't able to confirm AFib (see 12/16 MyChart).      He restarted Eliquis 5 mg BID 12/19 d/t concerns that he could have AFib, and stopped ASA.    He wore this 12/20-1/3 and is on Metoprolol XL 50 mg BID    This showed NO AFib or AFlutter - great news!   He had SR (avg HR 65 bpm, range  bpm), with 9.8% PVC burden, 2 runs of SVT (longest 4 beats). Pause up to 3s and episodes of Wenckebach.    ** 3.0s pause was ~1230 PM on Monday 12/26. Was he asleep/napping? Any sxs if awake?    ** Episodes of Wenkebach looked to be during middle of night (0300).    1. Therefore, unsure what his \"irregular\" warning on watch was ... could have been PACs.  2. OK to stop Eliquis if he'd like. He remains at risk of AFib given he had it after surgery and has lots of PACs however we haven't shown he's had this since bypass. For peace of mind, he may want to continue it. If stops Eliquis, restart ASA and update Epic med list  3. Let me know about 3s pause  - awake?  What time does he take the Metoprolol XL 50 mg BID?'    Will update Nitza regarding above.  Patient provided verbal understanding.  FELICIANO Juarez        "

## 2023-01-16 NOTE — PROGRESS NOTES
Thanks for update.  Has appointment with Dr. Blake and BP will be looked at again at that time.    Given that he was asymptomatic with 3s pause on metoprolol XL 50 mg BID, we will make no medication changes and continue to monitor for any lightheadedness/dizziness.    Thanks-Nitza

## 2023-02-17 ENCOUNTER — OFFICE VISIT (OUTPATIENT)
Dept: URGENT CARE | Facility: URGENT CARE | Age: 63
End: 2023-02-17
Payer: MEDICAID

## 2023-02-17 ENCOUNTER — ANCILLARY PROCEDURE (OUTPATIENT)
Dept: GENERAL RADIOLOGY | Facility: CLINIC | Age: 63
End: 2023-02-17
Attending: PHYSICIAN ASSISTANT
Payer: MEDICAID

## 2023-02-17 VITALS
DIASTOLIC BLOOD PRESSURE: 89 MMHG | TEMPERATURE: 97.1 F | BODY MASS INDEX: 32.86 KG/M2 | SYSTOLIC BLOOD PRESSURE: 151 MMHG | RESPIRATION RATE: 18 BRPM | HEART RATE: 65 BPM | OXYGEN SATURATION: 97 % | WEIGHT: 222.6 LBS

## 2023-02-17 DIAGNOSIS — L03.90 CELLULITIS, UNSPECIFIED CELLULITIS SITE: ICD-10-CM

## 2023-02-17 DIAGNOSIS — M79.671 INFLAMMATORY PAIN OF RIGHT HEEL: ICD-10-CM

## 2023-02-17 DIAGNOSIS — M79.671 INFLAMMATORY PAIN OF RIGHT HEEL: Primary | ICD-10-CM

## 2023-02-17 DIAGNOSIS — L85.3 XEROSIS OF SKIN: ICD-10-CM

## 2023-02-17 PROCEDURE — 99214 OFFICE O/P EST MOD 30 MIN: CPT | Performed by: PHYSICIAN ASSISTANT

## 2023-02-17 PROCEDURE — 73650 X-RAY EXAM OF HEEL: CPT | Mod: TC | Performed by: RADIOLOGY

## 2023-02-17 RX ORDER — CEPHALEXIN 500 MG/1
500 CAPSULE ORAL 3 TIMES DAILY
Qty: 30 CAPSULE | Refills: 0 | Status: SHIPPED | OUTPATIENT
Start: 2023-02-17 | End: 2023-07-20

## 2023-02-17 RX ORDER — NAPROXEN 500 MG/1
500 TABLET ORAL 2 TIMES DAILY WITH MEALS
Qty: 30 TABLET | Refills: 3 | Status: SHIPPED | OUTPATIENT
Start: 2023-02-17 | End: 2023-07-20

## 2023-02-17 NOTE — PROGRESS NOTES
Assessment & Plan     Inflammatory pain of right heel    Xray heel Negative for acute findings, read by Jacoby Ragland PA-C [facility] at time of visit.    Area of inflammation and tenderness with erythema  Concern for inflammatory area or cellulitis  Warm foot soaks  Emollients on skin to prevent cracking    - XR Calcaneus Right G/E 2 Views; Future  - naproxen (NAPROSYN) 500 MG tablet; Take 1 tablet (500 mg) by mouth 2 times daily (with meals)  - cephALEXin (KEFLEX) 500 MG capsule; Take 1 capsule (500 mg) by mouth 3 times daily  - Orthopedic  Referral; Future    Cellulitis, unspecified cellulitis site    Cellulitis is an infection of the deep layers of skin. A break in the skin, such as a cut or scratch, can let bacteria under the skin. If the bacteria get to deep layers of the skin, it can be serious. If not treated, cellulitis can get into the bloodstream and lymph nodes. The infection can then spread throughout the body. This causes serious illness.   Cellulitis causes the affected skin to become red, swollen, warm, and sore. The reddened areas have a visible border. An open sore may leak fluid (pus). You may have a fever, chills, and pain.   Cellulitis is treated with antibiotics taken for 7 to 10 days. An open sore may be cleaned and covered with cool wet gauze. Symptoms should get better 1 to 2 days after treatment is started. Make sure to take all the antibiotics for the full number of days until they are gone. Keep taking the medicine even if your symptoms go away.     - cephALEXin (KEFLEX) 500 MG capsule; Take 1 capsule (500 mg) by mouth 3 times daily  - Orthopedic  Referral; Future    Xerosis of skin    Dry skin of heels, cracking of skin    - Emollient (CERAVE) CREA; Externally apply topically 2 times daily as needed (dry skin)  - Orthopedic  Referral; Future    Review of external notes as documented elsewhere in note       BMI:   Estimated body mass index is 32.86 kg/m  as calculated  "from the following:    Height as of 10/31/22: 1.753 m (5' 9.02\").    Weight as of this encounter: 101 kg (222 lb 9.6 oz).       CONSULTATION/REFERRAL to Poditary    No follow-ups on file.    JARON Alexis, PAMaylinC  M [facility] ORIANA Patel is a 62 year old, presenting for the following health issues:  Foot Pain (Pain at the button of right heel for the last weeks. )      HPI   Review of Systems   Constitutional, HEENT, cardiovascular, pulmonary, gi and gu systems are negative, except as otherwise noted.      Objective    BP (!) 151/89 (BP Location: Left arm, Patient Position: Sitting, Cuff Size: Adult Regular)   Pulse 65   Temp 97.1  F (36.2  C) (Oral)   Resp 18   Wt 101 kg (222 lb 9.6 oz)   SpO2 97%   BMI 32.86 kg/m    Body mass index is 32.86 kg/m .  Physical Exam   GENERAL: healthy, alert and no distress  MS: Positive for right heel tenderness, localized area of swelling, redness  SKIN: Positive for dry skin, area of infection  NEURO: Normal strength and tone, mentation intact and speech normal  PSYCH: mentation appears normal, affect normal/bright    Xray - Reviewed and interpreted by me.  Negative for acute findings, read by Jacoby SALMERON at time of visit.                "

## 2023-02-24 ENCOUNTER — HOSPITAL ENCOUNTER (OUTPATIENT)
Dept: CARDIOLOGY | Facility: CLINIC | Age: 63
Discharge: HOME | End: 2023-02-24
Attending: PHYSICIAN ASSISTANT | Admitting: PHYSICIAN ASSISTANT
Payer: MEDICAID

## 2023-02-24 DIAGNOSIS — Z95.1 S/P CABG (CORONARY ARTERY BYPASS GRAFT): ICD-10-CM

## 2023-02-24 LAB — LVEF ECHO: NORMAL

## 2023-02-24 PROCEDURE — 93306 TTE W/DOPPLER COMPLETE: CPT | Mod: 26 | Performed by: INTERNAL MEDICINE

## 2023-02-24 PROCEDURE — 255N000002 HC RX 255 OP 636: Performed by: PHYSICIAN ASSISTANT

## 2023-02-24 PROCEDURE — 999N000208 ECHOCARDIOGRAM COMPLETE

## 2023-02-24 RX ADMIN — HUMAN ALBUMIN MICROSPHERES AND PERFLUTREN 9 ML: 10; .22 INJECTION, SOLUTION INTRAVENOUS at 15:28

## 2023-02-28 ENCOUNTER — OFFICE VISIT (OUTPATIENT)
Dept: PODIATRY | Facility: CLINIC | Age: 63
End: 2023-02-28
Attending: PHYSICIAN ASSISTANT
Payer: MEDICAID

## 2023-02-28 VITALS
SYSTOLIC BLOOD PRESSURE: 140 MMHG | DIASTOLIC BLOOD PRESSURE: 80 MMHG | HEIGHT: 69 IN | WEIGHT: 222 LBS | BODY MASS INDEX: 32.88 KG/M2

## 2023-02-28 DIAGNOSIS — L85.3 XEROSIS OF SKIN: ICD-10-CM

## 2023-02-28 DIAGNOSIS — L03.90 CELLULITIS, UNSPECIFIED CELLULITIS SITE: ICD-10-CM

## 2023-02-28 DIAGNOSIS — M79.671 INFLAMMATORY PAIN OF RIGHT HEEL: ICD-10-CM

## 2023-02-28 PROCEDURE — 99203 OFFICE O/P NEW LOW 30 MIN: CPT | Performed by: PODIATRIST

## 2023-02-28 NOTE — LETTER
2/28/2023         RE: Bryce Espinoza  [address] [facility] [address]        Dear Colleague,    Thank you for referring your patient, Bryce Espinoza, to the [facility]. Please see a copy of my visit note below.    ASSESSMENT:  Encounter Diagnoses   Name Primary?     Inflammatory pain of right heel      Cellulitis, unspecified cellulitis site      Xerosis of skin      MEDICAL DECISION MAKING:  I personally reviewed the previous right heel x-rays.  No acute findings.    There does not appear to be any active infection at this point.  No indication for any additional antibiotic.  He is to closely monitor.  Should he start to develop swelling, pain, redness, then MRI and possible incision and drainage for abscess will be considered.  I agree with his current moisturizing cream.  Goldbond foot cream and AmLactin cream also discussed.    Follow-up on an as-needed basis.    Disclaimer: This note consists of symbols derived from keyboarding, dictation and/or voice recognition software. As a result, there may be errors in the script that have gone undetected. Please consider this when interpreting information found in this chart.    Grey Naik DPM, FACFAS, MS    [place] Department of Podiatry/Foot & Ankle Surgery      ____________________________________________________________________    HPI:       Follow up from a 2/17/23 Urgent Care visit  He was evaluated for inflammation, pain, erythema of the left heel.  Jorge associates it to a skin fissure   He was prescribed cephalexin and naproxen.  He has completed the cephalexin and reports significant improvement  In moisturizing creams also recommended, which he purchased.  He asked about soaking his feet.    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     Olecranon bursitis 03/2001    right     Other and unspecified hyperlipidemia      Paroxysmal atrial fibrillation (H)      Paroxysmal atrial flutter (H)      RIGHT LUNG CALCIFIED GRANULOMA 02/2002    RML     Tobacco use disorder     Quit 1/03     Unspecified hemorrhoids without mention of complication 08/2001     Unspecified hereditary and idiopathic peripheral neuropathy    *  *  Past Surgical History:   Procedure Laterality Date     BYPASS GRAFT ARTERY CORONARY N/A 9/25/2019    Procedure: CORONARY ARTERY BYPASS GRAFTING x 1 (LIMA - LAD) WITH CORONARY ENDARTERECTOMY  (ON PUMP OXYGENATOR ; HANK BY [facility]);  Surgeon: Magdi Turner MD;  Location:  OR     CV HEART CATHETERIZATION WITH POSSIBLE INTERVENTION N/A 9/6/2019    Procedure: Coronary Angiogram;  Surgeon: Nick Willson MD;  Location:  HEART CARDIAC CATH LAB     CV LEFT HEART CATH N/A 9/6/2019    Procedure: Left Heart Cath;  Surgeon: Nick Willson MD;  Location:  HEART CARDIAC CATH LAB     CV LEFT VENTRICULOGRAM N/A 9/6/2019    Procedure: Left Ventriculogram;  Surgeon: Nick Willson MD;  Location:  HEART CARDIAC CATH LAB     ZZC NONSPECIFIC PROCEDURE  2/00/02    EBCT   *  *  Current Outpatient Medications   Medication Sig Dispense Refill     amLODIPine (NORVASC) 5 MG tablet Take 1 tablet (5 mg) by mouth daily 90 tablet 3     aspirin (ASA) 81 MG chewable tablet Take 1 tablet (81 mg) by mouth daily       cephALEXin (KEFLEX) 500 MG capsule Take 1 capsule (500 mg) by mouth 3 times daily 30 capsule 0     Cholecalciferol (VITAMIN D) 2000 UNITS tablet Take 2,000 Units by mouth daily       Emollient (CERAVE) CREA Externally apply topically 2 times daily as needed (dry skin) 453 g 1     ezetimibe (ZETIA) 10 MG tablet TAKE 1 TABLET(10 MG) BY MOUTH DAILY 90 tablet 1     losartan-hydrochlorothiazide  "(HYZAAR) 100-25 MG tablet Take 50-12.5 mg (1/2 tablet) po bid 90 tablet 3     metoprolol succinate ER (TOPROL XL) 50 MG 24 hr tablet TAKE 1 TABLET BY MOUTH TWICE DAILY 180 tablet 3     naproxen (NAPROSYN) 500 MG tablet Take 1 tablet (500 mg) by mouth 2 times daily (with meals) 30 tablet 3         EXAM:    Vitals: BP (!) 140/80   Ht 1.753 m (5' 9\")   Wt 100.7 kg (222 lb)   BMI 32.78 kg/m    BMI: Body mass index is 32.78 kg/m .    Constitutional:  Bryce Espinoza is in no apparent distress, appears well-nourished.  Cooperative with history and physical exam.    Vascular:  Pedal pulses are palpable for both the DP and PT arteries.  CFT < 3 sec.  No edema.      Neuro: Light touch sensation is intact to the L4, L5, S1 distributions  No evidence of weakness, spasticity, or contracture in the lower extremities.     Derm: Generalized xerosis of the plantar skin, right foot.  No visualized fissures around the right heel today.  No erythema.  There is a palpable bump on the posterior heel that is not painful.    Musculoskeletal:    Lower extremity muscle strength is normal. No gross deformities.      XR CALCANEUS RIGHT G/E 2 VIEWS 2/17/2023 12:02 PM      HISTORY: Inflammatory pain of right heel     COMPARISON: None.                                                                      IMPRESSION: No fractures are evident. Normal joint spacing. The soft  tissues are unremarkable.     OLIVIA NEGRO MD       Again, thank you for allowing me to participate in the care of your patient.        Sincerely,        Grey Naik DPM    "

## 2023-02-28 NOTE — PROGRESS NOTES
ASSESSMENT:  Encounter Diagnoses   Name Primary?     Inflammatory pain of right heel      Cellulitis, unspecified cellulitis site      Xerosis of skin      MEDICAL DECISION MAKING:  I personally reviewed the previous right heel x-rays.  No acute findings.    There does not appear to be any active infection at this point.  No indication for any additional antibiotic.  He is to closely monitor.  Should he start to develop swelling, pain, redness, then MRI and possible incision and drainage for abscess will be considered.  I agree with his current moisturizing cream.  Goldbond foot cream and AmLactin cream also discussed.    Follow-up on an as-needed basis.    Disclaimer: This note consists of symbols derived from keyboarding, dictation and/or voice recognition software. As a result, there may be errors in the script that have gone undetected. Please consider this when interpreting information found in this chart.    Grey Naik DPM, FACETIENNE, MS    [place] Department of Podiatry/Foot & Ankle Surgery      ____________________________________________________________________    HPI:       Follow up from a 2/17/23 Urgent Care visit  He was evaluated for inflammation, pain, erythema of the left heel.  Jorge associates it to a skin fissure   He was prescribed cephalexin and naproxen.  He has completed the cephalexin and reports significant improvement  In moisturizing creams also recommended, which he purchased.  He asked about soaking his feet.    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     Olecranon bursitis 03/2001    right     Other and unspecified hyperlipidemia      Paroxysmal atrial fibrillation (H)      Paroxysmal  atrial flutter (H)      RIGHT LUNG CALCIFIED GRANULOMA 02/2002    RML     Tobacco use disorder     Quit 1/03     Unspecified hemorrhoids without mention of complication 08/2001     Unspecified hereditary and idiopathic peripheral neuropathy    *  *  Past Surgical History:   Procedure Laterality Date     BYPASS GRAFT ARTERY CORONARY N/A 9/25/2019    Procedure: CORONARY ARTERY BYPASS GRAFTING x 1 (LIMA - LAD) WITH CORONARY ENDARTERECTOMY  (ON PUMP OXYGENATOR ; HANK BY [facility]);  Surgeon: Magdi Turner MD;  Location:  OR     CV HEART CATHETERIZATION WITH POSSIBLE INTERVENTION N/A 9/6/2019    Procedure: Coronary Angiogram;  Surgeon: Nick Willson MD;  Location:  HEART CARDIAC CATH LAB     CV LEFT HEART CATH N/A 9/6/2019    Procedure: Left Heart Cath;  Surgeon: Nick Willson MD;  Location:  HEART CARDIAC CATH LAB     CV LEFT VENTRICULOGRAM N/A 9/6/2019    Procedure: Left Ventriculogram;  Surgeon: Nick Willson MD;  Location:  HEART CARDIAC CATH LAB     Z NONSPECIFIC PROCEDURE  2/00/02    EBCT   *  *  Current Outpatient Medications   Medication Sig Dispense Refill     amLODIPine (NORVASC) 5 MG tablet Take 1 tablet (5 mg) by mouth daily 90 tablet 3     aspirin (ASA) 81 MG chewable tablet Take 1 tablet (81 mg) by mouth daily       cephALEXin (KEFLEX) 500 MG capsule Take 1 capsule (500 mg) by mouth 3 times daily 30 capsule 0     Cholecalciferol (VITAMIN D) 2000 UNITS tablet Take 2,000 Units by mouth daily       Emollient (CERAVE) CREA Externally apply topically 2 times daily as needed (dry skin) 453 g 1     ezetimibe (ZETIA) 10 MG tablet TAKE 1 TABLET(10 MG) BY MOUTH DAILY 90 tablet 1     losartan-hydrochlorothiazide (HYZAAR) 100-25 MG tablet Take 50-12.5 mg (1/2 tablet) po bid 90 tablet 3     metoprolol succinate ER (TOPROL XL) 50 MG 24 hr tablet TAKE 1 TABLET BY MOUTH TWICE DAILY 180 tablet 3     naproxen (NAPROSYN) 500 MG tablet Take 1 tablet (500 mg) by mouth 2 times daily (with  "meals) 30 tablet 3         EXAM:    Vitals: BP (!) 140/80   Ht 1.753 m (5' 9\")   Wt 100.7 kg (222 lb)   BMI 32.78 kg/m    BMI: Body mass index is 32.78 kg/m .    Constitutional:  Bryce Espinoza is in no apparent distress, appears well-nourished.  Cooperative with history and physical exam.    Vascular:  Pedal pulses are palpable for both the DP and PT arteries.  CFT < 3 sec.  No edema.      Neuro: Light touch sensation is intact to the L4, L5, S1 distributions  No evidence of weakness, spasticity, or contracture in the lower extremities.     Derm: Generalized xerosis of the plantar skin, right foot.  No visualized fissures around the right heel today.  No erythema.  There is a palpable bump on the posterior heel that is not painful.    Musculoskeletal:    Lower extremity muscle strength is normal. No gross deformities.      XR CALCANEUS RIGHT G/E 2 VIEWS 2/17/2023 12:02 PM      HISTORY: Inflammatory pain of right heel     COMPARISON: None.                                                                      IMPRESSION: No fractures are evident. Normal joint spacing. The soft  tissues are unremarkable.     OLIVIA NEGRO MD   "

## 2023-02-28 NOTE — PATIENT INSTRUCTIONS
Thank you for choosing [facility]!    DR. MALDONADO'S CLINIC LOCATIONS:     [facility] TRIAGE LINE: [phone]   [address] APPOINTMENTS: [phone]   [place] [address] RADIOLOGY: [phone]   (Every other Tues - Wed - Fri PM) SET UP SURGERY: [phone]    PHYSICAL THERAPY: [phone]   [facility] BILLING QUESTIONS: [phone] [id number] FAX: [phone]   [place] [address]    (Thurs & Fri AM)      Creams that work well:  Gold Bond Foot Cream  Amlactin cream    Please reach out if the problem starts to come back.  Soaking is not needed    SIGNS OF INFECTION  expanding redness around the wound   yellow or greenish-colored pus or cloudy wound drainage   red streaking spreading from the wound   increased swelling, tenderness, or pain around the wound   fever  *If you notice any of these signs of infection, call us right away!

## 2023-03-01 ENCOUNTER — OFFICE VISIT (OUTPATIENT)
Dept: CARDIOLOGY | Facility: CLINIC | Age: 63
End: 2023-03-01
Payer: COMMERCIAL

## 2023-03-01 VITALS
SYSTOLIC BLOOD PRESSURE: 131 MMHG | BODY MASS INDEX: 33.33 KG/M2 | HEIGHT: 69 IN | OXYGEN SATURATION: 97 % | HEART RATE: 68 BPM | WEIGHT: 225 LBS | DIASTOLIC BLOOD PRESSURE: 85 MMHG

## 2023-03-01 DIAGNOSIS — E78.5 HYPERLIPIDEMIA LDL GOAL <70: ICD-10-CM

## 2023-03-01 DIAGNOSIS — I10 ESSENTIAL HYPERTENSION, BENIGN: ICD-10-CM

## 2023-03-01 DIAGNOSIS — Z95.1 S/P CABG (CORONARY ARTERY BYPASS GRAFT): ICD-10-CM

## 2023-03-01 PROCEDURE — 99215 OFFICE O/P EST HI 40 MIN: CPT | Performed by: INTERNAL MEDICINE

## 2023-03-01 RX ORDER — PRAVASTATIN SODIUM 20 MG
20 TABLET ORAL DAILY
Qty: 90 TABLET | Refills: 3 | Status: SHIPPED | OUTPATIENT
Start: 2023-03-01 | End: 2023-03-10

## 2023-03-01 RX ORDER — LOSARTAN POTASSIUM AND HYDROCHLOROTHIAZIDE 25; 100 MG/1; MG/1
TABLET ORAL
Qty: 90 TABLET | Refills: 3 | Status: SHIPPED | OUTPATIENT
Start: 2023-03-01 | End: 2023-08-29

## 2023-03-01 RX ORDER — METOPROLOL SUCCINATE 50 MG/1
50 TABLET, EXTENDED RELEASE ORAL 2 TIMES DAILY
Qty: 180 TABLET | Refills: 3 | Status: SHIPPED | OUTPATIENT
Start: 2023-03-01 | End: 2024-03-11

## 2023-03-01 RX ORDER — EZETIMIBE 10 MG/1
10 TABLET ORAL DAILY
Qty: 90 TABLET | Refills: 1 | Status: SHIPPED | OUTPATIENT
Start: 2023-03-01 | End: 2023-09-12

## 2023-03-01 RX ORDER — AMLODIPINE BESYLATE 5 MG/1
5 TABLET ORAL DAILY
Qty: 90 TABLET | Refills: 3 | Status: SHIPPED | OUTPATIENT
Start: 2023-03-01 | End: 2024-03-14

## 2023-03-01 NOTE — PROGRESS NOTES
CARDIOLOGY CLINIC CONSULTATION    REASON FOR CONSULT: Coronary artery disease    PRIMARY CARE PHYSICIAN:  Remington Riggs    Today's clinic visit entailed:  Review of the result(s) of each unique test - Echo labs cath EP records  40 minutes spent on the date of the encounter doing chart review, history and exam, documentation and further activities per the note  Provider  Link to [facility] Help Grid     The level of medical decision making during this visit was of moderate complexity.      HISTORY OF PRESENT ILLNESS:  This is a very pleasant 62-year-old gentleman who was seen by me 1 time in 2019.  He is here for follow-up at the [facility].  He has the following pertinent medical issues    1. History of coronary artery disease diagnosed in 2019 on coronary angiography subsequently he underwent single-vessel LIMA to LAD bypass surgery.  2. Carotid endarterectomy  3. Postoperative atrial fibrillation and flutter without recurrence subsequently seen by EP and stop anticoagulation due to lack of recurrence  4. Strong family history of coronary artery disease  5. Former smoker  6. Obesity  7. Hyperlipidemia hypertension  8. Statin intolerance    Patient is here for follow-up.  Denies any new cardiovascular symptoms.  No syncope presyncope.  No angina no heart failure.  He is functionally not very active.  Does not exercise.  He is obese with a BMI of 33.  Blood pressure has been recently running on the higher side.  PCP started amlodipine.  Numbers are now better.    He is statin intolerance.  He currently takes Zetia.  LDL is close to 100.    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      Olecranon bursitis 03/2001    right     Other and unspecified hyperlipidemia      Paroxysmal atrial fibrillation (H)      Paroxysmal atrial flutter (H)      RIGHT LUNG CALCIFIED GRANULOMA 02/2002    RML     Tobacco use disorder     Quit 1/03     Unspecified hemorrhoids without mention of complication 08/2001     Unspecified hereditary and idiopathic peripheral neuropathy        MEDICATIONS:  Current Outpatient Medications   Medication     amLODIPine (NORVASC) 5 MG tablet     aspirin (ASA) 81 MG chewable tablet     Cholecalciferol (VITAMIN D) 2000 UNITS tablet     Emollient (CERAVE) CREA     ezetimibe (ZETIA) 10 MG tablet     losartan-hydrochlorothiazide (HYZAAR) 100-25 MG tablet     metoprolol succinate ER (TOPROL XL) 50 MG 24 hr tablet     naproxen (NAPROSYN) 500 MG tablet     pravastatin (PRAVACHOL) 20 MG tablet     cephALEXin (KEFLEX) 500 MG capsule     No current facility-administered medications for this visit.       ALLERGIES:  Allergies   Allergen Reactions     Atorvastatin      Myalgias     Crestor [Rosuvastatin]      Myalgias       Lisinopril      Body aches pt d/mylene  06/2015       SOCIAL HISTORY:  I have reviewed this patient's social history and updated it with pertinent information if needed. Bryce KRISTINE Espinoza  reports that he quit smoking about 10 years ago. His smoking use included cigarettes. He has a 33.00 pack-year smoking history. He has never used smokeless tobacco. He reports current alcohol use. He reports current drug use.    FAMILY HISTORY:  I have reviewed this patient's family history and updated it with pertinent information if needed.   Family History   Problem Relation Age of Onset     Arthritis Mother         RA     Hypertension Mother      Breast Cancer Mother      Prostate Cancer Maternal Grandfather         80     Cancer Maternal Grandmother         Lung     Coronary Artery Disease Father      Other - See Comments Brother      Diabetes Brother      Genetic Disorder Son        REVIEW OF  SYSTEMS:  Skin:  Negative     Eyes:  Negative    ENT:  Negative    Respiratory:  Negative    Cardiovascular:  Negative for;palpitations;chest pain;dizziness;lightheadedness;fatigue;edema    Gastroenterology: Negative for melena;hematochezia  Genitourinary:  Negative    Musculoskeletal:  Negative nocturnal cramping  Neurologic:  Negative    Psychiatric:  Negative    Heme/Lymph/Imm:  Negative    Endocrine:  Negative        PHYSICAL EXAM:      BP: 131/85 Pulse: 68     SpO2: 97 %      Vital Signs with Ranges  Pulse:  [68] 68  BP: (131-140)/(80-85) 131/85  SpO2:  [97 %] 97 %  225 lbs 0 oz    Constitutional: awake, alert, no distress  Respiratory: Clear to auscultation  Cardiovascular: Normal heart sounds soft systolic murmur no rubs or gallops JVP hard to see no edema  GI: nondistended  Neuropsychiatric: appropriate affact    DATA:  Labs: Pertinent cardiac labs reviewed    ASSESSMENT:  Coronary artery disease status post single-vessel bypass surgery    RECOMMENDATIONS:  1. Patient is doing well overall from a cardiac standpoint without any cardiac symptoms.  2. In regards to CAD, continue aspirin therapy for life.  He is statin intolerant.  His LDL is close to 100.  He is quite young.  After extensive discussion we have decided to rechallenge him with 20 mg of pravastatin.  Recheck lipid panel with an ABBEY follow-up in 2 months.  If he has side effects on pravastatin and or LDL is not coming close to the 50-70 range, I recommend starting Repatha.  3. See me back in 12 months routinely in clinic for follow-up.  4. Exercise weight loss healthy diet strongly advised to the patient.  5. Hypertension is now under good control.  Follow-up with PCP.    Spent a total of 40 minutes for this encounter today.    BHARGAVI Peraza, [facility]  Cardiology - [facility]  March 1, 2023    This note was completed in part using dictation via the Dragon voice recognition software. Although reviewed after completion, some word and grammatical  errors may occur and must be interpreted in the appropriate clinical context.  If there are any questions pertaining to this issue, please contact me for further clarification or information.

## 2023-03-01 NOTE — LETTER
3/1/2023    Remington Riggs MD  [address] [facility] [address]    RE: Bryce Espinoza       Dear Colleague,     [facility] the [facility].  CARDIOLOGY CLINIC CONSULTATION    REASON FOR CONSULT: Coronary artery disease    PRIMARY CARE PHYSICIAN:  Remington Riggs    Today's clinic visit entailed:  Review of the result(s) of each unique test - Echo labs cath EP records  40 minutes spent on the date of the encounter doing chart review, history and exam, documentation and further activities per the note  Provider  Link to [facility] Help Grid     The level of medical decision making during this visit was of moderate complexity.      HISTORY OF PRESENT ILLNESS:  This is a very pleasant 62-year-old gentleman who was seen by me 1 time in 2019.  He is here for follow-up at the [facility].  He has the following pertinent medical issues    1. History of coronary artery disease diagnosed in 2019 on coronary angiography subsequently he underwent single-vessel LIMA to LAD bypass surgery.  2. Carotid endarterectomy  3. Postoperative atrial fibrillation and flutter without recurrence subsequently seen by EP and stop anticoagulation due to lack of recurrence  4. Strong family history of coronary artery disease  5. Former smoker  6. Obesity  7. Hyperlipidemia hypertension  8. Statin intolerance    Patient is here for follow-up.  Denies any new cardiovascular symptoms.  No syncope presyncope.  No angina no heart failure.  He is functionally not very active.  Does not exercise.  He is obese with a BMI of 33.  Blood pressure has been recently running on the higher side.  PCP started amlodipine.  Numbers are now better.    He is statin intolerance.  He currently takes Zetia.  LDL is close to 100.    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     Olecranon bursitis 03/2001    right     Other and unspecified hyperlipidemia      Paroxysmal atrial fibrillation (H)      Paroxysmal atrial flutter (H)      RIGHT LUNG CALCIFIED GRANULOMA 02/2002    RML     Tobacco use disorder     Quit 1/03     Unspecified hemorrhoids without mention of complication 08/2001     Unspecified hereditary and idiopathic peripheral neuropathy        MEDICATIONS:  Current Outpatient Medications   Medication     amLODIPine (NORVASC) 5 MG tablet     aspirin (ASA) 81 MG chewable tablet     Cholecalciferol (VITAMIN D) 2000 UNITS tablet     Emollient (CERAVE) CREA     ezetimibe (ZETIA) 10 MG tablet     losartan-hydrochlorothiazide (HYZAAR) 100-25 MG tablet     metoprolol succinate ER (TOPROL XL) 50 MG 24 hr tablet     naproxen (NAPROSYN) 500 MG tablet     pravastatin (PRAVACHOL) 20 MG tablet     cephALEXin (KEFLEX) 500 MG capsule     No current facility-administered medications for this visit.       ALLERGIES:  Allergies   Allergen Reactions     Atorvastatin      Myalgias     Crestor [Rosuvastatin]      Myalgias       Lisinopril      Body aches pt d/mylene  06/2015       SOCIAL HISTORY:  I have reviewed this patient's social history and updated it with pertinent information if needed. Bryce KRISTINE Espinoza  reports that he quit smoking about 10 years ago. His smoking use included cigarettes. He has a 33.00 pack-year smoking history. He has never used smokeless tobacco. He reports current alcohol use. He reports current drug use.    FAMILY HISTORY:  I have reviewed this patient's family history and updated it with pertinent information if needed.   Family History   Problem Relation Age of Onset     Arthritis Mother         RA     Hypertension Mother      Breast Cancer Mother      Prostate Cancer Maternal  Grandfather         80     Cancer Maternal Grandmother         Lung     Coronary Artery Disease Father      Other - See Comments Brother      Diabetes Brother      Genetic Disorder Son        REVIEW OF SYSTEMS:  Skin:  Negative     Eyes:  Negative    ENT:  Negative    Respiratory:  Negative    Cardiovascular:  Negative for;palpitations;chest pain;dizziness;lightheadedness;fatigue;edema    Gastroenterology: Negative for melena;hematochezia  Genitourinary:  Negative    Musculoskeletal:  Negative nocturnal cramping  Neurologic:  Negative    Psychiatric:  Negative    Heme/Lymph/Imm:  Negative    Endocrine:  Negative        PHYSICAL EXAM:      BP: 131/85 Pulse: 68     SpO2: 97 %      Vital Signs with Ranges  Pulse:  [68] 68  BP: (131-140)/(80-85) 131/85  SpO2:  [97 %] 97 %  225 lbs 0 oz    Constitutional: awake, alert, no distress  Respiratory: Clear to auscultation  Cardiovascular: Normal heart sounds soft systolic murmur no rubs or gallops JVP hard to see no edema  GI: nondistended  Neuropsychiatric: appropriate affact    DATA:  Labs: Pertinent cardiac labs reviewed    ASSESSMENT:  Coronary artery disease status post single-vessel bypass surgery    RECOMMENDATIONS:  1. Patient is doing well overall from a cardiac standpoint without any cardiac symptoms.  2. In regards to CAD, continue aspirin therapy for life.  He is statin intolerant.  His LDL is close to 100.  He is quite young.  After extensive discussion we have decided to rechallenge him with 20 mg of pravastatin.  Recheck lipid panel with an ABBEY follow-up in 2 months.  If he has side effects on pravastatin and or LDL is not coming close to the 50-70 range, I recommend starting Repatha.  3. See me back in 12 months routinely in clinic for follow-up.  4. Exercise weight loss healthy diet strongly advised to the patient.  5. Hypertension is now under good control.  Follow-up with PCP.    Spent a total of 40 minutes for this encounter today.    BHARGAVI Peraza,  [facility]  Cardiology - [facility]  March 1, 2023    This note was completed in part using dictation via the Dragon voice recognition software. Although reviewed after completion, some word and grammatical errors may occur and must be interpreted in the appropriate clinical context.  If there are any questions pertaining to this issue, please contact me for further clarification or information.    Thank you for allowing me to participate in the care of your patient.      Sincerely,     Felton Blake MD     [facility]  cc:   Leah Thomson PA-C  [address] [place] [id number]

## 2023-03-03 ENCOUNTER — TELEPHONE (OUTPATIENT)
Dept: PODIATRY | Facility: CLINIC | Age: 63
End: 2023-03-03
Payer: COMMERCIAL

## 2023-03-03 NOTE — TELEPHONE ENCOUNTER
Per message left by patient at 8:34 am:  Jorge is out of the medication he was on for the bump on his foot.  He feels like it's been working and would like to have another weeks worth if that's a possibility.  Please contact him at [phone].

## 2023-03-03 NOTE — TELEPHONE ENCOUNTER
Left voicemail asking patient to return our call.  number provided: [phone].   Will need to find out the name of the medication he is inquiring about.     LEA Lucia RN

## 2023-03-07 ENCOUNTER — TELEPHONE (OUTPATIENT)
Dept: CARDIOLOGY | Facility: CLINIC | Age: 63
End: 2023-03-07
Payer: COMMERCIAL

## 2023-03-07 DIAGNOSIS — E78.5 HYPERLIPIDEMIA LDL GOAL <70: Primary | ICD-10-CM

## 2023-03-07 NOTE — TELEPHONE ENCOUNTER
Patient was seen at  on 2/17/23 and prescribed Keflex. He was later seen by Dr. Naik on 2/28/23. Patient had called last week regarding medication refill. In talking with patient today he notes significant improvement and no longer feels need for refill of antibiotic. He denies redness, swelling and pain. His primary complaint is itching around the area.     Notified patient that office will call patient back if Dr. Naik has any concerns, otherwise patient can call back at his convenience should concerns arise. Patient was in understanding.     Routing to provider as FYI. Please let staff know if any you have any concerns to be addressed with patient.     Carmina Hoover MSA, ATC  Certified Athletic Trainer

## 2023-03-07 NOTE — TELEPHONE ENCOUNTER
[facility] Call Center    Phone Message    May a detailed message be left on voicemail: yes     Reason for Call: Other: Please call pt back to discuss him not taking prevastatin. He develped bladder cancer shortly after starting and he will not take it again. Please call pt back to discuss.     Action Taken: Message routed to:  Other: Cardiology    Travel Screening: Not Applicable       Thank you!  [facility]

## 2023-03-07 NOTE — TELEPHONE ENCOUNTER
When I evaluated Jorge on 2/28/23, I did not appreciated any signs of infection. If he continues to do well, I don't think he needs a refill.  He should reach out and/or return to clinic, if things change.    Dr. Naik

## 2023-03-07 NOTE — TELEPHONE ENCOUNTER
I called pt back to discuss his pravastatin. Pt said he now recalls that he tried pravastatin in the past and had really bad muscle aching with it. Pt said he has a history of bladder cancer too and read that pravastatin can cause cancer. Pt wanted to know if there is a different statin or cholesterol medication he can try. I looked in his chart history and he has been intolerant to atorvastatin and rosuvastatin. I told pt it doesn't look like he has tried simvastatin, but if he had muscle aching on all of the other statins he is likely to have it with simvastatin too.  mentions pt possible starting repatha in his 3/1/23 OV note. I will send  an update. Bonilla WIGGINS March 7, 2023, 2:43 PM

## 2023-03-08 NOTE — TELEPHONE ENCOUNTER
Will message our prescription coverage team to find out if repatha or praluent is covered PCSK9 inhibitor with pt's insurance and price breakdown. Bonilla WIGGINS March 8, 2023, 9:57 AM

## 2023-03-09 NOTE — TELEPHONE ENCOUNTER
Milka Diaz N  You 8 hours ago (7:38 AM)     SM  Patient has Medical Assistance through [employer].     Praluent and Repatha are both covered after prior authorization.  Central PA Team can obtain this for you upon request. Note that patient has [employer] now, with ID [id number].     This insurance is very picky, so providing good evidence of the following to the PA team will increase the likelihood of approval:     Patient has been compliant for 3 months with high dose statin therapy (at minimum atorvastatin 40mg to 80mg daily or rosuvastatin 20mg to 40mg daily) OR at maximally tolerated statin therapy OR failure to tolerate at least two statins as documented in chart notes provided at time of request AND   If patient is tolerant to statins, confirmation is provided that statin therapy will continue along with PCSK9 therapy AND     Diagnosis of one of the following (documentation of diagnosis and LDL values must be provided at time of request):   Heterozygous or homozygous familial hypercholesterolemia with LDL >190mg/dL in adults or LDL >155mg/dL in children under age 16 OR   Clinical atherosclerotic cardiovascular disease with LDL >130mg/dL and one of the following risk factors:   Acute coronary syndromes   Myocardial infarction   Stable or unstable angina   Coronary or other arterial revascularization   Stroke   Peripheral artery disease presumed to be of atherosclerotic origin   Transient ischemic attack   Clinically significant coronary artery disease   Carotid artery occlusion >50%   Renal artery stenosis or renal artery stent procedure     I anticipate copay would be $3/mo after approval.     Milka Diaz   Pharmacy Technician/Liaison, Discharge Pharmacy   [phone] (voice or text)  [email or web address]

## 2023-03-09 NOTE — TELEPHONE ENCOUNTER
I called pt to discuss 's recommendation that he start repatha since he has been intolerant to multiple statins in the past. PA will be needed.    Pt's wife got on the phone and said pt is in between jobs right now so he has [employer] temporarily. Pt has a really high deductible right now. She is hesitant for him to start on the repatha until he knows what his insurance situation will be with his next job. She and pt want to know if he can try simvastatin at a low dose as that is a statin he has never tried. Pt also wants to work on losing weight through diet and eating healthier to lower his cholesterol. I will update  to see if he wants pt to try simvastatin at this time, or whether pt should just remain off of a statin at this time until he finds a new job, and is aware of his long term health insurance plan.

## 2023-03-09 NOTE — TELEPHONE ENCOUNTER
I called pt to discuss 's recommendation to start repatha since he has been intolerant to multiple statins in the past. PA will be needed with his current insurance. Pt's

## 2023-03-10 ENCOUNTER — MYC MEDICAL ADVICE (OUTPATIENT)
Dept: CARDIOLOGY | Facility: CLINIC | Age: 63
End: 2023-03-10
Payer: COMMERCIAL

## 2023-03-10 RX ORDER — SIMVASTATIN 10 MG
10 TABLET ORAL AT BEDTIME
Qty: 90 TABLET | Refills: 3 | Status: SHIPPED | OUTPATIENT
Start: 2023-03-10 | End: 2023-07-20 | Stop reason: SINTOL

## 2023-03-10 NOTE — TELEPHONE ENCOUNTER
Sure he can try simvastatin 10 mg daily and follow-up with us in clinic in 3 months to see how he is doing.

## 2023-03-10 NOTE — TELEPHONE ENCOUNTER
I left message for pt to call back to discuss 's recommendation to start simvastatin 10 mg daily. I will also send an update in my chart. Pt has a 5/8/23 visit with ABBEY Garcia so he can have flp/alt lab done at that time. Bonilla WIGGINS March 10, 2023, 3:44 PM

## 2023-03-13 ENCOUNTER — TELEPHONE (OUTPATIENT)
Dept: PODIATRY | Facility: CLINIC | Age: 63
End: 2023-03-13
Payer: COMMERCIAL

## 2023-03-13 NOTE — TELEPHONE ENCOUNTER
Right foot is just a little bit sore. No redness or swelling, but pain is not completely gone. Patient was told to call back if the pain did not completely go away.     Patient is requesting antibiotic. Pharmacy attached.    Can we leave a detailed message on this number? YES  Phone number patient can be reached at: Cell number on file:    Telephone Information:   Mobile [phone]       Dinah Max RN  [facility] Triage       3

## 2023-03-13 NOTE — TELEPHONE ENCOUNTER
Based on what was described, I'm not sure an antibiotic is indicated.  He certainly can present to urgent care or the ED, if things are worsening. It does not sound like they are.    I ask that he return to clinic for evaluation.  Perhaps another course of an antibiotic might be considered. We also discussed possible MRI, if the problem persists.    Dr. Naik

## 2023-03-14 ENCOUNTER — OFFICE VISIT (OUTPATIENT)
Dept: PODIATRY | Facility: CLINIC | Age: 63
End: 2023-03-14
Payer: COMMERCIAL

## 2023-03-14 VITALS
SYSTOLIC BLOOD PRESSURE: 126 MMHG | BODY MASS INDEX: 33.33 KG/M2 | WEIGHT: 225 LBS | DIASTOLIC BLOOD PRESSURE: 76 MMHG | HEIGHT: 69 IN

## 2023-03-14 DIAGNOSIS — L03.90 CELLULITIS, UNSPECIFIED CELLULITIS SITE: Primary | ICD-10-CM

## 2023-03-14 DIAGNOSIS — R23.4 SKIN FISSURE: ICD-10-CM

## 2023-03-14 DIAGNOSIS — M79.671 INFLAMMATORY PAIN OF RIGHT HEEL: ICD-10-CM

## 2023-03-14 PROCEDURE — 99213 OFFICE O/P EST LOW 20 MIN: CPT | Performed by: PODIATRIST

## 2023-03-14 RX ORDER — CEPHALEXIN 500 MG/1
500 CAPSULE ORAL 3 TIMES DAILY
Qty: 21 CAPSULE | Refills: 0 | Status: SHIPPED | OUTPATIENT
Start: 2023-03-14 | End: 2023-07-20

## 2023-03-14 NOTE — PROGRESS NOTES
ASSESSMENT:  Encounter Diagnoses   Name Primary?     Cellulitis, unspecified cellulitis site Yes     Inflammatory pain of right heel      Skin fissure, right heel      MEDICAL DECISION MAKING:  Interval improvement since I last evaluated Jorge on 2/28/2023.  Hyperpigmentation/erythema is very localized, yet in the region of his pain.  The bump palpated at his last visit was somewhat concerning for possible underlying abscess.    This is resolving.    Due to previous concern for infection and some ongoing pain, I am agreeable to 7 additional days of cephalexin.    Cephalexin 500 mg p.o. 3 times daily x7 days    He is to continue to monitor    Follow-up on an as-needed basis.    Disclaimer: This note consists of symbols derived from keyboarding, dictation and/or voice recognition software. As a result, there may be errors in the script that have gone undetected. Please consider this when interpreting information found in this chart.    Grey Naik DPM, FACFAS, MS    [place] [facility] & Ankle [facility]      ____________________________________________________________________    HPI:       Jorge presents in follow-up for what was considered cellulitis of his right heel likely secondary to a fissure that developed a dry hyperkeratotic skin.  He completed the oral cephalexin.  Pain was nearly gone.  Recently had some additional pain and inquired about additional antibiotic.  I asked that he present to clinic for evaluation.  Reports interval reduction in erythema and pain.  Jorge is applying the AmLactin cream that was prescribed and reports improvement in terms of the dry skin.  *  Past Medical History:   Diagnosis Date     Coronary artery disease involving native coronary artery of native heart without angina pectoris 07/04/2019    Per CT calcium score of 722.97 3/2019     Essential hypertension, benign      Hematuria      Malignant neoplasm of bladder, part unspecified 11/2002    Transitional Cell  Carcinoma bladder     Olecranon bursitis 03/2001    right     Other and unspecified hyperlipidemia      Paroxysmal atrial fibrillation (H)      Paroxysmal atrial flutter (H)      RIGHT LUNG CALCIFIED GRANULOMA 02/2002    RML     Tobacco use disorder     Quit 1/03     Unspecified hemorrhoids without mention of complication 08/2001     Unspecified hereditary and idiopathic peripheral neuropathy    *  *  Past Surgical History:   Procedure Laterality Date     BYPASS GRAFT ARTERY CORONARY N/A 9/25/2019    Procedure: CORONARY ARTERY BYPASS GRAFTING x 1 (LIMA - LAD) WITH CORONARY ENDARTERECTOMY  (ON PUMP OXYGENATOR ; HANK BY [facility]);  Surgeon: Magdi Turner MD;  Location:  OR     CV HEART CATHETERIZATION WITH POSSIBLE INTERVENTION N/A 9/6/2019    Procedure: Coronary Angiogram;  Surgeon: Nick Willson MD;  Location:  HEART CARDIAC CATH LAB     CV LEFT HEART CATH N/A 9/6/2019    Procedure: Left Heart Cath;  Surgeon: Nick Willson MD;  Location:  HEART CARDIAC CATH LAB     CV LEFT VENTRICULOGRAM N/A 9/6/2019    Procedure: Left Ventriculogram;  Surgeon: Nick Willson MD;  Location:  HEART CARDIAC CATH LAB     Z NONSPECIFIC PROCEDURE  2/00/02    EBCT   *  *  Current Outpatient Medications   Medication Sig Dispense Refill     amLODIPine (NORVASC) 5 MG tablet Take 1 tablet (5 mg) by mouth daily 90 tablet 3     aspirin (ASA) 81 MG chewable tablet Take 1 tablet (81 mg) by mouth daily       cephALEXin (KEFLEX) 500 MG capsule Take 1 capsule (500 mg) by mouth 3 times daily (Patient not taking: Reported on 3/1/2023) 30 capsule 0     Cholecalciferol (VITAMIN D) 2000 UNITS tablet Take 2,000 Units by mouth daily       Emollient (CERAVE) CREA Externally apply topically 2 times daily as needed (dry skin) 453 g 1     ezetimibe (ZETIA) 10 MG tablet Take 1 tablet (10 mg) by mouth daily 90 tablet 1     losartan-hydrochlorothiazide (HYZAAR) 100-25 MG tablet Take 50-12.5 mg (1/2 tablet) po bid 90 tablet 3      "metoprolol succinate ER (TOPROL XL) 50 MG 24 hr tablet Take 1 tablet (50 mg) by mouth 2 times daily 180 tablet 3     naproxen (NAPROSYN) 500 MG tablet Take 1 tablet (500 mg) by mouth 2 times daily (with meals) 30 tablet 3     simvastatin (ZOCOR) 10 MG tablet Take 1 tablet (10 mg) by mouth At Bedtime 90 tablet 3         EXAM:    Vitals: /76   Ht 1.753 m (5' 9\")   Wt 102.1 kg (225 lb)   BMI 33.23 kg/m    BMI: Body mass index is 33.23 kg/m .       Vascular:  Pedal pulses are palpable for both the DP and PT arteries.  CFT < 3 sec.  No edema.       Neuro: Light touch sensation is intact to the L4, L5, S1 distributions  No evidence of weakness, spasticity, or contracture in the lower extremities.      Derm: Generalized xerosis of the plantar skin, right foot.    Appears better moisturizing at his last visit.  Interval reduction of the previously palpable bump on the posterior right heel.  There is some hyperpigmented skin and a healing fissure in this region.  No drainage.  No erythema.    Musculoskeletal:    Lower extremity muscle strength is normal. No gross deformities      "

## 2023-03-14 NOTE — TELEPHONE ENCOUNTER
Phone call to patient and he was informed of recommendation of a follow up appointment to see if an antibiotic is warranted. Appointment scheduled for 10:00am today with Dr. Naik in [place] with a 9:45 am arrival. He verbalized understanding.     LEA Lucia RN

## 2023-03-14 NOTE — LETTER
3/14/2023         RE: Bryce Espinoza  [address] [facility] [address]        Dear Colleague,    Thank you for referring your patient, Bryce Espinoza, to the [facility]. Please see a copy of my visit note below.    ASSESSMENT:  Encounter Diagnoses   Name Primary?     Cellulitis, unspecified cellulitis site Yes     Inflammatory pain of right heel      Skin fissure, right heel      MEDICAL DECISION MAKING:  Interval improvement since I last evaluated Jorge on 2/28/2023.  Hyperpigmentation/erythema is very localized, yet in the region of his pain.  The bump palpated at his last visit was somewhat concerning for possible underlying abscess.    This is resolving.    Due to previous concern for infection and some ongoing pain, I am agreeable to 7 additional days of cephalexin.    Cephalexin 500 mg p.o. 3 times daily x7 days    He is to continue to monitor    Follow-up on an as-needed basis.    Disclaimer: This note consists of symbols derived from keyboarding, dictation and/or voice recognition software. As a result, there may be errors in the script that have gone undetected. Please consider this when interpreting information found in this chart.    Grey Naik DPM, FACFAS, MS    [place] [facility] of Podiatry/Foot & Ankle Surgery      ____________________________________________________________________    HPI:       Jorge presents in follow-up for what was considered cellulitis of his right heel likely secondary to a fissure that developed a dry hyperkeratotic skin.  He completed the oral cephalexin.  Pain was nearly gone.  Recently had some additional pain and inquired about additional antibiotic.  I asked that he present to clinic for evaluation.  Reports interval reduction in erythema and pain.  Jorge is applying the AmLactin cream that was prescribed and reports improvement in terms of the dry skin.  *  Past Medical History:   Diagnosis Date     Coronary artery  disease involving native coronary artery of native heart without angina pectoris 07/04/2019    Per CT calcium score of 722.97 3/2019     Essential hypertension, benign      Hematuria      Malignant neoplasm of bladder, 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     Olecranon bursitis 03/2001    right     Other and unspecified hyperlipidemia      Paroxysmal atrial fibrillation (H)      Paroxysmal atrial flutter (H)      RIGHT LUNG CALCIFIED GRANULOMA 02/2002    RML     Tobacco use disorder     Quit 1/03     Unspecified hemorrhoids without mention of complication 08/2001     Unspecified hereditary and idiopathic peripheral neuropathy    *  *  Past Surgical History:   Procedure Laterality Date     BYPASS GRAFT ARTERY CORONARY N/A 9/25/2019    Procedure: CORONARY ARTERY BYPASS GRAFTING x 1 (LIMA - LAD) WITH CORONARY ENDARTERECTOMY  (ON PUMP OXYGENATOR ; HANK BY [facility]);  Surgeon: Magdi Turner MD;  Location:  OR     CV HEART CATHETERIZATION WITH POSSIBLE INTERVENTION N/A 9/6/2019    Procedure: Coronary Angiogram;  Surgeon: Nick Willson MD;  Location:  HEART CARDIAC CATH LAB     CV LEFT HEART CATH N/A 9/6/2019    Procedure: Left Heart Cath;  Surgeon: Nick Willson MD;  Location:  HEART CARDIAC CATH LAB     CV LEFT VENTRICULOGRAM N/A 9/6/2019    Procedure: Left Ventriculogram;  Surgeon: Nick Willson MD;  Location:  HEART CARDIAC CATH LAB     ZZC NONSPECIFIC PROCEDURE  2/00/02    EBCT   *  *  Current Outpatient Medications   Medication Sig Dispense Refill     amLODIPine (NORVASC) 5 MG tablet Take 1 tablet (5 mg) by mouth daily 90 tablet 3     aspirin (ASA) 81 MG chewable tablet Take 1 tablet (81 mg) by mouth daily       cephALEXin (KEFLEX) 500 MG capsule Take 1 capsule (500 mg) by mouth 3 times daily (Patient not taking: Reported on 3/1/2023) 30 capsule 0     Cholecalciferol (VITAMIN D) 2000 UNITS tablet Take 2,000 Units by mouth daily       Emollient (CERAVE) CREA  "Externally apply topically 2 times daily as needed (dry skin) 453 g 1     ezetimibe (ZETIA) 10 MG tablet Take 1 tablet (10 mg) by mouth daily 90 tablet 1     losartan-hydrochlorothiazide (HYZAAR) 100-25 MG tablet Take 50-12.5 mg (1/2 tablet) po bid 90 tablet 3     metoprolol succinate ER (TOPROL XL) 50 MG 24 hr tablet Take 1 tablet (50 mg) by mouth 2 times daily 180 tablet 3     naproxen (NAPROSYN) 500 MG tablet Take 1 tablet (500 mg) by mouth 2 times daily (with meals) 30 tablet 3     simvastatin (ZOCOR) 10 MG tablet Take 1 tablet (10 mg) by mouth At Bedtime 90 tablet 3         EXAM:    Vitals: /76   Ht 1.753 m (5' 9\")   Wt 102.1 kg (225 lb)   BMI 33.23 kg/m    BMI: Body mass index is 33.23 kg/m .       Vascular:  Pedal pulses are palpable for both the DP and PT arteries.  CFT < 3 sec.  No edema.       Neuro: Light touch sensation is intact to the L4, L5, S1 distributions  No evidence of weakness, spasticity, or contracture in the lower extremities.      Derm: Generalized xerosis of the plantar skin, right foot.    Appears better moisturizing at his last visit.  Interval reduction of the previously palpable bump on the posterior right heel.  There is some hyperpigmented skin and a healing fissure in this region.  No drainage.  No erythema.    Musculoskeletal:    Lower extremity muscle strength is normal. No gross deformities          Again, thank you for allowing me to participate in the care of your patient.        Sincerely,        Grey Naik, EWELINA    "

## 2023-03-25 ENCOUNTER — HEALTH MAINTENANCE LETTER (OUTPATIENT)
Age: 63
End: 2023-03-25

## 2023-07-11 ENCOUNTER — NURSE TRIAGE (OUTPATIENT)
Dept: NURSING | Facility: CLINIC | Age: 63
End: 2023-07-11
Payer: COMMERCIAL

## 2023-07-11 ENCOUNTER — TELEPHONE (OUTPATIENT)
Dept: INTERNAL MEDICINE | Facility: CLINIC | Age: 63
End: 2023-07-11
Payer: COMMERCIAL

## 2023-07-11 NOTE — TELEPHONE ENCOUNTER
General Call    Contacts       Type Contact Phone/Fax    07/11/2023 06:46 PM CDT Phone (Incoming) Jorge Espinoza (Self) [phone] (M)        Reason for Call:  Pt needs to see  (or any doctor) for neuropathy because he's starting a new job next week. Ca you call pt if he can be seen soon? Thank you!    Could we send this information to you in [place] or would you prefer to receive a phone call?:   Patient would prefer a phone call     Okay to leave a detailed message?: Yes at Home number on file [phone] (home)

## 2023-07-11 NOTE — TELEPHONE ENCOUNTER
Triage Call:    Patient calling to report bilateral foot pain.  He suspects that he is having neuropathy, denies numbness, but reports burning sensation after long walks. He denies pain at rest, pain worst after a long walk.  He denies any abnormal appearance of his feet including discoloration or swelling.    According to the protocol, patient should see provider within 24 hours.  Care advice given. Patient verbalizes understanding and agrees with plan of care. Transferred to scheduling.     Carla Connors RN  07/11/23 6:42 PM  [facility] Nurse Advisor    Reason for Disposition    Weakness (i.e., loss of strength) of new-onset in foot or toes  (Exceptions: not truly weak, foot feels weak because of pain; weakness present > 2 weeks)    Additional Information    Negative: Followed a foot injury    Negative: Diabetes mellitus    Negative: Toe pain is main symptom    Negative: Ankle pain is main symptom    Negative: Thigh or calf pain is main symptom    Negative: Entire foot is cool or blue in comparison to other foot    Negative: Purple or black skin on foot or toe    Negative: [1] Red area or streak AND [2] fever    Negative: [1] Swollen foot AND [2] fever    Negative: Patient sounds very sick or weak to the triager    Negative: [1] SEVERE pain (e.g., excruciating, unable to do any normal activities) AND [2] not improved after 2 hours of pain medicine    Negative: [1] Looks infected (spreading redness, pus) AND [2] large red area (> 2 in. or 5 cm)    Negative: Looks like a boil, infected sore, or deep ulcer    Negative: [1] Redness of the skin AND [2] no fever    Negative: [1] Swollen foot AND [2] no fever  (Exceptions: localized bump from bunions, calluses, insect bite, sting)    Protocols used: FOOT PAIN-A-

## 2023-07-12 NOTE — TELEPHONE ENCOUNTER
Spoke with patient to relay PCP recommendations. Patient stated symptoms are not new and not severe and he would rather wait for OV with PCP. RN assisted patient to be scheduled with PCP for OV in [facility]. Patient did not have any additional questions or concerns at this time.

## 2023-07-12 NOTE — TELEPHONE ENCOUNTER
I am  not in  clinic the rest of the week and can't seen pt or address further. Message is not clear in regards to what pt is seeking. HAS known inflammatory pain in right foot for past 5 mos and has been seeing podiatry. Triage note yesterday states now bilateral foot pain with some burning sensation after long walks but no numbness or rest pain. Not mentions starting new job next week so not clear if requesting any restrictions or why job issue being mentioned. If acute need, then pt to be seen by other provider. If not acute issue, then may add onto my schedule in a open Vrt-Rel appt slot in the next couple weeks and see me in clinic for evaluation

## 2023-07-20 ENCOUNTER — ANCILLARY PROCEDURE (OUTPATIENT)
Dept: GENERAL RADIOLOGY | Facility: CLINIC | Age: 63
End: 2023-07-20
Attending: INTERNAL MEDICINE
Payer: COMMERCIAL

## 2023-07-20 ENCOUNTER — OFFICE VISIT (OUTPATIENT)
Dept: INTERNAL MEDICINE | Facility: CLINIC | Age: 63
End: 2023-07-20
Payer: COMMERCIAL

## 2023-07-20 VITALS
DIASTOLIC BLOOD PRESSURE: 73 MMHG | BODY MASS INDEX: 33.06 KG/M2 | HEIGHT: 69 IN | HEART RATE: 71 BPM | WEIGHT: 223.2 LBS | OXYGEN SATURATION: 95 % | TEMPERATURE: 97 F | SYSTOLIC BLOOD PRESSURE: 125 MMHG

## 2023-07-20 DIAGNOSIS — Z12.5 SCREENING FOR PROSTATE CANCER: ICD-10-CM

## 2023-07-20 DIAGNOSIS — E78.5 HYPERLIPIDEMIA LDL GOAL <70: ICD-10-CM

## 2023-07-20 DIAGNOSIS — M79.672 BILATERAL FOOT PAIN: Primary | ICD-10-CM

## 2023-07-20 DIAGNOSIS — M79.671 BILATERAL FOOT PAIN: Primary | ICD-10-CM

## 2023-07-20 DIAGNOSIS — I10 ESSENTIAL HYPERTENSION, BENIGN: ICD-10-CM

## 2023-07-20 DIAGNOSIS — M79.672 LEFT FOOT PAIN: ICD-10-CM

## 2023-07-20 LAB
ALBUMIN SERPL BCG-MCNC: 4.5 G/DL (ref 3.5–5.2)
ALP SERPL-CCNC: 90 U/L (ref 40–129)
ALT SERPL W P-5'-P-CCNC: 27 U/L (ref 0–70)
ANION GAP SERPL CALCULATED.3IONS-SCNC: 10 MMOL/L (ref 7–15)
AST SERPL W P-5'-P-CCNC: 25 U/L (ref 0–45)
BILIRUB SERPL-MCNC: 0.8 MG/DL
BUN SERPL-MCNC: 18.9 MG/DL (ref 8–23)
CALCIUM SERPL-MCNC: 9.7 MG/DL (ref 8.8–10.2)
CHLORIDE SERPL-SCNC: 107 MMOL/L (ref 98–107)
CHOLEST SERPL-MCNC: 161 MG/DL
CREAT SERPL-MCNC: 0.95 MG/DL (ref 0.67–1.17)
DEPRECATED HCO3 PLAS-SCNC: 26 MMOL/L (ref 22–29)
GFR SERPL CREATININE-BSD FRML MDRD: 90 ML/MIN/1.73M2
GLUCOSE SERPL-MCNC: 103 MG/DL (ref 70–99)
HDLC SERPL-MCNC: 37 MG/DL
LDLC SERPL CALC-MCNC: 105 MG/DL
NONHDLC SERPL-MCNC: 124 MG/DL
POTASSIUM SERPL-SCNC: 4 MMOL/L (ref 3.4–5.3)
PROT SERPL-MCNC: 6.8 G/DL (ref 6.4–8.3)
PSA SERPL DL<=0.01 NG/ML-MCNC: 1.03 NG/ML (ref 0–4.5)
SODIUM SERPL-SCNC: 143 MMOL/L (ref 136–145)
TRIGL SERPL-MCNC: 94 MG/DL

## 2023-07-20 PROCEDURE — 80053 COMPREHEN METABOLIC PANEL: CPT | Performed by: INTERNAL MEDICINE

## 2023-07-20 PROCEDURE — 73630 X-RAY EXAM OF FOOT: CPT | Mod: TC | Performed by: RADIOLOGY

## 2023-07-20 PROCEDURE — G0103 PSA SCREENING: HCPCS | Performed by: INTERNAL MEDICINE

## 2023-07-20 PROCEDURE — 80061 LIPID PANEL: CPT | Performed by: INTERNAL MEDICINE

## 2023-07-20 PROCEDURE — 99214 OFFICE O/P EST MOD 30 MIN: CPT | Performed by: INTERNAL MEDICINE

## 2023-07-20 PROCEDURE — 36415 COLL VENOUS BLD VENIPUNCTURE: CPT | Performed by: INTERNAL MEDICINE

## 2023-07-20 NOTE — PATIENT INSTRUCTIONS
Xray left foot   Rx for orthotics. Bring to  [facility] dept.  Call [phone]  Labs as ordered  Continue current medications  Reduce calorie/carbohydrate (sugar, bread, potato, pasta, rice, alcohol etc)  intake in diet.  Increase color on your plate with vegetables.

## 2023-07-20 NOTE — PROGRESS NOTES
ASSESSMENT:    1. Bilateral foot pain  Pain mostly left foot but bilateral.  Concern for left plantar heel spur.  X-ray left foot.  Prescription for orthotics bilateral.  If symptoms not improving, patient will see podiatry  - XR Foot Left G/E 3 Views; Future  - Orthotics and Prosthetics DME Orthotic; Foot Orthotics; Bilateral    2. Screening for prostate cancer  Candidate for screening  - Prostate Specific Antigen Screen; Future  - Prostate Specific Antigen Screen    3. Hyperlipidemia LDL goal <70  On Zetia.  Intolerant of statin trial x3 including simvastatin most recently.  Discussed possibility of Repatha or Nexletol but pt declines both trials at this time. Will inform MD if willing to try in future if covered by insurance/affordable  - Comprehensive metabolic panel; Future  - Lipid panel reflex to direct LDL Fasting; Future  - Comprehensive metabolic panel  - Lipid panel reflex to direct LDL Fasting    4. Essential hypertension, benign  Controlled.  Continue medication.  Labs ordered  - Comprehensive metabolic panel; Future  - Comprehensive metabolic panel        PLAN:   Xray left foot   Rx for orthotics. Bring to  [facility] dept.  Call [phone]  Labs as ordered  Continue current medications  Reduce calorie/carbohydrate (sugar, bread, potato, pasta, rice, alcohol etc)  intake in diet.  Increase color on your plate with vegetables for weight loss      (Chart documentation was completed, in part, with [employer] voice-recognition software. Even though reviewed, some grammatical, spelling, and word errors may remain.)    Remington Riggs MD  Internal Medicine Department  [facility]      Lauren Patel is a 62 year old, presenting for the following health issues:  Musculoskeletal Problem      History of Present Illness       Reason for visit:  Pain in feet  Symptom onset:  More than a month  Symptoms include:  Pain in feet  Symptom intensity:  Mild  Symptom progression:   "Staying the same  Had these symptoms before:  No  What makes it worse:  Walking 3 miles  What makes it better:  Getting off my feet    He eats 2-3 servings of fruits and vegetables daily.He consumes 0 sweetened beverage(s) daily.He exercises with enough effort to increase his heart rate 30 to 60 minutes per day.  He exercises with enough effort to increase his heart rate 6 days per week.   He is taking medications regularly.       Most recent lab results reviewed with pt.      Recently prescribed simvastatin by cardiology but developed muscle pain with it.  Intolerant to other statin trials.  Patient stopped the medication and myalgias resolved.  Not interested in Repatha or Nexletol trials at this time. Explained tpo pt that these were not statins.  History of pain bilateral feet no left is worse than right.  Gets pain usually within the ball of the foot but has some left heel pain also.  Weight is up 9 pounds more recently.  Has been eating more carbs in diet and not exercising as much.  Denies current chest pain, shortness of breath, abdominal pain       Additional ROS:   Constitutional, HEENT, Cardiovascular, Pulmonary, GI and , Neuro, MSK and Psych review of systems/symptoms are otherwise negative or unchanged from previous, except as noted above.      OBJECTIVE:  /73   Pulse 71   Temp 97  F (36.1  C) (Temporal)   Ht 1.753 m (5' 9\")   Wt 101.2 kg (223 lb 3.2 oz)   SpO2 95%   BMI 32.96 kg/m     Estimated body mass index is 32.96 kg/m  as calculated from the following:    Height as of this encounter: 1.753 m (5' 9\").    Weight as of this encounter: 101.2 kg (223 lb 3.2 oz).     Neck: no adenopathy. Thyroid normal to palpation. No bruits  Pulm: Lungs clear to auscultation   CV: Regular rates and rhythm  GI: Soft, nontender, Normal active bowel sounds, No hepatosplenomegaly or masses palpable  Ext: Peripheral pulses intact. No edema.  Mild tender palpation bilateral ball of foot and arches along with " tenderness to palpation left heel at plantar attachment point  Neuro: Normal strength and tone, sensory exam grossly normal

## 2023-08-10 ENCOUNTER — TELEPHONE (OUTPATIENT)
Dept: INTERNAL MEDICINE | Facility: CLINIC | Age: 63
End: 2023-08-10
Payer: COMMERCIAL

## 2023-08-21 ENCOUNTER — MYC MEDICAL ADVICE (OUTPATIENT)
Dept: CARDIOLOGY | Facility: CLINIC | Age: 63
End: 2023-08-21
Payer: COMMERCIAL

## 2023-08-21 DIAGNOSIS — I10 ESSENTIAL HYPERTENSION, BENIGN: ICD-10-CM

## 2023-08-22 ENCOUNTER — HOSPITAL ENCOUNTER (EMERGENCY)
Facility: CLINIC | Age: 63
Discharge: HOME | End: 2023-08-22
Attending: EMERGENCY MEDICINE | Admitting: EMERGENCY MEDICINE
Payer: COMMERCIAL

## 2023-08-22 ENCOUNTER — APPOINTMENT (OUTPATIENT)
Dept: CT IMAGING | Facility: CLINIC | Age: 63
End: 2023-08-22
Attending: EMERGENCY MEDICINE
Payer: COMMERCIAL

## 2023-08-22 ENCOUNTER — NURSE TRIAGE (OUTPATIENT)
Dept: INTERNAL MEDICINE | Facility: CLINIC | Age: 63
End: 2023-08-22
Payer: COMMERCIAL

## 2023-08-22 VITALS
BODY MASS INDEX: 31.84 KG/M2 | DIASTOLIC BLOOD PRESSURE: 88 MMHG | OXYGEN SATURATION: 98 % | HEART RATE: 65 BPM | HEIGHT: 69 IN | SYSTOLIC BLOOD PRESSURE: 150 MMHG | RESPIRATION RATE: 16 BRPM | TEMPERATURE: 97.1 F | WEIGHT: 215 LBS

## 2023-08-22 DIAGNOSIS — N13.2 HYDRONEPHROSIS WITH URINARY OBSTRUCTION DUE TO URETERAL CALCULUS: ICD-10-CM

## 2023-08-22 LAB
ALBUMIN SERPL BCG-MCNC: 4.1 G/DL (ref 3.5–5.2)
ALBUMIN UR-MCNC: NEGATIVE MG/DL
ALP SERPL-CCNC: 92 U/L (ref 40–129)
ALT SERPL W P-5'-P-CCNC: 23 U/L (ref 0–70)
ANION GAP SERPL CALCULATED.3IONS-SCNC: 11 MMOL/L (ref 7–15)
APPEARANCE UR: CLEAR
AST SERPL W P-5'-P-CCNC: 24 U/L (ref 0–45)
BASOPHILS # BLD AUTO: 0.1 10E3/UL (ref 0–0.2)
BASOPHILS NFR BLD AUTO: 0 %
BILIRUB SERPL-MCNC: 0.5 MG/DL
BILIRUB UR QL STRIP: NEGATIVE
BUN SERPL-MCNC: 18.9 MG/DL (ref 8–23)
CALCIUM SERPL-MCNC: 9.3 MG/DL (ref 8.8–10.2)
CHLORIDE SERPL-SCNC: 104 MMOL/L (ref 98–107)
COLOR UR AUTO: ABNORMAL
CREAT SERPL-MCNC: 0.92 MG/DL (ref 0.67–1.17)
DEPRECATED HCO3 PLAS-SCNC: 26 MMOL/L (ref 22–29)
EOSINOPHIL # BLD AUTO: 0.1 10E3/UL (ref 0–0.7)
EOSINOPHIL NFR BLD AUTO: 0 %
ERYTHROCYTE [DISTWIDTH] IN BLOOD BY AUTOMATED COUNT: 12.7 % (ref 10–15)
GFR SERPL CREATININE-BSD FRML MDRD: >90 ML/MIN/1.73M2
GLUCOSE SERPL-MCNC: 110 MG/DL (ref 70–99)
GLUCOSE UR STRIP-MCNC: NEGATIVE MG/DL
HCT VFR BLD AUTO: 44.9 % (ref 40–53)
HGB BLD-MCNC: 15.5 G/DL (ref 13.3–17.7)
HGB UR QL STRIP: ABNORMAL
HYALINE CASTS: 1 /LPF
IMM GRANULOCYTES # BLD: 0.1 10E3/UL
IMM GRANULOCYTES NFR BLD: 1 %
KETONES UR STRIP-MCNC: NEGATIVE MG/DL
LEUKOCYTE ESTERASE UR QL STRIP: NEGATIVE
LYMPHOCYTES # BLD AUTO: 1.1 10E3/UL (ref 0.8–5.3)
LYMPHOCYTES NFR BLD AUTO: 6 %
MCH RBC QN AUTO: 32.7 PG (ref 26.5–33)
MCHC RBC AUTO-ENTMCNC: 34.5 G/DL (ref 31.5–36.5)
MCV RBC AUTO: 95 FL (ref 78–100)
MONOCYTES # BLD AUTO: 1.7 10E3/UL (ref 0–1.3)
MONOCYTES NFR BLD AUTO: 10 %
MUCOUS THREADS #/AREA URNS LPF: PRESENT /LPF
NEUTROPHILS # BLD AUTO: 14.6 10E3/UL (ref 1.6–8.3)
NEUTROPHILS NFR BLD AUTO: 83 %
NITRATE UR QL: NEGATIVE
NRBC # BLD AUTO: 0 10E3/UL
NRBC BLD AUTO-RTO: 0 /100
PH UR STRIP: 5 [PH] (ref 5–7)
PLATELET # BLD AUTO: 216 10E3/UL (ref 150–450)
POTASSIUM SERPL-SCNC: 4.3 MMOL/L (ref 3.4–5.3)
PROT SERPL-MCNC: 6.5 G/DL (ref 6.4–8.3)
RBC # BLD AUTO: 4.74 10E6/UL (ref 4.4–5.9)
RBC URINE: 121 /HPF
SODIUM SERPL-SCNC: 141 MMOL/L (ref 136–145)
SP GR UR STRIP: 1.02 (ref 1–1.03)
SQUAMOUS EPITHELIAL: <1 /HPF
UROBILINOGEN UR STRIP-MCNC: NORMAL MG/DL
WBC # BLD AUTO: 17.6 10E3/UL (ref 4–11)
WBC URINE: 1 /HPF

## 2023-08-22 PROCEDURE — 85025 COMPLETE CBC W/AUTO DIFF WBC: CPT | Performed by: EMERGENCY MEDICINE

## 2023-08-22 PROCEDURE — 99284 EMERGENCY DEPT VISIT MOD MDM: CPT | Mod: 25

## 2023-08-22 PROCEDURE — 74176 CT ABD & PELVIS W/O CONTRAST: CPT

## 2023-08-22 PROCEDURE — 81001 URINALYSIS AUTO W/SCOPE: CPT | Performed by: EMERGENCY MEDICINE

## 2023-08-22 PROCEDURE — 96360 HYDRATION IV INFUSION INIT: CPT | Mod: 59

## 2023-08-22 PROCEDURE — 36415 COLL VENOUS BLD VENIPUNCTURE: CPT | Performed by: EMERGENCY MEDICINE

## 2023-08-22 PROCEDURE — 250N000013 HC RX MED GY IP 250 OP 250 PS 637: Performed by: EMERGENCY MEDICINE

## 2023-08-22 PROCEDURE — 258N000003 HC RX IP 258 OP 636: Performed by: EMERGENCY MEDICINE

## 2023-08-22 PROCEDURE — 80053 COMPREHEN METABOLIC PANEL: CPT | Performed by: EMERGENCY MEDICINE

## 2023-08-22 PROCEDURE — 51798 US URINE CAPACITY MEASURE: CPT

## 2023-08-22 RX ORDER — TAMSULOSIN HYDROCHLORIDE 0.4 MG/1
0.4 CAPSULE ORAL ONCE
Status: COMPLETED | OUTPATIENT
Start: 2023-08-22 | End: 2023-08-22

## 2023-08-22 RX ORDER — HYDROCODONE BITARTRATE AND ACETAMINOPHEN 5; 325 MG/1; MG/1
1 TABLET ORAL EVERY 6 HOURS PRN
Qty: 12 TABLET | Refills: 0 | Status: SHIPPED | OUTPATIENT
Start: 2023-08-22 | End: 2023-08-25

## 2023-08-22 RX ORDER — TAMSULOSIN HYDROCHLORIDE 0.4 MG/1
0.4 CAPSULE ORAL DAILY
Qty: 10 CAPSULE | Refills: 0 | Status: SHIPPED | OUTPATIENT
Start: 2023-08-22 | End: 2023-09-01

## 2023-08-22 RX ORDER — ACETAMINOPHEN 500 MG
1000 TABLET ORAL ONCE
Status: COMPLETED | OUTPATIENT
Start: 2023-08-22 | End: 2023-08-22

## 2023-08-22 RX ORDER — ONDANSETRON 4 MG/1
4 TABLET, ORALLY DISINTEGRATING ORAL EVERY 8 HOURS PRN
Qty: 10 TABLET | Refills: 0 | Status: SHIPPED | OUTPATIENT
Start: 2023-08-22 | End: 2023-08-25

## 2023-08-22 RX ADMIN — TAMSULOSIN HYDROCHLORIDE 0.4 MG: 0.4 CAPSULE ORAL at 13:38

## 2023-08-22 RX ADMIN — SODIUM CHLORIDE 1000 ML: 9 INJECTION, SOLUTION INTRAVENOUS at 13:38

## 2023-08-22 RX ADMIN — ACETAMINOPHEN 1000 MG: 500 TABLET, FILM COATED ORAL at 10:20

## 2023-08-22 ASSESSMENT — ACTIVITIES OF DAILY LIVING (ADL): ADLS_ACUITY_SCORE: 35

## 2023-08-22 NOTE — ED PROVIDER NOTES
"  History     Chief Complaint:  Urinary Retention       HPI   Bryce Espinoza is a 62 year old male with a past medical history of CAD, HTN, neoplasms of the bladder, and atrial fibrillation who presents with difficulty urinating. The patient states that he woke up at 0400 this morning and was able to urinate but then when he woke back up at 0500 he could not urinate and when he tried he had some dysuria. He has felt like he needs to urinate all morning but has only managed to a little. The patient had kidney stones in the past and this feels similar. He also had bladder cancer and had surgery through the penis but has never had problems urinating after that.      Independent Historian:   None - Patient Only    Review of External Notes:   None      Medications:    amLODIPine   aspirin 81 mg  ezetimibe   losartan-hydrochlorothiazide   metoprolol succinate     Past Medical History:    CAD  HTN  Malignant neoplasm of bladder  Hyperlipidemia  Atrial fibrillation  Right lung granuloma  Tobacco use  Hemorrhoids  DENYS  Neuropathy  Kidney stones    Past Surgical History:    CABG  Heart cath     Physical Exam   Patient Vitals for the past 24 hrs:   BP Temp Temp src Pulse Resp SpO2 Height Weight   08/22/23 0958 (!) 150/88 97.1  F (36.2  C) Temporal 65 16 98 % 1.753 m (5' 9\") 97.5 kg (215 lb)        Physical Exam  General: Patient is alert and normal appearing.  HEENT: Head atraumatic    Eyes: pupils equal and reactive. Conjunctiva clear   Nares: patent   Oropharynx: no lesions, uvula midline, no palatal draping, normal voice, no trismus  Neck: Supple without lymphadenopathy, no meningismus  Chest: Heart regular rate and rhythm.   Lungs: Equal clear to auscultation with no wheeze or rales  Abdomen: Soft, non tender, nondistended, normal bowel sounds  Back: No costovertebral angle tenderness, no midline C, T or L spine tenderness  Neuro: Grossly nonfocal, normal speech, strength equal bilaterally, CN 2-12 intact  Extremities: No " deformities, equal radial and DP pulses. No clubbing, cyanosis.  No edema  Skin: Warm and dry with no rash.       Emergency Department Course     Imaging:  CT Abdomen Pelvis w/o Contrast   Final Result   IMPRESSION:    1.  Obstructing 2 mm stone in the distal left ureter with trace   associated hydroureteronephrosis and perinephric/periureteric   stranding.      SANDRA CARABALLO MD            SYSTEM ID:  [id number]         Report per radiology    Laboratory:  Labs Ordered and Resulted from Time of ED Arrival to Time of ED Departure   UA MACROSCOPIC WITH REFLEX TO MICRO AND CULTURE - Abnormal       Result Value    Color Urine Light Yellow      Appearance Urine Clear      Glucose Urine Negative      Bilirubin Urine Negative      Ketones Urine Negative      Specific Gravity Urine 1.021      Blood Urine Large (*)     pH Urine 5.0      Protein Albumin Urine Negative      Urobilinogen Urine Normal      Nitrite Urine Negative      Leukocyte Esterase Urine Negative      Mucus Urine Present (*)     RBC Urine 121 (*)     WBC Urine 1      Squamous Epithelials Urine <1      Hyaline Casts Urine 1     COMPREHENSIVE METABOLIC PANEL - Abnormal    Sodium 141      Potassium 4.3      Chloride 104      Carbon Dioxide (CO2) 26      Anion Gap 11      Urea Nitrogen 18.9      Creatinine 0.92      Calcium 9.3      Glucose 110 (*)     Alkaline Phosphatase 92      AST 24      ALT 23      Protein Total 6.5      Albumin 4.1      Bilirubin Total 0.5      GFR Estimate >90     CBC WITH PLATELETS AND DIFFERENTIAL - Abnormal    WBC Count 17.6 (*)     RBC Count 4.74      Hemoglobin 15.5      Hematocrit 44.9      MCV 95      MCH 32.7      MCHC 34.5      RDW 12.7      Platelet Count 216      % Neutrophils 83      % Lymphocytes 6      % Monocytes 10      % Eosinophils 0      % Basophils 0      % Immature Granulocytes 1      NRBCs per 100 WBC 0      Absolute Neutrophils 14.6 (*)     Absolute Lymphocytes 1.1      Absolute Monocytes 1.7 (*)     Absolute  Eosinophils 0.1      Absolute Basophils 0.1      Absolute Immature Granulocytes 0.1      Absolute NRBCs 0.0        Emergency Department Course & Assessments:    Interventions:  Medications   tamsulosin (FLOMAX) capsule 0.4 mg (has no administration in time range)   0.9% sodium chloride BOLUS (has no administration in time range)   acetaminophen (TYLENOL) tablet 1,000 mg (1,000 mg Oral $Given 8/22/23 1020)      Assessments:  1140 Obtained the patients history and performed initial exam  1331 Rechecked patient and updated him on findings    Independent Interpretation (X-rays, CTs, rhythm strip):  T abdomen pelvis with no obstruction or free air      Social Determinants of Health affecting care:   None    Disposition:  The patient was discharged to home.     Impression & Plan      Medical Decision Making:  Bryce Espinoza is a 62 year old male who presented with unilateral flank & abdominal pain and a feeling of difficulty urinating consistent with renal colic. CT confirms a 2 mm ureteral stone at the distal left ureter.  Renal function is normal/baseline.  CT and lab workup show no other alternative etiology that could be causing his symptoms (e.g., AAA, appendicitis, pyelonephritis).  Patient with leukocytosis and stranding.  Ureter and perinephric but urinalysis without signs of UTI.  He is afebrile.  He has had no nausea vomiting or chills.  There is no fever or convincing evidence of a urinary tract infection. On recheck, his pain is controlled with interventions in the ED and he is tolerating POs. I will prescribe supportive medications and Flomax to facilitate stone passage. I have advised him to return for uncontrolled pain, vomiting, fever, or any other concerning symptoms. I also advised to strain his urine to look for a stone and submit it to his primary doctor for lab analysis.  Finally, I have advised follow up with urology within 3-5 days.  Strict return precautions for fever, inability to tolerate p.o.,  increase or worsening pain or other symptoms discussed.  Bladder scan was 0 cc of urine retained and do not feel he is having retention.  Patient is agreeable with the plan for discharge and return precautions reviewed      Diagnosis:    ICD-10-CM    1. Hydronephrosis with urinary obstruction due to ureteral calculus  N13.2            Discharge Medications:  New Prescriptions    HYDROCODONE-ACETAMINOPHEN (NORCO) 5-325 MG TABLET    Take 1 tablet by mouth every 6 hours as needed for pain    ONDANSETRON (ZOFRAN ODT) 4 MG ODT TAB    Take 1 tablet (4 mg) by mouth every 8 hours as needed for nausea    TAMSULOSIN (FLOMAX) 0.4 MG CAPSULE    Take 1 capsule (0.4 mg) by mouth daily for 10 doses        Scribe Disclosure:  Judson LINCOLN, am serving as a scribe at 11:47 AM on 8/22/2023 to document services personally performed by Adelaida Dennison MD based on my observations and the provider's statements to me.     8/22/2023   Adelaida Dennison MD Neuner, Maria Bea, MD  08/22/23 8915

## 2023-08-22 NOTE — TELEPHONE ENCOUNTER
"TO PCP    S-(situation): decreased  urination throughout night, now feels like full bladder and unable to urinate.     B-(background): hx of malignant neoplasm of bladder    A-(assessment): patient states, \"it really hurts done there,\" hx of bladder CA, full feeling of bladder and unable to urinate.     R-(recommendations): Disposition state to go to ED now. Writer advised patient seek medical attention in ED at this time as  he states he cannot wait for U/C to open either.  Patient in agreement with plan of care. He will go to ED.     Sallie Torres RN on 8/22/2023 at 8:32 AM      Reason for Disposition   Pus (white, yellow) or bloody discharge from end of penis   Pain or burning with passing urine (urination) is main symptom   Unable to urinate (or only a few drops) and bladder feels very full    Additional Information   Negative: Sounds like a life-threatening emergency to the triager   Negative: Shock suspected (e.g., cold/pale/clammy skin, too weak to stand, low BP, rapid pulse)   Negative: Shock suspected (e.g., cold/pale/clammy skin, too weak to stand, low BP, rapid pulse)   Negative: Sounds like a life-threatening emergency to the triager   Negative: Followed a female genital area injury (e.g., vagina, vulva)   Negative: Followed a male genital area injury (penis, scrotum)   Negative: Vaginal discharge    Answer Assessment - Initial Assessment Questions  1. SYMPTOM: \"What's the main symptom you're concerned about?\" (e.g., frequency, incontinence)      Pain   2. ONSET: \"When did the  pain   start?\"      It started at 3 am this morning  3. PAIN: \"Is there any pain?\" If Yes, ask: \"How bad is it?\" (Scale: 1-10; mild, moderate, severe)      Yes.   4. CAUSE: \"What do you think is causing the symptoms?\"      I don't know   5. OTHER SYMPTOMS: \"Do you have any other symptoms?\" (e.g., fever, flank pain, blood in urine, pain with urination)      Unable to urinate  6. PREGNANCY: \"Is there any chance you are pregnant?\" " "\"When was your last menstrual period?\"      N/A    Protocols used: Urinary Symptoms-A-OH, Penis and Scrotum Symptoms-A-OH, Urination Pain - Male-A-OH    "

## 2023-08-22 NOTE — ED TRIAGE NOTES
Awoke at 0200 to urinate and had no issue. One hour later had to urinate again but could not pass any urine.  Complains of full bladder pain. Denies fever, chills. Bladder cancer in 2002.  Remote history of kidney stones.  Up to bathroom in triage and unable to urinate.

## 2023-08-23 ENCOUNTER — PATIENT OUTREACH (OUTPATIENT)
Dept: INTERNAL MEDICINE | Facility: CLINIC | Age: 63
End: 2023-08-23
Payer: COMMERCIAL

## 2023-08-23 NOTE — TELEPHONE ENCOUNTER
"  ED for acute condition Discharge Protocol    \"Hi, my name is Shanae Chapman RN, a registered nurse, and I am calling from [facility].  I am calling to follow up and see how things are going for you after your recent emergency visit.\"    Tell me how you are doing now that you are home?\" I passed the kidney stone this morning.     Patient does not have any pain, hematuria, or other symptoms at this time.     Discharge Instructions    \"Let's review your discharge instructions.  What is/are the follow-up recommendations?  Pt. Response: No follow-up needed.    \"Has an appointment with your primary care provider been scheduled?\"  No (not needed)    Medications    \"Tell me what changed about your medicines when you discharged?\"    No permanent changes. Flomax daily for 10 days, Hydrocodone and Zofran PRN.     \"What questions do you have about your medications?\"   None        Call Summary    \"What questions or concerns do you have about your recent visit and your follow-up care?\"     none    \"If you have questions or things don't continue to improve, we encourage you contact us through the main clinic number (give number).  Even if the clinic is not open, triage nurses are available 24/7 to help you.     We would like you to know that our clinic has extended hours (provide information).  We also have urgent care (provide details on closest location and hours/contact info)\"    \"Thank you for your time and take care!\"          "

## 2023-08-23 NOTE — TELEPHONE ENCOUNTER
What type of discharge? Emergency Department  Risk of Hospital admission or ED visit: 66%  Is a TCM episode required? No  When should the patient follow up with PCP? within 30 days of discharge.    Hung Acuna RN  [facility]

## 2023-08-29 RX ORDER — LOSARTAN POTASSIUM AND HYDROCHLOROTHIAZIDE 25; 100 MG/1; MG/1
1 TABLET ORAL EVERY EVENING
Start: 2023-08-29 | End: 2023-12-19

## 2023-09-12 DIAGNOSIS — E78.5 HYPERLIPIDEMIA LDL GOAL <70: ICD-10-CM

## 2023-09-12 RX ORDER — EZETIMIBE 10 MG/1
10 TABLET ORAL DAILY
Qty: 90 TABLET | Refills: 1 | Status: SHIPPED | OUTPATIENT
Start: 2023-09-12 | End: 2024-03-27

## 2023-09-18 NOTE — TELEPHONE ENCOUNTER
"Recevied phone call from eBba Kelly RN, at [facility] at 0921.  She stated Bryce sent a message to them re: Eliquis and joint pain and is wanting to know if this can be stopped.      Per Dr. Mcgovern' note in 12/2019, patient developed atrial arrhythmias post bypass in 9/2019  Patient was minimally symptomatic with these episodes.     Further:    \"Atrial arrhythmias, predominantly typical atrial flutter, have been intermittently documented in Cardiac Rehab.  The patient completed a 14-day cardiac monitor in late November/early December.  This revealed paroxysmal atrial fibrillation with burden of 1%, longest event of 4.5 hours.  This event occurred in the early a.m. hours.\"      \"Currently, Mr. Espinoza does not have symptoms from atrial fibrillation.  For the time being, I would continue metoprolol, essentially rate control treatment, for his infrequent atrial fibrillation.  I agree with anticoagulation with Eliquis given his [id number] score of 2 (vascular disease, hypertension).\"    Sleep consult was ordered to check for underlying sleep apnea as a potential contributing factor to Aflutter.  He has followed up with Dr. Gerard and was recommended to have home sleep apnea testing completed.  This currently has not been scheduled.    On review of Dr. Riggs's note from today at 0944, he stated it is unlikely that patient would have severe myalgias with Eliquis.  He is recommending patient stop his Rosuvastatin/Crestor for 2 week to see if his symptoms improve and then follow-up with Dr. Riggs in 2 weeks to discuss further treatment options.      Called and spoke with Beba Guerrero RN at [facility], and she stated that it appears that patient's concern was addressed by Dr. Riggs.  They will call if they have further questions.      Pamela WIGGINS    " proximal phalax palar aspect with approximately a 1 cm linear laceration with no active bleeding, discharge, swelling, redness, streaking. Pt with reduce range of motion to the DIP but has full range of motion to the PIP and MCP. Pt has full sensation to the entire finger/hand/wrist. Cap refill < 2 second skin is warm pink and perfusing well/finger

## 2023-09-27 ENCOUNTER — MYC MEDICAL ADVICE (OUTPATIENT)
Dept: CARDIOLOGY | Facility: CLINIC | Age: 63
End: 2023-09-27
Payer: COMMERCIAL

## 2023-10-10 DIAGNOSIS — Z95.1 S/P CABG (CORONARY ARTERY BYPASS GRAFT): ICD-10-CM

## 2023-10-10 DIAGNOSIS — I10 HYPERTENSION, UNSPECIFIED TYPE: ICD-10-CM

## 2023-10-10 RX ORDER — METOPROLOL SUCCINATE 50 MG/1
50 TABLET, EXTENDED RELEASE ORAL 2 TIMES DAILY
Qty: 180 TABLET | Refills: 3 | OUTPATIENT
Start: 2023-10-10

## 2023-10-11 RX ORDER — CLONIDINE HYDROCHLORIDE 0.1 MG/1
TABLET ORAL
Qty: 30 TABLET | Refills: 0 | OUTPATIENT
Start: 2023-10-11

## 2023-10-11 NOTE — TELEPHONE ENCOUNTER
Chart shoes pt not taking this med at this time. Call pt and find out if has been taking this despite prior removal by other   person last year and if taking, how often and then route back to me to review further

## 2023-10-26 ENCOUNTER — NURSE TRIAGE (OUTPATIENT)
Dept: INTERNAL MEDICINE | Facility: CLINIC | Age: 63
End: 2023-10-26
Payer: COMMERCIAL

## 2023-10-26 ENCOUNTER — TELEPHONE (OUTPATIENT)
Dept: INTERNAL MEDICINE | Facility: CLINIC | Age: 63
End: 2023-10-26

## 2023-10-26 ENCOUNTER — OFFICE VISIT (OUTPATIENT)
Dept: URGENT CARE | Facility: URGENT CARE | Age: 63
End: 2023-10-26
Payer: COMMERCIAL

## 2023-10-26 VITALS
RESPIRATION RATE: 16 BRPM | SYSTOLIC BLOOD PRESSURE: 125 MMHG | OXYGEN SATURATION: 97 % | WEIGHT: 222.2 LBS | HEART RATE: 63 BPM | DIASTOLIC BLOOD PRESSURE: 85 MMHG | TEMPERATURE: 97.9 F | BODY MASS INDEX: 32.81 KG/M2

## 2023-10-26 DIAGNOSIS — R31.0 GROSS HEMATURIA: Primary | ICD-10-CM

## 2023-10-26 LAB
ALBUMIN UR-MCNC: 30 MG/DL
APPEARANCE UR: CLEAR
BACTERIA #/AREA URNS HPF: ABNORMAL /HPF
BILIRUB UR QL STRIP: ABNORMAL
COLOR UR AUTO: ABNORMAL
ERYTHROCYTE [DISTWIDTH] IN BLOOD BY AUTOMATED COUNT: 12.4 % (ref 10–15)
GLUCOSE UR STRIP-MCNC: NEGATIVE MG/DL
HCT VFR BLD AUTO: 45.7 % (ref 40–53)
HGB BLD-MCNC: 15.3 G/DL (ref 13.3–17.7)
HGB UR QL STRIP: ABNORMAL
KETONES UR STRIP-MCNC: NEGATIVE MG/DL
LEUKOCYTE ESTERASE UR QL STRIP: NEGATIVE
MCH RBC QN AUTO: 32.1 PG (ref 26.5–33)
MCHC RBC AUTO-ENTMCNC: 33.5 G/DL (ref 31.5–36.5)
MCV RBC AUTO: 96 FL (ref 78–100)
NITRATE UR QL: NEGATIVE
PH UR STRIP: 5.5 [PH] (ref 5–7)
PLATELET # BLD AUTO: 225 10E3/UL (ref 150–450)
RBC # BLD AUTO: 4.77 10E6/UL (ref 4.4–5.9)
RBC #/AREA URNS AUTO: ABNORMAL /HPF
SP GR UR STRIP: >=1.03 (ref 1–1.03)
SQUAMOUS #/AREA URNS AUTO: ABNORMAL /LPF
TRANS CELLS #/AREA URNS HPF: ABNORMAL /HPF
UROBILINOGEN UR STRIP-ACNC: 0.2 E.U./DL
WBC # BLD AUTO: 10.9 10E3/UL (ref 4–11)
WBC #/AREA URNS AUTO: ABNORMAL /HPF

## 2023-10-26 PROCEDURE — 99213 OFFICE O/P EST LOW 20 MIN: CPT | Performed by: PHYSICIAN ASSISTANT

## 2023-10-26 PROCEDURE — 85027 COMPLETE CBC AUTOMATED: CPT | Performed by: PHYSICIAN ASSISTANT

## 2023-10-26 PROCEDURE — 36415 COLL VENOUS BLD VENIPUNCTURE: CPT | Performed by: PHYSICIAN ASSISTANT

## 2023-10-26 PROCEDURE — 81001 URINALYSIS AUTO W/SCOPE: CPT | Performed by: PHYSICIAN ASSISTANT

## 2023-10-26 NOTE — TELEPHONE ENCOUNTER
Pt was seen in UC today 10/26/2023 and instructed to schedule an urgent urology appointment.     Pt reporting he was unable to schedule appointment within 5 days as recommended in UC.     Hx: Pt of Dr. Ventura: bladder cancer in 2001.     Nov 8th, 2023.     Pt is asking Dr. Riggs to call Dr. Ventura and ask him if her can be seen sooner. Requested pt contact Dr. Ventura's office and request a sooner appointment. Pt stated her already attempted that and wanted a message sent to Dr. Riggs asking him to call Dr. Ventura and request a sooner appointment.

## 2023-10-26 NOTE — TELEPHONE ENCOUNTER
"Situation: Patient can see blood in his urine. No other symptoms.  Background: Patient  has kidney stone in August.   Assessment: Patient will need appointment today.   Recommendation:  No appointments in [facility], patient will now go to [facility] urgent care.       Answer Assessment - Initial Assessment Questions  1. SYMPTOM: \"What's the main symptom you're concerned about?\" (e.g., frequency, incontinence)      Blood in urine.   2. ONSET: \"When did the  start?\"      Today.   3. PAIN: \"Is there any pain?\" If Yes, ask: \"How bad is it?\" (Scale: 1-10; mild, moderate, severe)      No  4. CAUSE: \"What do you think is causing the symptoms?\"      Possible kidney stone?   5. OTHER SYMPTOMS: \"Do you have any other symptoms?\" (e.g., blood in urine, fever, flank pain, pain with urination)      No  6. PREGNANCY: \"Is there any chance you are pregnant?\" \"When was your last menstrual period?\"      N/A    Protocols used: [facility]        Mare Mosher RN  -[facility]     "

## 2023-10-26 NOTE — PATIENT INSTRUCTIONS
Recommend urgent follow-up with urology, call to get this scheduled now  Blood work pending, will call if abnormal or urgent results.  If developing more severe groin pain, signs of blood loss including dizziness/ lightheadedness, persistent large amounts of blood or clots in the urine, fevers, nausea, vomiting, you should be seen in the Emergency Room.

## 2023-10-26 NOTE — PROGRESS NOTES
Gross hematuria  - UA Macroscopic with reflex to Microscopic and Culture - Clinic Collect  - UA Microscopic with Reflex to Culture  - CBC with platelets; Future  - Adult Urology  Referral; Future    PLAN:  --Recommend close follow-up with urology within 3-5 days. He is established with [facility], but can't see them until after 11/1 due to insurance coverage. Placed [facility] referral if he can be seen sooner.   --Low threshold for ER evaluation if developing dizziness, lethargy, weakness, vomiting.   --CBC pending, will call with abnormal/urgent results.     Follow-up:   Patient was advised to follow-up with urology in 3-5 days, either [facility] or [facility], referral placed. Patient educated on red flag symptoms and asked to go directly to the ED if these symptoms present themselves.     Chief Complaint   Patient presents with    UTI     Noticed today, some pain with urinating, no frequency or urgency to urinate, passed kidney stone in August, no fever       Subjective   HPI   Bryce Espinoza is a 63 year old male who presents with hematuria and pain in the right groin/pubic region that started today. He has a history of bladder cancer in 2001 and recently passed a kidney stone in August. He reports the kidney stone in August as much more painful than what he is experiencing now. He would describe the blood in the urine this morning as brown colored and large amount, filling the toilet. He did not see any clots. He rates the right sided pubic pain at 3/10 currently. Denies increased frequency, urgency, difficulty starting or stopping stream, penile pain, nausea, vomiting, fevers, dizziness or lightheadedness.    Past Medical History:   Diagnosis Date    Coronary artery disease involving native coronary artery of native heart without angina pectoris 07/04/2019    Per CT calcium score of 722.97 3/2019    Essential hypertension, benign     Hematuria     Malignant neoplasm of bladder, part  unspecified 11/2002    Transitional Cell Carcinoma bladder    Olecranon bursitis 03/2001    right    Other and unspecified hyperlipidemia     Paroxysmal atrial fibrillation (H)     Paroxysmal atrial flutter (H)     RIGHT LUNG CALCIFIED GRANULOMA 02/2002    RML    Tobacco use disorder     Quit 1/03    Unspecified hemorrhoids without mention of complication 08/2001    Unspecified hereditary and idiopathic peripheral neuropathy      Current Outpatient Medications   Medication Sig Dispense Refill    amLODIPine (NORVASC) 5 MG tablet Take 1 tablet (5 mg) by mouth daily 90 tablet 3    aspirin (ASA) 81 MG chewable tablet Take 1 tablet (81 mg) by mouth daily      Cholecalciferol (VITAMIN D) 2000 UNITS tablet Take 2,000 Units by mouth daily      ezetimibe (ZETIA) 10 MG tablet Take 1 tablet (10 mg) by mouth daily 90 tablet 1    losartan-hydrochlorothiazide (HYZAAR) 100-25 MG tablet Take 1 tablet by mouth every evening      metoprolol succinate ER (TOPROL XL) 50 MG 24 hr tablet Take 1 tablet (50 mg) by mouth 2 times daily 180 tablet 3     Social History     Tobacco Use    Smoking status: Former     Packs/day: 1.00     Years: 33.00     Additional pack years: 0.00     Total pack years: 33.00     Types: Cigarettes     Quit date: 2/2/2013     Years since quitting: 10.7    Smokeless tobacco: Never   Substance Use Topics    Alcohol use: Yes     Comment: weekends        Allergies   Allergen Reactions    Atorvastatin      Myalgias    Crestor [Rosuvastatin]      Myalgias      Lisinopril      Body aches pt d/mylene  06/2015    Simvastatin      Myalgias         Review of Systems   Review of Systems   All systems negative except for those listed above in HPI.        Objective    Temp: 97.9  F (36.6  C) Temp src: Oral BP: 125/85 Pulse: 63   Resp: 16 SpO2: 97 %       Physical Exam   Physical Exam  Vitals reviewed.   Constitutional:       Appearance: Normal appearance.   Cardiovascular:      Rate and Rhythm: Normal rate and regular rhythm.       Pulses: Normal pulses.      Heart sounds: Normal heart sounds.   Pulmonary:      Effort: Pulmonary effort is normal.      Breath sounds: Normal breath sounds.   Abdominal:      General: There is distension.      Tenderness: There is no abdominal tenderness. There is no right CVA tenderness, left CVA tenderness, guarding or rebound.   Skin:     General: Skin is warm and dry.      Capillary Refill: Capillary refill takes less than 2 seconds.   Neurological:      General: No focal deficit present.      Mental Status: He is alert.   Psychiatric:         Mood and Affect: Mood normal.         Behavior: Behavior normal.          Results for orders placed or performed in visit on 10/26/23 (from the past 24 hour(s))   UA Macroscopic with reflex to Microscopic and Culture - Clinic Collect    Specimen: Urine, Clean Catch   Result Value Ref Range    Color Urine Brown (A) Colorless, Straw, Light Yellow, Yellow    Appearance Urine Clear Clear    Glucose Urine Negative Negative mg/dL    Bilirubin Urine Small (A) Negative    Ketones Urine Negative Negative mg/dL    Specific Gravity Urine >=1.030 1.003 - 1.035    Blood Urine Large (A) Negative    pH Urine 5.5 5.0 - 7.0    Protein Albumin Urine 30 (A) Negative mg/dL    Urobilinogen Urine 0.2 0.2, 1.0 E.U./dL    Nitrite Urine Negative Negative    Leukocyte Esterase Urine Negative Negative   UA Microscopic with Reflex to Culture   Result Value Ref Range    Bacteria Urine Many (A) None Seen /HPF    RBC Urine 25-50 (A) 0-2 /HPF /HPF    WBC Urine 5-10 (A) 0-5 /HPF /HPF    Squamous Epithelials Urine Few (A) None Seen /LPF    Transitional Epithelials Urine Few (A) None Seen /HPF    Narrative    Urine Culture not indicated             Rosa Shirley NP Student  [facility]

## 2023-10-27 NOTE — TELEPHONE ENCOUNTER
Janes, nurse with [facility], Dr Ventura, returned call.     Patient unable to schedule appointment prior to 11/1/23 due to insurance.   Currently scheduled with Dr Ventura 11/8/23.   Urology has tried to contact patient for possible earlier appointment 11/3/23, however unable to reach patient or leave message (voice mail box full).   [facility] will continue to try to contact patient regarding appointment.     Lena HOLDEN, RN      October 27, 2023  1:05 PM

## 2023-10-27 NOTE — TELEPHONE ENCOUNTER
Attempted to call and speak with a nurse at [facility], however was on hold for 20 minutes and unable to continue holding.     Will follow-up later today and try again to reach a nurse.     Thank you,  Maryjane Murphy RN

## 2023-10-27 NOTE — TELEPHONE ENCOUNTER
Called and left message for Janes at [facility] (team member with Dr. Ventura), and asked him to call the clinic back regarding this pt.     Upon call back, please relay provider message below and inquire if pt can be seen sooner at [facility] (within 5 days per provider recommendation).     Will plan to follow-up later today if no call back.     Thank you,  Maryjane Murphy RN

## 2023-10-27 NOTE — TELEPHONE ENCOUNTER
Only info I have to review is urgent care note as I did not see pt. Hx bladder cancer in past and had kidney stone AUg 2023 with ER visit. Today had gross hematuria when urinating but did not see clots. Not having pain in back like when had kidney stone obstruction in past. Apparently followed by [facility] and Dr Ventura in past. No recent notes in chart so not known when pt last scoped in bladder for follow-up of his bladder cancer. Last CT abd Aug 2023 showed one distal left ureter stone that likely has passed since then.  [facility] not able to get pt seen in 5 days as requested by Urgent Care provider. I can't pas on any more info to Dr Ventura or other provider than what I have written above taken from Urgent care note. Please call [facility] at [phone] and ask to talk to Dr Ventura's nurse and explain situation as above and ask if they are able to get pt seen by Dr Ventura or partner in the next 5 days if possible and to call pt if they are able to schedule. If not, then route back to me

## 2023-11-08 ENCOUNTER — TRANSFERRED RECORDS (OUTPATIENT)
Dept: HEALTH INFORMATION MANAGEMENT | Facility: CLINIC | Age: 63
End: 2023-11-08
Payer: COMMERCIAL

## 2023-12-08 ENCOUNTER — TRANSFERRED RECORDS (OUTPATIENT)
Dept: HEALTH INFORMATION MANAGEMENT | Facility: CLINIC | Age: 63
End: 2023-12-08
Payer: COMMERCIAL

## 2023-12-19 DIAGNOSIS — I10 ESSENTIAL HYPERTENSION, BENIGN: ICD-10-CM

## 2023-12-19 RX ORDER — LOSARTAN POTASSIUM AND HYDROCHLOROTHIAZIDE 25; 100 MG/1; MG/1
1 TABLET ORAL EVERY EVENING
Qty: 90 TABLET | Refills: 0 | Status: SHIPPED | OUTPATIENT
Start: 2023-12-19 | End: 2024-03-27

## 2024-01-23 ENCOUNTER — TELEPHONE (OUTPATIENT)
Dept: INTERNAL MEDICINE | Facility: CLINIC | Age: 64
End: 2024-01-23
Payer: COMMERCIAL

## 2024-01-23 DIAGNOSIS — K40.90 RIGHT INGUINAL HERNIA: Primary | ICD-10-CM

## 2024-01-23 NOTE — TELEPHONE ENCOUNTER
Pt previously met in 2022 with Dr Nuno Lowe at the [place] division of [facility]. Pt to call to schedule follow-up consultation appt since > 1 year since seen by surgeon     [facility]   [address], [address]   [facility] [address]   Phone: [phone]

## 2024-01-23 NOTE — TELEPHONE ENCOUNTER
"Pt called the clinic asking for the \"name of the jacobo who does the double hernia surgery\" so he can call and make an appt.      Pt stated he was going to have the surgery done last year, but was having issues with his BP.     Pt stated Dr Riggs knows who the doctor is but pt couldn't remember the providers name.     Pt stated his hernias are getting worse (more pain). Pt declined triage at this time.     Routing to PCP to review and advise.     Please call pt back with PCPs recommendations.   "

## 2024-01-24 NOTE — TELEPHONE ENCOUNTER
Called and spoke with pt. Relayed Dr Riggs's message from below. Provided pt with # listed below. Pt is appreciative.

## 2024-02-13 ENCOUNTER — OFFICE VISIT (OUTPATIENT)
Dept: SURGERY | Facility: CLINIC | Age: 64
End: 2024-02-13
Payer: COMMERCIAL

## 2024-02-13 VITALS
HEART RATE: 60 BPM | DIASTOLIC BLOOD PRESSURE: 82 MMHG | RESPIRATION RATE: 16 BRPM | HEIGHT: 69 IN | BODY MASS INDEX: 32.58 KG/M2 | SYSTOLIC BLOOD PRESSURE: 120 MMHG | OXYGEN SATURATION: 99 % | WEIGHT: 220 LBS

## 2024-02-13 DIAGNOSIS — K40.90 RIGHT INGUINAL HERNIA: Primary | ICD-10-CM

## 2024-02-13 PROCEDURE — 99214 OFFICE O/P EST MOD 30 MIN: CPT | Performed by: SURGERY

## 2024-02-13 RX ORDER — TAMSULOSIN HYDROCHLORIDE 0.4 MG/1
0.4 CAPSULE ORAL DAILY
Qty: 7 CAPSULE | Refills: 0 | Status: SHIPPED | OUTPATIENT
Start: 2024-02-13 | End: 2024-04-17

## 2024-02-13 NOTE — PROGRESS NOTES
HPI:  Bryce is a 63 year old male who returns regarding his right inguinal hernia.  The patient was previously scheduled for surgery with me, but had to cancel surgery due to blood pressure problems.  He continues to have symptoms in the right groin.    Past Medical History:   has a past medical history of Coronary artery disease involving native coronary artery of native heart without angina pectoris (07/04/2019), Essential hypertension, benign, Hematuria, Malignant neoplasm of bladder, part unspecified (11/2002), Olecranon bursitis (03/2001), Other and unspecified hyperlipidemia, Paroxysmal atrial fibrillation (H), Paroxysmal atrial flutter (H), RIGHT LUNG CALCIFIED GRANULOMA (02/2002), Tobacco use disorder, Unspecified hemorrhoids without mention of complication (08/2001), and Unspecified hereditary and idiopathic peripheral neuropathy.    Past Surgical History:  Past Surgical History:   Procedure Laterality Date    BYPASS GRAFT ARTERY CORONARY N/A 9/25/2019    Procedure: CORONARY ARTERY BYPASS GRAFTING x 1 ([facility]A - LAD) WITH CORONARY ENDARTERECTOMY  (ON PUMP OXYGENATOR ; [facility]);  Surgeon: Magdi Turner MD;  Location:  OR    CV HEART CATHETERIZATION WITH POSSIBLE INTERVENTION N/A 9/6/2019    Procedure: Coronary Angiogram;  Surgeon: Nick Willson MD;  Location:  HEART CARDIAC CATH LAB    CV LEFT HEART CATH N/A 9/6/2019    Procedure: Left Heart Cath;  Surgeon: Nick Willson MD;  Location:  HEART CARDIAC CATH LAB    CV LEFT VENTRICULOGRAM N/A 9/6/2019    Procedure: Left Ventriculogram;  Surgeon: Nick Willson MD;  Location:  HEART CARDIAC CATH LAB    ZZC NONSPECIFIC PROCEDURE  2/00/02    EBCT        Social History:  Social History     Socioeconomic History    Marital status:      Spouse name: Not on file    Number of children: Not on file    Years of education: Not on file    Highest education level: Not on file   Occupational History    Not on file   Tobacco Use     Smoking status: Former     Packs/day: 1.00     Years: 33.00     Additional pack years: 0.00     Total pack years: 33.00     Types: Cigarettes     Quit date: 2013     Years since quittin.0    Smokeless tobacco: Never   Substance and Sexual Activity    Alcohol use: Yes     Comment: weekends    Drug use: Yes     Comment: nica taylor    Sexual activity: Yes   Other Topics Concern    Parent/sibling w/ CABG, MI or angioplasty before 65F 55M? No   Social History Narrative    Not on file     Social Determinants of Health     Financial Resource Strain: Not on file   Food Insecurity: Not on file   Transportation Needs: Not on file   Physical Activity: Not on file   Stress: Not on file   Social Connections: Not on file   Interpersonal Safety: Not on file   Housing Stability: Not on file        Family History:  Family History   Problem Relation Age of Onset    Arthritis Mother         RA    Hypertension Mother     Breast Cancer Mother     Prostate Cancer Maternal Grandfather         80    Cancer Maternal Grandmother         Lung    Coronary Artery Disease Father     Other - See Comments Brother     Diabetes Brother     Genetic Disorder Son          ROS:  The 10 point review of systems is negative other than noted in the HPI and above.    PE:    General- Well-developed, well-nourished, patient able to get up on table without difficulty.  HEENT- Normocephalic and atraumatic. Pupils equal and round.  Mucous membranes moist.  Sclera are nonicteric.  Neck- No lymphadenopathy or masses   Respirations- are regular and non labored  Abdomen is abdomen is soft without significant tenderness, masses, organomegaly or guarding  Hernia-there is some questionable bulging high in the left inguinal canal, though no definite hernia.              Right inguinal hernia is present with valsalva              The hernia is reducible   Umbilical hernia is not present.              External genitalia are normal               Assessment: right  inguinal hernia    Plan: I have once again recommended robotic assisted right inguinal hernia repair with mesh, with possible left inguinal hernia repair.  We have discussed observation, reduction techniques and importance, incarceration and strangulation signs, symptoms and importance as well as need to seek emergency treatment.    We have discussed surgery in detail, including risk, benefits, complications, mesh, infection, nerve and cord damage and their sequelae including chronic pain and testicular loss, lifting and activity limits after surgery. He has been given literature to review. We will schedule surgery at patient's convenience.    A total of 30 minutes was spent today in chart review, patient examination and discussion, and documentation.      You Lowe MD    Please route or send letter to:  Primary Care Provider (PCP)

## 2024-02-13 NOTE — LETTER
February 13, 2024          Remington Riggs MD  [address] [place] [address]      RE:   Bryce Espinoza 1960      Dear Colleague,    Thank you for referring your patient, Bryce Espinoza, to [facility]. Please see a copy of my visit note below.    HPI:  Bryce is a 63 year old male who returns regarding his right inguinal hernia.  The patient was previously scheduled for surgery with me, but had to cancel surgery due to blood pressure problems.  He continues to have symptoms in the right groin.    Past Medical History:  Has a past medical history of Coronary artery disease involving native coronary artery of native heart without angina pectoris (07/04/2019), Essential hypertension, benign, Hematuria, Malignant neoplasm of bladder, part unspecified (11/2002), Olecranon bursitis (03/2001), Other and unspecified hyperlipidemia, Paroxysmal atrial fibrillation (H), Paroxysmal atrial flutter (H), RIGHT LUNG CALCIFIED GRANULOMA (02/2002), Tobacco use disorder, Unspecified hemorrhoids without mention of complication (08/2001), and Unspecified hereditary and idiopathic peripheral neuropathy.    ROS:  The 10 point review of systems is negative other than noted in the HPI and above.    PE:    General- Well-developed, well-nourished, patient able to get up on table without difficulty.  HEENT- Normocephalic and atraumatic. Pupils equal and round.  Mucous membranes moist.  Sclera are nonicteric.  Neck- No lymphadenopathy or masses   Respirations- are regular and non labored  Abdomen is abdomen is soft without significant tenderness, masses, organomegaly or guarding  Hernia-there is some questionable bulging high in the left inguinal canal, though no definite hernia.              Right inguinal hernia is present with valsalva              The hernia is reducible   Umbilical hernia is not present.              External genitalia are normal               Assessment: right inguinal  hernia    Plan: I have once again recommended robotic assisted right inguinal hernia repair with mesh, with possible left inguinal hernia repair.  We have discussed observation, reduction techniques and importance, incarceration and strangulation signs, symptoms and importance as well as need to seek emergency treatment.    We have discussed surgery in detail, including risk, benefits, complications, mesh, infection, nerve and cord damage and their sequelae including chronic pain and testicular loss, lifting and activity limits after surgery. He has been given literature to review. We will schedule surgery at patient's convenience.      Again, thank you for allowing me to participate in the care of your patient.      Sincerely,      You Lowe MD

## 2024-02-14 ENCOUNTER — TELEPHONE (OUTPATIENT)
Dept: SURGERY | Facility: CLINIC | Age: 64
End: 2024-02-14
Payer: COMMERCIAL

## 2024-02-14 NOTE — TELEPHONE ENCOUNTER
Type of surgery: ROBOTIC ASSISTED RIGHT INGUINAL HERNIA REPAIR WITH MESH, POSSIBLE LEFT INGUINAL HERNIA REPAIR   Location of surgery: [facility]  Date and time of surgery: 4-5-24, 12 PM  Surgeon: DR MALDONADO  Pre-Op Appt Date: PATIENT TO SCHEDULE  Post-Op Appt Date: NA   Packet sent out: Yes  Pre-cert/Authorization completed:  Not Applicable  Date: 2-14-24      ROBOTIC ASSISTED RIGHT INGUINAL HERNIA REPAIR WITH MESH, POSSIBLE LEFT INGUINAL HERNIA REPAIR GENERAL PT INST TO HAVE H&P 90 MINS REQ PA ASSIST [facility] ALW

## 2024-03-06 ENCOUNTER — ANCILLARY PROCEDURE (OUTPATIENT)
Dept: GENERAL RADIOLOGY | Facility: CLINIC | Age: 64
End: 2024-03-06
Attending: INTERNAL MEDICINE
Payer: COMMERCIAL

## 2024-03-06 ENCOUNTER — OFFICE VISIT (OUTPATIENT)
Dept: INTERNAL MEDICINE | Facility: CLINIC | Age: 64
End: 2024-03-06
Payer: COMMERCIAL

## 2024-03-06 DIAGNOSIS — N28.9 RENAL INSUFFICIENCY: ICD-10-CM

## 2024-03-06 DIAGNOSIS — I48.91 ATRIAL FIBRILLATION, UNSPECIFIED TYPE (H): ICD-10-CM

## 2024-03-06 DIAGNOSIS — M25.552 BILATERAL HIP PAIN: ICD-10-CM

## 2024-03-06 DIAGNOSIS — I25.10 CORONARY ARTERY DISEASE INVOLVING NATIVE CORONARY ARTERY OF NATIVE HEART WITHOUT ANGINA PECTORIS: ICD-10-CM

## 2024-03-06 DIAGNOSIS — Z01.818 PREOP GENERAL PHYSICAL EXAM: Primary | ICD-10-CM

## 2024-03-06 DIAGNOSIS — M25.551 BILATERAL HIP PAIN: ICD-10-CM

## 2024-03-06 DIAGNOSIS — E79.0 ELEVATED URIC ACID IN BLOOD: ICD-10-CM

## 2024-03-06 DIAGNOSIS — E78.5 HYPERLIPIDEMIA LDL GOAL <70: ICD-10-CM

## 2024-03-06 DIAGNOSIS — K40.90 RIGHT INGUINAL HERNIA: ICD-10-CM

## 2024-03-06 DIAGNOSIS — I10 ESSENTIAL HYPERTENSION, BENIGN: ICD-10-CM

## 2024-03-06 DIAGNOSIS — G47.33 OSA (OBSTRUCTIVE SLEEP APNEA): ICD-10-CM

## 2024-03-06 LAB
ERYTHROCYTE [DISTWIDTH] IN BLOOD BY AUTOMATED COUNT: 12.6 % (ref 10–15)
ERYTHROCYTE [SEDIMENTATION RATE] IN BLOOD BY WESTERGREN METHOD: 9 MM/HR (ref 0–20)
HCT VFR BLD AUTO: 44.6 % (ref 40–53)
HGB BLD-MCNC: 15.1 G/DL (ref 13.3–17.7)
MCH RBC QN AUTO: 32.4 PG (ref 26.5–33)
MCHC RBC AUTO-ENTMCNC: 33.9 G/DL (ref 31.5–36.5)
MCV RBC AUTO: 96 FL (ref 78–100)
PLATELET # BLD AUTO: 232 10E3/UL (ref 150–450)
RBC # BLD AUTO: 4.66 10E6/UL (ref 4.4–5.9)
WBC # BLD AUTO: 12.6 10E3/UL (ref 4–11)

## 2024-03-06 PROCEDURE — 73522 X-RAY EXAM HIPS BI 3-4 VIEWS: CPT | Mod: TC | Performed by: RADIOLOGY

## 2024-03-06 PROCEDURE — 86140 C-REACTIVE PROTEIN: CPT | Performed by: INTERNAL MEDICINE

## 2024-03-06 PROCEDURE — 36415 COLL VENOUS BLD VENIPUNCTURE: CPT | Performed by: INTERNAL MEDICINE

## 2024-03-06 PROCEDURE — 84550 ASSAY OF BLOOD/URIC ACID: CPT | Performed by: INTERNAL MEDICINE

## 2024-03-06 PROCEDURE — 93000 ELECTROCARDIOGRAM COMPLETE: CPT | Performed by: INTERNAL MEDICINE

## 2024-03-06 PROCEDURE — 84443 ASSAY THYROID STIM HORMONE: CPT | Performed by: INTERNAL MEDICINE

## 2024-03-06 PROCEDURE — 85652 RBC SED RATE AUTOMATED: CPT | Performed by: INTERNAL MEDICINE

## 2024-03-06 PROCEDURE — 80053 COMPREHEN METABOLIC PANEL: CPT | Performed by: INTERNAL MEDICINE

## 2024-03-06 PROCEDURE — 82550 ASSAY OF CK (CPK): CPT | Performed by: INTERNAL MEDICINE

## 2024-03-06 PROCEDURE — 99214 OFFICE O/P EST MOD 30 MIN: CPT | Mod: 25 | Performed by: INTERNAL MEDICINE

## 2024-03-06 PROCEDURE — 85027 COMPLETE CBC AUTOMATED: CPT | Performed by: INTERNAL MEDICINE

## 2024-03-06 NOTE — PROGRESS NOTES
Preoperative Evaluation  [address] [address]  Phone: [phone]  Primary Provider: Remington Fuchs  Pre-op Performing Provider: REMINGTON FUCHS  Mar 6, 2024       Jorge is a 63 year old, presenting for the following:  Pre-Op Exam      Surgical Information  Surgery/Procedure: Robotic assisted right inguinal hernia repair with mesh, possible left inguinal hernia repair   Surgery Location: [facility]  Surgeon: You Lowe MD   Surgery Date: 04/05/2024  Time of Surgery: 11:00 am  Where patient plans to recover: At home with family  Fax number for surgical facility: Note does not need to be faxed, will be available electronically in Epic.    Assessment & Plan     The proposed surgical procedure is considered INTERMEDIATE risk.    Preop general physical exam  Patient has recurrent A-fib as discussed below but currently rate controlled.  Appears medically stable to proceed with surgery as scheduled with further A-fib management as discussed below  - EKG 12-lead complete w/read - Clinics  - Comprehensive metabolic panel; Future  - CBC with platelets; Future  - Comprehensive metabolic panel  - CBC with platelets    Right inguinal hernia (possible left in addition)  Symptomatic, reducible    Atrial fibrillation, unspecified type (H)  Likely related to recent noncompliance with CPAP use for sleep apnea.  Previous paroxysmal.  Patient was not aware of current A-fib.  Will therefore start long-term anticoagulation with Eliquis.  Rate controlled with Metoprolol XL. Restart consistent CPAP use.  Check thyroid level.  Last echo February 2023 with normal ejection fraction.  No orthopnea, shortness of breath to suggest new onset cardiomyopathy from rate controlled A-fib so   will defer repeat echo at this time  Addendum: Pt was seen by cardiology (Dr Blake) 3/27/24 who agreed with  AC and Metoprolol. Heart monitor added to assess if pt having paroxysmal or chronic  A fib to help guide decision if he should undergo attempt for cardioversion 1 ,month after being on AC but not affecting pt's current ability to undergo surgery as scheduled given stable rate control currently  - apixaban ANTICOAGULANT (ELIQUIS ANTICOAGULANT) 5 MG tablet; Take 1 tablet (5 mg) by mouth 2 times daily  - TSH with free T4 reflex; Future  - TSH with free T4 reflex    DENYS (obstructive sleep apnea)  Encouraged patient to restart CPAP use every night    Essential hypertension, benign  Controlled but hydrochlorathiazide possibly contributing to new renal insufficiency. Will stop combo losartan/hydrochlorothiazide 100/25mg tab and start separate Losartan 100mg daily WITHOUT hydrochlorathiazide   - Comprehensive metabolic panel; Future  - Comprehensive metabolic panel    Coronary artery disease involving native coronary artery of native heart without angina pectoris  No chest pain with exertional activity    Hyperlipidemia LDL goal <70  Intolerant of previous statin trials.  Pt has been restarted on Repatha through cardiology    Bilateral hip pain   Mild DJD seen on Xray bilateral hip joints and SI joints. Labs as ordered in addition. Tylenol as needed for pain. Dos not appear to be acute gout episode despite mild uric acid levele elevation. Will recheck uric acid level in future off of hydrochlorathiazide before considering Allopurinol. Possible future hip injection trial in future if persisting  - Uric acid; Future  - Erythrocyte sedimentation rate auto; Future  - CRP inflammation; Future  - CK total; Future  - XR Hip Bilateral 2 Views Each; Future  - Uric acid  - Erythrocyte sedimentation rate auto  - CRP inflammation  - CK total    Renal Insufficiency  New. Prior GFR > 90. Denies NSAIDS but on some OTC supplements. He will stop those. Stop hydrochlorathiazide to see if contributing and will have repeat BMP lab done 4/3/24. Will also increase fluid intake    - Basic metabolic panel    Risks and  Recommendations  The patient has the following additional risks and recommendations for perioperative complications:   - No identified additional risk factors other than previously addressed    Patient instructions  Use CPAP every night for obstructive sleep apnea   Approval given to proceed with proposed procedure despite presence of new recurrent A fib that is rate controlled  No solid food after midnight prior to your  procedure. May have clear liquids up to 2 hours prior to the  procedure (9:00am)  Take Eliquis twice a day with last dose Tuesday evening  4/2/24.   Stop and do not take  Eliquis 4/3,  4/4 or 4/5  to reduce risk of bleeding.    Restart taking Eliquis again after surgery the evening of 4/6/24 and then take twice a day after that as usual  May use Tylenol for pain control  between now and your procedure. Do NOT take Ibuprofen/Advil  or Aleve while on Eliquis  Stop combo Losartan/hydrochlorothiazide   blood pressure pill  Start plain Losartan 100mg tab, 1 tab daily in the PM for blood pressure control when you also take Amlodipine. Take your first dose tomorrow night Saturday  3/30/24  Take Amlodipine, Metoprolol and Losartan the night before surgery as you normally would   Start Tamsulosin 0.4mg capsule, 1 capsule daily in AM on  4/1/24 to help with urination around the time of surgery and take for 7 days.    Take Metoprolol and Tamsulosin  the morning of surgery with some water prior to 9:00am   Stop other over the counter supplements for now except may take Vitamin D  Have an extra glass of water or two daily to help hydration in case that is part of the reason for your reduction in kidney function  Call  [phone] or use [employer] to schedule a future lab appointment  non-fasting  in the afternoon of Wednesday 4/3/24 to recheck your kidney function off of hydrochlorathiazide   Turn   heart monitor back  in to cardiology when instructed by them           Recommendation  APPROVAL GIVEN to proceed  with proposed procedure        Subjective       HPI related to upcoming procedure:      Hx right inguinal hernia with bulging and discomfort.  On exam, there is some questionable bulging high in the left inguinal canal, though no definite hernia palpable. Was referred to general surgery. Following  consultation with Dr Lowe, pt has elected to undergo surgical treatment  RIH and possibly left depending on findings at time of surgery. Regarding exercise tolerance, pt very activity with his job and denies chest pain or shortness of breath with exertional activity        3/5/2024     6:28 PM   Preop Questions   1. Have you ever had a heart attack or stroke? No   2. Have you ever had surgery on your heart or blood vessels, such as a stent placement, a coronary artery bypass, or surgery on an artery in your head, neck, heart, or legs? YES -    Hx DENYS, on History of obstructive coronary artery disease diagnosed in 2019 with a 60% left main lesion and a large dominant right coronary artery.  Patient underwent single-vessel bypass surgery with LIMA to LAD.  See operative notes for details.  Patient required a coronary endarterectomy (typo in my previous note from 2023) in the proximal segment of the LAD for LIMA grafting.   3. Do you have chest pain with activity? No   4. Do you have a history of  heart failure? No   5. Do you currently have a cold, bronchitis or symptoms of other infection? No   6. Do you have a cough, shortness of breath, or wheezing? No   7. Do you or anyone in your family have previous history of blood clots? No   8. Do you or does anyone in your family have a serious bleeding problem such as prolonged bleeding following surgeries or cuts? No   9. Have you ever had problems with anemia or been told to take iron pills? No   10. Have you had any abnormal blood loss such as black, tarry or bloody stools? No   11. Have you ever had a blood transfusion? No   12. Are you willing to have a blood transfusion  if it is medically needed before, during, or after your surgery? YES   13. Have you or any of your relatives ever had problems with anesthesia? No   14. Do you have sleep apnea, excessive snoring or daytime drowsiness? YES -  Has not vivian using CPAP but recently but will restart   14a. Do you have a CPAP machine? Yes   15. Do you have any artifical heart valves or other implanted medical devices like a pacemaker, defibrillator, or continuous glucose monitor? No   16. Do you have artificial joints? No   17. Are you allergic to latex? No       Health Care Directive  Patient does not have a Health Care Directive or Living Will: Discussed advance care planning with patient; information given to patient to review.    Preoperative Review of    reviewed - Had #12 Norco tabs prescribed by other MD 8/22/23. Not taking currently       Status of Chronic Conditions:  See assessment/plan section above for active medical problems.  Problems all longstanding and stable, except as noted/documented.  See ROS in addition for pertinent symptoms related to these conditions.      Patient Active Problem List    Diagnosis Date Noted    DENYS (obstructive sleep apnea) 07/16/2021     Priority: Medium     Severe DENYS      Paroxysmal atrial fibrillation (H)      Priority: Medium    S/P CABG (coronary artery bypass graft) 10/12/2019     Priority: Medium    Coronary artery disease involving native coronary artery of native heart without angina pectoris 07/04/2019     Priority: Medium     Per CT calcium score of 722.97 3/2019      Hyperlipidemia LDL goal <70 04/04/2019     Priority: Medium    Other specified idiopathic peripheral neuropathy 02/17/2004     Priority: Medium    Malignant neoplasm of bladder (H) 01/07/2003     Priority: Medium     Problem list name updated by automated process. Provider to review      Essential hypertension, benign 11/01/2002     Priority: Medium      Past Medical History:   Diagnosis Date    Coronary artery  disease involving native coronary artery of native heart without angina pectoris 07/04/2019    Per CT calcium score of 722.97 3/2019    Essential hypertension, benign     Hematuria     Malignant neoplasm of bladder, 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    Olecranon bursitis 03/2001    right    Other and unspecified hyperlipidemia     Paroxysmal atrial fibrillation (H)     Paroxysmal atrial flutter (H)     RIGHT LUNG CALCIFIED GRANULOMA 02/2002    RML    Tobacco use disorder     Quit 1/03    Unspecified hemorrhoids without mention of complication 08/2001    Unspecified hereditary and idiopathic peripheral neuropathy      Past Surgical History:   Procedure Laterality Date    BYPASS GRAFT ARTERY CORONARY N/A 9/25/2019    Procedure: CORONARY ARTERY BYPASS GRAFTING x 1 (LIMA - LAD) WITH CORONARY ENDARTERECTOMY  (ON PUMP OXYGENATOR ; H[facility] BY [facility]);  Surgeon: Magdi Turner MD;  Location:  OR    CV HEART CATHETERIZATION WITH POSSIBLE INTERVENTION N/A 9/6/2019    Procedure: Coronary Angiogram;  Surgeon: Nick Willson MD;  Location:  HEART CARDIAC CATH LAB    CV LEFT HEART CATH N/A 9/6/2019    Procedure: Left Heart Cath;  Surgeon: Nick Willson MD;  Location:  HEART CARDIAC CATH LAB    CV LEFT VENTRICULOGRAM N/A 9/6/2019    Procedure: Left Ventriculogram;  Surgeon: Nick Willson MD;  Location:  HEART CARDIAC CATH LAB    Z NONSPECIFIC PROCEDURE  2/00/02    EBCT     Current Outpatient Medications   Medication Sig Dispense Refill    apixaban ANTICOAGULANT (ELIQUIS ANTICOAGULANT) 5 MG tablet Take 1 tablet (5 mg) by mouth 2 times daily 60 tablet 11    Cholecalciferol (VITAMIN D) 2000 UNITS tablet Take 2,000 Units by mouth daily      losartan (COZAAR) 100 MG tablet Take 1 tablet (100 mg) by mouth daily 90 tablet 3    tamsulosin (FLOMAX) 0.4 MG capsule Take 1 capsule (0.4 mg) by mouth daily Begin 4 days before surgery, including morning of surgery 7 capsule 0     amLODIPine (NORVASC) 5 MG tablet Take 1 tablet (5 mg) by mouth daily 90 tablet 3    evolocumab (REPATHA) 140 MG/ML prefilled autoinjector Inject 1 mL (140 mg) Subcutaneous every 14 days 6 mL 3    metoprolol succinate ER (TOPROL XL) 50 MG 24 hr tablet Take 1 tablet (50 mg) by mouth 2 times daily Appointment required for further refills 180 tablet 3       Allergies   Allergen Reactions    Atorvastatin      Myalgias    Crestor [Rosuvastatin]      Myalgias      Lisinopril      Body aches pt d/mylene  2015    Simvastatin      Myalgias        Social History     Tobacco Use    Smoking status: Former     Packs/day: 1.00     Years: 33.00     Additional pack years: 0.00     Total pack years: 33.00     Types: Cigarettes     Quit date: 2013     Years since quittin.0    Smokeless tobacco: Never   Substance Use Topics    Alcohol use: Yes     Comment: weekends     Family History   Problem Relation Age of Onset    Arthritis Mother         RA    Hypertension Mother     Breast Cancer Mother     Prostate Cancer Maternal Grandfather         80    Cancer Maternal Grandmother         Lung    Coronary Artery Disease Father     Other - See Comments Brother     Diabetes Brother     Genetic Disorder Son      History   Drug Use     Comment: [email or web address]         Review of Systems    Review of Systems  CONSTITUTIONAL: NEGATIVE for fever, chills. Weight decreased.   INTEGUMENTARY/SKIN: NEGATIVE for worrisome rashes, moles or lesions  EYES: NEGATIVE for vision changes or irritation  ENT/MOUTH: NEGATIVE for ear, mouth and throat problems  RESP: NEGATIVE for significant cough or SOB  BREAST: NEGATIVE for masses, tenderness or discharge  CV: NEGATIVE for chest pain, palpitations or peripheral edema  GI: NEGATIVE for nausea, abdominal pain, heartburn, or change in bowel habits  : NEGATIVE for frequency, dysuria, or hematuria. Positive for inguinal hernia with some discomfort right  MUSCULOSKELETAL:  POSITIVE for occ mild pain in bilateral  "hip/low back areas. Denies redness in a particular joint or swelling. No trauma. No radicular sx  NEURO: NEGATIVE for weakness, dizziness or paresthesias  ENDOCRINE: NEGATIVE for temperature intolerance. Hx hyperlipidemia. Prior Zetia not controlling and intolerant of statins  HEME: NEGATIVE for bleeding problems  PSYCHIATRIC: NEGATIVE for changes in mood or affect    Objective    /64 (BP Location: Left arm, Patient Position: Sitting, Cuff Size: Adult Regular)   Pulse 94   Temp 97.7  F (36.5  C) (Temporal)   Resp 16   Wt 91.4 kg (201 lb 9.6 oz)   SpO2 98%   BMI 29.77 kg/m     Estimated body mass index is 32.49 kg/m  as calculated from the following:    Height as of 2/13/24: 1.753 m (5' 9\").    Weight as of 2/13/24: 99.8 kg (220 lb).  Physical Exam  General appearance -  alert, no distress  Skin - No rashes or lesions.  Head - normocephalic, atraumatic  Eyes - RANDY, EOMI, fundi exam with nondilated pupils negative.  Ears - External ears normal. Canals clear. TM's normal.  Nose/Sinuses - Nares normal. Septum midline. Mucosa normal. No drainage or sinus tenderness.  Oropharynx - No erythema, no adenopathy, no exudates.  Neck - Supple without adenopathy or thyromegaly. No bruits.  Lungs - Clear to auscultation without wheezes/rhonchi.  Heart - Irregular rate and rhythm without murmurs, clicks, or gallops.  Nodes - No supraclavicular, axillary, or inguinal adenopathy palpable.  Abdomen - Abdomen soft, non-tender. BS normal. No masses or hepatosplenomegaly palpable. No bruits.  Extremities -No cyanosis, clubbing or edema.    Musculoskeletal - Spine ROM normal. Muscular strength intact.   Peripheral pulses - radial=4/4, femoral=4/4, posterior tibial=4/4, dorsalis pedis=4/4,  Neuro - Gait normal. Reflexes normal and symmetric. Sensation grossly WNL.  Genital - Normal-appearing male external genitalia. No scrotal masses  palpable.  Reducible right inguinal hernia  Rectal - deferred      Recent Labs   Lab Test " 10/26/23  1315 08/22/23  1204 07/20/23  1223   HGB 15.3 15.5  --     216  --    NA  --  141 143   POTASSIUM  --  4.3 4.0   CR  --  0.92 0.95        Diagnostics  Component      Latest Ref Rng 3/6/2024  3:58 PM 3/28/2024  4:30 PM   Sodium      135 - 145 mmol/L 144  139    Potassium      3.4 - 5.3 mmol/L 3.9  4.2    Carbon Dioxide (CO2)      22 - 29 mmol/L 27  27    Anion Gap      7 - 15 mmol/L 11  11    Urea Nitrogen      8.0 - 23.0 mg/dL 30.0 (H)  35.9 (H)    Creatinine      0.67 - 1.17 mg/dL 1.42 (H)  1.42 (H)    GFR Estimate      >60 mL/min/1.73m2 56 (L)  56 (L)    Calcium      8.8 - 10.2 mg/dL 9.6  9.5    Chloride      98 - 107 mmol/L 106  101    Glucose      70 - 99 mg/dL 85  88    Alkaline Phosphatase      40 - 150 U/L 88     AST      0 - 45 U/L 22     ALT      0 - 70 U/L 34     Protein Total      6.4 - 8.3 g/dL 6.8     Albumin      3.5 - 5.2 g/dL 4.5     Bilirubin Total      <=1.2 mg/dL 0.4     WBC      4.0 - 11.0 10e3/uL 12.6 (H)     RBC Count      4.40 - 5.90 10e6/uL 4.66     Hemoglobin      13.3 - 17.7 g/dL 15.1     Hematocrit      40.0 - 53.0 % 44.6     MCV      78 - 100 fL 96     MCH      26.5 - 33.0 pg 32.4     MCHC      31.5 - 36.5 g/dL 33.9     RDW      10.0 - 15.0 % 12.6     Platelet Count      150 - 450 10e3/uL 232     Uric Acid      3.4 - 7.0 mg/dL 7.3 (H)     Sed Rate      0 - 20 mm/hr 9     CRP Inflammation      <5.00 mg/L <3.00     TSH      0.30 - 4.20 uIU/mL 1.06     CK Total      39 - 308 U/L 67              EKG: A fib with rate 77. No acute ST/T changes    Revised Cardiac Risk Index (RCRI)  The patient has the following serious cardiovascular risks for perioperative complications:   - Coronary Artery Disease (MI, positive stress test, angina, Qs on EKG) = 1 point     RCRI Interpretation: 1 point: Class II (low risk - 0.9% complication rate)         Signed Electronically by: Remington Riggs MD  Copy of this evaluation report is provided to requesting physician.

## 2024-03-06 NOTE — PATIENT INSTRUCTIONS
Stop Aspirin  Start Eliquis 5mg tab, 1 tab twice a day for clot/stroke reduction risk with A fib  Use CPAP every night for obstructive sleep apnea   Approval given to proceed with proposed procedure   No solid food after midnight prior to your  procedure. May have clear liquids up to 2 hours prior to the  procedure (9:00am)  Take Eliquis twice a day with last dose Tuesday evening  4/2/24.   Stop and do not take  Eliquis 4/3,  4/4 or 4/5  to reduce risk of bleeding.    Restart taking Eliquis again after surgery the evening of 4/6/24 and then take twice a day after that as usual  May use Tylenol for pain control  between now and your procedure. Do NOT take Ibuprofen/Advil  or Aleve while on Eliquis  Stop combo Losartan/hydrochlorothiazide combo blood pressure pill  Start plain Losartan 100mg tab, 1 tab daily in the PM for blood pressure control when you also take Amlodipine  Take Amlodipine, Metoprolol and Losartan the night before surgery as you normally would   Start Tamsulosin 0.4mg capsule, 1 capsule daily in AM starting 4/1/24 to help with urination around the time of surgery.    Take Metoprolol and Tamsulosin  the morning of surgery with some water prior to 9:00am   Stop other over the counter supplements for now  Have an extra glass of water or two daily to help hydration in case that is aprt of the reason for your reduction in kidney function  Call  [phone] or use [employer] to schedule a future lab appointment  non-fasting  in the afternoon of Wednesday 4/3/24  Turn   heart monitor back  in to cardiology when instructed by them

## 2024-03-07 LAB
ALBUMIN SERPL BCG-MCNC: 4.5 G/DL (ref 3.5–5.2)
ALP SERPL-CCNC: 88 U/L (ref 40–150)
ALT SERPL W P-5'-P-CCNC: 34 U/L (ref 0–70)
ANION GAP SERPL CALCULATED.3IONS-SCNC: 11 MMOL/L (ref 7–15)
AST SERPL W P-5'-P-CCNC: 22 U/L (ref 0–45)
BILIRUB SERPL-MCNC: 0.4 MG/DL
BUN SERPL-MCNC: 30 MG/DL (ref 8–23)
CALCIUM SERPL-MCNC: 9.6 MG/DL (ref 8.8–10.2)
CHLORIDE SERPL-SCNC: 106 MMOL/L (ref 98–107)
CK SERPL-CCNC: 67 U/L (ref 39–308)
CREAT SERPL-MCNC: 1.42 MG/DL (ref 0.67–1.17)
CRP SERPL-MCNC: <3 MG/L
DEPRECATED HCO3 PLAS-SCNC: 27 MMOL/L (ref 22–29)
EGFRCR SERPLBLD CKD-EPI 2021: 56 ML/MIN/1.73M2
GLUCOSE SERPL-MCNC: 85 MG/DL (ref 70–99)
POTASSIUM SERPL-SCNC: 3.9 MMOL/L (ref 3.4–5.3)
PROT SERPL-MCNC: 6.8 G/DL (ref 6.4–8.3)
SODIUM SERPL-SCNC: 144 MMOL/L (ref 135–145)
TSH SERPL DL<=0.005 MIU/L-ACNC: 1.06 UIU/ML (ref 0.3–4.2)
URATE SERPL-MCNC: 7.3 MG/DL (ref 3.4–7)

## 2024-03-10 VITALS
OXYGEN SATURATION: 98 % | BODY MASS INDEX: 29.77 KG/M2 | DIASTOLIC BLOOD PRESSURE: 64 MMHG | WEIGHT: 201.6 LBS | TEMPERATURE: 97.7 F | RESPIRATION RATE: 16 BRPM | HEART RATE: 94 BPM | SYSTOLIC BLOOD PRESSURE: 102 MMHG

## 2024-03-11 ENCOUNTER — TELEPHONE (OUTPATIENT)
Dept: CARDIOLOGY | Facility: CLINIC | Age: 64
End: 2024-03-11
Payer: COMMERCIAL

## 2024-03-11 DIAGNOSIS — Z95.1 S/P CABG (CORONARY ARTERY BYPASS GRAFT): ICD-10-CM

## 2024-03-11 RX ORDER — METOPROLOL SUCCINATE 50 MG/1
50 TABLET, EXTENDED RELEASE ORAL 2 TIMES DAILY
Qty: 180 TABLET | Refills: 0 | Status: SHIPPED | OUTPATIENT
Start: 2024-03-11 | End: 2024-03-27

## 2024-03-12 ENCOUNTER — TRANSFERRED RECORDS (OUTPATIENT)
Dept: HEALTH INFORMATION MANAGEMENT | Facility: CLINIC | Age: 64
End: 2024-03-12
Payer: COMMERCIAL

## 2024-03-14 DIAGNOSIS — I10 ESSENTIAL HYPERTENSION, BENIGN: ICD-10-CM

## 2024-03-14 RX ORDER — AMLODIPINE BESYLATE 5 MG/1
5 TABLET ORAL DAILY
Qty: 90 TABLET | Refills: 0 | Status: SHIPPED | OUTPATIENT
Start: 2024-03-14 | End: 2024-03-27

## 2024-03-14 NOTE — TELEPHONE ENCOUNTER
Above RN's/Staff's notes reviewed.  Reviewed medications and allergies.    SUBJECTIVE:    Justine Harris is a 55 year old female who presents with  presenting with a non productive cough x couple days.  Had negative COVID test on Monday due to having a sore throat and it was Negative.  Denies having fevers.     Visit Vitals  /72 (BP Location: RUE - Right upper extremity, Patient Position: Sitting, Cuff Size: Regular)   Pulse 96   Temp 98.9 °F (37.2 °C) (Oral)   Resp 16   Wt 78.5 kg   LMP 12/03/2016   SpO2 100%   BMI 30.40 kg/m²         Tx/OTC medications tried: None     Denies known Latex allergy or symptoms of latex sensitivity.  Medications reviewed with patient:No changes made.  PCP verified  Pharmacy verified     Patient would like communication of their results via:        Cell Phone:       Telephone Information:   Mobile [phone]     Okay to leave a message containing results? Yes        OBJECTIVE:    Vitals:    08/07/21 1020   BP: 131/72   Pulse: 96   Resp: 16   Temp: 98.9 °F (37.2 °C)       General appearance:  , alert, in no distress, cooperative  Skin:  Skin color, texture, turgor are normal. There are no bruises, rashes or lesions.  Eyes: Conjunctivae and sclerae normal and pupils equal, round, reactive to light    Nose:  Normal  Ears:  External ears normal. Canals clear. Tympanic membranes are clear with all landmarks noted.  Throat:  Erythematous pharynx pale yellow postnasal drip no pus  Neck:  Supple, bilateral anterior cervical lymphadenopathy.  Lungs:  Scattered rhonchi harsh pleuritic cough no wheezing no rales.  Cardiac:  Regular rate and rhythm, no rubs, click gallops or murmurs  Abdomen:  Soft, no tenderness, bowel sounds normoactive  Extremities:  No pretibial edema      ASSESSMENT:  1. Acute bronchitis with bronchospasm    2. ST (sore throat)          PLAN:  Orders Placed This Encounter   • azithromycin (Zithromax Z-Delmer) 250 MG tablet       Take the following antibiotic Zithromax taking  [facility] Refill Guideline reviewed.  Medication meets criteria for refill.     2 tablets today then 1 tablet daily days 2, 3, 4, 5. With food.    Twice a day for the next 3 days, take a hot shower breathing in steam and expectorating is much mucus as possible. Drink 8 ounces of water or clear fluid every 2 hours while awake or 2 L plus over 24 hours to maximize expectoration of mucus.  The goal is to have clear colored urine in the afternoon and evening    NyQuil/Vicks severe cold and flu. taking 2 teaspoons every 4-6 hours needed for cough and congestion.  When taking this medication, add 2 extra-strength Tylenol to maximize efficacy.    Gargle with suitable antibacterial mouthwash such as [employer], crest, Listerine or Biotene after each meal and at bedtime.  As needed    Call if condition worsens and follow-up with PCP or WIC if not back to baseline in 5-7 days  ·

## 2024-03-19 ENCOUNTER — TELEPHONE (OUTPATIENT)
Dept: INTERNAL MEDICINE | Facility: CLINIC | Age: 64
End: 2024-03-19
Payer: COMMERCIAL

## 2024-03-19 NOTE — TELEPHONE ENCOUNTER
Test Results    Contacts         Type Contact Phone/Fax    03/19/2024 09:32 AM CDT Phone (Incoming) Jorge Espinoza (Self) [phone] ()            Who ordered the test:  Dr Riggs    Type of test: Lab    Date of test: 3/6/24    Where was the test performed:  [facility]    What are your questions/concerns?:    Results     Could we send this information to you in [employer] or would you prefer to receive a phone call?:   Patient would prefer a phone call   Okay to leave a detailed message?: Yes at Cell number on file:    Telephone Information:   Mobile [phone]

## 2024-03-21 NOTE — TELEPHONE ENCOUNTER
Pt called clinic to follow-up on results.  Will leave sticky note on provider desk for review.     Pt states he has had what he believes is gout pain in his foot earlier this week. It has now gone away for greater than 24 hours. Pt agreed to call the clinic back if symptoms return.   Routing to PCP to advise/review.     Thank you,  Maryjane Murphy RN

## 2024-03-26 ENCOUNTER — TELEPHONE (OUTPATIENT)
Dept: INTERNAL MEDICINE | Facility: CLINIC | Age: 64
End: 2024-03-26
Payer: COMMERCIAL

## 2024-03-26 DIAGNOSIS — I10 ESSENTIAL HYPERTENSION, BENIGN: Primary | ICD-10-CM

## 2024-03-27 ENCOUNTER — OFFICE VISIT (OUTPATIENT)
Dept: CARDIOLOGY | Facility: CLINIC | Age: 64
End: 2024-03-27
Payer: COMMERCIAL

## 2024-03-27 ENCOUNTER — TELEPHONE (OUTPATIENT)
Dept: CARDIOLOGY | Facility: CLINIC | Age: 64
End: 2024-03-27

## 2024-03-27 VITALS
DIASTOLIC BLOOD PRESSURE: 70 MMHG | BODY MASS INDEX: 33.13 KG/M2 | SYSTOLIC BLOOD PRESSURE: 114 MMHG | OXYGEN SATURATION: 99 % | HEART RATE: 70 BPM | HEIGHT: 69 IN | WEIGHT: 223.7 LBS

## 2024-03-27 DIAGNOSIS — E78.5 HYPERLIPIDEMIA LDL GOAL <70: ICD-10-CM

## 2024-03-27 DIAGNOSIS — Z95.1 S/P CABG (CORONARY ARTERY BYPASS GRAFT): ICD-10-CM

## 2024-03-27 DIAGNOSIS — I10 ESSENTIAL HYPERTENSION, BENIGN: ICD-10-CM

## 2024-03-27 PROCEDURE — 99215 OFFICE O/P EST HI 40 MIN: CPT | Performed by: INTERNAL MEDICINE

## 2024-03-27 PROCEDURE — 93242 EXT ECG>48HR<7D RECORDING: CPT | Performed by: INTERNAL MEDICINE

## 2024-03-27 RX ORDER — METOPROLOL SUCCINATE 50 MG/1
50 TABLET, EXTENDED RELEASE ORAL 2 TIMES DAILY
Qty: 180 TABLET | Refills: 3 | Status: SHIPPED | OUTPATIENT
Start: 2024-03-27 | End: 2024-04-29

## 2024-03-27 RX ORDER — EZETIMIBE 10 MG/1
10 TABLET ORAL DAILY
Qty: 90 TABLET | Refills: 3 | Status: SHIPPED | OUTPATIENT
Start: 2024-03-27 | End: 2024-03-27

## 2024-03-27 RX ORDER — LOSARTAN POTASSIUM AND HYDROCHLOROTHIAZIDE 25; 100 MG/1; MG/1
1 TABLET ORAL EVERY EVENING
Qty: 90 TABLET | Refills: 3 | Status: SHIPPED | OUTPATIENT
Start: 2024-03-27 | End: 2024-03-29 | Stop reason: ALTCHOICE

## 2024-03-27 RX ORDER — AMLODIPINE BESYLATE 5 MG/1
5 TABLET ORAL DAILY
Qty: 90 TABLET | Refills: 3 | Status: SHIPPED | OUTPATIENT
Start: 2024-03-27 | End: 2024-05-29 | Stop reason: SINTOL

## 2024-03-27 NOTE — TELEPHONE ENCOUNTER
Central Prior Authorization Team   Phone: [phone]    PA Initiation    Medication: Repatha 140mg'ml  Insurance Company: [employer] ([facility]) - Phone [phone] Fax [phone]  Pharmacy Filling the Rx: [id number] [place], [place] - [address] [place] [facility] [address]  Filling Pharmacy Phone: [phone]  Filling Pharmacy Fax:    Start Date: 3/27/2024

## 2024-03-27 NOTE — PROGRESS NOTES
CARDIOLOGY CLINIC CONSULTATION    PRIMARY CARE PHYSICIAN:  Remington Riggs    HISTORY OF PRESENT ILLNESS:  This is a very pleasant 63-year-old male patient who had seen me initially in 2019.  Today he is here for follow-up.  He has the following pertinent medical issues.    History of former smoking, obesity hyperlipidemia hypertension and severe statin intolerance.  Strong family history of coronary artery disease  History of obstructive coronary artery disease diagnosed in 2019 with a 60% left main lesion and a large dominant right coronary artery.  Patient underwent single-vessel bypass surgery with LIMA to LAD.  See operative notes for details.  Patient required a coronary endarterectomy (typo in my previous note from 2023) in the proximal segment of the LAD for LIMA grafting.  Atrial fibrillation postoperatively without clinical recurrences until 2024  Anticoagulation started March 2024    Today the patient is here for routine follow-up.  He was doing okay on aspirin and Zetia therapy and his other antihypertensive medications.  Recently went to his PCP and was noted to be in rate controlled asymptomatic atrial fibrillation.  Aspirin was stopped and Eliquis was started.  Of note in the past he has tried different statins and has not able to tolerate them.  He is currently on Zetia but LDL is uncontrolled.  Patient denies angina syncope presyncope heart failure symptoms.  No bleeding problems.     PAST MEDICAL HISTORY:  Past Medical History:   Diagnosis Date    Coronary artery disease involving native coronary artery of native heart without angina pectoris 07/04/2019    Per CT calcium score of 722.97 3/2019    Essential hypertension, benign     Hematuria     Malignant neoplasm of bladder, 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    Olecranon bursitis 03/2001    right    Other and unspecified hyperlipidemia     Paroxysmal atrial fibrillation (H)     Paroxysmal atrial flutter (H)     RIGHT LUNG  CALCIFIED GRANULOMA 02/2002    [facility]    Tobacco use disorder     Quit 1/03    Unspecified hemorrhoids without mention of complication 08/2001    Unspecified hereditary and idiopathic peripheral neuropathy        MEDICATIONS:  Current Outpatient Medications   Medication    amLODIPine (NORVASC) 5 MG tablet    apixaban ANTICOAGULANT (ELIQUIS ANTICOAGULANT) 5 MG tablet    Cholecalciferol (VITAMIN D) 2000 UNITS tablet    evolocumab (REPATHA) 140 MG/ML prefilled autoinjector    losartan-hydrochlorothiazide (HYZAAR) 100-25 MG tablet    metoprolol succinate ER (TOPROL XL) 50 MG 24 hr tablet    tamsulosin (FLOMAX) 0.4 MG capsule     No current facility-administered medications for this visit.       SOCIAL HISTORY:  I have reviewed this patient's social history and updated it with pertinent information if needed. Bryce Espinoza  reports that he quit smoking about 11 years ago. His smoking use included cigarettes. He has a 33 pack-year smoking history. He has never used smokeless tobacco. He reports current alcohol use. He reports current drug use.    PHYSICAL EXAM:  Pulse:  [70] 70  BP: (114-139)/(70-93) 114/70  SpO2:  [99 %] 99 %  223 lbs 11.2 oz    Constitutional: alert, no distress  Respiratory: Good bilateral air entry  Cardiovascular: Irregular heart sounds no murmur rubs or gallops no edema  GI: nondistended  Neuropsychiatric: appropriate affact    ASSESSMENT: Pertinent issues addressed/ reviewed during this cardiology visit  Coronary artery disease status post single-vessel bypass surgery  Statin intolerance with uncontrolled LDL levels  Recurrent atrial fibrillation currently persistent  Cardiovascular risk factors as mentioned above    RECOMMENDATIONS:  In regards to coronary artery disease, the patient is without anginal symptoms.  His aspirin has been stopped because he has been started on anticoagulation.  Currently he is on Zetia and no statin as mentioned above.  He has tried low doses of different statins and  has had intractable myalgias.  I recommend switching to Repatha.  Once that is started I recommend stopping ezetimibe.  Discussed atrial fibrillation with the patient.  Currently he is not symptomatic.  Recommend getting an echocardiogram and a ZIO monitor for 7 days.  If he has paroxysmal then I would recommend conservative management with rate control strategy and anticoagulation.  If he is in persistent atrial fibrillation, I would recommend cardioverting after being on uninterrupted anticoagulation for a month.  In regards to long-term anticoagulation, given his [id number]-JEGn score of 2 based on hypertension and CAD, I would recommend long-term anticoagulation.  No aspirin.  Continue DOAC.  If any bleeding issues he needs to be contacting us.  Follow-up in 6 months with [facility] with a lipid panel checked.  I see that on 3/6/2024 his creatinine was bumped for unclear reasons.  I will add on another BMP to be done.    It was a pleasure seeing this patient in clinic today. Please do not hesitate to contact me with any future questions.     BHARGAVI Peraza, [facility]  Cardiology - [facility]  March 27, 2024    Total time including chart review documentation and coordinating care today was 45 minutes.    This note was completed in part using dictation via the Dragon voice recognition software. Some word and grammatical errors may occur and must be interpreted in the appropriate clinical context.  If there are any questions pertaining to this issue, please contact me for further clarification.

## 2024-03-27 NOTE — PROGRESS NOTES
Bryce Espinoza arrived here on 3/27/2024 9:45 AM for 3-7 Days  Zio monitor placement per ordering provider Dr Blake for the diagnosis PAF.  Patient s skin was prepped per protocol.  Zio monitor was placed.  Instructions were reviewed with and given to the patient.  Patient verbalized understanding of wear, troubleshooting and monitor return instructions.     Angela Vasquez LPN

## 2024-03-27 NOTE — LETTER
3/27/2024    Remington Riggs MD  [address] [facility] [address]    RE: Bryce Espinoza       Dear Colleague,     I had the pleasure of seeing Bryce Espinoza in the [facility].  CARDIOLOGY CLINIC CONSULTATION    PRIMARY CARE PHYSICIAN:  Remington Riggs    HISTORY OF PRESENT ILLNESS:  This is a very pleasant 63-year-old male patient who had seen me initially in 2019.  Today he is here for follow-up.  He has the following pertinent medical issues.    History of former smoking, obesity hyperlipidemia hypertension and severe statin intolerance.  Strong family history of coronary artery disease  History of obstructive coronary artery disease diagnosed in 2019 with a 60% left main lesion and a large dominant right coronary artery.  Patient underwent single-vessel bypass surgery with LIMA to LAD.  See operative notes for details.  Patient required a coronary endarterectomy (typo in my previous note from 2023) in the proximal segment of the LAD for LIMA grafting.  Atrial fibrillation postoperatively without clinical recurrences until 2024  Anticoagulation started March 2024    Today the patient is here for routine follow-up.  He was doing okay on aspirin and Zetia therapy and his other antihypertensive medications.  Recently went to his PCP and was noted to be in rate controlled asymptomatic atrial fibrillation.  Aspirin was stopped and Eliquis was started.  Of note in the past he has tried different statins and has not able to tolerate them.  He is currently on Zetia but LDL is uncontrolled.  Patient denies angina syncope presyncope heart failure symptoms.  No bleeding problems.     PAST MEDICAL HISTORY:  Past Medical History:   Diagnosis Date    Coronary artery disease involving native coronary artery of native heart without angina pectoris 07/04/2019    Per CT calcium score of 722.97 3/2019    Essential hypertension, benign     Hematuria     Malignant neoplasm of bladder, part unspecified 11/2002     Transitional Cell Carcinoma bladder    Olecranon bursitis 03/2001    right    Other and unspecified hyperlipidemia     Paroxysmal atrial fibrillation (H)     Paroxysmal atrial flutter (H)     RIGHT LUNG CALCIFIED GRANULOMA 02/2002    [facility]    Tobacco use disorder     Quit 1/03    Unspecified hemorrhoids without mention of complication 08/2001    Unspecified hereditary and idiopathic peripheral neuropathy        MEDICATIONS:  Current Outpatient Medications   Medication    amLODIPine (NORVASC) 5 MG tablet    apixaban ANTICOAGULANT (ELIQUIS ANTICOAGULANT) 5 MG tablet    Cholecalciferol (VITAMIN D) 2000 UNITS tablet    evolocumab (REPATHA) 140 MG/ML prefilled autoinjector    losartan-hydrochlorothiazide (HYZAAR) 100-25 MG tablet    metoprolol succinate ER (TOPROL XL) 50 MG 24 hr tablet    tamsulosin (FLOMAX) 0.4 MG capsule     No current facility-administered medications for this visit.       SOCIAL HISTORY:  I have reviewed this patient's social history and updated it with pertinent information if needed. Bryce Espinoza  reports that he quit smoking about 11 years ago. His smoking use included cigarettes. He has a 33 pack-year smoking history. He has never used smokeless tobacco. He reports current alcohol use. He reports current drug use.    PHYSICAL EXAM:  Pulse:  [70] 70  BP: (114-139)/(70-93) 114/70  SpO2:  [99 %] 99 %  223 lbs 11.2 oz    Constitutional: alert, no distress  Respiratory: Good bilateral air entry  Cardiovascular: Irregular heart sounds no murmur rubs or gallops no edema  GI: nondistended  Neuropsychiatric: appropriate affact    ASSESSMENT: Pertinent issues addressed/ reviewed during this cardiology visit  Coronary artery disease status post single-vessel bypass surgery  Statin intolerance with uncontrolled LDL levels  Recurrent atrial fibrillation currently persistent  Cardiovascular risk factors as mentioned above    RECOMMENDATIONS:  In regards to coronary artery disease, the patient is without  anginal symptoms.  His aspirin has been stopped because he has been started on anticoagulation.  Currently he is on Zetia and no statin as mentioned above.  He has tried low doses of different statins and has had intractable myalgias.  I recommend switching to Repatha.  Once that is started I recommend stopping ezetimibe.  Discussed atrial fibrillation with the patient.  Currently he is not symptomatic.  Recommend getting an echocardiogram and a ZIO monitor for 7 days.  If he has paroxysmal then I would recommend conservative management with rate control strategy and anticoagulation.  If he is in persistent atrial fibrillation, I would recommend cardioverting after being on uninterrupted anticoagulation for a month.  In regards to long-term anticoagulation, given his [id number] score of 2 based on hypertension and CAD, I would recommend long-term anticoagulation.  No aspirin.  Continue DOAC.  If any bleeding issues he needs to be contacting us.  Follow-up in 6 months with [facility] with a lipid panel checked.  I see that on 3/6/2024 his creatinine was bumped for unclear reasons.  I will add on another BMP to be done.    It was a pleasure seeing this patient in clinic today. Please do not hesitate to contact me with any future questions.     BHARGAVI Peraza, [facility]  Cardiology - [facility]  March 27, 2024    Total time including chart review documentation and coordinating care today was 45 minutes.    This note was completed in part using dictation via the Dragon voice recognition software. Some word and grammatical errors may occur and must be interpreted in the appropriate clinical context.  If there are any questions pertaining to this issue, please contact me for further clarification.    Bryce CARMONA Alexis arrived here on 3/27/2024 9:45 AM for 3-7 Days  Zio monitor placement per ordering provider Dr Blake for the diagnosis PAF.  Patient s skin was prepped per protocol.  Zio monitor was placed.  Instructions were  reviewed with and given to the patient.  Patient verbalized understanding of wear, troubleshooting and monitor return instructions.   Angela Vasquez LPN    Thank you for allowing me to participate in the care of your patient.    Sincerely,   Felton Blake MD   [facility]  cc:   Felton Blake MD  [address]  [place] [address]

## 2024-03-28 ENCOUNTER — LAB (OUTPATIENT)
Dept: LAB | Facility: CLINIC | Age: 64
End: 2024-03-28
Payer: COMMERCIAL

## 2024-03-28 DIAGNOSIS — I10 ESSENTIAL HYPERTENSION, BENIGN: ICD-10-CM

## 2024-03-28 PROCEDURE — 80048 BASIC METABOLIC PNL TOTAL CA: CPT

## 2024-03-28 PROCEDURE — 36415 COLL VENOUS BLD VENIPUNCTURE: CPT

## 2024-03-28 NOTE — TELEPHONE ENCOUNTER
Spoke with pt. Reviewed recent lab test. Uric acid elevated but pt was dehydrated recently and then has been hydrating better with water and sx resolved. Will defer Allopurinol for now and continue hydrochlorathiazide for now if GFR OK with better hydration. If recurrent gout with better hydration, will either have to reduce/stop hydrochlorathiazide or start Allopurinol for prevention

## 2024-03-28 NOTE — TELEPHONE ENCOUNTER
Spoke with pt and reviewed recent labs. Was dehydrated day of last lab test and has been drinking much more water since then. Cardiology note from today reviewed.  Has been on losartan/hydrochlorothiazide for couple years and GFR previously > 90 with that dose so expect  GFR to be better again with betetr hydration. Pt will get BMP lab nonfasting tomorrow at approx 3:30pm to recheck with better hydration and told pt I will call him again Friday end of clinic day with full plan re: upcoming surgery and orders/recs. Will speak with [facility] early tomorrow AM to get pt scheldued for lab test

## 2024-03-29 LAB
ANION GAP SERPL CALCULATED.3IONS-SCNC: 11 MMOL/L (ref 7–15)
BUN SERPL-MCNC: 35.9 MG/DL (ref 8–23)
CALCIUM SERPL-MCNC: 9.5 MG/DL (ref 8.8–10.2)
CHLORIDE SERPL-SCNC: 101 MMOL/L (ref 98–107)
CREAT SERPL-MCNC: 1.42 MG/DL (ref 0.67–1.17)
DEPRECATED HCO3 PLAS-SCNC: 27 MMOL/L (ref 22–29)
EGFRCR SERPLBLD CKD-EPI 2021: 56 ML/MIN/1.73M2
GLUCOSE SERPL-MCNC: 88 MG/DL (ref 70–99)
POTASSIUM SERPL-SCNC: 4.2 MMOL/L (ref 3.4–5.3)
SODIUM SERPL-SCNC: 139 MMOL/L (ref 135–145)

## 2024-03-29 RX ORDER — LOSARTAN POTASSIUM 100 MG/1
100 TABLET ORAL DAILY
Qty: 90 TABLET | Refills: 3 | Status: SHIPPED | OUTPATIENT
Start: 2024-03-29 | End: 2024-10-02

## 2024-03-29 NOTE — TELEPHONE ENCOUNTER
Prior Authorization Approval    Authorization Effective Date: 3/27/2024  Authorization Expiration Date: 3/27/2025  Medication: Repatha 140mg'ml  Approved Dose/Quantity:   Reference #:     Insurance Company: [employer] ([facility]) - Phone [phone] Fax [phone]  Expected CoPay:       CoPay Card Available:      Foundation Assistance Needed:    Which Pharmacy is filling the prescription (Not needed for infusion/clinic administered): [id number] - [place], [place] - [address] AT [facility] [address]  Pharmacy Notified:  yes  Patient Notified:  yes- Pharmacy will contact patient when ready to /ship

## 2024-04-01 ENCOUNTER — TELEPHONE (OUTPATIENT)
Dept: CARDIOLOGY | Facility: CLINIC | Age: 64
End: 2024-04-01
Payer: COMMERCIAL

## 2024-04-01 NOTE — TELEPHONE ENCOUNTER
"I called pt back and he said he wants to know if  is ok with him holding eliquis for 2 days prior to hernia surgery on 4/5. I will send an update to  to confirm.     Pt had 3/27/24 visit with . Per , \"Discussed atrial fibrillation with the patient. Currently he is not symptomatic. Recommend getting an echocardiogram and a ZIO monitor for 7 days. If he has paroxysmal then I would recommend conservative management with rate control strategy and anticoagulation. If he is in persistent atrial fibrillation, I would recommend cardioverting after being on uninterrupted anticoagulation for a month. \" Will not have the ziopatch results by the time of pt's hernia procedure.     Bonilla WIGGINS April 1, 2024, 11:52 AM      "

## 2024-04-01 NOTE — TELEPHONE ENCOUNTER
Health Call Center    Phone Message    May a detailed message be left on voicemail: yes     Reason for Call: Other: Patient is having hernia surgery on Friday and will like to know if it is ok for them to stop taking Eliquis prior to surgery. Please call patient back to further discuss.     Action Taken: Other: Cardiology    Travel Screening: Not Applicable    Thank you!  [facility]

## 2024-04-03 ENCOUNTER — HOSPITAL ENCOUNTER (EMERGENCY)
Facility: CLINIC | Age: 64
Discharge: HOME | End: 2024-04-03
Attending: EMERGENCY MEDICINE | Admitting: EMERGENCY MEDICINE
Payer: COMMERCIAL

## 2024-04-03 ENCOUNTER — APPOINTMENT (OUTPATIENT)
Dept: GENERAL RADIOLOGY | Facility: CLINIC | Age: 64
End: 2024-04-03
Attending: EMERGENCY MEDICINE
Payer: COMMERCIAL

## 2024-04-03 VITALS
RESPIRATION RATE: 16 BRPM | BODY MASS INDEX: 33.33 KG/M2 | HEART RATE: 100 BPM | WEIGHT: 225 LBS | TEMPERATURE: 97.3 F | HEIGHT: 69 IN | OXYGEN SATURATION: 94 % | DIASTOLIC BLOOD PRESSURE: 87 MMHG | SYSTOLIC BLOOD PRESSURE: 138 MMHG

## 2024-04-03 DIAGNOSIS — M25.512 ACUTE PAIN OF LEFT SHOULDER: ICD-10-CM

## 2024-04-03 LAB
ALBUMIN SERPL BCG-MCNC: 4 G/DL (ref 3.5–5.2)
ALP SERPL-CCNC: 96 U/L (ref 40–150)
ALT SERPL W P-5'-P-CCNC: 34 U/L (ref 0–70)
ANION GAP SERPL CALCULATED.3IONS-SCNC: 12 MMOL/L (ref 7–15)
AST SERPL W P-5'-P-CCNC: 33 U/L (ref 0–45)
ATRIAL RATE - MUSE: NORMAL BPM
BASOPHILS # BLD AUTO: 0.1 10E3/UL (ref 0–0.2)
BASOPHILS NFR BLD AUTO: 1 %
BILIRUB SERPL-MCNC: 0.4 MG/DL
BUN SERPL-MCNC: 19.5 MG/DL (ref 8–23)
CALCIUM SERPL-MCNC: 9.1 MG/DL (ref 8.8–10.2)
CHLORIDE SERPL-SCNC: 105 MMOL/L (ref 98–107)
CREAT SERPL-MCNC: 0.97 MG/DL (ref 0.67–1.17)
DEPRECATED HCO3 PLAS-SCNC: 25 MMOL/L (ref 22–29)
DIASTOLIC BLOOD PRESSURE - MUSE: NORMAL MMHG
EGFRCR SERPLBLD CKD-EPI 2021: 88 ML/MIN/1.73M2
EOSINOPHIL # BLD AUTO: 0.1 10E3/UL (ref 0–0.7)
EOSINOPHIL NFR BLD AUTO: 2 %
ERYTHROCYTE [DISTWIDTH] IN BLOOD BY AUTOMATED COUNT: 12.3 % (ref 10–15)
GLUCOSE SERPL-MCNC: 112 MG/DL (ref 70–99)
HCT VFR BLD AUTO: 42.9 % (ref 40–53)
HGB BLD-MCNC: 14.1 G/DL (ref 13.3–17.7)
HOLD SPECIMEN: NORMAL
IMM GRANULOCYTES # BLD: 0 10E3/UL
IMM GRANULOCYTES NFR BLD: 0 %
INTERPRETATION ECG - MUSE: NORMAL
LYMPHOCYTES # BLD AUTO: 2.4 10E3/UL (ref 0.8–5.3)
LYMPHOCYTES NFR BLD AUTO: 26 %
MCH RBC QN AUTO: 32 PG (ref 26.5–33)
MCHC RBC AUTO-ENTMCNC: 32.9 G/DL (ref 31.5–36.5)
MCV RBC AUTO: 98 FL (ref 78–100)
MONOCYTES # BLD AUTO: 0.9 10E3/UL (ref 0–1.3)
MONOCYTES NFR BLD AUTO: 10 %
NEUTROPHILS # BLD AUTO: 5.6 10E3/UL (ref 1.6–8.3)
NEUTROPHILS NFR BLD AUTO: 61 %
NRBC # BLD AUTO: 0 10E3/UL
NRBC BLD AUTO-RTO: 0 /100
P AXIS - MUSE: NORMAL DEGREES
PLATELET # BLD AUTO: 219 10E3/UL (ref 150–450)
POTASSIUM SERPL-SCNC: 3.9 MMOL/L (ref 3.4–5.3)
PR INTERVAL - MUSE: NORMAL MS
PROT SERPL-MCNC: 6.3 G/DL (ref 6.4–8.3)
QRS DURATION - MUSE: 92 MS
QT - MUSE: 356 MS
QTC - MUSE: 400 MS
R AXIS - MUSE: 42 DEGREES
RBC # BLD AUTO: 4.4 10E6/UL (ref 4.4–5.9)
SODIUM SERPL-SCNC: 142 MMOL/L (ref 135–145)
SYSTOLIC BLOOD PRESSURE - MUSE: NORMAL MMHG
T AXIS - MUSE: -33 DEGREES
TROPONIN T SERPL HS-MCNC: 16 NG/L
URATE SERPL-MCNC: 5.1 MG/DL (ref 3.4–7)
VENTRICULAR RATE- MUSE: 76 BPM
WBC # BLD AUTO: 9.2 10E3/UL (ref 4–11)

## 2024-04-03 PROCEDURE — 93005 ELECTROCARDIOGRAM TRACING: CPT

## 2024-04-03 PROCEDURE — 84484 ASSAY OF TROPONIN QUANT: CPT | Performed by: EMERGENCY MEDICINE

## 2024-04-03 PROCEDURE — 73030 X-RAY EXAM OF SHOULDER: CPT | Mod: LT

## 2024-04-03 PROCEDURE — 84155 ASSAY OF PROTEIN SERUM: CPT | Performed by: EMERGENCY MEDICINE

## 2024-04-03 PROCEDURE — 85025 COMPLETE CBC W/AUTO DIFF WBC: CPT | Performed by: EMERGENCY MEDICINE

## 2024-04-03 PROCEDURE — 99285 EMERGENCY DEPT VISIT HI MDM: CPT

## 2024-04-03 PROCEDURE — 36415 COLL VENOUS BLD VENIPUNCTURE: CPT | Performed by: EMERGENCY MEDICINE

## 2024-04-03 PROCEDURE — 84550 ASSAY OF BLOOD/URIC ACID: CPT | Performed by: EMERGENCY MEDICINE

## 2024-04-03 RX ORDER — ACETAMINOPHEN 500 MG
1000 TABLET ORAL ONCE
Status: DISCONTINUED | OUTPATIENT
Start: 2024-04-03 | End: 2024-04-03 | Stop reason: HOSPADM

## 2024-04-03 ASSESSMENT — COLUMBIA-SUICIDE SEVERITY RATING SCALE - C-SSRS
2. HAVE YOU ACTUALLY HAD ANY THOUGHTS OF KILLING YOURSELF IN THE PAST MONTH?: NO
1. IN THE PAST MONTH, HAVE YOU WISHED YOU WERE DEAD OR WISHED YOU COULD GO TO SLEEP AND NOT WAKE UP?: NO
6. HAVE YOU EVER DONE ANYTHING, STARTED TO DO ANYTHING, OR PREPARED TO DO ANYTHING TO END YOUR LIFE?: NO

## 2024-04-03 ASSESSMENT — ACTIVITIES OF DAILY LIVING (ADL)
ADLS_ACUITY_SCORE: 38
ADLS_ACUITY_SCORE: 38

## 2024-04-03 NOTE — TELEPHONE ENCOUNTER
I called pt and let him know  is ok with him holding his eliquis for 2 days prior to his hernia procedure. Pt to resume when ok with surgeon. Bonilla WIGGINS April 3, 2024, 8:20 AM

## 2024-04-03 NOTE — ED PROVIDER NOTES
"  History     Chief Complaint:  Chest Pain       HPI   Bryce Espinoza is a 63 year old right-handed male with a history of hypertension, hyperlipidemia, atrial fibrillation on Eliquis, and coronary artery disease s/p CABG who presents with left shoulder pain first noted when he woke this morning. Denies any clear triggering event. Pain is exacerbated by moving the joint and letting the arm hang loose. 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 He was concerned for heart attack. Reports he gets \"a little bit of pain\" in hs chest once in while, but denies persistent chest pain. Reports some pain radiating into the left sie of his neck. Reports dyspnea on exertion, including climbing just a few steps. Denies abdominal pain and jaw pain. Notes history of CABG in 2019, with angiogram showing 98% blockage of the left anterior descending coronary artery. He did not have any symptoms at that time. States he had to \"beg\" for an angiogram. Both of his brothers also underwent CABG surgeries in 2019. Denies history of diabetes, tobacco use, alcohol use, or street drug use. He notes he has a heart monitor in place.     Independent Historian:   The patient's wife reports he was recently found to have atrial fibrillation during a pre-op visit, and was placed on Eliquis. He stopped his Eliquis last night in anticipation of scheduled hernia surgery on Friday. He is scheduled for labs this afternoon.    Review of External Notes:   Chart review    Medications:    amLODIPine (NORVASC) 5 MG tablet  apixaban ANTICOAGULANT (ELIQUIS ANTICOAGULANT) 5 MG tablet  Cholecalciferol (VITAMIN D) 2000 UNITS tablet  evolocumab (REPATHA) 140 MG/ML prefilled autoinjector  losartan (COZAAR) 100 MG tablet  metoprolol succinate ER (TOPROL XL) 50 MG 24 hr tablet  tamsulosin (FLOMAX) 0.4 MG capsule      Past Medical History:  "   Past Medical History:   Diagnosis Date    Antiplatelet or antithrombotic long-term use     Arrhythmia     Coronary artery disease involving native coronary artery of native heart without angina pectoris 07/04/2019    Difficulty walking     Dyspnea on exertion     Essential hypertension, benign     Hematuria     Irregular heart beat     Malignant neoplasm of bladder, part unspecified 11/2002    Olecranon bursitis 03/2001    Other and unspecified hyperlipidemia     Paroxysmal atrial fibrillation (H)     Paroxysmal atrial flutter (H)     RIGHT LUNG CALCIFIED GRANULOMA 02/2002    Sleep apnea     Tobacco use disorder     Unspecified hemorrhoids without mention of complication 08/2001    Unspecified hereditary and idiopathic peripheral neuropathy      Patient Active Problem List   Diagnosis    Essential hypertension, benign    Malignant neoplasm of bladder (H)    Other specified idiopathic peripheral neuropathy    Hyperlipidemia LDL goal <70    Coronary artery disease involving native coronary artery of native heart without angina pectoris    S/P CABG (coronary artery bypass graft)    Paroxysmal atrial fibrillation (H)    DENYS (obstructive sleep apnea)        Past Surgical History:    Past Surgical History:   Procedure Laterality Date    BYPASS GRAFT ARTERY CORONARY N/A 9/25/2019    Procedure: CORONARY ARTERY BYPASS GRAFTING x 1 (LIMA - LAD) WITH CORONARY ENDARTERECTOMY  (ON PUMP OXYGENATOR ; HANK BY [facility]);  Surgeon: Magdi Turner MD;  Location:  OR    CV HEART CATHETERIZATION WITH POSSIBLE INTERVENTION N/A 9/6/2019    Procedure: Coronary Angiogram;  Surgeon: Nick Willson MD;  Location:  HEART CARDIAC CATH LAB    CV LEFT HEART CATH N/A 9/6/2019    Procedure: Left Heart Cath;  Surgeon: Nick Willson MD;  Location:  HEART CARDIAC CATH LAB    CV LEFT VENTRICULOGRAM N/A 9/6/2019    Procedure: Left Ventriculogram;  Surgeon: Nick Willson MD;  Location:  HEART CARDIAC CATH LAB    [facility]  "NONSPECIFIC PROCEDURE  2/00/02    EBCT        Physical Exam   Patient Vitals for the past 24 hrs:   BP Temp Temp src Pulse Resp SpO2 Height Weight   04/03/24 1342 138/87 97.3  F (36.3  C) Temporal 100 16 94 % 1.753 m (5' 9\") 102.1 kg (225 lb)        Physical Exam  GENERAL: well developed, pleasant  HEAD: atraumatic  EYES: pupils reactive, extraocular muscles intact, conjunctivae normal  ENT:  mucus membranes moist  NECK:  trachea midline, normal range of motion  RESPIRATORY: no tachypnea, breath sounds clear to auscultation   CVS: normal S1/S2, no murmurs, intact distal pulses  ABDOMEN: soft, nontender, nondistention  MUSCULOSKELETAL: Pain to left shoulder although no surrounding erythema or warmth,. No joint effusion. Biceps, triceps, and deltoid intact. Spurling test is normal.  SKIN: warm and dry, no acute rashes or ulceration  NEURO: GCS 15, cranial nerves intact, alert and oriented x3  PSYCH:  Mood/affect normal     Emergency Department Course   ECG:  ECG results from 04/03/24   EKG 12 lead     Value    Systolic Blood Pressure     Diastolic Blood Pressure     Ventricular Rate 76    Atrial Rate     CA Interval     QRS Duration 92        QTc 400    P Axis     R AXIS 42    T Axis -33    Interpretation ECG      Atrial fibrillation  ST & T wave abnormality, consider inferior ischemia  Abnormal ECG  When compared with ECG of 19-MAY-2021 21:32,  Atrial fibrillation has replaced Sinus rhythm  Read by me          Imaging:  XR Shoulder Left G/E 3 Views   Final Result   IMPRESSION: The left glenohumeral and acromioclavicular joints are   negative for fracture or dislocation. Mild hypertrophic change at the   AC joint.      CARLIE VU MD            SYSTEM ID:  [id number]           Laboratory:  Labs Ordered and Resulted from Time of ED Arrival to Time of ED Departure   COMPREHENSIVE METABOLIC PANEL - Abnormal       Result Value    Sodium 142      Potassium 3.9      Carbon Dioxide (CO2) 25      Anion Gap 12      " Urea Nitrogen 19.5      Creatinine 0.97      GFR Estimate 88      Calcium 9.1      Chloride 105      Glucose 112 (*)     Alkaline Phosphatase 96      AST 33      ALT 34      Protein Total 6.3 (*)     Albumin 4.0      Bilirubin Total 0.4     TROPONIN T, HIGH SENSITIVITY - Normal    Troponin T, High Sensitivity 16     URIC ACID - Normal    Uric Acid 5.1     CBC WITH PLATELETS AND DIFFERENTIAL    WBC Count 9.2      RBC Count 4.40      Hemoglobin 14.1      Hematocrit 42.9      MCV 98      MCH 32.0      MCHC 32.9      RDW 12.3      Platelet Count 219      % Neutrophils 61      % Lymphocytes 26      % Monocytes 10      % Eosinophils 2      % Basophils 1      % Immature Granulocytes 0      NRBCs per 100 WBC 0      Absolute Neutrophils 5.6      Absolute Lymphocytes 2.4      Absolute Monocytes 0.9      Absolute Eosinophils 0.1      Absolute Basophils 0.1      Absolute Immature Granulocytes 0.0      Absolute NRBCs 0.0        Emergency Department Course & Assessments:       Interventions:  Medications   acetaminophen (TYLENOL) tablet 1,000 mg (has no administration in time range)        Independent Interpretation (X-rays, CTs, rhythm strip):  N/A    Assessments/Consultations/Discussion of Management or Tests:  ED Course as of 04/03/24 1537   Wed Apr 03, 2024   1410 I obtained the history and examined the patient as noted above.    1511 I rechecked and updated the patient.        Social Determinants of Health affecting care:   None    Disposition:  The patient was discharged to home.     Impression & Plan    Medical Decision Making:    Patient presents with left shoulder pain that is very positional and is worse when he is letting it hang at it side as well as certain movements have made it much worse.  Does do physical labor.  Job patient had a specific injury.  He is concerned that this might represent a cardiac issue.  He has no chest or pulmonary symptoms.  He does have a history of bypass.  Troponin is negative.  Patient  has very reproducible symptoms as noted very point specific and he feels like there is somebody driving a needle into his shoulder when it occurs.  Feels more musculoskeletal and not cardiac in nature.  Does have a history of some borderline uric acid but uric acid is normal and there is no erythema or warmth to suggest acute gouty etiology.  Does have upcoming surgery for hernia surgery.  Discussed ice Tylenol follow-up with orthopedics if needed.    Diagnosis:    ICD-10-CM    1. Acute pain of left shoulder  M25.512          Discharge Medications:  New Prescriptions    No medications on file      Scribe Disclosure:  I, Lisha Rodríguez, am serving as a scribe at 2:37 PM on 4/3/2024 to document services personally performed by Alex Reyes MD based on my observations and the provider's statements to me.     4/3/2024   Alex Reyes MD Adams, Shaun L, MD  04/03/24 1935

## 2024-04-05 ENCOUNTER — ANESTHESIA EVENT (OUTPATIENT)
Dept: SURGERY | Facility: CLINIC | Age: 64
End: 2024-04-05
Payer: COMMERCIAL

## 2024-04-05 ENCOUNTER — ANESTHESIA (OUTPATIENT)
Dept: SURGERY | Facility: CLINIC | Age: 64
End: 2024-04-05
Payer: COMMERCIAL

## 2024-04-05 ENCOUNTER — APPOINTMENT (OUTPATIENT)
Dept: SURGERY | Facility: PHYSICIAN GROUP | Age: 64
End: 2024-04-05
Payer: COMMERCIAL

## 2024-04-05 ENCOUNTER — HOSPITAL ENCOUNTER (OUTPATIENT)
Facility: CLINIC | Age: 64
Discharge: HOME | End: 2024-04-05
Attending: SURGERY | Admitting: SURGERY
Payer: COMMERCIAL

## 2024-04-05 ENCOUNTER — PATIENT OUTREACH (OUTPATIENT)
Dept: INTERNAL MEDICINE | Facility: CLINIC | Age: 64
End: 2024-04-05

## 2024-04-05 VITALS
HEIGHT: 69 IN | TEMPERATURE: 97.2 F | SYSTOLIC BLOOD PRESSURE: 137 MMHG | OXYGEN SATURATION: 98 % | BODY MASS INDEX: 34.24 KG/M2 | RESPIRATION RATE: 16 BRPM | WEIGHT: 231.2 LBS | DIASTOLIC BLOOD PRESSURE: 91 MMHG | HEART RATE: 82 BPM

## 2024-04-05 DIAGNOSIS — K40.90 RIGHT INGUINAL HERNIA: ICD-10-CM

## 2024-04-05 DIAGNOSIS — K41.90 FEMORAL HERNIA OF RIGHT SIDE: Primary | ICD-10-CM

## 2024-04-05 DIAGNOSIS — R06.02 SOB (SHORTNESS OF BREATH): Primary | ICD-10-CM

## 2024-04-05 DIAGNOSIS — J90 PLEURAL EFFUSION: ICD-10-CM

## 2024-04-05 DIAGNOSIS — R79.89 ELEVATED BRAIN NATRIURETIC PEPTIDE (BNP) LEVEL: ICD-10-CM

## 2024-04-05 PROCEDURE — 710N000009 HC RECOVERY PHASE 1, LEVEL 1, PER MIN: Performed by: SURGERY

## 2024-04-05 PROCEDURE — 250N000011 HC RX IP 250 OP 636: Performed by: ANESTHESIOLOGY

## 2024-04-05 PROCEDURE — 360N000080 HC SURGERY LEVEL 7, PER MIN: Performed by: SURGERY

## 2024-04-05 PROCEDURE — 250N000011 HC RX IP 250 OP 636: Performed by: SURGERY

## 2024-04-05 PROCEDURE — 250N000013 HC RX MED GY IP 250 OP 250 PS 637: Performed by: ANESTHESIOLOGY

## 2024-04-05 PROCEDURE — 258N000003 HC RX IP 258 OP 636: Performed by: ANESTHESIOLOGY

## 2024-04-05 PROCEDURE — 370N000017 HC ANESTHESIA TECHNICAL FEE, PER MIN: Performed by: SURGERY

## 2024-04-05 PROCEDURE — 250N000011 HC RX IP 250 OP 636: Performed by: NURSE ANESTHETIST, CERTIFIED REGISTERED

## 2024-04-05 PROCEDURE — 49550 RPR REM HERNIA INIT REDUCE: CPT | Mod: RT | Performed by: PHYSICIAN ASSISTANT

## 2024-04-05 PROCEDURE — 250N000025 HC SEVOFLURANE, PER MIN: Performed by: SURGERY

## 2024-04-05 PROCEDURE — 258N000003 HC RX IP 258 OP 636: Performed by: NURSE ANESTHETIST, CERTIFIED REGISTERED

## 2024-04-05 PROCEDURE — 250N000009 HC RX 250: Performed by: NURSE ANESTHETIST, CERTIFIED REGISTERED

## 2024-04-05 PROCEDURE — S2900 ROBOTIC SURGICAL SYSTEM: HCPCS | Performed by: SURGERY

## 2024-04-05 PROCEDURE — C1781 MESH (IMPLANTABLE): HCPCS | Performed by: SURGERY

## 2024-04-05 PROCEDURE — 999N000141 HC STATISTIC PRE-PROCEDURE NURSING ASSESSMENT: Performed by: SURGERY

## 2024-04-05 PROCEDURE — 710N000012 HC RECOVERY PHASE 2, PER MINUTE: Performed by: SURGERY

## 2024-04-05 PROCEDURE — 272N000001 HC OR GENERAL SUPPLY STERILE: Performed by: SURGERY

## 2024-04-05 PROCEDURE — 49550 RPR REM HERNIA INIT REDUCE: CPT | Mod: RT | Performed by: SURGERY

## 2024-04-05 DEVICE — LAPAROSCOPIC SELF-FIXATING MESH POLYESTER WITH POLYLACTIC ACID GRIPS AND COLLAGEN FILM
Type: IMPLANTABLE DEVICE | Site: ABDOMEN | Status: FUNCTIONAL
Brand: PROGRIP

## 2024-04-05 RX ORDER — FENTANYL CITRATE 50 UG/ML
INJECTION, SOLUTION INTRAMUSCULAR; INTRAVENOUS PRN
Status: DISCONTINUED | OUTPATIENT
Start: 2024-04-05 | End: 2024-04-05

## 2024-04-05 RX ORDER — BUPIVACAINE HYDROCHLORIDE 5 MG/ML
INJECTION, SOLUTION PERINEURAL PRN
Status: DISCONTINUED | OUTPATIENT
Start: 2024-04-05 | End: 2024-04-05 | Stop reason: HOSPADM

## 2024-04-05 RX ORDER — CEFAZOLIN SODIUM/WATER 2 G/20 ML
2 SYRINGE (ML) INTRAVENOUS
Status: COMPLETED | OUTPATIENT
Start: 2024-04-05 | End: 2024-04-05

## 2024-04-05 RX ORDER — OXYCODONE HYDROCHLORIDE 5 MG/1
10 TABLET ORAL
Status: DISCONTINUED | OUTPATIENT
Start: 2024-04-05 | End: 2024-04-05 | Stop reason: HOSPADM

## 2024-04-05 RX ORDER — OXYCODONE HYDROCHLORIDE 5 MG/1
5 TABLET ORAL
Status: COMPLETED | OUTPATIENT
Start: 2024-04-05 | End: 2024-04-05

## 2024-04-05 RX ORDER — ONDANSETRON 4 MG/1
4 TABLET, ORALLY DISINTEGRATING ORAL EVERY 30 MIN PRN
Status: DISCONTINUED | OUTPATIENT
Start: 2024-04-05 | End: 2024-04-05 | Stop reason: HOSPADM

## 2024-04-05 RX ORDER — ONDANSETRON 2 MG/ML
4 INJECTION INTRAMUSCULAR; INTRAVENOUS EVERY 30 MIN PRN
Status: DISCONTINUED | OUTPATIENT
Start: 2024-04-05 | End: 2024-04-05 | Stop reason: HOSPADM

## 2024-04-05 RX ORDER — LIDOCAINE HYDROCHLORIDE 20 MG/ML
INJECTION, SOLUTION INFILTRATION; PERINEURAL PRN
Status: DISCONTINUED | OUTPATIENT
Start: 2024-04-05 | End: 2024-04-05

## 2024-04-05 RX ORDER — HYDROMORPHONE HCL IN WATER/PF 6 MG/30 ML
0.4 PATIENT CONTROLLED ANALGESIA SYRINGE INTRAVENOUS EVERY 5 MIN PRN
Status: DISCONTINUED | OUTPATIENT
Start: 2024-04-05 | End: 2024-04-05 | Stop reason: HOSPADM

## 2024-04-05 RX ORDER — SODIUM CHLORIDE, SODIUM LACTATE, POTASSIUM CHLORIDE, CALCIUM CHLORIDE 600; 310; 30; 20 MG/100ML; MG/100ML; MG/100ML; MG/100ML
INJECTION, SOLUTION INTRAVENOUS CONTINUOUS PRN
Status: DISCONTINUED | OUTPATIENT
Start: 2024-04-05 | End: 2024-04-05

## 2024-04-05 RX ORDER — OXYCODONE HYDROCHLORIDE 5 MG/1
5 TABLET ORAL
Status: DISCONTINUED | OUTPATIENT
Start: 2024-04-05 | End: 2024-04-05 | Stop reason: HOSPADM

## 2024-04-05 RX ORDER — FENTANYL CITRATE 50 UG/ML
50 INJECTION, SOLUTION INTRAMUSCULAR; INTRAVENOUS EVERY 5 MIN PRN
Status: DISCONTINUED | OUTPATIENT
Start: 2024-04-05 | End: 2024-04-05 | Stop reason: HOSPADM

## 2024-04-05 RX ORDER — NALOXONE HYDROCHLORIDE 0.4 MG/ML
0.1 INJECTION, SOLUTION INTRAMUSCULAR; INTRAVENOUS; SUBCUTANEOUS
Status: DISCONTINUED | OUTPATIENT
Start: 2024-04-05 | End: 2024-04-05 | Stop reason: HOSPADM

## 2024-04-05 RX ORDER — PROPOFOL 10 MG/ML
INJECTION, EMULSION INTRAVENOUS PRN
Status: DISCONTINUED | OUTPATIENT
Start: 2024-04-05 | End: 2024-04-05

## 2024-04-05 RX ORDER — FENTANYL CITRATE 50 UG/ML
25 INJECTION, SOLUTION INTRAMUSCULAR; INTRAVENOUS EVERY 5 MIN PRN
Status: DISCONTINUED | OUTPATIENT
Start: 2024-04-05 | End: 2024-04-05 | Stop reason: HOSPADM

## 2024-04-05 RX ORDER — HYDROMORPHONE HCL IN WATER/PF 6 MG/30 ML
0.2 PATIENT CONTROLLED ANALGESIA SYRINGE INTRAVENOUS EVERY 5 MIN PRN
Status: DISCONTINUED | OUTPATIENT
Start: 2024-04-05 | End: 2024-04-05 | Stop reason: HOSPADM

## 2024-04-05 RX ORDER — DEXAMETHASONE SODIUM PHOSPHATE 4 MG/ML
INJECTION, SOLUTION INTRA-ARTICULAR; INTRALESIONAL; INTRAMUSCULAR; INTRAVENOUS; SOFT TISSUE PRN
Status: DISCONTINUED | OUTPATIENT
Start: 2024-04-05 | End: 2024-04-05

## 2024-04-05 RX ORDER — SODIUM CHLORIDE, SODIUM LACTATE, POTASSIUM CHLORIDE, CALCIUM CHLORIDE 600; 310; 30; 20 MG/100ML; MG/100ML; MG/100ML; MG/100ML
INJECTION, SOLUTION INTRAVENOUS CONTINUOUS
Status: DISCONTINUED | OUTPATIENT
Start: 2024-04-05 | End: 2024-04-05 | Stop reason: HOSPADM

## 2024-04-05 RX ORDER — CEFAZOLIN SODIUM/WATER 2 G/20 ML
2 SYRINGE (ML) INTRAVENOUS SEE ADMIN INSTRUCTIONS
Status: DISCONTINUED | OUTPATIENT
Start: 2024-04-05 | End: 2024-04-05 | Stop reason: HOSPADM

## 2024-04-05 RX ORDER — ONDANSETRON 2 MG/ML
INJECTION INTRAMUSCULAR; INTRAVENOUS PRN
Status: DISCONTINUED | OUTPATIENT
Start: 2024-04-05 | End: 2024-04-05

## 2024-04-05 RX ORDER — GLYCOPYRROLATE 0.2 MG/ML
INJECTION, SOLUTION INTRAMUSCULAR; INTRAVENOUS PRN
Status: DISCONTINUED | OUTPATIENT
Start: 2024-04-05 | End: 2024-04-05

## 2024-04-05 RX ORDER — EZETIMIBE 10 MG/1
10 TABLET ORAL DAILY
COMMUNITY
End: 2024-04-17

## 2024-04-05 RX ORDER — OXYCODONE HYDROCHLORIDE 5 MG/1
5-10 TABLET ORAL EVERY 4 HOURS PRN
Qty: 12 TABLET | Refills: 0 | Status: SHIPPED | OUTPATIENT
Start: 2024-04-05 | End: 2024-04-17

## 2024-04-05 RX ORDER — ONDANSETRON 4 MG/1
4 TABLET, ORALLY DISINTEGRATING ORAL EVERY 8 HOURS PRN
Qty: 10 TABLET | Refills: 0 | Status: SHIPPED | OUTPATIENT
Start: 2024-04-05 | End: 2024-04-17

## 2024-04-05 RX ORDER — LIDOCAINE 40 MG/G
CREAM TOPICAL
Status: DISCONTINUED | OUTPATIENT
Start: 2024-04-05 | End: 2024-04-05 | Stop reason: HOSPADM

## 2024-04-05 RX ADMIN — DEXAMETHASONE SODIUM PHOSPHATE 8 MG: 4 INJECTION, SOLUTION INTRA-ARTICULAR; INTRALESIONAL; INTRAMUSCULAR; INTRAVENOUS; SOFT TISSUE at 12:04

## 2024-04-05 RX ADMIN — Medication 2 G: at 11:57

## 2024-04-05 RX ADMIN — SODIUM CHLORIDE, POTASSIUM CHLORIDE, SODIUM LACTATE AND CALCIUM CHLORIDE: 600; 310; 30; 20 INJECTION, SOLUTION INTRAVENOUS at 10:17

## 2024-04-05 RX ADMIN — SODIUM CHLORIDE, POTASSIUM CHLORIDE, SODIUM LACTATE AND CALCIUM CHLORIDE: 600; 310; 30; 20 INJECTION, SOLUTION INTRAVENOUS at 11:00

## 2024-04-05 RX ADMIN — PHENYLEPHRINE HYDROCHLORIDE 300 MCG: 10 INJECTION INTRAVENOUS at 12:24

## 2024-04-05 RX ADMIN — ROCURONIUM BROMIDE 20 MG: 50 INJECTION, SOLUTION INTRAVENOUS at 12:28

## 2024-04-05 RX ADMIN — FENTANYL CITRATE 100 MCG: 50 INJECTION INTRAMUSCULAR; INTRAVENOUS at 12:04

## 2024-04-05 RX ADMIN — PHENYLEPHRINE HYDROCHLORIDE 300 MCG: 10 INJECTION INTRAVENOUS at 12:13

## 2024-04-05 RX ADMIN — PHENYLEPHRINE HYDROCHLORIDE 0.5 MCG/KG/MIN: 10 INJECTION INTRAVENOUS at 12:20

## 2024-04-05 RX ADMIN — PHENYLEPHRINE HYDROCHLORIDE 300 MCG: 10 INJECTION INTRAVENOUS at 12:15

## 2024-04-05 RX ADMIN — MIDAZOLAM 2 MG: 1 INJECTION INTRAMUSCULAR; INTRAVENOUS at 11:57

## 2024-04-05 RX ADMIN — PHENYLEPHRINE HYDROCHLORIDE 100 MCG: 10 INJECTION INTRAVENOUS at 12:37

## 2024-04-05 RX ADMIN — GLYCOPYRROLATE 0.2 MG: 0.2 INJECTION, SOLUTION INTRAMUSCULAR; INTRAVENOUS at 12:04

## 2024-04-05 RX ADMIN — ROCURONIUM BROMIDE 40 MG: 50 INJECTION, SOLUTION INTRAVENOUS at 12:04

## 2024-04-05 RX ADMIN — SODIUM CHLORIDE, POTASSIUM CHLORIDE, SODIUM LACTATE AND CALCIUM CHLORIDE: 600; 310; 30; 20 INJECTION, SOLUTION INTRAVENOUS at 12:41

## 2024-04-05 RX ADMIN — FENTANYL CITRATE 25 MCG: 0.05 INJECTION, SOLUTION INTRAMUSCULAR; INTRAVENOUS at 13:13

## 2024-04-05 RX ADMIN — LIDOCAINE HYDROCHLORIDE 40 MG: 20 INJECTION, SOLUTION INFILTRATION; PERINEURAL at 12:04

## 2024-04-05 RX ADMIN — SUGAMMADEX 200 MG: 100 INJECTION, SOLUTION INTRAVENOUS at 12:56

## 2024-04-05 RX ADMIN — ONDANSETRON 4 MG: 2 INJECTION INTRAMUSCULAR; INTRAVENOUS at 12:50

## 2024-04-05 RX ADMIN — OXYCODONE HYDROCHLORIDE 5 MG: 5 TABLET ORAL at 14:13

## 2024-04-05 RX ADMIN — PROPOFOL 150 MG: 10 INJECTION, EMULSION INTRAVENOUS at 12:04

## 2024-04-05 ASSESSMENT — ACTIVITIES OF DAILY LIVING (ADL)
ADLS_ACUITY_SCORE: 21
ADLS_ACUITY_SCORE: 19
ADLS_ACUITY_SCORE: 21

## 2024-04-05 ASSESSMENT — LIFESTYLE VARIABLES: TOBACCO_USE: 1

## 2024-04-05 ASSESSMENT — ENCOUNTER SYMPTOMS: DYSRHYTHMIAS: 1

## 2024-04-05 NOTE — ANESTHESIA PREPROCEDURE EVALUATION
Anesthesia Pre-Procedure Evaluation    Patient: Bryce Espinoza   MRN: [id number] : 1960        Procedure : Procedure(s):  Robotic assisted right inguinal hernia repair with mesh, possible left inguinal hernia repair          Past Medical History:   Diagnosis Date    Antiplatelet or antithrombotic long-term use     Arrhythmia     Coronary artery disease involving native coronary artery of native heart without angina pectoris 2019    Per CT calcium score of 722.97 3/2019    Difficulty walking     Dyspnea on exertion     Essential hypertension, benign     Hematuria     Irregular heart beat     Malignant neoplasm of bladder, part unspecified 2002    Transitional Cell Carcinoma bladder    Olecranon bursitis 2001    right    Other and unspecified hyperlipidemia     Paroxysmal atrial fibrillation (H)     Paroxysmal atrial flutter (H)     RIGHT LUNG CALCIFIED GRANULOMA 2002    RML    Sleep apnea     Tobacco use disorder     Quit     Unspecified hemorrhoids without mention of complication 2001    Unspecified hereditary and idiopathic peripheral neuropathy       Past Surgical History:   Procedure Laterality Date    BYPASS GRAFT ARTERY CORONARY N/A 2019    Procedure: CORONARY ARTERY BYPASS GRAFTING x 1 (LIMA - LAD) WITH CORONARY ENDARTERECTOMY  (ON PUMP OXYGENATOR ; HANK BY [facility]);  Surgeon: Magdi Turner MD;  Location:  OR    CV HEART CATHETERIZATION WITH POSSIBLE INTERVENTION N/A 2019    Procedure: Coronary Angiogram;  Surgeon: Nick Willson MD;  Location:  HEART CARDIAC CATH LAB    CV LEFT HEART CATH N/A 2019    Procedure: Left Heart Cath;  Surgeon: Nick Willson MD;  Location:  HEART CARDIAC CATH LAB    CV LEFT VENTRICULOGRAM N/A 2019    Procedure: Left Ventriculogram;  Surgeon: Nick Willson MD;  Location:  HEART CARDIAC CATH LAB    ZZC NONSPECIFIC PROCEDURE      EBCT      Allergies   Allergen Reactions    Atorvastatin       Myalgias    Crestor [Rosuvastatin]      Myalgias      Lisinopril      Body aches pt d/mylene  2015    Simvastatin      Myalgias      Social History     Tobacco Use    Smoking status: Former     Packs/day: 1.00     Years: 33.00     Additional pack years: 0.00     Total pack years: 33.00     Types: Cigarettes     Quit date: 2013     Years since quittin.1    Smokeless tobacco: Never   Substance Use Topics    Alcohol use: Yes     Comment: weekends      Wt Readings from Last 1 Encounters:   24 104.9 kg (231 lb 3.2 oz)        Anesthesia Evaluation            ROS/MED HX  ENT/Pulmonary:     (+) sleep apnea,               tobacco use,                        Neurologic:  - neg neurologic ROS     Cardiovascular:     (+)  hypertension- -  CAD -  CABG- -   Taking blood thinners                     dysrhythmias, a-fib,             METS/Exercise Tolerance: >4 METS    Hematologic:  - neg hematologic  ROS     Musculoskeletal:  - neg musculoskeletal ROS     GI/Hepatic:  - neg GI/hepatic ROS     Renal/Genitourinary:  - neg Renal ROS     Endo: Comment: .Body mass index is 34.13 kg/m .        Psychiatric/Substance Use:  - neg psychiatric ROS     Infectious Disease:  - neg infectious disease ROS     Malignancy:  - neg malignancy ROS     Other:  - neg other ROS          Physical Exam    Airway        Mallampati: II    Neck ROM: full     Respiratory Devices and Support         Dental           Cardiovascular   cardiovascular exam normal       Rhythm and rate: regular     Pulmonary   pulmonary exam normal                OUTSIDE LABS:  CBC:   Lab Results   Component Value Date    WBC 9.2 2024    WBC 12.6 (H) 2024    HGB 14.1 2024    HGB 15.1 2024    HCT 42.9 2024    HCT 44.6 2024     2024     2024     BMP:   Lab Results   Component Value Date     2024     2024    POTASSIUM 3.9 2024    POTASSIUM 4.2 2024    CHLORIDE 105 2024     CHLORIDE 101 03/28/2024    CO2 25 04/03/2024    CO2 27 03/28/2024    BUN 19.5 04/03/2024    BUN 35.9 (H) 03/28/2024    CR 0.97 04/03/2024    CR 1.42 (H) 03/28/2024     (H) 04/03/2024    GLC 88 03/28/2024     COAGS:   Lab Results   Component Value Date    PTT 28 05/19/2021    INR 0.99 05/19/2021    FIBR 231 09/25/2019     POC:   Lab Results   Component Value Date     (H) 09/30/2019     HEPATIC:   Lab Results   Component Value Date    ALBUMIN 4.0 04/03/2024    PROTTOTAL 6.3 (L) 04/03/2024    ALT 34 04/03/2024    AST 33 04/03/2024    ALKPHOS 96 04/03/2024    BILITOTAL 0.4 04/03/2024     OTHER:   Lab Results   Component Value Date    PH 7.36 09/25/2019    LACT 1.2 09/25/2019    A1C 5.4 05/19/2021    KELLY 9.1 04/03/2024    PHOS 3.7 09/30/2019    MAG 2.1 09/27/2019    LIPASE 62 (L) 01/30/2017    AMYLASE 52 01/30/2017    TSH 1.06 03/06/2024    CRP <2.9 11/19/2020    SED 9 03/06/2024       Anesthesia Plan    ASA Status:  3       Anesthesia Type: General.     - Airway: ETT   Induction: Intravenous, Propofol.   Maintenance: Balanced.        Consents    Anesthesia Plan(s) and associated risks, benefits, and realistic alternatives discussed. Questions answered and patient/representative(s) expressed understanding.     - Discussed:     - Discussed with:  Patient            Postoperative Care    Pain management: IV analgesics, Oral pain medications, Multi-modal analgesia.   PONV prophylaxis: Ondansetron (or other 5HT-3), Dexamethasone or Solumedrol     Comments:               Rodrigo Brian DO    I have reviewed the pertinent notes and labs in the chart from the past 30 days and (re)examined the patient.  Any updates or changes from those notes are reflected in this note.            # Drug Induced Coagulation Defect: home medication list includes an anticoagulant medication   # Obesity: Estimated body mass index is 34.13 kg/m  as calculated from the following:    Height as of this encounter: 1.753 m (5'  "9.02\").    Weight as of this encounter: 104.9 kg (231 lb 3.2 oz).      "

## 2024-04-05 NOTE — ANESTHESIA PROCEDURE NOTES
Airway       Patient location during procedure: OR       Procedure Start/Stop Times: 4/5/2024 12:07 PM  Staff -        Performed By: CRNA  Consent for Airway        Urgency: elective  Indications and Patient Condition       Indications for airway management: ethel-procedural and airway protection       Induction type:intravenous       Mask difficulty assessment: 3 - difficult mask (inadequate, unstable, or two providers) +/- NMBA    Final Airway Details       Final airway type: endotracheal airway       Successful airway: ETT - single and Oral  Endotracheal Airway Details        ETT size (mm): 8.0       Cuffed: yes       Successful intubation technique: video laryngoscopy       VL Blade Size: MAC 4       Grade View of Cords: 2       Adjucts: stylet       Position: Right       Measured from: lips       Secured at (cm): 22       Bite block used: None    Post intubation assessment        Placement verified by: capnometry        Number of attempts at approach: 1       Number of other approaches attempted: 0       Secured with: tape       Ease of procedure: easy       Dentition: Intact    Medication(s) Administered   Medication Administration Time: 4/5/2024 12:07 PM

## 2024-04-05 NOTE — ANESTHESIA CARE TRANSFER NOTE
Patient: Bryce Espinoza    Procedure: Procedure(s):  Robotic assisted right inguinal hernia repair with mesh       Diagnosis: Right inguinal hernia [K40.90]  Diagnosis Additional Information: No value filed.    Anesthesia Type:   General     Note:    Oropharynx: spontaneously breathing  Level of Consciousness: awake  Oxygen Supplementation: face mask    Independent Airway: airway patency satisfactory and stable  Dentition: dentition unchanged  Vital Signs Stable: post-procedure vital signs reviewed and stable  Report to RN Given: handoff report given  Patient transferred to: PACU    Handoff Report: Identifed the Patient, Identified the Reponsible Provider, Reviewed the pertinent medical history, Discussed the surgical course, Reviewed Intra-OP anesthesia mangement and issues during anesthesia, Set expectations for post-procedure period and Allowed opportunity for questions and acknowledgement of understanding      Vitals:  Vitals Value Taken Time   BP     Temp     Pulse 80 04/05/24 1303   Resp 21 04/05/24 1303   SpO2 95 % 04/05/24 1303   Vitals shown include unfiled device data.    Electronically Signed By: AURELIA Connelly CRNA  April 5, 2024  1:04 PM

## 2024-04-05 NOTE — DISCHARGE INSTRUCTIONS
HOME CARE FOLLOWING INGUINAL/FEMORAL HERNIA REPAIR  GAVIN Parkinson, LESTER Foote, ARON Mcknight, JUAN MANUEL Schmitt  **RESTART ELIQUIS TOMORROW EVENING**    DIET:  Start with liquids and gradually resume your regular diet as tolerated.  Drink plenty of fluids.  While taking pain medications, consider use of a stool softener, increase your fiber in your diet, or add a fiber supplement (like Metamucil, Citrucel) to help prevent constipation - a possible side effect of pain medications.    NAUSEA:  If nauseated from the anesthetic/pain meds; rest in bed, get up cautiously with assistance, and drink clear liquids (juice, tea, broth).    ACTIVITY:  Light Activity -- you may immediately be up and about as tolerated.  Walking is encouraged, increase as tolerated.  Driving/Light Work-- when comfortable and off narcotic pain medications.  Strenuous Work/Activity -- limit lifting to 25 pounds for 3 weeks.  Active Sports (running, biking, etc.) -- cautiously resume after 2 weeks.    INCISIONAL CARE:  If you have a dressing in place, keep clean and dry for 48 hours; you may replace the gauze if it becomes soiled.  After 48 hours you may remove the dressing and shower.  Do not submerse incision in water for 1 week.  If you have a Dermabond dressing (a type of skin glue), you may shower immediately.  Sutures will absorb and need not be removed.  If present, leave the steri-strips (white paper tapes) in place for 14 days after surgery.  If present, leave Dermabond glue in place until it wears/flakes off.  Do not apply lotions, creams, or ointments to incisions.  Expect a variable amount of swelling/black and blue discoloration that may involve the penis/scrotum or labia.  Some numbness around the incision is common.  A lump/ridge under the incision is normal and will gradually resolve.    DISCOMFORT:  Local anesthetic placed at surgery should provide relief for 4-8 hours.  Begin taking pain pills before discomfort is  severe.  Take the pain medication with some food, when possible, to minimize side effects.  Intermittent use of ice packs to the hernia repair site may help during the first 1-3 weeks after surgery.  Expect gradual improvement.    Over-the-counter anti-inflammatory medications (i.e. Ibuprofen/Advil/Motrin or Naprosyn/Aleve) may be used per package instructions in addition to or while tapering off the narcotic pain medications to decrease swelling and sensitivity at the repair site.  DO NOT TAKE these Anti-inflammatory medications if your primary physician has advised against doing so, or if you have acid reflux, ulcer, or bleeding disorder, or take blood-thinner medications.  Call your primary physician or the surgery office if you have medication questions.    FOLLOW-UP AFTER SURGERY:  -Our office will contact you approximately 2-3 weeks after surgery to check on your progress and answer any questions you may have.  If you are doing well, you will not need to return for an office appointment.  If any concerns are identified over the phone, we will help you make an appointment to see a provider.    -If you have not received a phone call, have any questions or concerns, or would like to be seen, please call us at [phone].  We are located at: [address], [address]; [place] [address]    -CONTACT US IF THE FOLLOWING DEVELOPS:   1. A fever that is above 101     2. Increased redness, warmth, drainage, bleeding, or swelling.   3. Pain that is not relieved by rest/ice and your prescription.   4.  Increasing pain after 48 hours.   5. Drainage that is thick, cloudy, yellow, green or white.   6. Any other questions or concerns.      FREQUENTLY ASKED QUESTIONS:    Q:  How should my incision look?    A:  Normally your incision will appear slightly swollen with light redness directly along the incision itself as it heals.  It may feel like a bump or ridge as the healing/scarring happens, and over time (3-4  months) this bump or ridge feeling should slowly go away.  In general, clear or pink watery drainage can be normal at first as your incision heals, but should decrease over time.    Q:  How do I know if my incision is infected?  A:  Look at your incision for signs of infection, like redness around the incision spreading to surrounding skin, or drainage of cloudy or foul-smelling drainage.  If you feel warm, check your temperature to see if you are running a fever.    **If any of these things occur, please notify the nurse at our office.  We may need you to come into the office for an incision check.      Q:  How do I take care of my incision?  A:  If you have a dressing in place - Starting the day after surgery, replace the dressing 1-2 times a day until there is no further drainage from the incision.  At that time, a dressing is no longer needed.  Try to minimize tape on the skin if irritation is occurring at the tape sites.  If you have significant irritation from tape on the skin, please call the office to discuss other method of dressing your incision.    Small pieces of tape called  steri-strips  may be present directly overlying your incision; these may be removed 10 days after surgery unless otherwise specified by your surgeon.  If these tapes start to loosen at the ends, you may trim them back until they fall off or are removed.    A:  If you had  Dermabond  tissue glue used as a dressing (this causes your incision to look shiny with a clear covering over it) - This type of dressing wears off with time and does not require more dressings over the top unless it is draining around the glue as it wears off.  Do not apply ointments or lotions over the incisions until the glue has completely worn off.    Q:  There is a piece of tape or a sticky  lead  still on my skin.  Can I remove this?  A:  Sometimes the sticky  leads  used for monitoring during surgery or for evaluation in the emergency department are not all  removed while you are in the hospital.  These sometimes have a tab or metal dot on them.  You can easily remove these on your own, like taking off a band-aid.  If there is a gel substance under the  lead , simply wipe/clean it off with a washcloth or paper towel.      Q:  What can I do to minimize constipation (very hard stools, or lack of stools)?  A:  Stay well hydrated.  Increase your dietary fiber intake or take a fiber supplement -with plenty of water.  Walk around frequently.  You may consider an over-the-counter stool-softener.  Your Pharmacist can assist you with choosing one that is stocked at your pharmacy.  Constipation is also one of the most common side effects of pain medication.  If you are using pain medication, be pro-active and try to PREVENT problems with constipation by taking the steps above BEFORE constipation becomes a problem.    Q:  What do I do if I need more pain medications?  A:  Call the office to receive refills.  Be aware that certain pain meds cannot be called into a pharmacy and actually require a paper prescription.  A change may be made in your pain med as you progress thru your recovery period or if you have side effects to certain meds.    --Pain meds are NOT refilled after 5pm on weekdays, and NOT AT ALL on the weekends, so please look ahead to prevent problems.    Q:  Why am I having a hard time sleeping now that I am at home?  A:  Many medications you receive while you are in the hospital can impact your sleep for a number of days after your surgery/hospitalization.  Decreased level of activity and naps during the day may also make sleeping at night difficult.  Try to minimize day-time naps, and get up frequently during the day to walk around your home during your recovery time.  Sleep aides may be of some help, but are not recommended for long-term use.      Q:  I am having some back discomfort.  What should I do?  A:  This may be related to certain positioning that was  required for your surgery, extended periods of time in bed, or other changes in your overall activity level.  You may try ice, heat, acetaminophen, or ibuprofen to treat this temporarily.  Note that many pain medications have acetaminophen in them and would state this on the prescription bottle.  Be sure not to exceed the maximum of 4000mg per day of acetaminophen.     **If the pain you are having does not resolve, is severe, or is a flare of back pain you have had on other occasions prior to surgery, please contact your primary physician for further recommendations or for an appointment to be examined at their office.    Q:  Why am I having headaches?  A:  Headaches can be caused by many things:  caffeine withdrawal, use of pain meds, dehydration, high blood pressure, lack of sleep, over-activity/exhaustion, flare-up of usual migraine headaches.  If you feel this is related to muscle tension (a band-like feeling around the head, or a pressure at the low-back of the head) you may try ice or heat to this area.  You may need to drink more fluids (try electrolyte drink like Gatorade), rest, or take your usual migraine medications.   **If your headaches do not resolve, worsen, are accompanied by other symptoms, or if your blood pressure is high, please call your primary physician for recommendation and/or examination.    Q:  I am unable to urinate.  What do I do?  A:  A small percentage of people can have difficulty urinating initially after surgery.  This includes being able to urinate only a very small amount at a time and feeling discomfort or pressure in the very low abdomen.  This is called  urinary retention , and is actually an urgent situation.  Proceed to your nearest Emergency department for evaluation (not an Urgent Care Center).  Sometimes the bladder does not work correctly after certain medications you receive during surgery, or related to certain procedures.  You may need to have a catheter placed until  your bladder recovers.  When planning to go to an Emergency department, it may help to call the ER to let them know you are coming in for this problem after a surgery.  This may help you get in quicker to be evaluated.  **If you have symptoms of a urinary tract infection, please contact your primary physician for the proper evaluation and treatment.        If you have other questions, please call the office Monday thru Friday between 8am and 4:30pm to discuss with the nurse or physician assistant.  #[phone]    There is a surgeon ON CALL on weekday evenings and over the weekend in case of urgent need only, and may be contacted at the same number.    If you are having an emergency, call 911 or proceed to your nearest emergency department.

## 2024-04-05 NOTE — ANESTHESIA POSTPROCEDURE EVALUATION
Patient: Bryce Espinoza    Procedure: Procedure(s):  Robotic assisted right inguinal hernia repair with mesh       Anesthesia Type:  General    Note:  Disposition: Outpatient   Postop Pain Control: Uneventful            Sign Out: Well controlled pain   PONV: No   Neuro/Psych: Uneventful            Sign Out: Acceptable/Baseline neuro status   Airway/Respiratory: Uneventful            Sign Out: Acceptable/Baseline resp. status   CV/Hemodynamics: Uneventful            Sign Out: Acceptable CV status   Other NRE: NONE   DID A NON-ROUTINE EVENT OCCUR? No           Last vitals:  Vitals Value Taken Time   /93 04/05/24 1350   Temp 98.5  F (36.9  C) 04/05/24 1345   Pulse 84 04/05/24 1354   Resp 17 04/05/24 1354   SpO2 96 % 04/05/24 1354   Vitals shown include unfiled device data.    Electronically Signed By: Gumaro Spencer MD  April 5, 2024  3:01 PM

## 2024-04-05 NOTE — OP NOTE
General Surgery Operative Note    Pre-operative diagnosis:  Right inguinal hernia [K40.90]   Post-operative diagnosis: Right femoral hernia   Procedure: Robotic assisted repair of right femoral hernia with mesh   Surgeon: You Lowe MD   Assistant(s): Ashlyn Lomas PA-C - the physician assistant was medically necessary to assist in prepping, positioning, camera operation, retraction/exposure and instrument exchange.   Anesthesia: General    Estimated blood loss: 5 cc's   Drains placed: None   Complications:  None   Findings:  Prominent right femoral hernia.  No obvious indirect sac.  Repair was achieved using preperitoneal ProGrip mesh.     Indications for operation: This is a 63-year-old gentleman who presented with right groin bulging.  Examination revealed a hernia in the right inguinal region.  Robotic assisted repair was recommended, and the procedure, along with its risks and complications, was discussed with the patient.  He agreed to proceed.    Details of the operation: After informed consent, the patient was taken to the operating room, where he underwent satisfactory induction of general anesthesia.  The patient was sterilely prepped and draped and a supraumbilical skin incision was made.  Dissection was carried bluntly down to the fascia, which was opened very slightly using electrocautery.  The robotic camera port was inserted and pneumoperitoneum was achieved using CO2 insufflation.  An 8 mm robotic port was now placed on each side of the abdomen under direct visualization.  The patient was placed in slight Trendelenburg position and the robot was brought in and docked without difficulty.  The left side was inspected and revealed no evidence of hernia.  The right side showed an obvious hernia.  The peritoneum was scored above the level of the ASIS and the preperitoneal space was then developed.  Blunt dissection it became obvious that the hernia was a fairly prominent femoral hernia.  We  created the space well below the pubis.  There was no evidence of significant cord lipoma.  Once the space was fully developed, a piece of ProGrip mesh was brought onto the field and trimmed appropriately on the corners.  This was then placed into the peritoneal cavity and deployed so that it lay smoothly.  It nicely covered the internal ring and the femoral canal.  The peritoneum was now closed using a running 3-0 V lock suture.  The robot was undocked and pneumoperitoneum was released.  The trocars were removed and the supraumbilical fascia was closed using 0 Vicryl sutures.  Skin incisions were closed using 4-0 subcuticular Vicryl, followed by Steri-Strips.    The patient tolerated the procedure well and was transferred to the recovery room in satisfactory condition.  Sponge and needle counts were correct at the close of the case.    Specimens: * No specimens in log *        You Lowe MD

## 2024-04-05 NOTE — TELEPHONE ENCOUNTER
What type of discharge? Emergency Department  Risk of Hospital admission or ED visit: 60.2%  Is a TCM episode required? No  When should the patient follow up with PCP? NAEEM Gay RN  [facility]     [phone]

## 2024-04-07 ENCOUNTER — HOSPITAL ENCOUNTER (EMERGENCY)
Facility: CLINIC | Age: 64
Discharge: HOME | End: 2024-04-07
Attending: EMERGENCY MEDICINE | Admitting: EMERGENCY MEDICINE
Payer: COMMERCIAL

## 2024-04-07 ENCOUNTER — NURSE TRIAGE (OUTPATIENT)
Dept: NURSING | Facility: CLINIC | Age: 64
End: 2024-04-07
Payer: COMMERCIAL

## 2024-04-07 ENCOUNTER — APPOINTMENT (OUTPATIENT)
Dept: GENERAL RADIOLOGY | Facility: CLINIC | Age: 64
End: 2024-04-07
Attending: EMERGENCY MEDICINE
Payer: COMMERCIAL

## 2024-04-07 VITALS
OXYGEN SATURATION: 93 % | WEIGHT: 231 LBS | TEMPERATURE: 98.2 F | RESPIRATION RATE: 17 BRPM | BODY MASS INDEX: 34.1 KG/M2 | DIASTOLIC BLOOD PRESSURE: 80 MMHG | SYSTOLIC BLOOD PRESSURE: 144 MMHG | HEART RATE: 89 BPM

## 2024-04-07 DIAGNOSIS — R06.02 SHORTNESS OF BREATH: ICD-10-CM

## 2024-04-07 LAB
ANION GAP SERPL CALCULATED.3IONS-SCNC: 13 MMOL/L (ref 7–15)
ATRIAL RATE - MUSE: NORMAL BPM
BASOPHILS # BLD AUTO: 0 10E3/UL (ref 0–0.2)
BASOPHILS NFR BLD AUTO: 0 %
BUN SERPL-MCNC: 15.1 MG/DL (ref 8–23)
CALCIUM SERPL-MCNC: 9.4 MG/DL (ref 8.8–10.2)
CHLORIDE SERPL-SCNC: 106 MMOL/L (ref 98–107)
CREAT SERPL-MCNC: 0.81 MG/DL (ref 0.67–1.17)
DEPRECATED HCO3 PLAS-SCNC: 25 MMOL/L (ref 22–29)
DIASTOLIC BLOOD PRESSURE - MUSE: NORMAL MMHG
EGFRCR SERPLBLD CKD-EPI 2021: >90 ML/MIN/1.73M2
EOSINOPHIL # BLD AUTO: 0.1 10E3/UL (ref 0–0.7)
EOSINOPHIL NFR BLD AUTO: 1 %
ERYTHROCYTE [DISTWIDTH] IN BLOOD BY AUTOMATED COUNT: 12.6 % (ref 10–15)
GLUCOSE SERPL-MCNC: 113 MG/DL (ref 70–99)
HCT VFR BLD AUTO: 42.2 % (ref 40–53)
HGB BLD-MCNC: 14 G/DL (ref 13.3–17.7)
HOLD SPECIMEN: NORMAL
IMM GRANULOCYTES # BLD: 0.1 10E3/UL
IMM GRANULOCYTES NFR BLD: 1 %
INTERPRETATION ECG - MUSE: NORMAL
LYMPHOCYTES # BLD AUTO: 1.5 10E3/UL (ref 0.8–5.3)
LYMPHOCYTES NFR BLD AUTO: 8 %
MCH RBC QN AUTO: 31.8 PG (ref 26.5–33)
MCHC RBC AUTO-ENTMCNC: 33.2 G/DL (ref 31.5–36.5)
MCV RBC AUTO: 96 FL (ref 78–100)
MONOCYTES # BLD AUTO: 1.7 10E3/UL (ref 0–1.3)
MONOCYTES NFR BLD AUTO: 8 %
NEUTROPHILS # BLD AUTO: 16.2 10E3/UL (ref 1.6–8.3)
NEUTROPHILS NFR BLD AUTO: 82 %
NRBC # BLD AUTO: 0 10E3/UL
NRBC BLD AUTO-RTO: 0 /100
NT-PROBNP SERPL-MCNC: 2540 PG/ML (ref 0–900)
P AXIS - MUSE: NORMAL DEGREES
PLATELET # BLD AUTO: 218 10E3/UL (ref 150–450)
POTASSIUM SERPL-SCNC: 4 MMOL/L (ref 3.4–5.3)
PR INTERVAL - MUSE: NORMAL MS
QRS DURATION - MUSE: 94 MS
QT - MUSE: 314 MS
QTC - MUSE: 420 MS
R AXIS - MUSE: 88 DEGREES
RBC # BLD AUTO: 4.4 10E6/UL (ref 4.4–5.9)
SODIUM SERPL-SCNC: 144 MMOL/L (ref 135–145)
SYSTOLIC BLOOD PRESSURE - MUSE: NORMAL MMHG
T AXIS - MUSE: -20 DEGREES
TROPONIN T SERPL HS-MCNC: 19 NG/L
VENTRICULAR RATE- MUSE: 108 BPM
WBC # BLD AUTO: 19.6 10E3/UL (ref 4–11)

## 2024-04-07 PROCEDURE — 85025 COMPLETE CBC W/AUTO DIFF WBC: CPT | Performed by: EMERGENCY MEDICINE

## 2024-04-07 PROCEDURE — 80048 BASIC METABOLIC PNL TOTAL CA: CPT | Performed by: EMERGENCY MEDICINE

## 2024-04-07 PROCEDURE — 84484 ASSAY OF TROPONIN QUANT: CPT | Performed by: EMERGENCY MEDICINE

## 2024-04-07 PROCEDURE — 94640 AIRWAY INHALATION TREATMENT: CPT

## 2024-04-07 PROCEDURE — 36415 COLL VENOUS BLD VENIPUNCTURE: CPT | Performed by: EMERGENCY MEDICINE

## 2024-04-07 PROCEDURE — 250N000009 HC RX 250: Performed by: EMERGENCY MEDICINE

## 2024-04-07 PROCEDURE — 99285 EMERGENCY DEPT VISIT HI MDM: CPT | Mod: 25

## 2024-04-07 PROCEDURE — 83880 ASSAY OF NATRIURETIC PEPTIDE: CPT | Performed by: EMERGENCY MEDICINE

## 2024-04-07 PROCEDURE — 93005 ELECTROCARDIOGRAM TRACING: CPT

## 2024-04-07 PROCEDURE — 71046 X-RAY EXAM CHEST 2 VIEWS: CPT

## 2024-04-07 RX ORDER — IPRATROPIUM BROMIDE AND ALBUTEROL SULFATE 2.5; .5 MG/3ML; MG/3ML
3 SOLUTION RESPIRATORY (INHALATION) ONCE
Status: COMPLETED | OUTPATIENT
Start: 2024-04-07 | End: 2024-04-07

## 2024-04-07 RX ADMIN — IPRATROPIUM BROMIDE AND ALBUTEROL SULFATE 3 ML: .5; 3 SOLUTION RESPIRATORY (INHALATION) at 20:31

## 2024-04-07 ASSESSMENT — COLUMBIA-SUICIDE SEVERITY RATING SCALE - C-SSRS
1. IN THE PAST MONTH, HAVE YOU WISHED YOU WERE DEAD OR WISHED YOU COULD GO TO SLEEP AND NOT WAKE UP?: NO
2. HAVE YOU ACTUALLY HAD ANY THOUGHTS OF KILLING YOURSELF IN THE PAST MONTH?: NO
6. HAVE YOU EVER DONE ANYTHING, STARTED TO DO ANYTHING, OR PREPARED TO DO ANYTHING TO END YOUR LIFE?: NO

## 2024-04-07 ASSESSMENT — ACTIVITIES OF DAILY LIVING (ADL)
ADLS_ACUITY_SCORE: 37
ADLS_ACUITY_SCORE: 35
ADLS_ACUITY_SCORE: 37
ADLS_ACUITY_SCORE: 37

## 2024-04-07 NOTE — TELEPHONE ENCOUNTER
Nurse Triage SBAR    Is this a 2nd Level Triage? YES, LICENSED PRACTITIONER REVIEW IS REQUIRED    Situation: wheeze, post surgery    Background: Patient had a right inguinal hernia repair with mesh on Friday 4/5/24, and today c/o a light wheeze. Denies feeling short of breath    Assessment: Patient sounds SOB during conversation    Protocol Recommended Disposition:   Call PCP Now    Recommendation: Patient verbalized understanding of care advice.       Paged to provider Daniel Solano MD @1805 & 1816 call returned at 1826    Does the patient meet one of the following criteria for ADS visit consideration? No    Reason for Disposition   [1] Caller has URGENT question AND [2] triager unable to answer question    Additional Information   Negative: Sounds like a life-threatening emergency to the triager   Negative: [1] Widespread rash AND [2] bright red, sunburn-like   Negative: [1] SEVERE headache AND [2] after spinal (epidural) anesthesia   Negative: [1] Vomiting AND [2] persists > 4 hours   Negative: [1] Vomiting AND [2] abdomen looks much more swollen than usual   Negative: [1] Drinking very little AND [2] dehydration suspected (e.g., no urine > 12 hours, very dry mouth, very lightheaded)   Negative: Patient sounds very sick or weak to the triager   Negative: Sounds like a serious complication to the triager   Negative: Fever > 100.4 F (38.0 C)   Negative: [1] SEVERE post-op pain (e.g., excruciating, pain scale 8-10) AND [2] not controlled with pain medications    Protocols used: Post-Op Symptoms and Febservsz-I-VC

## 2024-04-08 ENCOUNTER — TELEPHONE (OUTPATIENT)
Dept: INTERNAL MEDICINE | Facility: CLINIC | Age: 64
End: 2024-04-08
Payer: COMMERCIAL

## 2024-04-08 RX ORDER — FUROSEMIDE 20 MG
20 TABLET ORAL DAILY
Qty: 30 TABLET | Refills: 0 | Status: SHIPPED | OUTPATIENT
Start: 2024-04-08 | End: 2024-04-25

## 2024-04-08 RX ORDER — POTASSIUM CHLORIDE 1500 MG/1
20 TABLET, EXTENDED RELEASE ORAL DAILY
Qty: 30 TABLET | Refills: 0 | Status: SHIPPED | OUTPATIENT
Start: 2024-04-08 | End: 2024-04-25

## 2024-04-08 NOTE — TELEPHONE ENCOUNTER
"Spoke with patient to conduct ED/Hosp follow up. Patient stated he was advised to be prescribed a \"water pill again\" due to fluid overload reported during 4/7/2024 ED admission. RN did schedule patient to be seen for ED follow up 4/17/2024 but patient is seeking recommendations if a medication should be prescribed sooner to help with symptoms. Patient stated he is feeling better but would like recommendations.     Routing to PCP for review. Thank you so much!  "

## 2024-04-08 NOTE — TELEPHONE ENCOUNTER
"  ED for acute condition Discharge Protocol    \"Hi, my name is Justice L. Phoenix, RN, a registered nurse, and I am calling from [facility].  I am calling to follow up and see how things are going for you after your recent emergency visit.\"    Tell me how you are doing now that you are home?\" \"The ED provider thinks I should go back on the water pill.       Discharge Instructions    \"Let's review your discharge instructions.  What is/are the follow-up recommendations?  Pt. Response: Follow up with PCP for evaluation.    \"Has an appointment with your primary care provider been scheduled?\"  No (not needed)    Medications    \"Tell me what changed about your medicines when you discharged?\"    \"Nothing was prescribed.\"     \"What questions do you have about your medications?\"   None        Call Summary    \"What questions or concerns do you have about your recent visit and your follow-up care?\"     none    \"If you have questions or things don't continue to improve, we encourage you contact us through the main clinic number (give number).  Even if the clinic is not open, triage nurses are available 24/7 to help you.     We would like you to know that our clinic has extended hours (provide information).  We also have urgent care (provide details on closest location and hours/contact info)\"    \"Thank you for your time and take care!\"          "

## 2024-04-08 NOTE — DISCHARGE INSTRUCTIONS
Your kidney function is normal.  You do not have pneumonia.  It does look like you have some fluid overload, which normally we would recommend that you start a water pill.  We recommend that you follow-up with your primary care provider regarding this.  Please return the emergency department if you develop any new or concerning symptoms.

## 2024-04-08 NOTE — TELEPHONE ENCOUNTER
Spoke with patient to relay PCP recommendations. Patient verbalized understanding and will keep clinic updated for any new or worsening symptoms.

## 2024-04-08 NOTE — ED TRIAGE NOTES
Pt developed sudden sob at 2am, has been having continued sob and wheezing since then. Pt had a hernia surgery on Friday. Pt has a hx of afib, was off blood thinners for 3 days.      Triage Assessment (Adult)       Row Name 04/07/24 6936          Triage Assessment    Airway WDL WDL        Respiratory WDL    Respiratory WDL rhythm/pattern     Rhythm/Pattern, Respiratory shortness of breath        Cardiac WDL    Cardiac WDL WDL        Cognitive/Neuro/Behavioral WDL    Cognitive/Neuro/Behavioral WDL WDL

## 2024-04-08 NOTE — TELEPHONE ENCOUNTER
ER note, chest x-ray and lab results noted.  Patient to start furosemide  (diuretic) 20 mg tablet, 1 tablet daily along with potassium 20 millequivalent tablet, 1 tablet daily.  Prescriptions sent to [facility] across the [address] from [facility].  Patient to follow-up with me on 4/17/2024 schedule.  Contact clinic if shortness of breath worsens between now and then

## 2024-04-08 NOTE — ED PROVIDER NOTES
History     Chief Complaint:  Shortness of Breath       HPI   Bryce Espinoza is a 63 year old male, 2 days s/p hernia repair, with a history of atrial fibrillation on Eliquis, coronary artery disease s/p CABG, hypertension, and hyperlipidemia who presents with shortness of breath and wheezing beginning at 2 am this morning. Denies fever, chills, and chest pain. Reports when he last had wheezing, he was diagnosed with pneumonia. He is a former smoker. Denies history of asthma and COPD.     Independent Historian:   The patient's wife reports he was restarted on Eliquis a few weeks ago after ECG showed atrial fibrillation at his pre-op check. He stopped this a few henry prior to his hernia surgery.     Review of External Notes:   4/3/24: ED note reviewed     Medications:    Norvasc   Eliquis  Repatha  Zetia  Cozaar  Metoprolol     Past Medical History:    Coronary artery disease   Hypertension   Bladder cancer   Atrial fibrillation   Hyperlipidemia   Atrial flutter  Obstructive sleep apnea   Tobacco use disorder   Peripheral neuropathy     Past Surgical History:    CABG  EBCT    Physical Exam   Patient Vitals for the past 24 hrs:   BP Temp Temp src Pulse Resp SpO2 Weight   04/07/24 2210 -- -- -- 89 17 93 % --   04/07/24 2200 (!) 144/80 -- -- 92 23 92 % --   04/07/24 2130 126/88 -- -- 98 21 94 % --   04/07/24 2100 (!) 148/91 -- -- 79 23 100 % --   04/07/24 2015 (!) 154/113 -- -- 98 22 96 % --   04/07/24 2014 (!) 169/112 -- -- 107 23 93 % --   04/07/24 1902 (!) 147/100 98.2  F (36.8  C) Temporal 93 20 100 % 104.8 kg (231 lb)      Physical Exam  General: No acute distress  Head: No obvious trauma to head.  Ears, Nose, Throat:  External ears normal.  Nose normal.  No pharyngeal erythema, swelling or exudate.  Midline uvula. Moist mucus membranes.  Eyes:  Conjunctivae clear.   Neck: Normal range of motion.  Neck supple.   CV: Regular rate and rhythm.  No murmurs.      Respiratory: Effort normal and breath sounds normal.  Mild late  expiratory wheezing heard throughout.   Gastrointestinal: Soft. Distension. There is no tenderness.  There is no rigidity, no rebound and no guarding.   Musculoskeletal: Normal range of motion.  Non tender extremities to palpations. No lower extremity edema  Neuro: Alert. Moving all extremities appropriately.  Normal speech.    Skin: Skin is warm and dry.  No rash noted. Multiple surgical bandages overlaying laparoscopic incision sites that are dry with no surrounding erythema or drainage.  Psych: Normal mood and affect. Behavior is normal.     Emergency Department Course   ECG:  ECG results from 04/07/24   EKG 12-lead, tracing only     Value    Systolic Blood Pressure     Diastolic Blood Pressure     Ventricular Rate 108    Atrial Rate     NM Interval     QRS Duration 94        QTc 420    P Axis     R AXIS 88    T Axis -20    Interpretation ECG      Atrial fibrillation with rapid ventricular response with premature ventricular or aberrantly conducted complexes  Incomplete right bundle branch block  ST & T wave abnormality, consider inferior ischemia  Abnormal ECG  When compared with ECG of 03-APR-2024 13:41,  No significant change was found  Read by me         Imaging:  Chest XR,  PA & LAT   Final Result   IMPRESSION: Small pleural effusions on the lateral view. Minimal peripheral basal septal thickening suspected, which may reflect vascular congestion. Mildly enlarged cardiac silhouette. Mild basilar atelectasis. Sternotomy.              Laboratory:  Labs Ordered and Resulted from Time of ED Arrival to Time of ED Departure   BASIC METABOLIC PANEL - Abnormal       Result Value    Sodium 144      Potassium 4.0      Chloride 106      Carbon Dioxide (CO2) 25      Anion Gap 13      Urea Nitrogen 15.1      Creatinine 0.81      GFR Estimate >90      Calcium 9.4      Glucose 113 (*)    CBC WITH PLATELETS AND DIFFERENTIAL - Abnormal    WBC Count 19.6 (*)     RBC Count 4.40      Hemoglobin 14.0       Hematocrit 42.2      MCV 96      MCH 31.8      MCHC 33.2      RDW 12.6      Platelet Count 218      % Neutrophils 82      % Lymphocytes 8      % Monocytes 8      % Eosinophils 1      % Basophils 0      % Immature Granulocytes 1      NRBCs per 100 WBC 0      Absolute Neutrophils 16.2 (*)     Absolute Lymphocytes 1.5      Absolute Monocytes 1.7 (*)     Absolute Eosinophils 0.1      Absolute Basophils 0.0      Absolute Immature Granulocytes 0.1      Absolute NRBCs 0.0     NT PROBNP INPATIENT - Abnormal    N terminal Pro BNP Inpatient 2,540 (*)    TROPONIN T, HIGH SENSITIVITY - Normal    Troponin T, High Sensitivity 19        Emergency Department Course & Assessments:       Interventions:  Medications   ipratropium - albuterol 0.5 mg/2.5 mg/3 mL (DUONEB) neb solution 3 mL (3 mLs Nebulization $Given 4/7/24 2031)        Independent Interpretation (X-rays, CTs, rhythm strip):  Chest x-ray shows evidence of vascular congestion, no consolidation, pleural effusion or pneumothorax    Assessments/Consultations/Discussion of Management or Tests:  ED Course as of 04/08/24 0104   Sun Apr 07, 2024 2018 I obtained the history and examined the patient as noted above.    2209 I rechecked and updated the patient.     2221 I rechecked and updated the patient. They were amenable to plan for discharge.        Social Determinants of Health affecting care:   None    Disposition:  The patient was discharged to home.     Impression & Plan    Medical Decision Making:  Patient presents with shortness of breath and wheezing.  He does have faint wheezing on exam.  No history of asthma or COPD.  He is a previous smoker.  Oxygen saturations are appropriate.  DuoNeb treatment is given.  He does have a leukocytosis, which could be secondary to his recent surgery.  BMP is grossly unremarkable.  Troponin is not elevated.  EKG shows no ischemic changes.  Chest x-ray shows evidence of vascular congestion.  BNP is elevated, and he has lower extremity  edema.  All these findings are consistent with fluid overload.  Upon reevaluation, he reports he is feeling much better.  I discussed this with the patient, and he states he used to be on a water pill, but no longer is, because his kidney function was worsening.  I informed him that his kidney function is normal today.  I encouraged him to follow-up with his primary care provider regarding further management of his fluid overload and possible restarting of his diuretic.  Return precautions are given and he verbalizes understanding.  He is discharged home in stable condition.        Diagnosis:    ICD-10-CM    1. Shortness of breath  R06.02            Discharge Medications:  Discharge Medication List as of 4/7/2024 10:52 PM         Scribe Disclosure:  I, Lisha Marcos, am serving as a scribe at 8:21 PM on 4/7/2024 to document services personally performed by Nick Frazier MD based on my observations and the provider's statements to me.     4/7/2024   Nick Frazier MD Peery, Stephen, MD  04/08/24 0113

## 2024-04-08 NOTE — TELEPHONE ENCOUNTER
Patient Returning Call    Reason for call:  Patient said that he was in the ER and they want him to get on water pill not the one he was on because it damage his kidney please call the patient to discuss further     Information relayed to patient:  n/a    Patient has additional questions:  Yes    What are your questions/concerns:  above    Who does the patient want to speak with:      Is an  needed?:  No      Could we send this information to you in [facility] or would you prefer to receive a phone call?:   Patient would prefer a phone call   Okay to leave a detailed message?: Yes at Cell number on file:    Telephone Information:   Mobile [phone]

## 2024-04-17 ENCOUNTER — OFFICE VISIT (OUTPATIENT)
Dept: INTERNAL MEDICINE | Facility: CLINIC | Age: 64
End: 2024-04-17
Payer: COMMERCIAL

## 2024-04-17 VITALS
RESPIRATION RATE: 16 BRPM | HEART RATE: 95 BPM | BODY MASS INDEX: 33.68 KG/M2 | OXYGEN SATURATION: 100 % | WEIGHT: 228.2 LBS | DIASTOLIC BLOOD PRESSURE: 70 MMHG | TEMPERATURE: 97.4 F | SYSTOLIC BLOOD PRESSURE: 115 MMHG

## 2024-04-17 DIAGNOSIS — I50.9 CONGESTIVE HEART FAILURE, UNSPECIFIED HF CHRONICITY, UNSPECIFIED HEART FAILURE TYPE (H): Primary | ICD-10-CM

## 2024-04-17 DIAGNOSIS — E78.5 HYPERLIPIDEMIA LDL GOAL <70: ICD-10-CM

## 2024-04-17 DIAGNOSIS — I48.0 PAROXYSMAL ATRIAL FIBRILLATION (H): ICD-10-CM

## 2024-04-17 PROCEDURE — 80053 COMPREHEN METABOLIC PANEL: CPT | Performed by: INTERNAL MEDICINE

## 2024-04-17 PROCEDURE — 99214 OFFICE O/P EST MOD 30 MIN: CPT | Mod: 24 | Performed by: INTERNAL MEDICINE

## 2024-04-17 PROCEDURE — 83880 ASSAY OF NATRIURETIC PEPTIDE: CPT | Performed by: INTERNAL MEDICINE

## 2024-04-17 PROCEDURE — 80061 LIPID PANEL: CPT | Performed by: INTERNAL MEDICINE

## 2024-04-17 PROCEDURE — 36415 COLL VENOUS BLD VENIPUNCTURE: CPT | Performed by: INTERNAL MEDICINE

## 2024-04-17 NOTE — PROGRESS NOTES
ASSESSMENT:   1. Congestive heart failure, unspecified HF chronicity, unspecified heart failure type (H)  Currently asymptomatic.  Continue current diuretic therapy, metoprolol.  Labs as ordered.  Await cardiac echo later this month.  If ejection fraction normal, will then consider trial of stopping diuretic therapy to see if recent exacerbation more related to IV fluid/stress from surgery versus cardiac change with A-fib that would require more long-term diuretic therapy  - Comprehensive metabolic panel; Future  - BNP-N terminal pro; Future  - Comprehensive metabolic panel  - BNP-N terminal pro    2. Hyperlipidemia LDL goal <70  Now on Repatha.  Needs lipid reevaluation  - Comprehensive metabolic panel; Future  - Lipid panel reflex to direct LDL Fasting; Future  - Comprehensive metabolic panel  - Lipid panel reflex to direct LDL Fasting    3. Paroxysmal atrial fibrillation (H)  On anticoagulation.  Moderately rate controlled.  Continue current medication for now.  Await echo.  Possible titration upward of metoprolol dosage depending on ECHO EF and HR recheck at that time        PLAN:  Continue same meds for now but take Furosemide in AM  Labs as ordered  ECHO next week as ordered by cardiology. If pump function unchanged and doing well, then possible future trial off of the Furosemide/potassium  If reduced pump function or heart rate  remains in the 90s, will titrate up Metoprolol dosage  Maintain low salt intake in diet (goal < 2,000mg/day)              Lauren Patel is a 63 year old, presenting for the following health issues:  Hospital F/U and Hypertension    History of Present Illness       Reason for visit:  Medication follow upHe consumes 0 sweetened beverage(s) daily.He exercises with enough effort to increase his heart rate 60 or more minutes per day.  He exercises with enough effort to increase his heart rate 7 days per week.   He is taking medications regularly.       Pt following up on [facility] ER visit  on 4/7/2024 for shortness of breath     ER note  reviewed. Part of the note as below:      Chief Complaint:  Shortness of Breath        HPI   Bryce Espinoza is a 63 year old male, 2 days s/p hernia repair, with a history of atrial fibrillation on Eliquis, coronary artery disease s/p CABG, hypertension, and hyperlipidemia who presents with shortness of breath and wheezing beginning at 2 am this morning. Denies fever, chills, and chest pain. Reports when he last had wheezing, he was diagnosed with pneumonia. He is a former smoker. Denies history of asthma and COPD.     Impression & Plan    Medical Decision Making:  Patient presents with shortness of breath and wheezing.  He does have faint wheezing on exam.  No history of asthma or COPD.  He is a previous smoker.  Oxygen saturations are appropriate.  DuoNeb treatment is given.  He does have a leukocytosis, which could be secondary to his recent surgery.  BMP is grossly unremarkable.  Troponin is not elevated.  EKG shows no ischemic changes.  Chest x-ray shows evidence of vascular congestion.  BNP is elevated, and he has lower extremity edema.  All these findings are consistent with fluid overload.  Upon reevaluation, he reports he is feeling much better.  I discussed this with the patient, and he states he used to be on a water pill, but no longer is, because his kidney function was worsening.  I informed him that his kidney function is normal today.  I encouraged him to follow-up with his primary care provider regarding further management of his fluid overload and possible restarting of his diuretic.  Return precautions are given and he verbalizes understanding.  He is discharged home in stable condition.    Most recent lab results reviewed with pt.    Underwent right inguinal  hernia repair 2 days prior to ER visit.  Denies current groin discomfort.  Was started on furosemide and potassium.  Patient denies current chest pain, shortness of breath with usual walking  "activity.  No lower extremity edema.  Sleeping well and lying flat at night.  No orthopnea or PND.  Denies chest pain.  A-fib rate controlled and patient on anticoagulation.  Scheduled for follow-up echo on 4/22/2024  On Repatha now for lipid management.  Fasting today and due for follow-up lipid labs         Additional ROS:   Constitutional, HEENT, Cardiovascular, Pulmonary, GI and , Neuro, MSK and Psych review of systems/symptoms are otherwise negative or unchanged from previous, except as noted above.      OBJECTIVE:  /70 (BP Location: Left arm, Patient Position: Sitting, Cuff Size: Adult Regular)   Pulse 95   Temp 97.4  F (36.3  C) (Temporal)   Resp 16   Wt 103.5 kg (228 lb 3.2 oz)   SpO2 100%   BMI 33.68 kg/m     Estimated body mass index is 33.68 kg/m  as calculated from the following:    Height as of 4/5/24: 1.753 m (5' 9.02\").    Weight as of this encounter: 103.5 kg (228 lb 3.2 oz).      Neck: no adenopathy. Thyroid normal to palpation. No bruits. No JVD  Pulm: Lungs clear to auscultation   CV: Irregular rates and rhythm  GI: Soft, nontender, Normal active bowel sounds, No hepatosplenomegaly or masses palpable  Ext: Peripheral pulses intact. Trace bilateral ankle  edema.  Neuro: Normal strength and tone, sensory exam grossly normal      (Chart documentation was completed, in part, with [employer] voice-recognition software. Even though reviewed, some grammatical, spelling, and word errors may remain.)    Remington Riggs MD  Internal Medicine Department  [facility]            "

## 2024-04-17 NOTE — PATIENT INSTRUCTIONS
"CC: Thyroid Nodule    HPI: Gayathri Mack is a 81 y.o. female here for thyroid nodules along with pending conditions listed in the Visit Diagnosis. Diagnosed in in 6/21 on a chest CT performed by her CT. +FHx of thyroid nodules in her daughter and mother.    Thyroid u/s showed a 1.9 and 1.5 cm nodule in the left lobe. FNA performed of 1.9 cm nodule was benign 5/22. Pt here w/ daughter. PTH was elevated but is now normal. No n/v, abdominal pain or kidney stones. No n/v or abdominal pain. She has one kidney stone seen on imaging.     Dexa 2/22 shows osteoporosis. Taking fosamax weekly since 11/18. Her sister has osteoporosis. Taking ca and vd. No exercise. Plans to attend silver sneakers and walk. No falls, fractures or steriod injections.    PMHx, PSHx: reviewed in epic. COPD.    Social Hx: no ETOH/tobacco use. Former smoker who quit two years ago. She smoked 1 ppd since she was 18.       Wt Readings from Last 6 Encounters:   04/27/23 71.2 kg (156 lb 13.7 oz)   02/07/23 70.9 kg (156 lb 6.7 oz)   10/26/22 71.5 kg (157 lb 8.3 oz)   10/07/22 70.6 kg (155 lb 10.3 oz)   09/20/22 70.8 kg (156 lb)   06/22/22 70.9 kg (156 lb 4.9 oz)      ROS:   Constitutional: No recent significant weight change  Eyes: No recent visual changes  Cardiovascular: Denies current anginal symptoms  Respiratory: Denies current respiratory difficulty  Gastrointestinal: Denies recent bowel disturbances  GenitoUrinary - No dysuria  Skin: No new skin rash  Neurologic: No focal neurologic complaints  Musculoskeletal: no joint pain  Endocrine: no polyphagia, polydipsia or polyuria  Remainder ROS negative     /80 (BP Location: Left arm, Patient Position: Sitting, BP Method: Small (Manual))   Pulse 86   Temp 97.9 °F (36.6 °C) (Oral)   Ht 5' 1" (1.549 m)   Wt 71.2 kg (156 lb 13.7 oz)   SpO2 95%   BMI 29.64 kg/m²      Personally reviewed labs below:    Lab Results   Component Value Date    TSH 0.747 04/19/2023    FREET4 1.15 04/19/2023        " Continue same meds for now but take Furosemide in AM  Labs as ordered  ECHO next week as ordered by cardiology. If pump function unchanged and doing well, then possible future trial off of the Furosemide/potassium  If reduced pump function or heart rate  remains in the 90s, will titrate up Metoprolol dosage  Maintain low salt intake in diet (goal < 2,000mg/day)     Chemistry        Component Value Date/Time     02/13/2023 1450    K 5.4 (H) 02/13/2023 1450     02/13/2023 1450    CO2 21 (L) 02/13/2023 1450    BUN 19 02/13/2023 1450    CREATININE 0.8 02/13/2023 1450    GLU 74 02/13/2023 1450        Component Value Date/Time    CALCIUM 9.7 02/13/2023 1450    ALKPHOS 69 02/13/2023 1450    AST 38 02/13/2023 1450    ALT 35 02/13/2023 1450    BILITOT 0.4 02/13/2023 1450    ESTGFRAFRICA >60 05/09/2022 0525    EGFRNONAA >60 05/09/2022 0525           Lab Results   Component Value Date    HGBA1C 5.7 (H) 02/13/2023      US THYROID. A complete ultrasound examination of the thyroid was done with grayscale and color flow Doppler imaging where appropriate.     COMPARISON:  Most recent ultrasound from February 21, 2022     FINDINGS:  RIGHT lobe: The right lobe of the thyroid is normal in size and mildly heterogeneous in background echogenicity measuring 5.9 x 2.6 x 2.1 cm. The background vascularity of the thyroid gland is normal.     LEFT lobe: The left lobe of the thyroid is normal in size and mildly heterogeneous in background echogenicity measuring 4.7 x 2.4 x 2.3 cm. The background vascularity of the thyroid gland is normal.     ISTHMUS: The thyroid isthmus measures 2 mm.     LYMPH NODES: No gross lymphadenopathy is seen in the central neck.     THYROID NODULES:  NODULE #1: Right lower lobe  Size: 9 x 8 x 7 mm, previously 8 x 8 x 7 mm  Composition: solid or almost completely solid (2 points)  Echogenicity: hyperechoic or isoechoic (1 point)  Shape: wider-than-tall (0 points)  Margin: ill-defined (0 points)  Echogenic Foci: none or large comet-tail artifacts (0 points)  Nodule TI-RADS score: TR3 (3 pts). less than 1.5 cm; no follow-up.     NODULE #2: Left mid lobe  Size: 19 x 15 x 15 mm, previously 19 x 15 x 15 mm  Composition: solid or almost completely solid (2 points)  Echogenicity: hyperechoic or isoechoic (1 point)  Shape: wider-than-tall (0 points)  Margin: smooth (0  points)  Echogenic Foci: none or large comet-tail artifacts (0 points)  Nodule TI-RADS score: TR3 (3 pts). Mildly suspicious. 1.5-2.5 cm follow up.     NODULE #3: Left lower lobe  Size: 14 x 13 x 9 mm, previously 14 x 11 x 9 mm  Composition: solid or almost completely solid (2 points)  Echogenicity: hyperechoic or isoechoic (1 point)  Shape: wider-than-tall (0 points)  Margin: ill-defined (0 points)  Echogenic Foci: none or large comet-tail artifacts (0 points)  Nodule TI-RADS score: TR3 (3 pts). less than 1.5 cm; no follow-up.     NODULE #4: Adjacent left lower lobe  Size: 11 x 8 x 8 mm, previously 10 x 7 x 6 mm  Composition: solid or almost completely solid (2 points)  Echogenicity: hyperechoic or isoechoic (1 point)  Shape: wider-than-tall (0 points)  Margin: ill-defined (0 points)  Echogenic Foci: none or large comet-tail artifacts (0 points)  Nodule TI-RADS score: TR3 (3 pts). less than 1.5 cm; no follow-up.     IMPRESSION:  Heterogeneous multinodular thyroid gland with index nodule as outlined above, without adverse interval change from the previous exam.    PE:  GENERAL: elderly female, well developed, well nourished  NECK: Supple neck, normal thyroid. No bruit  LYMPHATIC: No cervical or supraclavicular lymphadenopathy  CARDIOVASCULAR: Normal heart sounds, no pedal edema  RESPIRATORY: Normal effort, clear to auscultation  MUSC: + bony lump on second digit of right hand, 2+ DTR UE/LE  NEURO: steady gait, CN ll-Xll grossly intact  PSYCH: normal mood and affect    Assessment/Plan:   1. Multiple thyroid nodules  TSH    T4, Free    Comprehensive Metabolic Panel      2. Osteoporosis, unspecified osteoporosis type, unspecified pathological fracture presence        3. Hypovitaminosis D  Vitamin D      4. Hyperparathyroidism        5. Nodule of skin of right hand  X-Ray Hand 3 View Bilateral         Nodular thyroid disease-stable-FNA benign. Repeat u/s in 4/24 .The 1.5 cm nodule was not identified during the  FNA.  Postmenopausal-DEXA scan 2/24. Continue Fosamax until 11/23. Will change to prolia in 11/23. Start taking 2000 IU of vitamin D and 1500 mg of calcium. Also, participate in weight bearing and resistance exercises for 40 min 4x weekly to maintain your bone density. Will check for secondary causes.  Hypovitaminosis i-vhlfef-ahyxratv vd intake.  Hyperparathyroidism-recheck pth, iCa, Ca is normal.    Xray right hand  F/u in 6 mths w/ labs

## 2024-04-18 LAB
ALBUMIN SERPL BCG-MCNC: 4.2 G/DL (ref 3.5–5.2)
ALP SERPL-CCNC: 99 U/L (ref 40–150)
ALT SERPL W P-5'-P-CCNC: 23 U/L (ref 0–70)
ANION GAP SERPL CALCULATED.3IONS-SCNC: 11 MMOL/L (ref 7–15)
AST SERPL W P-5'-P-CCNC: 21 U/L (ref 0–45)
BILIRUB SERPL-MCNC: 0.3 MG/DL
BUN SERPL-MCNC: 16.7 MG/DL (ref 8–23)
CALCIUM SERPL-MCNC: 9.3 MG/DL (ref 8.8–10.2)
CHLORIDE SERPL-SCNC: 107 MMOL/L (ref 98–107)
CHOLEST SERPL-MCNC: 77 MG/DL
CREAT SERPL-MCNC: 0.88 MG/DL (ref 0.67–1.17)
DEPRECATED HCO3 PLAS-SCNC: 24 MMOL/L (ref 22–29)
EGFRCR SERPLBLD CKD-EPI 2021: >90 ML/MIN/1.73M2
FASTING STATUS PATIENT QL REPORTED: YES
GLUCOSE SERPL-MCNC: 86 MG/DL (ref 70–99)
HDLC SERPL-MCNC: 36 MG/DL
LDLC SERPL CALC-MCNC: 28 MG/DL
NONHDLC SERPL-MCNC: 41 MG/DL
NT-PROBNP SERPL-MCNC: 1057 PG/ML (ref 0–900)
POTASSIUM SERPL-SCNC: 5 MMOL/L (ref 3.4–5.3)
PROT SERPL-MCNC: 6.4 G/DL (ref 6.4–8.3)
SODIUM SERPL-SCNC: 142 MMOL/L (ref 135–145)
TRIGL SERPL-MCNC: 65 MG/DL

## 2024-04-22 ENCOUNTER — HOSPITAL ENCOUNTER (OUTPATIENT)
Dept: CARDIOLOGY | Facility: CLINIC | Age: 64
Discharge: HOME | End: 2024-04-22
Attending: INTERNAL MEDICINE | Admitting: INTERNAL MEDICINE
Payer: COMMERCIAL

## 2024-04-22 DIAGNOSIS — I10 ESSENTIAL HYPERTENSION, BENIGN: ICD-10-CM

## 2024-04-22 DIAGNOSIS — E78.5 HYPERLIPIDEMIA LDL GOAL <70: ICD-10-CM

## 2024-04-22 LAB — LVEF ECHO: NORMAL

## 2024-04-22 PROCEDURE — 93306 TTE W/DOPPLER COMPLETE: CPT

## 2024-04-22 PROCEDURE — 93306 TTE W/DOPPLER COMPLETE: CPT | Mod: 26 | Performed by: INTERNAL MEDICINE

## 2024-04-23 PROCEDURE — 93248 EXT ECG>7D<15D REV&INTERPJ: CPT | Performed by: INTERNAL MEDICINE

## 2024-04-25 ENCOUNTER — MYC MEDICAL ADVICE (OUTPATIENT)
Dept: INTERNAL MEDICINE | Facility: CLINIC | Age: 64
End: 2024-04-25
Payer: COMMERCIAL

## 2024-04-25 DIAGNOSIS — R79.89 ELEVATED BRAIN NATRIURETIC PEPTIDE (BNP) LEVEL: ICD-10-CM

## 2024-04-25 DIAGNOSIS — J90 PLEURAL EFFUSION: ICD-10-CM

## 2024-04-25 DIAGNOSIS — I50.9 ACUTE CONGESTIVE HEART FAILURE, UNSPECIFIED HEART FAILURE TYPE (H): Primary | ICD-10-CM

## 2024-04-25 DIAGNOSIS — R06.02 SOB (SHORTNESS OF BREATH): ICD-10-CM

## 2024-04-25 RX ORDER — POTASSIUM CHLORIDE 1500 MG/1
20 TABLET, EXTENDED RELEASE ORAL DAILY
Qty: 30 TABLET | Refills: 0 | OUTPATIENT
Start: 2024-04-25

## 2024-04-25 RX ORDER — FUROSEMIDE 20 MG
20 TABLET ORAL DAILY
Qty: 30 TABLET | Refills: 0 | OUTPATIENT
Start: 2024-04-25

## 2024-04-26 ENCOUNTER — CARE COORDINATION (OUTPATIENT)
Dept: CARDIOLOGY | Facility: CLINIC | Age: 64
End: 2024-04-26
Payer: COMMERCIAL

## 2024-04-26 RX ORDER — POTASSIUM CHLORIDE 1500 MG/1
20 TABLET, EXTENDED RELEASE ORAL DAILY
Qty: 30 TABLET | Refills: 0 | Status: SHIPPED | OUTPATIENT
Start: 2024-04-26 | End: 2024-04-29

## 2024-04-26 RX ORDER — FUROSEMIDE 20 MG
20 TABLET ORAL DAILY
Qty: 30 TABLET | Refills: 0 | Status: SHIPPED | OUTPATIENT
Start: 2024-04-26 | End: 2024-05-29

## 2024-04-26 NOTE — TELEPHONE ENCOUNTER
Dr. Riggs, patient is leaving May 9th for a fishing trip to [place].     The dates are May 9-the 24.     Can he remain on the two medication Lasix  and potassium and stop the medication when he returns ?      He does not want to have any complications while he is gone.     He will need a short refill of Lasix and potassium for another 30 days of each.     He has no edema in lower extremities. nor SOB.       He would like you opinion because he is far from medical facilities.       Mare Mosher RN  -[facility]

## 2024-04-26 NOTE — TELEPHONE ENCOUNTER
Called and spoke with pt to relay provider note below.   He verbalized understanding and declines further questions at this time.     Thank you,  Maryjane Murphy RN

## 2024-04-26 NOTE — PROGRESS NOTES
Zio patch monitor completed recently, results show pt in persistent afib. HR avg 74 bpm, no symptoms reported. Dr Blake has no add-on spot available today. One spot avail Mon 4/29/24. Called pt reviewed results and recommendations. Discussed pt going on a fishing trip on 5/9 to [place] and is wondering if he would have potential DCCV done before his trip or not. Pt is feeling overall pretty asymptomatic and his HR was well controlled during the zio monitor period, avg 74 bpm. Discussed pt held his blood thinner for surgery first week of April. He needs to be on that uninterrupted for a month before undergoing DCCV. Told pt he can discuss timing of DCCV further with Dr. Blake at his virtual visit with him on Monday, but  probably will be done after his trip. Advised he make sure to take his meds with him and not miss any doses. Minimize alcohol, overall follow healthy lifestyle on his trip. Pt verbalized understanding of everything discussed. Scheduling will call pt to set up his virtual visit for Mon 4/29 at 3:15 PM w/Dr. Blake ([facility] schedule). Any Avilez RN on 4/26/2024 at 10:08 AM        Received: Today  Autumn Tafoya RN  P [facility] Heart Team 7         Previous Messages       ----- Message -----  From: Felton Blake MD  Sent: 4/25/2024   3:40 PM CDT  To: Autumn Tafoya RN    Can you please have him see me as a virtual add-on visit tomorrow for discussion of this.  We may need to discuss cardioversion.      Results  ZIO PATCH 3-7 DAYS INTERPRETATION  Conclusion    Indication: Hypertension, s/p CABG  9 day Ziopatch monitor  Baseline persistent atrial fibrillation with heart rates ranging , average 74 bpm.  2 runs of nonsustained VT lasting up to 5 beats.  18 episodes of pauses lasting up to 5.6 seconds, some of which occurred during daytime hours.  No symptoms were reported.

## 2024-04-26 NOTE — TELEPHONE ENCOUNTER
Recent labs showed normal kidney function and potassium levels with pt on Furosemide and potassium. Since pt dong well now and will be away from medical care , pt to stay on Furosemide and potassium, 1 tab daily of each as previous for 30 more days and then stop taking both meds. Rxs sent to pt's [facility] pharmacy

## 2024-04-29 ENCOUNTER — VIRTUAL VISIT (OUTPATIENT)
Dept: CARDIOLOGY | Facility: CLINIC | Age: 64
End: 2024-04-29
Payer: COMMERCIAL

## 2024-04-29 DIAGNOSIS — I10 ESSENTIAL HYPERTENSION, BENIGN: ICD-10-CM

## 2024-04-29 DIAGNOSIS — Z95.1 S/P CABG (CORONARY ARTERY BYPASS GRAFT): ICD-10-CM

## 2024-04-29 PROCEDURE — 99214 OFFICE O/P EST MOD 30 MIN: CPT | Mod: 24 | Performed by: INTERNAL MEDICINE

## 2024-04-29 RX ORDER — METOPROLOL SUCCINATE 50 MG/1
50 TABLET, EXTENDED RELEASE ORAL DAILY
Qty: 180 TABLET | Refills: 3 | Status: SHIPPED | OUTPATIENT
Start: 2024-04-29

## 2024-04-29 NOTE — PROGRESS NOTES
Jorge is a 63 year old who is being evaluated via a billable video visit.    How would you like to obtain your AVS? MyChart  If the video visit is dropped, the invitation should be resent by: Text to cell phone: [phone]  Will anyone else be joining your video visit? No    Video-Visit Details    Type of service:  Video Visit   Originating Location (pt. Location): Home    Distant Location (provider location):  On-site  Platform used for Video Visit: [employer]    Review Of Systems  Skin: negative  Eyes: negative  Ears/Nose/Throat: negative  Respiratory: No shortness of breath, dyspnea on exertion, cough, or hemoptysis  Cardiovascular: negative  Gastrointestinal: negative  Genitourinary: negative  Musculoskeletal: negative  Neurologic: negative  Psychiatric: negative  Hematologic/Lymphatic/Immunologic: negative  Endocrine: negative    CARDIOLOGY CLINIC CONSULTATION    PRIMARY CARE PHYSICIAN:  Remington Riggs    HISTORY OF PRESENT ILLNESS:  This is a very pleasant 63-year-old male patient who had seen me initially in 2019.  He has the following pertinent medical issues.     History of former smoking, obesity hyperlipidemia hypertension and severe statin intolerance.  Strong family history of coronary artery disease  History of obstructive coronary artery disease diagnosed in 2019 with a 60% left main lesion and a large dominant right coronary artery.  Patient underwent single-vessel bypass surgery with LIMA to LAD.  See operative notes for details.  Patient required a coronary endarterectomy in the proximal segment of the LAD for LIMA grafting.  Atrial fibrillation postoperatively without clinical recurrences until 2024  Anticoagulation started March 2024  Statin intolerance on Repatha with good response to LDL    Today the patient is seeing me virtually in follow up. He had a cardiac monitor done after his recent visit with me that showed persistent AF with pauses during awake hours. He was also in the ED with mild  vascular congestion and elevated NT pro BNP levels in the 2000 range and started on low dose lasix and potassium for mild edema and HFPEF.     He is asymptomatic from a cardiac standpoint at this time. He has no edema.  No angina.  His wife is on the phone call today.      In regards to pertinent data, during the monitor, he had multiple pauses over 5 seconds during A-fib.  Average heart rate is in the 70s.  Although he was asymptomatic significant bradycardic events were noted.  His potassium is at 5.0.  Creatinine is normal.  This is on a combination of 20 mg of Lasix and 20 meq of potassium.    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    Olecranon bursitis 03/2001    right    Other and unspecified hyperlipidemia     Paroxysmal atrial fibrillation (H)     Paroxysmal atrial flutter (H)     RIGHT LUNG CALCIFIED GRANULOMA 02/2002    RML    Sleep apnea     Tobacco use disorder     Quit 1/03    Unspecified hemorrhoids without mention of complication 08/2001    Unspecified hereditary and idiopathic peripheral neuropathy        MEDICATIONS:  Current Outpatient Medications   Medication Sig Dispense Refill    amLODIPine (NORVASC) 5 MG tablet Take 1 tablet (5 mg) by mouth daily 90 tablet 3    apixaban ANTICOAGULANT (ELIQUIS ANTICOAGULANT) 5 MG tablet Take 1 tablet (5 mg) by mouth 2 times daily 60 tablet 11    Cholecalciferol (VITAMIN D) 2000 UNITS tablet Take 2,000 Units by mouth daily      evolocumab (REPATHA) 140 MG/ML prefilled autoinjector Inject 1 mL (140 mg) Subcutaneous  every 14 days 6 mL 3    furosemide (LASIX) 20 MG tablet Take 1 tablet (20 mg) by mouth daily for 30 days 30 tablet 0    losartan (COZAAR) 100 MG tablet Take 1 tablet (100 mg) by mouth daily 90 tablet 3    metoprolol succinate ER (TOPROL XL) 50 MG 24 hr tablet Take 1 tablet (50 mg) by mouth 2 times daily Appointment required for further refills 180 tablet 3    potassium chloride ER (K-TAB) 20 MEQ CR tablet Take 1 tablet (20 mEq) by mouth daily for 30 days 30 tablet 0     No current facility-administered medications for this visit.       SOCIAL HISTORY:  I have reviewed this patient's social history and updated it with pertinent information if needed. Bryce Espinoza  reports that he quit smoking about 11 years ago. His smoking use included cigarettes. He started smoking about 44 years ago. He has a 33 pack-year smoking history. He has never used smokeless tobacco. He reports current alcohol use. He reports current drug use.    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  However he has recurred back into atrial fibrillation.  He is in persistent atrial fibrillation.  He wants to go on a fishing trip in a couple weeks and after that he is willing to proceed with cardioversion.  He is anticoagulated.  No missing doses.  He will undergo DC cardioversion in May after he comes back from his fishing trip without need for HANK.  Risk and benefits explained.    Continue Eliquis.  No need for aspirin.    He will continue Repatha.  LDL is responded nicely.    In regards to his volume status I told him to change Lasix to  as needed.  Given his elevated potassium I recommend stopping potassium supplementation.    Lastly given his pauses, and relatively excellent rate control during A-fib, I told him to reduce his metoprolol succinate 50 mg to once daily.    The patient understands that cardioversion is not a cure for A-fib.  If after cardioversion if A-fib recurs, recommend referral to EP for consideration of ablation.    It was a pleasure seeing this patient in clinic today. Please do not hesitate to contact me with any future questions.     BHARGAVI Peraza, [facility]  Cardiology - [facility]  April 29, 2024    Level of visit complexity: CBC BMP lipids echocardiogram ECG cardiac monitoring    This note was completed in part using dictation via the Dragon voice recognition software. Some word and grammatical errors may occur and must be interpreted in the appropriate clinical context.  If there are any questions pertaining to this issue, please contact me for further clarification.

## 2024-04-29 NOTE — LETTER
4/29/2024    Remington Riggs MD  [address] [facility] [address]    RE: Bryce Espinoza       Dear Colleague,     I had the pleasure of seeing Bryce Espinoza in the [facility].  Jorge is a 63 year old who is being evaluated via a billable video visit.    How would you like to obtain your AVS? MyChart  If the video visit is dropped, the invitation should be resent by: Text to cell phone: [phone]  Will anyone else be joining your video visit? No    Video-Visit Details    Type of service:  Video Visit   Originating Location (pt. Location): Home    Distant Location (provider location):  On-site  Platform used for Video Visit: [employer]    Review Of Systems  Skin: negative  Eyes: negative  Ears/Nose/Throat: negative  Respiratory: No shortness of breath, dyspnea on exertion, cough, or hemoptysis  Cardiovascular: negative  Gastrointestinal: negative  Genitourinary: negative  Musculoskeletal: negative  Neurologic: negative  Psychiatric: negative  Hematologic/Lymphatic/Immunologic: negative  Endocrine: negative    CARDIOLOGY CLINIC CONSULTATION    PRIMARY CARE PHYSICIAN:  Remington Riggs    HISTORY OF PRESENT ILLNESS:  This is a very pleasant 63-year-old male patient who had seen me initially in 2019.  He has the following pertinent medical issues.     History of former smoking, obesity hyperlipidemia hypertension and severe statin intolerance.  Strong family history of coronary artery disease  History of obstructive coronary artery disease diagnosed in 2019 with a 60% left main lesion and a large dominant right coronary artery.  Patient underwent single-vessel bypass surgery with LIMA to LAD.  See operative notes for details.  Patient required a coronary endarterectomy in the proximal segment of the LAD for LIMA grafting.  Atrial fibrillation postoperatively without clinical recurrences until 2024  Anticoagulation started March 2024  Statin intolerance on Repatha with good response to LDL    Today the  patient is seeing me virtually in follow up. He had a cardiac monitor done after his recent visit with me that showed persistent AF with pauses during awake hours. He was also in the ED with mild vascular congestion and elevated NT pro BNP levels in the 2000 range and started on low dose lasix and potassium for mild edema and HFPEF.     He is asymptomatic from a cardiac standpoint at this time. He has no edema.  No angina.  His wife is on the phone call today.      In regards to pertinent data, during the monitor, he had multiple pauses over 5 seconds during A-fib.  Average heart rate is in the 70s.  Although he was asymptomatic significant bradycardic events were noted.  His potassium is at 5.0.  Creatinine is normal.  This is on a combination of 20 mg of Lasix and 20 meq of potassium.    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    Olecranon bursitis 03/2001    right    Other and unspecified hyperlipidemia     Paroxysmal atrial fibrillation (H)     Paroxysmal atrial flutter (H)     RIGHT LUNG CALCIFIED GRANULOMA 02/2002    RML    Sleep apnea     Tobacco use disorder     Quit 1/03    Unspecified hemorrhoids without mention of complication 08/2001    Unspecified hereditary and idiopathic peripheral neuropathy        MEDICATIONS:  Current Outpatient Medications   Medication Sig Dispense Refill    amLODIPine (NORVASC) 5 MG tablet Take 1 tablet (5 mg) by mouth daily 90 tablet 3    apixaban ANTICOAGULANT (ELIQUIS ANTICOAGULANT) 5 MG tablet Take 1 tablet (5 mg) by mouth 2  times daily 60 tablet 11    Cholecalciferol (VITAMIN D) 2000 UNITS tablet Take 2,000 Units by mouth daily      evolocumab (REPATHA) 140 MG/ML prefilled autoinjector Inject 1 mL (140 mg) Subcutaneous every 14 days 6 mL 3    furosemide (LASIX) 20 MG tablet Take 1 tablet (20 mg) by mouth daily for 30 days 30 tablet 0    losartan (COZAAR) 100 MG tablet Take 1 tablet (100 mg) by mouth daily 90 tablet 3    metoprolol succinate ER (TOPROL XL) 50 MG 24 hr tablet Take 1 tablet (50 mg) by mouth 2 times daily Appointment required for further refills 180 tablet 3    potassium chloride ER (K-TAB) 20 MEQ CR tablet Take 1 tablet (20 mEq) by mouth daily for 30 days 30 tablet 0     No current facility-administered medications for this visit.       SOCIAL HISTORY:  I have reviewed this patient's social history and updated it with pertinent information if needed. Bryce Espinoza  reports that he quit smoking about 11 years ago. His smoking use included cigarettes. He started smoking about 44 years ago. He has a 33 pack-year smoking history. He has never used smokeless tobacco. He reports current alcohol use. He reports current drug use.    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  However he has recurred back into atrial fibrillation.  He is in persistent atrial fibrillation.  He wants to go on a fishing trip in a couple weeks and after that he is willing to proceed with cardioversion.  He is anticoagulated.  No missing doses.  He will undergo DC cardioversion in May after he comes back from his fishing trip without need  for HANK.  Risk and benefits explained.    Continue Eliquis.  No need for aspirin.    He will continue Repatha.  LDL is responded nicely.    In regards to his volume status I told him to change Lasix to as needed.  Given his elevated potassium I recommend stopping potassium supplementation.    Lastly given his pauses, and relatively excellent rate control during A-fib, I told him to reduce his metoprolol succinate 50 mg to once daily.    The patient understands that cardioversion is not a cure for A-fib.  If after cardioversion if A-fib recurs, recommend referral to EP for consideration of ablation.    It was a pleasure seeing this patient in clinic today. Please do not hesitate to contact me with any future questions.     BHARGAVI Peraza, [facility]  Cardiology - [facility] Heart  April 29, 2024    Level of visit complexity: CBC BMP lipids echocardiogram ECG cardiac monitoring    This note was completed in part using dictation via the Dragon voice recognition software. Some word and grammatical errors may occur and must be interpreted in the appropriate clinical context.  If there are any questions pertaining to this issue, please contact me for further clarification.  Thank you for allowing me to participate in the care of your patient.      Sincerely,     Felton Blake MD     [facility]  cc:   Felton Blake MD  [address] [place] [address]  [place] [id number]

## 2024-05-01 ENCOUNTER — TELEPHONE (OUTPATIENT)
Dept: SURGERY | Facility: CLINIC | Age: 64
End: 2024-05-01
Payer: COMMERCIAL

## 2024-05-01 DIAGNOSIS — Z98.890 POSTOPERATIVE STATE: Primary | ICD-10-CM

## 2024-05-01 PROCEDURE — 99024 POSTOP FOLLOW-UP VISIT: CPT | Performed by: PHYSICIAN ASSISTANT

## 2024-05-01 NOTE — TELEPHONE ENCOUNTER
[facility]   Postoperative Follow-up Phone Call  -Call to patient to review recent procedure and recovery    Procedure Date:  April/05  Surgeon:  Dr. Lowe  Procedure:  Right Femoral hernia repair with Progrip Mesh     He is now over 3 weeks postop.   He is feeling well, tolerating normal diet, normal bowel function and denies incision concerns.  He only noticed the groin still being a bit sore when he was bending forward to clip his toenails.    Current pain management: none.  Pt concerns:  none    Bryce may continue to slowly advance his activities at this time.  General recommendation is to remain at a 20 lb weight restriction until 3 weeks after surgery.  After that time, he may increase activity as tolerated.  He may continue to utilize OTC pain management options as well as use of ice/heat to site for comfort.  He should expect progressive resolution of the healing ridge along the incisional site over the following 2-3 months.    Pt is recommended to contact the office if worsening pain, onset of fever/redness at any inc site, or new drainage from the area.  Pt also recommended to call office at any time if ongoing questions/concerns during recovery, but otherwise may follow-up on a prn basis.  Bryce is in agreement with this plan.    Ashlyn Lomas PA-C    Please route or send letter to:  Primary Care Provider (PCP)

## 2024-05-09 ENCOUNTER — MYC MEDICAL ADVICE (OUTPATIENT)
Dept: CARDIOLOGY | Facility: CLINIC | Age: 64
End: 2024-05-09
Payer: COMMERCIAL

## 2024-05-09 ENCOUNTER — TELEPHONE (OUTPATIENT)
Dept: CARDIOLOGY | Facility: CLINIC | Age: 64
End: 2024-05-09
Payer: COMMERCIAL

## 2024-05-09 DIAGNOSIS — I48.19 ATRIAL FIBRILLATION, PERSISTENT (H): Primary | ICD-10-CM

## 2024-05-09 NOTE — TELEPHONE ENCOUNTER
Called pt to review his BP concerns and BP log he sent via [facility]. No answer left VM w/call back number. Also told pt I'd send a reply to his mychart since we missed each other by phone. See [facility] encounter. Closing this one to avoid duplicates. Any Avilez RN on 5/9/2024 at 11:18 AM

## 2024-05-09 NOTE — TELEPHONE ENCOUNTER
Called pt to discuss his BP's further. Pt said he takes all of his BP meds at night right before bed. He takes his Eliquis BID as directed. Discussed it is odd his AM BP's are so high if he takes his BP meds right before bed. Determined that pt has been taking his AM BP readings after completing his morning routine and going up/down the stairs several times which might account for his high AM BP readings. He has not been resting for 10 minutes before checking his BP. Pt will begin checking his AM BP first thing upon waking before going up/down stairs, bathing, dressing, eating etc. He will also check it during the day when he is able to rest. I told him we need a better idea of his avg day time BP. His late PM BPs look good. Pt feeling well at this time and looking forward to his fishing trip. He will take all of his meds w/him and not miss any doses of Eliquis. [place] and we can call him if any adjustments are needed. DCCV as planned later this month. Will update Dr. Blake w/BP readings as needed. Any Avilez RN on 5/9/2024 at 12:37 PM

## 2024-05-09 NOTE — TELEPHONE ENCOUNTER
[facility] Call Center    Phone Message    May a detailed message be left on voicemail: yes     Reason for Call: Other: Pt would like a call back to discuss his blood pressure and going out of town tomorrow.     Action Taken: Other: Cardiology    Travel Screening: Not Applicable    Thank you!  [facility]

## 2024-05-15 NOTE — TELEPHONE ENCOUNTER
Pt left  to report his BP was 184/107 this AM 7:30ish. [place] He is looking for recommendations. He does have all his meds with him. Metoprolol was decreased at last OV per note on 4/29/24. Called pt back for updated BP. Pt states BP now 172/112. . Pt said last night when his BP was high he was sweating quite a bit and gassy. He denies chest pain, shortness of breath, dizziness, headache or swelling/edema. We discussed he is on a [employer] trip. He has had some foods like brats/party foods and alcohol but said he's trying to limit alcohol. Pt said he did take all of his morning meds around 7:30AM. Pt is taking an extra metoprolol 50 mg now. Will route to Dr. Blake for further recommendations. Any Avilez RN on 5/15/2024 at 11:35 AM    Yesterday   153/101   7 AM  150/92     1 PM  164/103   9 PM      Current Outpatient Medications   Medication Instructions    amLODIPine (NORVASC) 5 mg, Oral, DAILY    apixaban ANTICOAGULANT (ELIQUIS ANTICOAGULANT) 5 mg, Oral, 2 TIMES DAILY    evolocumab (REPATHA) 140 mg, Subcutaneous, EVERY 14 DAYS    furosemide (LASIX) 20 mg, Oral, DAILY    losartan (COZAAR) 100 mg, Oral, DAILY    metoprolol succinate ER (TOPROL XL) 50 mg, Oral, DAILY, Appointment required for further refills    vitamin D3 (CHOLECALCIFEROL) 2,000 Units, Oral, DAILY

## 2024-05-15 NOTE — TELEPHONE ENCOUNTER
Called pt no answer. Left detailed VM with recommendations below. I also told pt that starting tomorrow he is not to take extra metoprolol as he had earlier today as Dr. Blake is only increasing his amlodipine dose. Informed pt take the extra amlodipine 5 mg tonight if BP still high then starting tomorrow amlodipine 10 mg once daily. Pt was asked to call back and confirm. Any Avilez RN on 5/15/2024 at 2:22 PM       Felton Blake MD  You27 minutes ago (1:37 PM)     KV  Please increase amlodipine to 10 mg daily starting today         Note

## 2024-05-16 ENCOUNTER — TELEPHONE (OUTPATIENT)
Dept: INTERNAL MEDICINE | Facility: CLINIC | Age: 64
End: 2024-05-16
Payer: COMMERCIAL

## 2024-05-16 NOTE — TELEPHONE ENCOUNTER
OK to hold Eliquis starting 2 days  before the colonoscopy as requested and then restart the day after the procedure as usual unless instructed differently by the GI doctor at time of the procedure. Inform [facility] and pt

## 2024-05-16 NOTE — TELEPHONE ENCOUNTER
Verbal orders given per MD note below to RN at [place].     Called pt and updated plan of care per provider instruction.

## 2024-05-16 NOTE — TELEPHONE ENCOUNTER
Pt has colonoscopy scheduled.  [facility] needs approval for 2 day hold of Eliquis starting 06/12/2024.  If unable to do 2 day hold, GI request a creatinine level from the last 90 days.      Ok to leave a detailed VM with providers response at [facility] nurse line-     [phone].

## 2024-05-17 NOTE — TELEPHONE ENCOUNTER
Called pt to confirm he received my message w/Dr. Blake's recommendations on 5/15/24 and to get update on his BP's. Pt didn't answer. I left another detailed VM w/recommendations. Asked pt call back to confirm. Any Avilez RN on 5/17/2024 at 2:38 PM       Felton Blake MD  You 5/15/2024      KV  Please increase amlodipine to 10 mg daily starting today          Note

## 2024-05-20 NOTE — PROGRESS NOTES
Pt called me back states he gained weight from 225# to 232 # over the course of a week which he notes was Thursday 5/16/24 when he returned home from his fishing trip. He started taking Lasix 20 mg daily on Thursday and reports his weight is back down to 229# today. Pt takes 1/2 tablet of KCL on the days he takes Lasix. Pt unsure the dose of KCL tablet as it's no longer on his med list but said it was prescribed the same time as the Lasix by Dr. Riggs. BP now 111/78 on amlodipine 10 mg daily. Pt denies shortness of breath or chest pain. Said he had some LE and facial swelling when his weight was up. Pt will continue on Lasix & KCL until his weight returns to his baseline 225#. I reviewed that Dr. Riggs's team has approved an Eliquis hold starting 6/12 for a colonoscopy but that he can't hold his bloodthinner for 30 days after his DCCV on 5/23/24. Pt was advised to contact [facility] to r/s his procedure for later June. Pt [facility] Update to Dr. Blake re: weight gain and Lasix/KCL use. Lasix was changed to PRN at last OV w/Dr. Blake on 4/29/24. Any Avilez RN on 5/20/2024 at 3:40 PM

## 2024-05-22 ENCOUNTER — CARE COORDINATION (OUTPATIENT)
Dept: CARDIOLOGY | Facility: CLINIC | Age: 64
End: 2024-05-22
Payer: COMMERCIAL

## 2024-05-22 NOTE — PROGRESS NOTES
Called patient reviewed pre-procedure prep.     DCCV/HANK prep instructions    Patient is scheduled for a Cardioversion at [facility] - [address], [place] [address] - Main Entrance of the Hospital, on 05/23/24.  Check in time is at 8:30 AM and procedure to follow.    Patient instructed to remain NPO for solid foods 8 hours prior to arrival and may have clear liquids up to 2 hours prior to arrival for their DCCV.    Patient is taking Pradaxa/Xarelto/Eliquis and has been taking daily uninterrupted for at least 21days.     Patient is not diabetic.     Patient is not taking Digoxin.    Patient is taking taking furosemide and has been instructed to hold it the morning of procedure. and has been advised to hold this the morning of the procedure.    Pt is not on a SGLT2 inhibitor.    Pt is not on a GLP-1 Agonist    Patient advised to take their other daily medications the morning of the procedure with small sips of water.     Verified patient has someone available to drive them home from the hospital and can stay with them for 24 hours after the procedure.     Patient advised to notify care team with any new COVID like symptoms prior to procedure. Day of procedure phone number: Maryam at [phone]    Patient is aware of visitor policy.    Patient expresses understanding of above instructions and denies further questions at this time.    Any Avilez RN on 5/22/2024 at 10:22 AM  [facility]         ----- Message -----  From: Myrna Andino  Sent: 5/2/2024  10:42 AM CDT  To: Raquel Andino; [facility] Heart Team 7  Subject: DCCV for 5/23/24                                DCCV/HANK Orders    Location: [facility]    Procedure: Cardioversion     needed: No    Diagnosis:  S/P CABG (coronary artery bypass graft) Essential hypertension, benign    Procedure Date: 5/23/24    Procedure Time: 10:30am    Patient Arrival Time: 8:30am    Ordering  Cardiologist: Dr. Blake    Performing Cardiologist: Dr. Blake    Cardiac Assessment Completed: Yes  Date: 4/29/24  Provider: Lou    NPO: Patient instructed to remain NPO for solid foods 8 hours prior to arrival and may have clear liquids up to 2 hours prior to arrival for their DCCV.    Patient on Coumadin/Warfarin:  No  Patient on Pradaxa/Xarelto/Eliquis:  Yes  Patient on Invokana/Farxiga/Inpefa/Jardiance/Steglatro/Synjardy:  No  Patient on Adlyxin/Bydureon/Byetta/Mounjaro/Ozempic/Rybelsus/Soliquo/Trulicity/Victoza/Wegovy/Zepbound: No    Previous procedure records requested by MD?  No    Additional labs needed at check in? No    Appointment was scheduled: Over the phone    Special instructions:      If pt is having a Cardioverion:  Does the pt have a device: No

## 2024-05-23 ENCOUNTER — HOSPITAL ENCOUNTER (OUTPATIENT)
Facility: CLINIC | Age: 64
Discharge: HOME | End: 2024-05-23
Admitting: INTERNAL MEDICINE
Payer: COMMERCIAL

## 2024-05-23 ENCOUNTER — ANESTHESIA EVENT (OUTPATIENT)
Dept: SURGERY | Facility: CLINIC | Age: 64
End: 2024-05-23
Payer: COMMERCIAL

## 2024-05-23 ENCOUNTER — ANESTHESIA (OUTPATIENT)
Dept: SURGERY | Facility: CLINIC | Age: 64
End: 2024-05-23
Payer: COMMERCIAL

## 2024-05-23 ENCOUNTER — HOSPITAL ENCOUNTER (OUTPATIENT)
Dept: MEDSURG UNIT | Facility: CLINIC | Age: 64
Discharge: HOME | End: 2024-05-23
Attending: INTERNAL MEDICINE | Admitting: INTERNAL MEDICINE
Payer: COMMERCIAL

## 2024-05-23 VITALS
HEART RATE: 80 BPM | SYSTOLIC BLOOD PRESSURE: 145 MMHG | OXYGEN SATURATION: 96 % | DIASTOLIC BLOOD PRESSURE: 100 MMHG | RESPIRATION RATE: 16 BRPM | TEMPERATURE: 97 F

## 2024-05-23 DIAGNOSIS — I10 ESSENTIAL HYPERTENSION, BENIGN: ICD-10-CM

## 2024-05-23 DIAGNOSIS — Z95.1 S/P CABG (CORONARY ARTERY BYPASS GRAFT): ICD-10-CM

## 2024-05-23 LAB
ATRIAL RATE - MUSE: 88 BPM
DIASTOLIC BLOOD PRESSURE - MUSE: NORMAL MMHG
INR PPP: 1.07 (ref 0.85–1.15)
INTERPRETATION ECG - MUSE: NORMAL
MAGNESIUM SERPL-MCNC: 2 MG/DL (ref 1.7–2.3)
P AXIS - MUSE: NORMAL DEGREES
POTASSIUM SERPL-SCNC: 3.8 MMOL/L (ref 3.4–5.3)
PR INTERVAL - MUSE: NORMAL MS
QRS DURATION - MUSE: 88 MS
QT - MUSE: 384 MS
QTC - MUSE: 467 MS
R AXIS - MUSE: 51 DEGREES
SYSTOLIC BLOOD PRESSURE - MUSE: NORMAL MMHG
T AXIS - MUSE: 27 DEGREES
VENTRICULAR RATE- MUSE: 89 BPM

## 2024-05-23 PROCEDURE — 258N000003 HC RX IP 258 OP 636: Performed by: NURSE ANESTHETIST, CERTIFIED REGISTERED

## 2024-05-23 PROCEDURE — 85610 PROTHROMBIN TIME: CPT | Performed by: INTERNAL MEDICINE

## 2024-05-23 PROCEDURE — 92960 CARDIOVERSION ELECTRIC EXT: CPT | Performed by: ANESTHESIOLOGY

## 2024-05-23 PROCEDURE — 258N000003 HC RX IP 258 OP 636: Performed by: INTERNAL MEDICINE

## 2024-05-23 PROCEDURE — 250N000013 HC RX MED GY IP 250 OP 250 PS 637: Performed by: INTERNAL MEDICINE

## 2024-05-23 PROCEDURE — 93005 ELECTROCARDIOGRAM TRACING: CPT

## 2024-05-23 PROCEDURE — 370N000017 HC ANESTHESIA TECHNICAL FEE, PER MIN

## 2024-05-23 PROCEDURE — 92960 CARDIOVERSION ELECTRIC EXT: CPT

## 2024-05-23 PROCEDURE — 36415 COLL VENOUS BLD VENIPUNCTURE: CPT | Performed by: INTERNAL MEDICINE

## 2024-05-23 PROCEDURE — 999N000054 HC STATISTIC EKG NON-CHARGEABLE

## 2024-05-23 PROCEDURE — 92960 CARDIOVERSION ELECTRIC EXT: CPT | Performed by: INTERNAL MEDICINE

## 2024-05-23 PROCEDURE — 250N000011 HC RX IP 250 OP 636: Performed by: NURSE ANESTHETIST, CERTIFIED REGISTERED

## 2024-05-23 PROCEDURE — 84132 ASSAY OF SERUM POTASSIUM: CPT | Performed by: INTERNAL MEDICINE

## 2024-05-23 PROCEDURE — 93010 ELECTROCARDIOGRAM REPORT: CPT | Mod: XU | Performed by: INTERNAL MEDICINE

## 2024-05-23 PROCEDURE — 83735 ASSAY OF MAGNESIUM: CPT | Performed by: INTERNAL MEDICINE

## 2024-05-23 PROCEDURE — 92960 CARDIOVERSION ELECTRIC EXT: CPT | Performed by: NURSE ANESTHETIST, CERTIFIED REGISTERED

## 2024-05-23 PROCEDURE — 999N000184 HC STATISTIC TELEMETRY

## 2024-05-23 PROCEDURE — 999N000010 HC STATISTIC ANES STAT CODE-CRNA PER MINUTE

## 2024-05-23 RX ORDER — NALOXONE HYDROCHLORIDE 0.4 MG/ML
0.2 INJECTION, SOLUTION INTRAMUSCULAR; INTRAVENOUS; SUBCUTANEOUS
Status: DISCONTINUED | OUTPATIENT
Start: 2024-05-23 | End: 2024-05-23 | Stop reason: HOSPADM

## 2024-05-23 RX ORDER — NALOXONE HYDROCHLORIDE 0.4 MG/ML
0.4 INJECTION, SOLUTION INTRAMUSCULAR; INTRAVENOUS; SUBCUTANEOUS
Status: DISCONTINUED | OUTPATIENT
Start: 2024-05-23 | End: 2024-05-23 | Stop reason: HOSPADM

## 2024-05-23 RX ORDER — ATROPINE SULFATE 0.1 MG/ML
.5-1 INJECTION INTRAVENOUS
Status: DISCONTINUED | OUTPATIENT
Start: 2024-05-23 | End: 2024-05-23 | Stop reason: HOSPADM

## 2024-05-23 RX ORDER — POTASSIUM CHLORIDE 1500 MG/1
20 TABLET, EXTENDED RELEASE ORAL
Status: DISCONTINUED | OUTPATIENT
Start: 2024-05-23 | End: 2024-05-23 | Stop reason: HOSPADM

## 2024-05-23 RX ORDER — PROPOFOL 10 MG/ML
INJECTION, EMULSION INTRAVENOUS PRN
Status: DISCONTINUED | OUTPATIENT
Start: 2024-05-23 | End: 2024-05-23

## 2024-05-23 RX ORDER — POTASSIUM CHLORIDE 1500 MG/1
20 TABLET, EXTENDED RELEASE ORAL DAILY
COMMUNITY
End: 2024-05-29

## 2024-05-23 RX ORDER — POTASSIUM CHLORIDE 1500 MG/1
40 TABLET, EXTENDED RELEASE ORAL
Status: DISCONTINUED | OUTPATIENT
Start: 2024-05-23 | End: 2024-05-23 | Stop reason: HOSPADM

## 2024-05-23 RX ORDER — SODIUM CHLORIDE 9 MG/ML
INJECTION, SOLUTION INTRAVENOUS CONTINUOUS
Status: DISCONTINUED | OUTPATIENT
Start: 2024-05-23 | End: 2024-05-23 | Stop reason: HOSPADM

## 2024-05-23 RX ORDER — MAGNESIUM SULFATE HEPTAHYDRATE 40 MG/ML
2 INJECTION, SOLUTION INTRAVENOUS
Status: DISCONTINUED | OUTPATIENT
Start: 2024-05-23 | End: 2024-05-23 | Stop reason: HOSPADM

## 2024-05-23 RX ORDER — FLUMAZENIL 0.1 MG/ML
0.2 INJECTION, SOLUTION INTRAVENOUS
Status: DISCONTINUED | OUTPATIENT
Start: 2024-05-23 | End: 2024-05-23 | Stop reason: HOSPADM

## 2024-05-23 RX ORDER — SODIUM CHLORIDE 9 MG/ML
INJECTION, SOLUTION INTRAVENOUS CONTINUOUS PRN
Status: DISCONTINUED | OUTPATIENT
Start: 2024-05-23 | End: 2024-05-23

## 2024-05-23 RX ADMIN — SODIUM CHLORIDE: 9 INJECTION, SOLUTION INTRAVENOUS at 09:38

## 2024-05-23 RX ADMIN — PROPOFOL 75 MG: 10 INJECTION, EMULSION INTRAVENOUS at 10:19

## 2024-05-23 RX ADMIN — POTASSIUM CHLORIDE 20 MEQ: 1500 TABLET, EXTENDED RELEASE ORAL at 10:05

## 2024-05-23 RX ADMIN — PROPOFOL 25 MG: 10 INJECTION, EMULSION INTRAVENOUS at 10:20

## 2024-05-23 RX ADMIN — SODIUM CHLORIDE: 9 INJECTION, SOLUTION INTRAVENOUS at 10:18

## 2024-05-23 ASSESSMENT — LIFESTYLE VARIABLES: TOBACCO_USE: 1

## 2024-05-23 ASSESSMENT — ACTIVITIES OF DAILY LIVING (ADL)
ADLS_ACUITY_SCORE: 35
ADLS_ACUITY_SCORE: 37
ADLS_ACUITY_SCORE: 37

## 2024-05-23 ASSESSMENT — ENCOUNTER SYMPTOMS: DYSRHYTHMIAS: 1

## 2024-05-23 NOTE — PRE-PROCEDURE
GENERAL PRE-PROCEDURE:   Procedure:  DCCV    Written consent obtained?: Yes    Risks and benefits: Risks, benefits and alternatives were discussed    Consent given by:  Patient  Patient states understanding of procedure being performed: Yes    Patient's understanding of procedure matches consent: Yes    Procedure consent matches procedure scheduled: Yes    Expected level of sedation:  Deep  Appropriately NPO:  Yes  ASA Class:  2  Mallampati  :  Grade 2- soft palate, base of uvula, tonsillar pillars, and portion of posterior pharyngeal wall visible  Lungs:  Lungs clear with good breath sounds bilaterally  Heart:  Systolic murmur and a-fib  History & Physical reviewed:  History and physical reviewed and no updates needed  Statement of review:  I have reviewed the lab findings, diagnostic data, medications, and the plan for sedation    The risks and benefits of the cardioversion procedure have been explained to patient in detail.  Patient understands that cardioversion is NOT a cure for atrial arrhythmias and may not be successful in certain cases to achieve restoration of sinus rhythm and that in certain cases patients may immediately or later revert back in to atrial fibrillation or other arrhythmias. Patient understands it and wishes to proceed. Patient understands that they have to be on uninterrupted anticoagulation for at least 4 weeks after this procedure with further duration determined by their clinical care teams.

## 2024-05-23 NOTE — ANESTHESIA POSTPROCEDURE EVALUATION
Patient: Bryce Espinoza    Procedure: Procedure(s):  Anesthesia cardioversion       Anesthesia Type:  General    Note:  Disposition: Outpatient   Postop Pain Control: Uneventful            Sign Out: Well controlled pain   PONV: No   Neuro/Psych: Uneventful            Sign Out: Acceptable/Baseline neuro status   Airway/Respiratory: Uneventful            Sign Out: Acceptable/Baseline resp. status   CV/Hemodynamics: Uneventful            Sign Out: Acceptable CV status   Other NRE: NONE   DID A NON-ROUTINE EVENT OCCUR? No           Last vitals:  Vitals:    05/23/24 1040 05/23/24 1050 05/23/24 1100   BP: (!) 128/91 (!) 145/100    Pulse: 60 80 80   Resp: 18 18 16   Temp:      SpO2: 94% 96%        Electronically Signed By: Remington Herrera MD  May 23, 2024  3:14 PM

## 2024-05-23 NOTE — PROGRESS NOTES
Care Suites Procedure Nursing Note    Patient Information  Name: Bryce Espinoza  Age: 63 year old    Procedure  Procedure: DCCV  Procedure start time: 1019  Procedure complete time: 1031  Concerns/abnormal assessment: NA  If abnormal assessment, provider notified: N/A  Plan/Other: Proceed as planned    0930  A/O. Pt denies difficulty swallowing.  Pt has Hx of sleep apnea. No dentures or loose teeth. NPO since last pm.  Discharge / post procedure instructions given to pt pre procedure w/ verbal understanding received.  All questions & concerns addressed. Family at bedside.   Monitor and EKG show pt is in AF with control rate.     MD Sedation Assessment completed. Consent signed at this time.   Time Out done at this time.        CDV: Pt tolerated well. CDV x 1 @ 150 joules. See rhythm strips. Total sedation given by anesthesia -   Monitor shows briefly converted to SR after DCCV then went right back to AF.   NO EKG needed per Dr. Blake post procedure.   Dr Blake did talk to wife and to pt. Follow up with cardiology is needed.      1100 Pt discharged per w/c to private vehicle. All personal belongings sent with pt. Pt to be NPO until noon. Both pt & wife  informed. Verbal understanding received.            Azalia Sewell, FELICIANO

## 2024-05-23 NOTE — PROCEDURES
[facility]    Procedure: Cardioversion External    Date/Time: 5/23/2024 10:26 AM    Performed by: Felton Blake MD  Authorized by: Felton Blake MD      UNIVERSAL PROTOCOL   Site Marked: NA  Prior Images Obtained and Reviewed:  Yes  Required items: Required blood products, implants, devices and special equipment available    Patient identity confirmed:  Verbally with patient, arm band, provided demographic data and hospital-assigned identification number  Patient was reevaluated immediately before administering moderate or deep sedation or anesthesia  Confirmation Checklist:  Patient's identity using two indicators, relevant allergies, procedure was appropriate and matched the consent or emergent situation and correct equipment/implants were available  Time out: Immediately prior to the procedure a time out was called    Universal Protocol: the Joint Commission Universal Protocol was followed    Preparation: Patient was prepped and draped in usual sterile fashion       ANESTHESIA    Anesthesia was administered and monitored by anesthesiology.  See anesthesia documentation for details.    SEDATION  Patient Sedated: Yes    Sedation Type:  Deep  Sedation:  Propofol and see MAR for details  Vital signs: Vital signs monitored during sedation      PROCEDURE DETAILS  Cardioversion basis: elective  Pre-procedure rhythm: atrial fibrillation  Patient position: patient was placed in a supine position  Chest area: chest area exposed  Electrodes: pads  Electrodes placed: anterior-posterior  Number of attempts: 1    Details of Attempts:  A single synchronized shock of 150 J was delivered that converted the patient into normal sinus rhythm.  After a couple minutes, the patient went back into atrial fibrillation.  No further attempts were made.  Patient should be referred to EP for consideration of rhythm control.  Continue rate control and anticoagulation at this time.  Post-procedure rhythm: atrial  fibrillation  Complications: no complications      PROCEDURE    Patient Tolerance:  Patient tolerated the procedure well with no immediate complications

## 2024-05-23 NOTE — PROGRESS NOTES
Care Suites Admission Nursing Note    Patient Information  Name: Bryce Espinoza  Age: 63 year old  Reason for admission: [facility]  Care Suites arrival time: 0830    Visitor Information  Name: Brooke     Patient Admission/Assessment   Pre-procedure assessment complete: Yes  If abnormal assessment/labs, provider notified: pending  NPO: Yes  Medications held per instructions/orders: Yes  Consent: to be obtained    Patient oriented to room: Yes  Education/questions answered: Yes  Plan/other: Proceed as planned    Discharge Planning  Discharge name/phone number: Brooke [phone]  Overnight post sedation caregiver: yes  Discharge location: home    Azalia Sewell RN

## 2024-05-23 NOTE — DISCHARGE INSTRUCTIONS
Cardioversion Discharge Instructions    After you go home:       For 24 hours - due to the sedation you received:    Have an adult stay with you for 24 hours.   Relax and take it easy.  Do NOT make any important or legal decisions.  Do NOT drive or operate machines at home or at work.  Do NOT drink alcohol.    Diet:    Start with clear liquids and progress to your normal diet as you feel able.    Medicines:    Take your medications, including blood thinners, unless your provider tells you not to.  If you have stopped any medications, check with your provider about when to restart them.    Follow Up Appointments:    Follow up with your cardiologist at [facility] of patient preference as instructed.  Follow up with your primary care provider as needed.    Post cardioversion:    The skin on your chest or back may feel tender for 48 hours.  If your skin is tender, you may:    Use a cold pack on the site. Never use ice directly on your skin. Use the cold pack for 20 minutes. Remove it for at least 30 minutes before re-using.  Apply 1% hydrocortisone cream to the skin (sold at drug stores)  Take Advil (Ibuprofen) or Tylenol (Acetaminophen) per your provider's recommendations.      Call your provider if you have:    Weakness, dizziness, lightheadedness, or fainting.  Shortness of breath.  Irregular heartbeat, feelings of your heart fluttering or beating fast, hard or palpitations.   More than minor skin discomfort or redness where the cardioversion pads were placed.  Questions or concerns.      Call 911 if you have:    Pain in your chest, arm, shoulder, neck, or upper back.  You have problems speaking or seeing.  Weakness in your arm or leg.  You are unable to move your arm or leg.  You have uncontrolled bleeding.         [facility] [place]:    [phone] [facility] (7 days a week)

## 2024-05-23 NOTE — ANESTHESIA PREPROCEDURE EVALUATION
Anesthesia Pre-Procedure Evaluation    Patient: Bryce Espinoza   MRN: [id number] : 1960        Procedure : Procedure(s):  Anesthesia cardioversion          Past Medical History:   Diagnosis Date    Antiplatelet or antithrombotic long-term use     Arrhythmia     Coronary artery disease involving native coronary artery of native heart without angina pectoris 2019    Per CT calcium score of 722.97 3/2019    Difficulty walking     Dyspnea on exertion     Essential hypertension, benign     Hematuria     Irregular heart beat     Malignant neoplasm of bladder, part unspecified 2002    Transitional Cell Carcinoma bladder    Olecranon bursitis 2001    right    Other and unspecified hyperlipidemia     Paroxysmal atrial fibrillation (H)     Paroxysmal atrial flutter (H)     RIGHT LUNG CALCIFIED GRANULOMA 2002    RML    Sleep apnea     Tobacco use disorder     Quit     Unspecified hemorrhoids without mention of complication 2001    Unspecified hereditary and idiopathic peripheral neuropathy       Past Surgical History:   Procedure Laterality Date    BYPASS GRAFT ARTERY CORONARY N/A 2019    Procedure: CORONARY ARTERY BYPASS GRAFTING x 1 (LIMA - LAD) WITH CORONARY ENDARTERECTOMY  (ON PUMP OXYGENATOR ; HANK BY [facility]);  Surgeon: Magdi Turner MD;  Location:  OR     HEART CATHETERIZATION WITH POSSIBLE INTERVENTION N/A 2019    Procedure: Coronary Angiogram;  Surgeon: Nick Willson MD;  Location:  HEART CARDIAC CATH LAB    CV LEFT HEART CATH N/A 2019    Procedure: Left Heart Cath;  Surgeon: Nick Willson MD;  Location:  HEART CARDIAC CATH LAB    CV LEFT VENTRICULOGRAM N/A 2019    Procedure: Left Ventriculogram;  Surgeon: Nick Willson MD;  Location: [facility] CARDIAC CATH LAB    DAVINCI XI HERNIORRHAPHY INGUINAL Right 2024    Procedure: Robotic assisted right inguinal hernia repair with mesh;  Surgeon: You Lowe MD;  Location:  OR    [facility]  NONSPECIFIC PROCEDURE      EBCT      Allergies   Allergen Reactions    Atorvastatin      Myalgias    Crestor [Rosuvastatin]      Myalgias      Lisinopril      Body aches pt d/mylene  2015    Simvastatin      Myalgias      Social History     Tobacco Use    Smoking status: Former     Current packs/day: 0.00     Average packs/day: 1 pack/day for 33.0 years (33.0 ttl pk-yrs)     Types: Cigarettes     Start date: 1980     Quit date: 2013     Years since quittin.3    Smokeless tobacco: Never   Substance Use Topics    Alcohol use: Yes     Comment: weekends      Wt Readings from Last 1 Encounters:   24 103.5 kg (228 lb 3.2 oz)        Anesthesia Evaluation            ROS/MED HX  ENT/Pulmonary:     (+) sleep apnea,               tobacco use, Past use,                       Neurologic:       Cardiovascular:     (+) Dyslipidemia hypertension- -  CAD -  - stent-   Taking blood thinners                     dysrhythmias, a-fib,        Previous cardiac testing   Echo: Date: 24 Results:  Complete Echo Adult.  ______________________________________________________________________________  Interpretation Summary     Left ventricular systolic function is normal.  The visual ejection fraction is 60-65%.  No regional wall motion abnormalities.  Normal right ventricular size and systolic function.  The left atrium is moderately dilated.  No significant valve disease.  Normal inferior vena cava.  The rhythm was atrial fibrillation.     ______________________________________________________________________________  Left Ventricle  The left ventricle is normal in size. There is normal left ventricular wall  thickness. Left ventricular systolic function is normal. The visual ejection  fraction is 60-65%. Diastolic function not assessed due to atrial  fibrillation. No regional wall motion abnormalities noted.     Right Ventricle  The right ventricle is normal in size and function.     Atria  The left atrium is  moderately dilated. Right atrial size is normal. There is  no atrial shunt seen.     Mitral Valve  The mitral valve leaflets appear normal. There is no evidence of stenosis,  fluttering, or prolapse. There is trace to mild mitral regurgitation. There is  no mitral valve stenosis.     Tricuspid Valve  Normal tricuspid valve. There is trace to mild tricuspid regurgitation. The  right ventricular systolic pressure is approximated at 23.8 mmHg plus the  right atrial pressure. Right ventricle systolic pressure estimate normal.     Aortic Valve  The aortic valve is trileaflet. No aortic regurgitation is present. No aortic  stenosis is present.     Pulmonic Valve  Normal pulmonic valve. There is trace pulmonic valvular regurgitation.     Vessels  The aortic root is normal size. Normal size ascending aorta. The inferior vena  cava is normal.     Pericardium  There is no pericardial effusion.     Rhythm  The rhythm was atrial fibrillation.    Stress Test:  Date: Results:    ECG Reviewed:  Date: Results:    Cath:  Date: Results:   (-) angina and angina   METS/Exercise Tolerance: >4 METS    Hematologic:       Musculoskeletal:       GI/Hepatic:       Renal/Genitourinary:       Endo:       Psychiatric/Substance Use:       Infectious Disease:       Malignancy:       Other:            Physical Exam    Airway        Mallampati: II   TM distance: > 3 FB   Neck ROM: full   Mouth opening: > 3 cm    Respiratory Devices and Support         Dental  no notable dental history     (+) Modest Abnormalities - crowns, retainers, 1 or 2 missing teeth      Cardiovascular          Rhythm and rate: irregular     Pulmonary   pulmonary exam normal                OUTSIDE LABS:  CBC:   Lab Results   Component Value Date    WBC 19.6 (H) 04/07/2024    WBC 9.2 04/03/2024    HGB 14.0 04/07/2024    HGB 14.1 04/03/2024    HCT 42.2 04/07/2024    HCT 42.9 04/03/2024     04/07/2024     04/03/2024     BMP:   Lab Results   Component Value Date      04/17/2024     04/07/2024    POTASSIUM 3.8 05/23/2024    POTASSIUM 5.0 04/17/2024    CHLORIDE 107 04/17/2024    CHLORIDE 106 04/07/2024    CO2 24 04/17/2024    CO2 25 04/07/2024    BUN 16.7 04/17/2024    BUN 15.1 04/07/2024    CR 0.88 04/17/2024    CR 0.81 04/07/2024    GLC 86 04/17/2024     (H) 04/07/2024     COAGS:   Lab Results   Component Value Date    PTT 28 05/19/2021    INR 1.07 05/23/2024    FIBR 231 09/25/2019     POC:   Lab Results   Component Value Date     (H) 09/30/2019     HEPATIC:   Lab Results   Component Value Date    ALBUMIN 4.2 04/17/2024    PROTTOTAL 6.4 04/17/2024    ALT 23 04/17/2024    AST 21 04/17/2024    ALKPHOS 99 04/17/2024    BILITOTAL 0.3 04/17/2024     OTHER:   Lab Results   Component Value Date    PH 7.36 09/25/2019    LACT 1.2 09/25/2019    A1C 5.4 05/19/2021    KELLY 9.3 04/17/2024    PHOS 3.7 09/30/2019    MAG 2.0 05/23/2024    LIPASE 62 (L) 01/30/2017    AMYLASE 52 01/30/2017    TSH 1.06 03/06/2024    CRP <2.9 11/19/2020    SED 9 03/06/2024       Anesthesia Plan    ASA Status:  2    NPO Status:  NPO Appropriate    Anesthesia Type: General.     - Airway: Mask Only   Induction: Intravenous, Propofol.   Maintenance: N/A.        Consents    Anesthesia Plan(s) and associated risks, benefits, and realistic alternatives discussed. Questions answered and patient/representative(s) expressed understanding.     - Discussed:     - Discussed with:  Patient            Postoperative Care            Comments:               Remington Herrera MD    I have reviewed the pertinent notes and labs in the chart from the past 30 days and (re)examined the patient.  Any updates or changes from those notes are reflected in this note.            # Drug Induced Coagulation Defect: home medication list includes an anticoagulant medication

## 2024-05-23 NOTE — TELEPHONE ENCOUNTER
Call out to pt. Reviewed recommendations below. Pt said he takes Lasix 20 mg but only takes KCL 1/2 tablet, 10 mEq daily, on the days he takes Lasix. Pt weight still > 220 lb so he will continue to take for now. Told pt if his weight not down to baseline in a week call us. We may need to re-assess his baseline weight and get some labs. On the other hand, if his weight comes down but swelling persists we will see if Dr. Blake wants to stop amlodipine. EP consult order placed.  Any Avilez RN on 5/23/2024 at 3:31 PM      Felton Blake MD  [facility] Heart Team 7  Refer to EP please. Failed CV today.    Also he has mild edema that I think may be the amlodipine. If his weight goes below 220 I told him to change lasix to PRN only for weight over 220.    If edema persists, let me know and we can change amlodipine.

## 2024-05-23 NOTE — ANESTHESIA CARE TRANSFER NOTE
Patient: Bryce Espinoza    Procedure: Procedure(s):  Anesthesia cardioversion       Diagnosis: Hypertension [I10]  Diagnosis Additional Information: No value filed.    Anesthesia Type:   General     Note:    Oropharynx: oropharynx clear of all foreign objects and spontaneously breathing  Level of Consciousness: awake  Oxygen Supplementation: nasal cannula  Level of Supplemental Oxygen (L/min / FiO2): 4  Independent Airway: airway patency satisfactory and stable  Dentition: dentition unchanged  Vital Signs Stable: post-procedure vital signs reviewed and stable  Report to RN Given: handoff report given  Patient transferred to: Phase II  Comments:   Comments: Diagnosis: Atrial Fibrillation.  Procedure: Cardioversion.  Cardiologist: Felton Do MD  Location: [facility].    At end of cardioversion procedure, patient exhibited spontaneous respirations, patient alert to voice, able to follow commands. Patient breathing room air with oxygen saturations maintained >98%.   SpO2, NiBP, and EKG monitors and alarms on and functioning, report on patient's clinical status given to recovery-trained [facility] RN, RN questions answered.          Handoff Report: Identifed the Patient, Identified the Reponsible Provider, Reviewed the pertinent medical history, Discussed the surgical course, Reviewed Intra-OP anesthesia mangement and issues during anesthesia, Set expectations for post-procedure period and Allowed opportunity for questions and acknowledgement of understanding      Vitals:  Vitals Value Taken Time   /107 05/23/24 1019   Temp     Pulse 76 05/23/24 1019   Resp 18    SpO2 100 % 05/23/24 1019       Electronically Signed By: AURELIA Aguirre CRNA  May 23, 2024  10:28 AM

## 2024-05-25 ENCOUNTER — HEALTH MAINTENANCE LETTER (OUTPATIENT)
Age: 64
End: 2024-05-25

## 2024-05-28 ENCOUNTER — CARE COORDINATION (OUTPATIENT)
Dept: CARDIOLOGY | Facility: CLINIC | Age: 64
End: 2024-05-28
Payer: COMMERCIAL

## 2024-05-28 DIAGNOSIS — I50.9 ACUTE CONGESTIVE HEART FAILURE, UNSPECIFIED HEART FAILURE TYPE (H): ICD-10-CM

## 2024-05-28 DIAGNOSIS — I10 ESSENTIAL HYPERTENSION, BENIGN: Primary | ICD-10-CM

## 2024-05-28 NOTE — TELEPHONE ENCOUNTER
OK try coming of amlodipine and try hydralazine 50 mg TID instead for BP and see if swelling is better.

## 2024-05-28 NOTE — PROGRESS NOTES
Returned pt call regarding BP concerns. Pt states his weight today is 223# and has been coming down. Pt said his main frustration is ongoing LE swelling. He thinks he would like to come off the amlodipine to see if that helps. His BP has been 145 systolic in the morning prior to taking morning meds. Pt will come off amlodipine and monitor BP 1-2 hours after he takes his meds. Pt will update us in a few days with BP readings. Pt also thinks he has a cold w/symptoms of cough, stuffy nose and fatigue. However he is wondering if those are side effects of any of his meds and maybe not a cold. Told pt those sound like URI symptoms which he agrees. Update to Dr. Blake. Any Avilez RN on 5/28/2024 at 1:52 PM

## 2024-05-29 RX ORDER — POTASSIUM CHLORIDE 1500 MG/1
10 TABLET, EXTENDED RELEASE ORAL DAILY PRN
Qty: 15 TABLET | Refills: 0 | Status: ON HOLD | OUTPATIENT
Start: 2024-05-29 | End: 2024-09-20

## 2024-05-29 RX ORDER — HYDRALAZINE HYDROCHLORIDE 50 MG/1
50 TABLET, FILM COATED ORAL 3 TIMES DAILY
Qty: 270 TABLET | Refills: 3 | Status: SHIPPED | OUTPATIENT
Start: 2024-05-29 | End: 2024-05-30

## 2024-05-29 RX ORDER — FUROSEMIDE 20 MG
20 TABLET ORAL DAILY PRN
Qty: 30 TABLET | Refills: 0 | Status: ON HOLD | OUTPATIENT
Start: 2024-05-29 | End: 2024-09-20

## 2024-05-29 NOTE — PROGRESS NOTES
Call out to pt. He states his weight today is 220# so he will also stop the furosemide and KCL and just use PRN now per Dr. Blake's previous recommendations recently. Pt will stop the amlodipine completely and  the hydralazine to start tonight or tomorrow. Per pt request pt was also sent a [employer] message with Dr Blake's recommendations. New rx sent to pt's [facility]. Any Avilez RN on 5/29/2024 at 12:32 PM      Felton Blake MD  You21 hours ago (2:28 PM)     KV  OK try coming of amlodipine and try hydralazine 50 mg TID instead for BP and see if swelling is better.         Note

## 2024-05-30 RX ORDER — AMLODIPINE BESYLATE 5 MG/1
5 TABLET ORAL DAILY
Status: SHIPPED
Start: 2024-05-30 | End: 2024-07-08

## 2024-05-30 NOTE — PROGRESS NOTES
Pt called left  w/questions on new med Rx.     Returned call to pt. Pt and SO Brooke on the phone. They state they have a friend who had been on hydralazine and had terrible side effects so pt is unwilling to start this. Pt did not stop amlodipine yet. He took amlodipine 5 mg last night with his other meds. Long discussion w/pt and SO.     They think there have been too many changes to his BP meds in the past 6 weeks. They said his BP was fine when he was on losartan-hydrochlorothiazide until his creatinine went up in April. State his PCP stopped the combo pill and changed to just losartan d/t abnormal creatine. At the same time he started drinking [employer] drink and they wonder if that's what made his creatine go up and not the hydrochlorothiazide because he had taken that drug for years. They don't want to try hydralazine. They wonder about going back on combo losartan-hydrochlorothiazide. Discussed he already has PRN Lasix now and he states his weight is 217# he is no longer taking this daily, so if they want to minimize more changes probably don't want to go back on the combo pill. He said he had minimal LE swelling on amlodipine 5 mg daily and that only became bothersome on 10 mg dose. Thinks his BP became elevated because of the fishing trip and he might not need 10 mg any more anyway. PM BP is very good. In the end decided he wants to stay on lower dose amlodipine 5 mg daily along with other BP meds losartan, metoprolol and PRN Lasix and KCL. Pt will start checking BP at 10 AM  (takes meds at 7 AM) and continue at 10 PM for a week. They will call in a week with BP readings and go from there. Any Avilez RN on 5/30/2024 at 12:54 PM      Yesterday 10 PM: /83 after evening meds were taken    Today AM: /95 --> BEFORE meds are taken and after going up/down stairs and getting ready; does not check BP in the AM after his meds are taken

## 2024-05-31 NOTE — TELEPHONE ENCOUNTER
Thank you for the alert.  Please have the patient write down home blood pressure readings and follow-up with an AB[facility]Y or PCP in the next 1 month.

## 2024-06-18 ENCOUNTER — OFFICE VISIT (OUTPATIENT)
Dept: CARDIOLOGY | Facility: CLINIC | Age: 64
End: 2024-06-18
Attending: INTERNAL MEDICINE
Payer: COMMERCIAL

## 2024-06-18 VITALS
HEIGHT: 69 IN | HEART RATE: 90 BPM | BODY MASS INDEX: 34.14 KG/M2 | OXYGEN SATURATION: 98 % | WEIGHT: 230.5 LBS | DIASTOLIC BLOOD PRESSURE: 123 MMHG | SYSTOLIC BLOOD PRESSURE: 190 MMHG

## 2024-06-18 DIAGNOSIS — I10 ESSENTIAL HYPERTENSION, BENIGN: ICD-10-CM

## 2024-06-18 DIAGNOSIS — I48.19 ATRIAL FIBRILLATION, PERSISTENT (H): Primary | ICD-10-CM

## 2024-06-18 PROCEDURE — 99215 OFFICE O/P EST HI 40 MIN: CPT | Mod: 24 | Performed by: INTERNAL MEDICINE

## 2024-06-18 PROCEDURE — 99417 PROLNG OP E/M EACH 15 MIN: CPT | Mod: 24 | Performed by: INTERNAL MEDICINE

## 2024-06-18 PROCEDURE — G2211 COMPLEX E/M VISIT ADD ON: HCPCS | Performed by: INTERNAL MEDICINE

## 2024-06-18 PROCEDURE — 93000 ELECTROCARDIOGRAM COMPLETE: CPT | Performed by: INTERNAL MEDICINE

## 2024-06-18 RX ORDER — HYDROCHLOROTHIAZIDE 12.5 MG/1
12.5 TABLET ORAL DAILY
Qty: 30 TABLET | Refills: 11 | Status: SHIPPED | OUTPATIENT
Start: 2024-06-18

## 2024-06-18 NOTE — PROGRESS NOTES
PHYSICIAN NOTE:  This visit was completed in person at the [facility].      I had the pleasure of seeing . Jorge Espinoza for evaluation of atrial fibrillation. He has been referred to EP by Dr. Blake. I previously met him in 10/2021 for evaluation of post-CABG AF.    Very pleasant 63-year-old male with the following chronic medical issues:  Coronary artery disease. Severe LMCA disease with CABG in 09/2019 (LIMA to LAD, the very small circumflex remained ungrafted). LAD endarterectomy required prior to LIMA grafting due to severe calcification of the vessel.  Atrial fibrillation, currently persistent. First arrhythmia documentation was post-CABG, he had both paroxysmal AF and typical atrial flutter. Details below.  [id number] is at least 2. On Eliquis.  HFpEF, mild symptoms.  Dyslipidemia.  Hypertension.  Statin intolerance, currently on Repatha.  Bladder cancer (2001).  DENYS, on CPAP.     Jorge experienced both AF and atrial flutter soon after CABG. In fact, he had arrhythmia beyond the typical 6-week window following the surgical procedure.    We later followed the patient with periodic cardiac monitors which showed no AF. The patient later established general cardiology care with Dr. Blake.     In March 2024 he was noted to be in AF during a visit with PCP. Dr. Blake felt that the patient was asymptomatic and ordered a cardiac monitor which I reviewed today. This 9-day recording shows persistent AF, average rate 74 with several pauses up to 5.6 seconds. There were 2 VT runs, up to 5 beats. He was not using CPAP at the time. The patient had no symptoms of lightheadedness, near syncope or syncope. Metoprolol XL was decreased by 50%, to the current 50 mg daily.    Around that time he started having issues with his blood pressure. Due to a bump in his creatinine, HCTZ 25 mg was stopped. The patient started gaining weight and became short of breath.    On 4/7/2024 the patient presented the ER with  "shortness of breath (no history of asthma or COPD). He was still in AF (mild RVR), hypotensive and was noted to have LE edema and evidence of vascular congestion by CXR. Pro BNP was elevated at 1057 (reference range 0 - 900).     He underwent cardioversion on 5/23/2024. Sinus rhythm was restored, but within a couple of minutes he was back in AF. The patient is now being referred to EP for further advice on how to manage his AF.    Jorge states that his energy level is less compared to one year ago. He has days where his ambition to do things is decreased and he gets tired easily. He also has dyspnea on exertion. He is not aware of the heart rhythm irregularity related to AF. He has returned to using CPAP faithfully.    Unfortunately, his blood pressure has recently been \"all over the place\". Today it was severely elevated. He thinks that his blood pressure worsened immediately after he stopped HCTZ 25 mg daily. On amlodipine 10 mg daily he gets quite a bit of edema, therefore he is currently on 5 mg daily. He also takes Toprol-XL 50 mg daily and losartan 100 mg daily.      PHYSICAL EXAMINATION:  Vital signs: 190/123, 90 and irregular, 104.6 kg, BMI 34  General:  in no apparent distress  ENT/Mouth:  no nasal discharge.  Eyes:  normal conjunctivae.   Chest/Lungs:  patient is not dyspneic.  Lungs CTA, without rales or wheezing  Cardiovascular: normal JVP, rhythm is irregular but not tachycardic.  No gallop, murmur or rub.    Abdomen:  no abdominal distention.  Soft, negative HJR.    Extremities: mild edema bilaterally  Skin:  no xanthelasma.    Neurologic:  alert & oriented x 3.   Vascular:  2+ carotids without bruits.       DIAGNOSTIC STUDIES (reviewed/interpreted in the clinic today):  Laboratory studies: potassium 3.8, magnesium 2.0, creatinine 0.88  ECG: AF with controlled VR, mild nonspecific ST abnormality.   Echocardiogram (04/2024): EF 60 - 65%, moderate LA enlargement, no significant valvular " "disease.      IMPRESSION:  Persistent atrial fibrillation. This coincided with the interruption of CPAP treatment (the patient reports issues with the uncomfortable CPAP mask). AF was diagnosed in 03/2024, though the exact time of onset is unclear. Jorge experienced immediate recurrence of AF after recent cardioversion.  I believe that the AF contributes to diastolic CHF. I also think it is the main cause for decreased energy level compared to last year.  Unfortunately, AA drug options are limited by CAD and bradycardia. He is too young for long-term amiodarone.  I reviewed the option of AF ablation with Jorge and his wife. I went over the technical aspects, risks/benefits of the procedure in great detail. Likelihood of \"success\" after a single procedure is ~60%. Risk of serious complication is low. Potential complications include, but not limited to, TIA/CVA, vascular injury, bleeding, cardiac perforation, phrenic nerve/esophageal/valvular injury, pulmonary vein stenosis. Risk of death is ~0.1 - 0.15%.   Hypertension. Uncontrolled at the moment. He seems to believe that HCTZ was extremely helpful. I asked him to resume it at half the previous dose. Monitor BP at home closely.    RECOMMENDATIONS:  Restart HCTZ 12.5 mg daily.   Continue amlodipine 5 mg, Toprol-XL 50 mg, valsartan 100 mg.  BMP in two weeks.  He was congratulated on the regular use of CPAP.  Consider AF ablation under anesthesia.  If he decides to pursue AF ablation, would start amiodarone 200 mg bid x 1 week followed by 200 mg daily, about 2-3 weeks before the procedure. He would need a cardiac CTA. No interruption of anticoagulation.    It was my pleasure seeing this delightful patient.  Please feel free to call with any questions.   Total time spent today, including time for chart review and documentation, was 60 minutes.    The longitudinal plan of care for the diagnosis(es)/condition(s) as documented were addressed during this visit. Due to the added " complexity in care, I will continue to support Jorge in the subsequent management and with ongoing continuity of care.     Martha Mcgovern MD, FACC      (Chart documentation was completed, in part, using Dragon voice-recognition software. The note was reviewed, however grammatical and spelling errors may be present.)  '    CURRENT MEDICATIONS:  Current Outpatient Medications   Medication Sig Dispense Refill    amLODIPine (NORVASC) 5 MG tablet Take 1 tablet (5 mg) by mouth daily      apixaban ANTICOAGULANT (ELIQUIS ANTICOAGULANT) 5 MG tablet Take 1 tablet (5 mg) by mouth 2 times daily 60 tablet 11    Cholecalciferol (VITAMIN D) 2000 UNITS tablet Take 2,000 Units by mouth daily      evolocumab (REPATHA) 140 MG/ML prefilled autoinjector Inject 1 mL (140 mg) Subcutaneous every 14 days 6 mL 3    furosemide (LASIX) 20 MG tablet Take 1 tablet (20 mg) by mouth daily as needed (For weight above 220 lb. If needed more than 2 days in a row call Cardiology Clinic.) 30 tablet 0    losartan (COZAAR) 100 MG tablet Take 1 tablet (100 mg) by mouth daily 90 tablet 3    metoprolol succinate ER (TOPROL XL) 50 MG 24 hr tablet Take 1 tablet (50 mg) by mouth daily Appointment required for further refills 180 tablet 3    potassium chloride ER (K-TAB) 20 MEQ CR tablet Take 0.5 tablets (10 mEq) by mouth daily as needed (only on the days you take furosemide for weight 220 lb or higher) 15 tablet 0       ALLERGIES     Allergies   Allergen Reactions    Atorvastatin      Myalgias    Crestor [Rosuvastatin]      Myalgias      Lisinopril      Body aches pt d/mylene  06/2015    Simvastatin      Myalgias       PAST MEDICAL HISTORY:  Past Medical History:   Diagnosis Date    Antiplatelet or antithrombotic long-term use     Arrhythmia     Coronary artery disease involving native coronary artery of native heart without angina pectoris 07/04/2019    Per CT calcium score of 722.97 3/2019    Difficulty walking     Dyspnea on exertion     Essential  hypertension, benign     Hematuria     Irregular heart beat     Malignant neoplasm of bladder, 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    Olecranon bursitis 03/2001    right    Other and unspecified hyperlipidemia     Paroxysmal atrial fibrillation (H)     Paroxysmal atrial flutter (H)     RIGHT LUNG CALCIFIED GRANULOMA 02/2002    RML    Sleep apnea     Tobacco use disorder     Quit 1/03    Unspecified hemorrhoids without mention of complication 08/2001    Unspecified hereditary and idiopathic peripheral neuropathy        PAST SURGICAL HISTORY:  Past Surgical History:   Procedure Laterality Date    ANESTHESIA CARDIOVERSION N/A 5/23/2024    Procedure: Anesthesia cardioversion;  Surgeon: GENERIC ANESTHESIA PROVIDER;  Location:  OR    BYPASS GRAFT ARTERY CORONARY N/A 9/25/2019    Procedure: CORONARY ARTERY BYPASS GRAFTING x 1 (LIMA - LAD) WITH CORONARY ENDARTERECTOMY  (ON PUMP OXYGENATOR ; HANK BY [facility]);  Surgeon: Magdi Turner MD;  Location:  OR    CV HEART CATHETERIZATION WITH POSSIBLE INTERVENTION N/A 9/6/2019    Procedure: Coronary Angiogram;  Surgeon: Nick Willson MD;  Location:  HEART CARDIAC CATH LAB    CV LEFT HEART CATH N/A 9/6/2019    Procedure: Left Heart Cath;  Surgeon: Nick Willson MD;  Location:  HEART CARDIAC CATH LAB    CV LEFT VENTRICULOGRAM N/A 9/6/2019    Procedure: Left Ventriculogram;  Surgeon: Nick Willson MD;  Location:  HEART CARDIAC CATH LAB    DAVINCI XI HERNIORRHAPHY INGUINAL Right 4/5/2024    Procedure: Robotic assisted right inguinal hernia repair with mesh;  Surgeon: You Lowe MD;  Location:  OR    ZZC NONSPECIFIC PROCEDURE  2/00/02    EBCT       FAMILY HISTORY:  Family History   Problem Relation Age of Onset    Arthritis Mother         RA    Hypertension Mother     Breast Cancer Mother     Prostate Cancer Maternal Grandfather         80    Cancer Maternal Grandmother         Lung    Coronary Artery Disease  Father     Other - See Comments Brother     Diabetes Brother     Genetic Disorder Son        SOCIAL HISTORY:  Social History     Socioeconomic History    Marital status:    Tobacco Use    Smoking status: Former     Current packs/day: 0.00     Average packs/day: 1 pack/day for 33.0 years (33.0 ttl pk-yrs)     Types: Cigarettes     Start date: 1980     Quit date: 2013     Years since quittin.3    Smokeless tobacco: Never   Vaping Use    Vaping status: Never Used   Substance and Sexual Activity    Alcohol use: Yes     Comment: weekends    Drug use: Yes     Comment: nica taylor    Sexual activity: Yes   Other Topics Concern    Parent/sibling w/ CABG, MI or angioplasty before 65F 55M? No     Social Determinants of Health     Interpersonal Safety: Low Risk  (3/6/2024)    Interpersonal Safety     Do you feel physically and emotionally safe where you currently live?: Yes     Within the past 12 months, have you been hit, slapped, kicked or otherwise physically hurt by someone?: No     Within the past 12 months, have you been humiliated or emotionally abused in other ways by your partner or ex-partner?: No         CC  Felton Blake MD  [address] [place] [id number]  [place] [id number]

## 2024-06-18 NOTE — LETTER
6/18/2024    Remington Riggs MD  [address] [facility] [address]    RE: Bryce Espinoza       Dear Colleague,     I had the pleasure of seeing Bryce Espinoza in the [facility].  PHYSICIAN NOTE:  This visit was completed in person at the [facility].      I had the pleasure of seeing Mr. Jorge Espinoza for evaluation of atrial fibrillation. He has been referred to EP by Dr. Blake. I previously met him in 10/2021 for evaluation of post-CABG AF.    Very pleasant 63-year-old male with the following chronic medical issues:  Coronary artery disease. Severe LMCA disease with CABG in 09/2019 (LIMA to LAD, the very small circumflex remained ungrafted). LAD endarterectomy required prior to LIMA grafting due to severe calcification of the vessel.  Atrial fibrillation, currently persistent. First arrhythmia documentation was post-CABG, he had both paroxysmal AF and typical atrial flutter. Details below.  [id number] is at least 2. On Eliquis.  HFpEF, mild symptoms.  Dyslipidemia.  Hypertension.  Statin intolerance, currently on Repatha.  Bladder cancer (2001).  DENYS, on CPAP.     Jorge experienced both AF and atrial flutter soon after CABG. In fact, he had arrhythmia beyond the typical 6-week window following the surgical procedure.    We later followed the patient with periodic cardiac monitors which showed no AF. The patient later established general cardiology care with Dr. Blake.     In March 2024 he was noted to be in AF during a visit with PCP. Dr. Blake felt that the patient was asymptomatic and ordered a cardiac monitor which I reviewed today. This 9-day recording shows persistent AF, average rate 74 with several pauses up to 5.6 seconds. There were 2 VT runs, up to 5 beats. He was not using CPAP at the time. The patient had no symptoms of lightheadedness, near syncope or syncope. Metoprolol XL was decreased by 50%, to the current 50 mg daily.    Around that time he started having issues  "with his blood pressure. Due to a bump in his creatinine his long-term HCTZ 25 mg was stopped. The patient started gaining weight and became short of breath.    On 4/7/2024 the patient presented the ER with shortness of breath (no history of asthma or COPD). He was hypotensive and was noted to have LE edema and evidence of vascular congestion by CXR. Pro BNP was elevated at 1057 (reference range 0 - 900).     He underwent cardioversion on 5/23/2024. Sinus rhythm was restored, but within a couple of minutes he was back in AF. The patient is now being referred to EP for further advice on how to manage his AF.    Jorge states that his energy level is less compared to one year ago. He has days where his ambition to do things is decreased and he gets tired easily. He also has occasional dyspnea on exertion. He is not aware of the heart rhythm irregularity related to AF. He has returned to using CPAP faithfully.    Unfortunately, his blood pressure has recently been \"all over the place\". Today it was severely elevated. He thinks that his blood pressure worsened immediately after he stopped HCTZ 25 mg daily. On amlodipine 10 mg daily he gets quite a bit of edema, therefore he is currently on 5 mg daily. He also takes Toprol-XL 50 mg daily and losartan 100 mg daily.      PHYSICAL EXAMINATION:  Vital signs: 190/123, 90 and irregular, 104.6 kg, BMI 34  General:  in no apparent distress  ENT/Mouth:  no nasal discharge.  Eyes:  normal conjunctivae.   Chest/Lungs:  patient is not dyspneic.  Lungs CTA, without rales or wheezing  Cardiovascular: normal JVP, rhythm is irregular but not tachycardic.  No gallop, murmur or rub.    Abdomen:  no abdominal distention.  Soft, negative HJR.    Extremities: mild edema bilaterally  Skin:  no xanthelasma.    Neurologic:  alert & oriented x 3.   Vascular:  2+ carotids without bruits.       DIAGNOSTIC STUDIES (reviewed/interpreted in the clinic today):  Laboratory studies: potassium 3.8, " "magnesium 2.0, creatinine 0.88  ECG: AF with controlled VR, mild nonspecific ST abnormality.   Echocardiogram (04/2024): EF 60 - 65%, moderate LA enlargement, no significant valvular disease.      IMPRESSION:  Persistent atrial fibrillation. This coincided with the interruption of CPAP treatment (the patient reports issues with the uncomfortable CPAP mask). AF was diagnosed in 03/2024, though the exact time of onset is unclear. Jorge experienced immediate recurrence of AF after recent cardioversion.  I believe that the AF contributes to diastolic CHF. I also think it is the main cause for decreased energy level compared to last year.  Unfortunately, AA drug options are limited by CAD and bradycardia. He is too young for long-term amiodarone.  I reviewed the option of AF ablation with Jorge and his wife. I went over the technical aspects, risks/benefits of the procedure in great detail. Likelihood of \"success\" after a single procedure is ~60%. Risk of serious complication is low. Potential complications include, but not limited to, TIA/CVA, vascular injury, bleeding, cardiac perforation, phrenic nerve/esophageal/valvular injury, pulmonary vein stenosis. Risk of death is ~0.1 - 0.15%.   Hypertension. Uncontrolled at the moment. He seems to believe that HCTZ was extremely helpful. I asked him to resume it at half the previous dose. Monitor BP at home closely.    RECOMMENDATIONS:  Restart HCTZ 12.5 mg daily.   Continue amlodipine 5 mg, Toprol-XL 50 mg, valsartan 100 mg.  BMP in two weeks.  He was congratulated on the regular use of CPAP.  Consider AF ablation under anesthesia.  If he decides to pursue AF ablation, would start amiodarone 200 mg bid x 1 week followed by 200 mg daily, about 2-3 weeks before the procedure. He would need a cardiac CTA. No interruption of anticoagulation.    It was my pleasure seeing this delightful patient.  Please feel free to call with any questions.   Total time spent today, including time " for chart review and documentation, was 60 minutes.    The longitudinal plan of care for the diagnosis(es)/condition(s) as documented were addressed during this visit. Due to the added complexity in care, I will continue to support Jorge in the subsequent management and with ongoing continuity of care.     Martha Mcgovern MD, [facility]      (Chart documentation was completed, in part, using Dragon voice-recognition software. The note was reviewed, however grammatical and spelling errors may be present.)  '    CURRENT MEDICATIONS:  Current Outpatient Medications   Medication Sig Dispense Refill    amLODIPine (NORVASC) 5 MG tablet Take 1 tablet (5 mg) by mouth daily      apixaban ANTICOAGULANT (ELIQUIS ANTICOAGULANT) 5 MG tablet Take 1 tablet (5 mg) by mouth 2 times daily 60 tablet 11    Cholecalciferol (VITAMIN D) 2000 UNITS tablet Take 2,000 Units by mouth daily      evolocumab (REPATHA) 140 MG/ML prefilled autoinjector Inject 1 mL (140 mg) Subcutaneous every 14 days 6 mL 3    furosemide (LASIX) 20 MG tablet Take 1 tablet (20 mg) by mouth daily as needed (For weight above 220 lb. If needed more than 2 days in a row call Cardiology Clinic.) 30 tablet 0    losartan (COZAAR) 100 MG tablet Take 1 tablet (100 mg) by mouth daily 90 tablet 3    metoprolol succinate ER (TOPROL XL) 50 MG 24 hr tablet Take 1 tablet (50 mg) by mouth daily Appointment required for further refills 180 tablet 3    potassium chloride ER (K-TAB) 20 MEQ CR tablet Take 0.5 tablets (10 mEq) by mouth daily as needed (only on the days you take furosemide for weight 220 lb or higher) 15 tablet 0       ALLERGIES     Allergies   Allergen Reactions    Atorvastatin      Myalgias    Crestor [Rosuvastatin]      Myalgias      Lisinopril      Body aches pt d/mylene  06/2015    Simvastatin      Myalgias       PAST MEDICAL HISTORY:  Past Medical History:   Diagnosis Date    Antiplatelet or antithrombotic long-term use     Arrhythmia     Coronary artery disease involving  native coronary artery of native heart without angina pectoris 07/04/2019    Per CT calcium score of 722.97 3/2019    Difficulty walking     Dyspnea on exertion     Essential hypertension, benign     Hematuria     Irregular heart beat     Malignant neoplasm of bladder, 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    Olecranon bursitis 03/2001    right    Other and unspecified hyperlipidemia     Paroxysmal atrial fibrillation (H)     Paroxysmal atrial flutter (H)     RIGHT LUNG CALCIFIED GRANULOMA 02/2002    RML    Sleep apnea     Tobacco use disorder     Quit 1/03    Unspecified hemorrhoids without mention of complication 08/2001    Unspecified hereditary and idiopathic peripheral neuropathy        PAST SURGICAL HISTORY:  Past Surgical History:   Procedure Laterality Date    ANESTHESIA CARDIOVERSION N/A 5/23/2024    Procedure: Anesthesia cardioversion;  Surgeon: GENERIC ANESTHESIA PROVIDER;  Location:  OR    BYPASS GRAFT ARTERY CORONARY N/A 9/25/2019    Procedure: CORONARY ARTERY BYPASS GRAFTING x 1 (LIMA - LAD) WITH CORONARY ENDARTERECTOMY  (ON PUMP OXYGENATOR ; HANK BY [facility]);  Surgeon: Magdi Turner MD;  Location:  OR    CV HEART CATHETERIZATION WITH POSSIBLE INTERVENTION N/A 9/6/2019    Procedure: Coronary Angiogram;  Surgeon: Nick Willson MD;  Location:  HEART CARDIAC CATH LAB    CV LEFT HEART CATH N/A 9/6/2019    Procedure: Left Heart Cath;  Surgeon: Nick Willson MD;  Location:  HEART CARDIAC CATH LAB    CV LEFT VENTRICULOGRAM N/A 9/6/2019    Procedure: Left Ventriculogram;  Surgeon: Nick Willson MD;  Location:  HEART CARDIAC CATH LAB    DAVINCI XI HERNIORRHAPHY INGUINAL Right 4/5/2024    Procedure: Robotic assisted right inguinal hernia repair with mesh;  Surgeon: You Lowe MD;  Location:  OR    [facility] NONSPECIFIC PROCEDURE  2/00/02    EBCT       FAMILY HISTORY:  Family History   Problem Relation Age of Onset    Arthritis Mother         RA     Hypertension Mother     Breast Cancer Mother     Prostate Cancer Maternal Grandfather         80    Cancer Maternal Grandmother         Lung    Coronary Artery Disease Father     Other - See Comments Brother     Diabetes Brother     Genetic Disorder Son        SOCIAL HISTORY:  Social History     Socioeconomic History    Marital status:    Tobacco Use    Smoking status: Former     Current packs/day: 0.00     Average packs/day: 1 pack/day for 33.0 years (33.0 ttl pk-yrs)     Types: Cigarettes     Start date: 1980     Quit date: 2013     Years since quittin.3    Smokeless tobacco: Never   Vaping Use    Vaping status: Never Used   Substance and Sexual Activity    Alcohol use: Yes     Comment: weekends    Drug use: Yes     Comment: nica taylor    Sexual activity: Yes   Other Topics Concern    Parent/sibling w/ CABG, MI or angioplasty before 65F 55M? No     Social Determinants of Health     Interpersonal Safety: Low Risk  (3/6/2024)    Interpersonal Safety     Do you feel physically and emotionally safe where you currently live?: Yes     Within the past 12 months, have you been hit, slapped, kicked or otherwise physically hurt by someone?: No     Within the past 12 months, have you been humiliated or emotionally abused in other ways by your partner or ex-partner?: No       CC  Felton Blake MD  [address] [facility] [address]  [place]  [place] [address]    Thank you for allowing me to participate in the care of your patient.      Sincerely,   Martha Mcgovern MD   [facility]

## 2024-06-24 NOTE — PROGRESS NOTES
6/24/24 Call transferred from Team 7, pt LVM asking caller to contact SO Brooke Fierro with an update.  Attempted to call Brooke at [phone], reached VM, LM and requested callback  KHerroRN 423 pm

## 2024-06-25 ENCOUNTER — TRANSFERRED RECORDS (OUTPATIENT)
Dept: HEALTH INFORMATION MANAGEMENT | Facility: CLINIC | Age: 64
End: 2024-06-25
Payer: COMMERCIAL

## 2024-06-25 ENCOUNTER — TELEPHONE (OUTPATIENT)
Dept: CARDIOLOGY | Facility: CLINIC | Age: 64
End: 2024-06-25
Payer: COMMERCIAL

## 2024-06-25 NOTE — TELEPHONE ENCOUNTER
Spoke to pt sig other Brooke regarding BP's since his visit with Dr Mcgovern on 6/18. Pt hydrochlorothiazide was restarted at 12.5 mg d/t his elevated /123.    4/19 10 /76  10 /61  6/20 and 6/21 pt BP's were good, no numbers given wt was 222  6/22 9 /87 wt 225  6/23 9 /89 9 /74  6/24 11 /93 9 /107 wt 226 (176/116 and 178/99-recheck) took PM meds and on recheck bp 166/107  6/25 8 /73 wt 223 Took extra hydrochlorothiazide today to equal 25 mg.    Discussed pt salt intake yesterday, which was not knows, because pt was at work. Pt takes his hydrochlorothiazide in the AM and Metoprolol XL, Valsartan and Amlodipine at night. Brooke pt sig other states that pt BP was good prior to the need to stop the hydrochlorothiazide when pt Kidney function went up. It was felt that this was caused by pt drinking Kombuca tea for weight loss.  Pt is no longer drinking and cre has been normal since stopping.  Pt sig other Brooke feels that she would like to continue with pt taking hydrochlorothiazide 12.5 mg daily, unless BP is elevated then pt would take an extra 1/2 dose and if this occurs again will let clinic know.  Pt is also wondering if needing the visit on Friday with Rosa Isela, which was made in May to follow up Cardioversion. Will review with Dr Mcgovern. Jagruti

## 2024-06-25 NOTE — TELEPHONE ENCOUNTER
LM for Brooke pt sig other on her personal phone, that Dr Mcgovenr recommended to give the hydrochlorothiazide more time as it can take up to 2-3 weeks to get the full affect, as Brooke had noted with conversation earlier today.  Asked to have pt continue to monitor. Asked that Brooke to call back with any questions. Jagruti

## 2024-06-25 NOTE — TELEPHONE ENCOUNTER
Hydrochlorothiazide was only resumed 1 week ago.  He needs to be patient, it may take 2-3 weeks to see the full effect of the drug.   His blood pressure is already improving; keep monitoring it, hopefully he will see further improvement.    I saw him for atrial fibrillation. For any future decisions on BP management, please discuss with Dr. Blake or PCP.    RG

## 2024-07-02 ENCOUNTER — MYC MEDICAL ADVICE (OUTPATIENT)
Dept: CARDIOLOGY | Facility: CLINIC | Age: 64
End: 2024-07-02
Payer: COMMERCIAL

## 2024-07-02 ENCOUNTER — TELEPHONE (OUTPATIENT)
Dept: CARDIOLOGY | Facility: CLINIC | Age: 64
End: 2024-07-02
Payer: COMMERCIAL

## 2024-07-02 NOTE — TELEPHONE ENCOUNTER
Spoke to Pt wife who states that the Colonoscopy has been cancelled and at this time unsure how long of a hold is needed for the bladder surgery.  Did make Brooke aware that 7 days was to long. Will get a call back with the correct hold time.  Jagruti

## 2024-07-02 NOTE — TELEPHONE ENCOUNTER
7-day hold is too long (and unnecessary), in my opinion.  Would approve hold for up to 4 days.   DI

## 2024-07-02 NOTE — TELEPHONE ENCOUNTER
Spoke to pt who states that he was told by Dr Ventura office that it is preferred that pt be off his blood thinner for 7 days. Pt made aware that Dr Mcgovern is approving a 4 day hold. Pt reminded that his is in persistent A Fib.  Asked if they are aware that pt is taking Eliquis and not warfarin.  Pt states that he told whomever he spoke to that he is on Eliquis.  Pt will call back with any issues going forward. Jagruti

## 2024-07-02 NOTE — TELEPHONE ENCOUNTER
Pt called and states that he is having a colonoscopy on 7/22 an ten bladder surgery on 7/24 and he is needing to hold his Eliquis for one week.  [id number] is at least 2.  Persistent A Fib at this time. Will message Dr Mcgovern for approval to hold for 7 days. Jagruti

## 2024-07-02 NOTE — TELEPHONE ENCOUNTER
Reviewed the BP's with pt wife Brooke and the concerns with the to low BP's noted. BP on 6/28 was doing 4 times in a row. Pt was not symptomatic-lightheaded  or dizzy and this was also the case on 7/1. Discussed that concerns would be if pt bp was consecutively low and also that pt was symptomatic.  For the most part BP's are looking much better with the HTCZ and will continue monitoring as they have.  Did also ask that if BP is low on one arm, may check on the other or make sure that the cuff is on correctly.  No other questions at this time. JNelsonRN

## 2024-07-05 ENCOUNTER — LAB (OUTPATIENT)
Dept: LAB | Facility: CLINIC | Age: 64
End: 2024-07-05
Payer: COMMERCIAL

## 2024-07-05 DIAGNOSIS — I48.19 ATRIAL FIBRILLATION, PERSISTENT (H): ICD-10-CM

## 2024-07-05 LAB
ANION GAP SERPL CALCULATED.3IONS-SCNC: 8 MMOL/L (ref 7–15)
BUN SERPL-MCNC: 18.6 MG/DL (ref 8–23)
CALCIUM SERPL-MCNC: 9.5 MG/DL (ref 8.8–10.2)
CHLORIDE SERPL-SCNC: 106 MMOL/L (ref 98–107)
CREAT SERPL-MCNC: 0.86 MG/DL (ref 0.67–1.17)
DEPRECATED HCO3 PLAS-SCNC: 29 MMOL/L (ref 22–29)
EGFRCR SERPLBLD CKD-EPI 2021: >90 ML/MIN/1.73M2
GLUCOSE SERPL-MCNC: 110 MG/DL (ref 70–99)
POTASSIUM SERPL-SCNC: 4.8 MMOL/L (ref 3.4–5.3)
SODIUM SERPL-SCNC: 143 MMOL/L (ref 135–145)

## 2024-07-05 PROCEDURE — 80048 BASIC METABOLIC PNL TOTAL CA: CPT

## 2024-07-05 PROCEDURE — 36415 COLL VENOUS BLD VENIPUNCTURE: CPT

## 2024-07-08 DIAGNOSIS — I10 ESSENTIAL HYPERTENSION, BENIGN: ICD-10-CM

## 2024-07-08 RX ORDER — AMLODIPINE BESYLATE 5 MG/1
5 TABLET ORAL DAILY
Qty: 90 TABLET | Refills: 3 | Status: SHIPPED | OUTPATIENT
Start: 2024-07-08

## 2024-07-10 ENCOUNTER — TELEPHONE (OUTPATIENT)
Dept: CARDIOLOGY | Facility: CLINIC | Age: 64
End: 2024-07-10
Payer: COMMERCIAL

## 2024-07-10 NOTE — TELEPHONE ENCOUNTER
Patient called to report he just went to the bathroom and had blood in his urine (brown tinged).  He is unsure if he passed a stone or it is related to his bladder cancer.  Asked patient to monitor and call after the next time he goes to the bathroom to see if he continues to have hematuria.  Patient also called urologist to report the above.  Patient will call with update after he goes to the bathroom.  FELICIANO Juarez

## 2024-07-11 NOTE — TELEPHONE ENCOUNTER
Pt called and LM that he had passed a kidney stone and urine is now clear.  Spoke to pt who is doing well at this time.  No changes needed. Tish

## 2024-07-24 ENCOUNTER — TRANSFERRED RECORDS (OUTPATIENT)
Dept: HEALTH INFORMATION MANAGEMENT | Facility: CLINIC | Age: 64
End: 2024-07-24
Payer: COMMERCIAL

## 2024-07-29 ENCOUNTER — TELEPHONE (OUTPATIENT)
Dept: CARDIOLOGY | Facility: CLINIC | Age: 64
End: 2024-07-29
Payer: COMMERCIAL

## 2024-07-29 DIAGNOSIS — I48.0 PAROXYSMAL ATRIAL FIBRILLATION (H): Primary | ICD-10-CM

## 2024-07-29 NOTE — TELEPHONE ENCOUNTER
Patient left message on EP nurse line requesting to set up afib ablation.  Called patient back and left a message for patient to call back. FELICIANO Juarez

## 2024-08-06 NOTE — TELEPHONE ENCOUNTER
8/6/24 Spoke w pt who would like to proceed with Afib Ablation as discussed w Dr Mcgovern on 6/18. Explained recommendations  If he decides to pursue AF ablation, would start amiodarone 200 mg bid x 1 week followed by 200 mg daily, about 2-3 weeks before the procedure. He would need a cardiac CTA. No interruption of anticoagulation.      Orders placed, informed him scheduling will contact him to set up CT and Ablation along with 1 week and 3 m follow up appts.  Pt voiced understanding and agreement with plan.   Shan 115 pm

## 2024-08-09 RX ORDER — AMIODARONE HYDROCHLORIDE 200 MG/1
TABLET ORAL
Qty: 104 TABLET | Refills: 0 | Status: CANCELLED | OUTPATIENT
Start: 2024-09-02 | End: 2024-12-08

## 2024-08-09 NOTE — TELEPHONE ENCOUNTER
LM for pt to discuss Amiodarone the to be started prior to his ablation.  With a possible start time of 9/2. Jagruti     Per Dr Mcgovern:   If he decides to pursue AF ablation, would start amiodarone 200 mg bid x 1 week followed by 200 mg daily, about 2-3 weeks before the procedure.

## 2024-08-12 RX ORDER — AMIODARONE HYDROCHLORIDE 200 MG/1
TABLET ORAL
Qty: 97 TABLET | Refills: 0 | Status: SHIPPED | OUTPATIENT
Start: 2024-08-12

## 2024-08-12 NOTE — TELEPHONE ENCOUNTER
Spoke to patient to review recommendations per Dr Mcgovern:  If he decides to pursue AF ablation, would start amiodarone 200 mg bid x 1 week followed by 200 mg daily, about 2-3 weeks before the procedure.    Patient will start medication on 9/2.  Ablation scheduled for 9/20. Medication list updated in epic and sent to preferred pharmacy.  Patient requested instructions to be sent via my chart.  Patient provided verbal understanding regarding above.  FELICIANO Juarez

## 2024-08-30 NOTE — TELEPHONE ENCOUNTER
Spoke to pt who wanted to verify his starting of Amiodarone on Monday 9/2. Pt will start on 200 mg twice daily for one week and then decreased to 200 mg going forward. Pt asked about his Eliquis and if he needs to hold for 3 days prior to his Ablation. Per Dr Mcgovern OV note, Pt he does not need to hold his Eliquis.  Pt made aware that he will get a call the day before his procedure to discuss his other medications that need to be held. Pt has no other questions at this time. Jagruti

## 2024-09-13 NOTE — TELEPHONE ENCOUNTER
Patient called again to see if he needed to hold eliquis.  Informed patient that he does not need to hold the eliquis.  Patient indicated he currently is passing a kidney stone and has hematuria.  He has not contacted his urologist.  Strongly suggested that he contacted him.  He will reach out to him for recommendations.   FELICIANO Juarez

## 2024-09-18 ENCOUNTER — HOSPITAL ENCOUNTER (OUTPATIENT)
Dept: CARDIOLOGY | Facility: CLINIC | Age: 64
Discharge: HOME | End: 2024-09-18
Attending: INTERNAL MEDICINE | Admitting: INTERNAL MEDICINE
Payer: COMMERCIAL

## 2024-09-18 VITALS — SYSTOLIC BLOOD PRESSURE: 131 MMHG | DIASTOLIC BLOOD PRESSURE: 98 MMHG | HEART RATE: 81 BPM

## 2024-09-18 DIAGNOSIS — I48.0 PAROXYSMAL ATRIAL FIBRILLATION (H): ICD-10-CM

## 2024-09-18 PROCEDURE — 250N000011 HC RX IP 250 OP 636: Performed by: INTERNAL MEDICINE

## 2024-09-18 PROCEDURE — 75572 CT HRT W/3D IMAGE: CPT | Mod: 26 | Performed by: INTERNAL MEDICINE

## 2024-09-18 PROCEDURE — 75572 CT HRT W/3D IMAGE: CPT

## 2024-09-18 RX ORDER — ONDANSETRON 2 MG/ML
4 INJECTION INTRAMUSCULAR; INTRAVENOUS
Status: DISCONTINUED | OUTPATIENT
Start: 2024-09-18 | End: 2024-09-19 | Stop reason: HOSPADM

## 2024-09-18 RX ORDER — LIDOCAINE 40 MG/G
CREAM TOPICAL
Status: DISCONTINUED | OUTPATIENT
Start: 2024-09-18 | End: 2024-09-19 | Stop reason: HOSPADM

## 2024-09-18 RX ORDER — DIPHENHYDRAMINE HYDROCHLORIDE 50 MG/ML
25-50 INJECTION INTRAMUSCULAR; INTRAVENOUS
Status: DISCONTINUED | OUTPATIENT
Start: 2024-09-18 | End: 2024-09-19 | Stop reason: HOSPADM

## 2024-09-18 RX ORDER — DIPHENHYDRAMINE HCL 25 MG
25 CAPSULE ORAL
Status: DISCONTINUED | OUTPATIENT
Start: 2024-09-18 | End: 2024-09-19 | Stop reason: HOSPADM

## 2024-09-18 RX ORDER — IOPAMIDOL 755 MG/ML
500 INJECTION, SOLUTION INTRAVASCULAR ONCE
Status: COMPLETED | OUTPATIENT
Start: 2024-09-18 | End: 2024-09-18

## 2024-09-18 RX ORDER — METHYLPREDNISOLONE SODIUM SUCCINATE 125 MG/2ML
125 INJECTION, POWDER, LYOPHILIZED, FOR SOLUTION INTRAMUSCULAR; INTRAVENOUS
Status: DISCONTINUED | OUTPATIENT
Start: 2024-09-18 | End: 2024-09-19 | Stop reason: HOSPADM

## 2024-09-18 RX ADMIN — IOPAMIDOL 100 ML: 755 INJECTION, SOLUTION INTRAVENOUS at 15:30

## 2024-09-19 ENCOUNTER — ANESTHESIA EVENT (OUTPATIENT)
Dept: CARDIOLOGY | Facility: CLINIC | Age: 64
End: 2024-09-19
Payer: COMMERCIAL

## 2024-09-19 ENCOUNTER — TELEPHONE (OUTPATIENT)
Dept: CARDIOLOGY | Facility: CLINIC | Age: 64
End: 2024-09-19
Payer: COMMERCIAL

## 2024-09-19 DIAGNOSIS — I48.19 PERSISTENT ATRIAL FIBRILLATION (H): Primary | ICD-10-CM

## 2024-09-19 RX ORDER — LIDOCAINE 40 MG/G
CREAM TOPICAL
Status: CANCELLED | OUTPATIENT
Start: 2024-09-19

## 2024-09-19 RX ORDER — SODIUM CHLORIDE, SODIUM LACTATE, POTASSIUM CHLORIDE, CALCIUM CHLORIDE 600; 310; 30; 20 MG/100ML; MG/100ML; MG/100ML; MG/100ML
INJECTION, SOLUTION INTRAVENOUS CONTINUOUS
Status: CANCELLED | OUTPATIENT
Start: 2024-09-19

## 2024-09-19 ASSESSMENT — LIFESTYLE VARIABLES: TOBACCO_USE: 1

## 2024-09-19 ASSESSMENT — ENCOUNTER SYMPTOMS: DYSRHYTHMIAS: 1

## 2024-09-19 NOTE — TELEPHONE ENCOUNTER
Spoke to patient to review instructions for afib ablation scheduled for 9/20.  Patient aware that he is to be NPO after midnight and may take sips of water with am medications.  Patient may have clear liquids until 4:30pm.  Patient may have clear liquids until 4:30am  Patient to arrive at 6:30am.  Patient aware that he will NOT be staying overnight after procedure unless there are complications.  Patient has arranged for ride and someone to be with him for the first 24 hours.  Patient to call [facility] at [phone] to alert if they going to cancel d/t illness or family emergency.  Patient provided verbal understanding regarding above.  FELICIANO Juarez

## 2024-09-19 NOTE — ANESTHESIA PREPROCEDURE EVALUATION
Anesthesia Pre-Procedure Evaluation    Patient: Bryce Espinoza   MRN: [id number] : 1960        Procedure : Procedure(s):  Ablation Atrial Fibrilation          Past Medical History:   Diagnosis Date    Antiplatelet or antithrombotic long-term use     Arrhythmia     Coronary artery disease involving native coronary artery of native heart without angina pectoris 2019    Per CT calcium score of 722.97 3/2019    Difficulty walking     Dyspnea on exertion     Essential hypertension, benign     Hematuria     Irregular heart beat     Malignant neoplasm of bladder, part unspecified 2002    Transitional Cell Carcinoma bladder    Olecranon bursitis 2001    right    Other and unspecified hyperlipidemia     Paroxysmal atrial fibrillation (H)     Paroxysmal atrial flutter (H)     RIGHT LUNG CALCIFIED GRANULOMA 2002    RML    Sleep apnea     Tobacco use disorder     Quit     Unspecified hemorrhoids without mention of complication 2001    Unspecified hereditary and idiopathic peripheral neuropathy       Past Surgical History:   Procedure Laterality Date    ANESTHESIA CARDIOVERSION N/A 2024    Procedure: Anesthesia cardioversion;  Surgeon: GENERIC ANESTHESIA PROVIDER;  Location:  OR    BYPASS GRAFT ARTERY CORONARY N/A 2019    Procedure: CORONARY ARTERY BYPASS GRAFTING x 1 (LIMA - LAD) WITH CORONARY ENDARTERECTOMY  (ON PUMP OXYGENATOR ; HANK BY BETSY);  Surgeon: Magdi Turner MD;  Location:  OR    CV HEART CATHETERIZATION WITH POSSIBLE INTERVENTION N/A 2019    Procedure: Coronary Angiogram;  Surgeon: Nick Willson MD;  Location:  HEART CARDIAC CATH LAB    CV LEFT HEART CATH N/A 2019    Procedure: Left Heart Cath;  Surgeon: Nick Willson MD;  Location:  HEART CARDIAC CATH LAB    CV LEFT VENTRICULOGRAM N/A 2019    Procedure: Left Ventriculogram;  Surgeon: Nick Willson MD;  Location:  HEART CARDIAC CATH LAB    DAVINCI XI HERNIORRHAPHY INGUINAL  Right 2024    Procedure: Robotic assisted right inguinal hernia repair with mesh;  Surgeon: You Lowe MD;  Location:  OR    [facility] NONSPECIFIC PROCEDURE      EBCT      Allergies   Allergen Reactions    Atorvastatin      Myalgias    Crestor [Rosuvastatin]      Myalgias      Lisinopril      Body aches pt d/mylene  2015    Simvastatin      Myalgias      Social History     Tobacco Use    Smoking status: Former     Current packs/day: 0.00     Average packs/day: 1 pack/day for 33.0 years (33.0 ttl pk-yrs)     Types: Cigarettes     Start date: 1980     Quit date: 2013     Years since quittin.6    Smokeless tobacco: Never   Substance Use Topics    Alcohol use: Yes     Comment: weekends      Wt Readings from Last 1 Encounters:   24 104.6 kg (230 lb 8 oz)          Prior to Admission medications    Medication Sig Start Date End Date Taking? Authorizing Provider   amiodarone (PACERONE) 200 MG tablet Take 200mg (1 tablet) po twice daily for 7 days then 200mg (1 tablet) po daily.   Patient to start 2024   Martha Mcgovern MD   amLODIPine (NORVASC) 5 MG tablet Take 1 tablet (5 mg) by mouth daily 24   Felton Blake MD   apixaban ANTICOAGULANT (ELIQUIS ANTICOAGULANT) 5 MG tablet Take 1 tablet (5 mg) by mouth 2 times daily 3/6/24   Remington Riggs MD   Cholecalciferol (VITAMIN D) 2000 UNITS tablet Take 2,000 Units by mouth daily 2/2/15   Remington Riggs MD   evolocumab (REPATHA) 140 MG/ML prefilled autoinjector Inject 1 mL (140 mg) Subcutaneous every 14 days 3/27/24   Felton Blake MD   furosemide (LASIX) 20 MG tablet Take 1 tablet (20 mg) by mouth daily as needed (For weight above 220 lb. If needed more than 2 days in a row call Cardiology Clinic.) 24   Felton Blake MD   hydroCHLOROthiazide 12.5 MG tablet Take 1 tablet (12.5 mg) by mouth daily 24   Martha Mcgovern MD   losartan (COZAAR) 100 MG tablet Take 1 tablet (100 mg) by mouth daily 3/29/24    Remington Riggs MD   metoprolol succinate ER (TOPROL XL) 50 MG 24 hr tablet Take 1 tablet (50 mg) by mouth daily Appointment required for further refills 4/29/24   Felton Blake MD   potassium chloride ER (K-TAB) 20 MEQ CR tablet Take 0.5 tablets (10 mEq) by mouth daily as needed (only on the days you take furosemide for weight 220 lb or higher) 5/29/24   Felton Blake MD     ECG 6/18/24: afib     ECHO 4/22/24: Interpretation Summary     Left ventricular systolic function is normal.  The visual ejection fraction is 60-65%.  No regional wall motion abnormalities.  Normal right ventricular size and systolic function.  The left atrium is moderately dilated.  No significant valve disease.  Normal inferior vena cava.  The rhythm was atrial fibrillation    Anesthesia Evaluation    Type: General.        ROS/MED HX  ENT/Pulmonary:     (+) sleep apnea,               tobacco use, Past use,                    (-) asthma   Neurologic:    (-) no seizures, no CVA and migraines   Cardiovascular:     (+)  hypertension- -  CAD -  - -   Taking blood thinners        GARCIA.             dysrhythmias, a-fib,          (-) valvular problems/murmurs, dyslipidemia and murmur   METS/Exercise Tolerance:     Hematologic:    (-) history of blood clots and anemia   Musculoskeletal:    (-) arthritis   GI/Hepatic:    (-) GERD and liver disease   Renal/Genitourinary:    (-) renal disease and nephrolithiasis   Endo:    (-) Type I DM, Type II DM, thyroid disease and obesity   Psychiatric/Substance Use:    (-) psychiatric history   Infectious Disease:    (-) Recent Fever   Malignancy:   (+) Malignancy, History of Other.Other CA bladder status post.    Other:            Physical Exam    Airway        Mallampati: II   TM distance: > 3 FB   Neck ROM: full   Mouth opening: > 3 cm    Respiratory Devices and Support         Dental       (+) Completely normal teeth      Cardiovascular   cardiovascular exam normal       Rhythm and rate: regular and normal (-)  no murmur    Pulmonary   pulmonary exam normal        breath sounds clear to auscultation           OUTSIDE LABS:  CBC:   Lab Results   Component Value Date    WBC 19.6 (H) 04/07/2024    WBC 9.2 04/03/2024    HGB 14.0 04/07/2024    HGB 14.1 04/03/2024    HCT 42.2 04/07/2024    HCT 42.9 04/03/2024     04/07/2024     04/03/2024     BMP:   Lab Results   Component Value Date     07/05/2024     04/17/2024    POTASSIUM 4.8 07/05/2024    POTASSIUM 3.8 05/23/2024    CHLORIDE 106 07/05/2024    CHLORIDE 107 04/17/2024    CO2 29 07/05/2024    CO2 24 04/17/2024    BUN 18.6 07/05/2024    BUN 16.7 04/17/2024    CR 0.86 07/05/2024    CR 0.88 04/17/2024     (H) 07/05/2024    GLC 86 04/17/2024     COAGS:   Lab Results   Component Value Date    PTT 28 05/19/2021    INR 1.07 05/23/2024    FIBR 231 09/25/2019     POC:   Lab Results   Component Value Date     (H) 09/30/2019     HEPATIC:   Lab Results   Component Value Date    ALBUMIN 4.2 04/17/2024    PROTTOTAL 6.4 04/17/2024    ALT 23 04/17/2024    AST 21 04/17/2024    ALKPHOS 99 04/17/2024    BILITOTAL 0.3 04/17/2024     OTHER:   Lab Results   Component Value Date    PH 7.36 09/25/2019    LACT 1.2 09/25/2019    A1C 5.4 05/19/2021    KELLY 9.5 07/05/2024    PHOS 3.7 09/30/2019    MAG 2.0 05/23/2024    LIPASE 62 (L) 01/30/2017    AMYLASE 52 01/30/2017    TSH 1.06 03/06/2024    CRP <2.9 11/19/2020    SED 9 03/06/2024       Anesthesia Plan    ASA Status:  3       Anesthesia Type: General.     - Airway: ETT   Induction: Propofol.   Maintenance: Balanced.        Consents    Anesthesia Plan(s) and associated risks, benefits, and realistic alternatives discussed. Questions answered and patient/representative(s) expressed understanding.     - Discussed:     - Discussed with:  Patient            Postoperative Care    Pain management: Multi-modal analgesia.   PONV prophylaxis: Ondansetron (or other 5HT-3), Dexamethasone or Solumedrol, Background Propofol  Infusion     Comments:    Other Comments: Precedex infusion           Janet Osborn MD    I have reviewed the pertinent notes and labs in the chart from the past 30 days and (re)examined the patient.  Any updates or changes from those notes are reflected in this note.

## 2024-09-20 ENCOUNTER — HOSPITAL ENCOUNTER (OUTPATIENT)
Facility: CLINIC | Age: 64
Discharge: HOME | End: 2024-09-20
Attending: INTERNAL MEDICINE | Admitting: INTERNAL MEDICINE
Payer: COMMERCIAL

## 2024-09-20 ENCOUNTER — ANESTHESIA (OUTPATIENT)
Dept: CARDIOLOGY | Facility: CLINIC | Age: 64
End: 2024-09-20
Payer: COMMERCIAL

## 2024-09-20 VITALS
HEART RATE: 74 BPM | HEIGHT: 69 IN | DIASTOLIC BLOOD PRESSURE: 85 MMHG | WEIGHT: 214 LBS | TEMPERATURE: 97 F | OXYGEN SATURATION: 93 % | BODY MASS INDEX: 31.7 KG/M2 | RESPIRATION RATE: 16 BRPM | SYSTOLIC BLOOD PRESSURE: 134 MMHG

## 2024-09-20 DIAGNOSIS — I48.0 PAROXYSMAL ATRIAL FIBRILLATION (H): ICD-10-CM

## 2024-09-20 DIAGNOSIS — I48.19 PERSISTENT ATRIAL FIBRILLATION (H): Primary | ICD-10-CM

## 2024-09-20 PROBLEM — T88.4XXA HARD TO INTUBATE: Status: ACTIVE | Noted: 2024-09-20

## 2024-09-20 LAB
ACT BLD: 157 SECONDS (ref 74–150)
ACT BLD: 358 SECONDS (ref 74–150)
ACT BLD: 366 SECONDS (ref 74–150)
ACT BLD: 375 SECONDS (ref 74–150)
ACT BLD: 434 SECONDS (ref 74–150)
ANION GAP SERPL CALCULATED.3IONS-SCNC: 9 MMOL/L (ref 7–15)
ATRIAL RATE - MUSE: 61 BPM
ATRIAL RATE - MUSE: 89 BPM
BUN SERPL-MCNC: 24.5 MG/DL (ref 8–23)
CALCIUM SERPL-MCNC: 9.9 MG/DL (ref 8.8–10.4)
CHLORIDE SERPL-SCNC: 106 MMOL/L (ref 98–107)
CREAT SERPL-MCNC: 1.1 MG/DL (ref 0.67–1.17)
DIASTOLIC BLOOD PRESSURE - MUSE: NORMAL MMHG
DIASTOLIC BLOOD PRESSURE - MUSE: NORMAL MMHG
EGFRCR SERPLBLD CKD-EPI 2021: 75 ML/MIN/1.73M2
ERYTHROCYTE [DISTWIDTH] IN BLOOD BY AUTOMATED COUNT: 13.9 % (ref 10–15)
GLUCOSE SERPL-MCNC: 124 MG/DL (ref 70–99)
HCO3 SERPL-SCNC: 30 MMOL/L (ref 22–29)
HCT VFR BLD AUTO: 52.6 % (ref 40–53)
HGB BLD-MCNC: 17.9 G/DL (ref 13.3–17.7)
INTERPRETATION ECG - MUSE: NORMAL
INTERPRETATION ECG - MUSE: NORMAL
MCH RBC QN AUTO: 32 PG (ref 26.5–33)
MCHC RBC AUTO-ENTMCNC: 34 G/DL (ref 31.5–36.5)
MCV RBC AUTO: 94 FL (ref 78–100)
P AXIS - MUSE: 54 DEGREES
P AXIS - MUSE: NORMAL DEGREES
PLATELET # BLD AUTO: 219 10E3/UL (ref 150–450)
POTASSIUM SERPL-SCNC: 4.3 MMOL/L (ref 3.4–5.3)
PR INTERVAL - MUSE: 166 MS
PR INTERVAL - MUSE: NORMAL MS
QRS DURATION - MUSE: 96 MS
QRS DURATION - MUSE: 98 MS
QT - MUSE: 396 MS
QT - MUSE: 422 MS
QTC - MUSE: 424 MS
QTC - MUSE: 451 MS
R AXIS - MUSE: 61 DEGREES
R AXIS - MUSE: 66 DEGREES
RBC # BLD AUTO: 5.59 10E6/UL (ref 4.4–5.9)
SODIUM SERPL-SCNC: 145 MMOL/L (ref 135–145)
SYSTOLIC BLOOD PRESSURE - MUSE: NORMAL MMHG
SYSTOLIC BLOOD PRESSURE - MUSE: NORMAL MMHG
T AXIS - MUSE: 39 DEGREES
T AXIS - MUSE: 54 DEGREES
VENTRICULAR RATE- MUSE: 61 BPM
VENTRICULAR RATE- MUSE: 78 BPM
WBC # BLD AUTO: 7.7 10E3/UL (ref 4–11)

## 2024-09-20 PROCEDURE — 93656 COMPRE EP EVAL ABLTJ ATR FIB: CPT | Performed by: NURSE ANESTHETIST, CERTIFIED REGISTERED

## 2024-09-20 PROCEDURE — 93005 ELECTROCARDIOGRAM TRACING: CPT

## 2024-09-20 PROCEDURE — 250N000009 HC RX 250: Performed by: INTERNAL MEDICINE

## 2024-09-20 PROCEDURE — 80048 BASIC METABOLIC PNL TOTAL CA: CPT | Performed by: INTERNAL MEDICINE

## 2024-09-20 PROCEDURE — 93655 ICAR CATH ABLTJ DSCRT ARRHYT: CPT | Performed by: INTERNAL MEDICINE

## 2024-09-20 PROCEDURE — 93010 ELECTROCARDIOGRAM REPORT: CPT | Mod: XU | Performed by: INTERNAL MEDICINE

## 2024-09-20 PROCEDURE — 250N000009 HC RX 250: Performed by: NURSE ANESTHETIST, CERTIFIED REGISTERED

## 2024-09-20 PROCEDURE — 93657 TX L/R ATRIAL FIB ADDL: CPT | Performed by: INTERNAL MEDICINE

## 2024-09-20 PROCEDURE — C1732 CATH, EP, DIAG/ABL, 3D/VECT: HCPCS | Performed by: INTERNAL MEDICINE

## 2024-09-20 PROCEDURE — 36415 COLL VENOUS BLD VENIPUNCTURE: CPT | Performed by: INTERNAL MEDICINE

## 2024-09-20 PROCEDURE — 93656 COMPRE EP EVAL ABLTJ ATR FIB: CPT | Performed by: INTERNAL MEDICINE

## 2024-09-20 PROCEDURE — 93656 COMPRE EP EVAL ABLTJ ATR FIB: CPT | Performed by: ANESTHESIOLOGY

## 2024-09-20 PROCEDURE — 258N000003 HC RX IP 258 OP 636: Performed by: INTERNAL MEDICINE

## 2024-09-20 PROCEDURE — 250N000011 HC RX IP 250 OP 636: Performed by: INTERNAL MEDICINE

## 2024-09-20 PROCEDURE — 999N000054 HC STATISTIC EKG NON-CHARGEABLE

## 2024-09-20 PROCEDURE — C1730 CATH, EP, 19 OR FEW ELECT: HCPCS | Performed by: INTERNAL MEDICINE

## 2024-09-20 PROCEDURE — 85347 COAGULATION TIME ACTIVATED: CPT

## 2024-09-20 PROCEDURE — 258N000003 HC RX IP 258 OP 636: Performed by: NURSE ANESTHETIST, CERTIFIED REGISTERED

## 2024-09-20 PROCEDURE — C1759 CATH, INTRA ECHOCARDIOGRAPHY: HCPCS | Performed by: INTERNAL MEDICINE

## 2024-09-20 PROCEDURE — 85027 COMPLETE CBC AUTOMATED: CPT | Performed by: INTERNAL MEDICINE

## 2024-09-20 PROCEDURE — C1769 GUIDE WIRE: HCPCS | Performed by: INTERNAL MEDICINE

## 2024-09-20 PROCEDURE — C1733 CATH, EP, OTHR THAN COOL-TIP: HCPCS | Performed by: INTERNAL MEDICINE

## 2024-09-20 PROCEDURE — C1894 INTRO/SHEATH, NON-LASER: HCPCS | Performed by: INTERNAL MEDICINE

## 2024-09-20 PROCEDURE — 272N000001 HC OR GENERAL SUPPLY STERILE: Performed by: INTERNAL MEDICINE

## 2024-09-20 PROCEDURE — 250N000011 HC RX IP 250 OP 636: Performed by: NURSE ANESTHETIST, CERTIFIED REGISTERED

## 2024-09-20 PROCEDURE — 999N000184 HC STATISTIC TELEMETRY

## 2024-09-20 PROCEDURE — 250N000025 HC SEVOFLURANE, PER MIN: Performed by: INTERNAL MEDICINE

## 2024-09-20 PROCEDURE — C1766 INTRO/SHEATH,STRBLE,NON-PEEL: HCPCS | Performed by: INTERNAL MEDICINE

## 2024-09-20 PROCEDURE — 370N000017 HC ANESTHESIA TECHNICAL FEE, PER MIN: Performed by: INTERNAL MEDICINE

## 2024-09-20 PROCEDURE — 999N000071 HC STATISTIC HEART CATH LAB OR EP LAB

## 2024-09-20 RX ORDER — GLYCOPYRROLATE 0.2 MG/ML
INJECTION, SOLUTION INTRAMUSCULAR; INTRAVENOUS PRN
Status: DISCONTINUED | OUTPATIENT
Start: 2024-09-20 | End: 2024-09-20

## 2024-09-20 RX ORDER — ACETAMINOPHEN 325 MG/1
650 TABLET ORAL EVERY 4 HOURS PRN
Status: DISCONTINUED | OUTPATIENT
Start: 2024-09-20 | End: 2024-09-20 | Stop reason: HOSPADM

## 2024-09-20 RX ORDER — NALOXONE HYDROCHLORIDE 0.4 MG/ML
0.4 INJECTION, SOLUTION INTRAMUSCULAR; INTRAVENOUS; SUBCUTANEOUS
Status: DISCONTINUED | OUTPATIENT
Start: 2024-09-20 | End: 2024-09-20 | Stop reason: HOSPADM

## 2024-09-20 RX ORDER — PROPOFOL 10 MG/ML
INJECTION, EMULSION INTRAVENOUS PRN
Status: DISCONTINUED | OUTPATIENT
Start: 2024-09-20 | End: 2024-09-20

## 2024-09-20 RX ORDER — PANTOPRAZOLE SODIUM 40 MG/1
40 TABLET, DELAYED RELEASE ORAL DAILY
Qty: 30 TABLET | Refills: 0 | Status: SHIPPED | OUTPATIENT
Start: 2024-09-21

## 2024-09-20 RX ORDER — DEXAMETHASONE SODIUM PHOSPHATE 4 MG/ML
INJECTION, SOLUTION INTRA-ARTICULAR; INTRALESIONAL; INTRAMUSCULAR; INTRAVENOUS; SOFT TISSUE PRN
Status: DISCONTINUED | OUTPATIENT
Start: 2024-09-20 | End: 2024-09-20

## 2024-09-20 RX ORDER — SODIUM CHLORIDE, SODIUM LACTATE, POTASSIUM CHLORIDE, CALCIUM CHLORIDE 600; 310; 30; 20 MG/100ML; MG/100ML; MG/100ML; MG/100ML
INJECTION, SOLUTION INTRAVENOUS CONTINUOUS
Status: DISCONTINUED | OUTPATIENT
Start: 2024-09-20 | End: 2024-09-20 | Stop reason: HOSPADM

## 2024-09-20 RX ORDER — LIDOCAINE HYDROCHLORIDE 20 MG/ML
INJECTION, SOLUTION INFILTRATION; PERINEURAL PRN
Status: DISCONTINUED | OUTPATIENT
Start: 2024-09-20 | End: 2024-09-20

## 2024-09-20 RX ORDER — HEPARIN SODIUM 1000 [USP'U]/ML
INJECTION, SOLUTION INTRAVENOUS; SUBCUTANEOUS
Status: DISCONTINUED | OUTPATIENT
Start: 2024-09-20 | End: 2024-09-20 | Stop reason: HOSPADM

## 2024-09-20 RX ORDER — NALOXONE HYDROCHLORIDE 0.4 MG/ML
0.2 INJECTION, SOLUTION INTRAMUSCULAR; INTRAVENOUS; SUBCUTANEOUS
Status: DISCONTINUED | OUTPATIENT
Start: 2024-09-20 | End: 2024-09-20 | Stop reason: HOSPADM

## 2024-09-20 RX ORDER — FENTANYL CITRATE 50 UG/ML
INJECTION, SOLUTION INTRAMUSCULAR; INTRAVENOUS PRN
Status: DISCONTINUED | OUTPATIENT
Start: 2024-09-20 | End: 2024-09-20

## 2024-09-20 RX ORDER — PROTAMINE SULFATE 10 MG/ML
INJECTION, SOLUTION INTRAVENOUS
Status: DISCONTINUED | OUTPATIENT
Start: 2024-09-20 | End: 2024-09-20 | Stop reason: HOSPADM

## 2024-09-20 RX ORDER — LIDOCAINE 40 MG/G
CREAM TOPICAL
Status: DISCONTINUED | OUTPATIENT
Start: 2024-09-20 | End: 2024-09-20 | Stop reason: HOSPADM

## 2024-09-20 RX ORDER — SODIUM CHLORIDE, SODIUM LACTATE, POTASSIUM CHLORIDE, CALCIUM CHLORIDE 600; 310; 30; 20 MG/100ML; MG/100ML; MG/100ML; MG/100ML
INJECTION, SOLUTION INTRAVENOUS CONTINUOUS PRN
Status: DISCONTINUED | OUTPATIENT
Start: 2024-09-20 | End: 2024-09-20

## 2024-09-20 RX ORDER — DEXMEDETOMIDINE HYDROCHLORIDE 4 UG/ML
INJECTION, SOLUTION INTRAVENOUS PRN
Status: DISCONTINUED | OUTPATIENT
Start: 2024-09-20 | End: 2024-09-20

## 2024-09-20 RX ORDER — ONDANSETRON 2 MG/ML
INJECTION INTRAMUSCULAR; INTRAVENOUS PRN
Status: DISCONTINUED | OUTPATIENT
Start: 2024-09-20 | End: 2024-09-20

## 2024-09-20 RX ORDER — KETOROLAC TROMETHAMINE 15 MG/ML
INJECTION, SOLUTION INTRAMUSCULAR; INTRAVENOUS
Status: DISCONTINUED | OUTPATIENT
Start: 2024-09-20 | End: 2024-09-20 | Stop reason: HOSPADM

## 2024-09-20 RX ADMIN — SODIUM CHLORIDE, POTASSIUM CHLORIDE, SODIUM LACTATE AND CALCIUM CHLORIDE: 600; 310; 30; 20 INJECTION, SOLUTION INTRAVENOUS at 07:36

## 2024-09-20 RX ADMIN — DEXMEDETOMIDINE HYDROCHLORIDE 0.5 MCG/KG/HR: 100 INJECTION, SOLUTION INTRAVENOUS at 08:46

## 2024-09-20 RX ADMIN — GLYCOPYRROLATE 0.1 MG: 0.2 INJECTION, SOLUTION INTRAMUSCULAR; INTRAVENOUS at 09:40

## 2024-09-20 RX ADMIN — PHENYLEPHRINE HYDROCHLORIDE 0.5 MCG/KG/MIN: 10 INJECTION INTRAVENOUS at 08:56

## 2024-09-20 RX ADMIN — SODIUM CHLORIDE, POTASSIUM CHLORIDE, SODIUM LACTATE AND CALCIUM CHLORIDE: 600; 310; 30; 20 INJECTION, SOLUTION INTRAVENOUS at 08:30

## 2024-09-20 RX ADMIN — GLYCOPYRROLATE 0.1 MG: 0.2 INJECTION, SOLUTION INTRAMUSCULAR; INTRAVENOUS at 09:42

## 2024-09-20 RX ADMIN — ONDANSETRON 4 MG: 2 INJECTION INTRAMUSCULAR; INTRAVENOUS at 11:44

## 2024-09-20 RX ADMIN — PROPOFOL 180 MG: 10 INJECTION, EMULSION INTRAVENOUS at 08:46

## 2024-09-20 RX ADMIN — ROCURONIUM BROMIDE 50 MG: 50 INJECTION, SOLUTION INTRAVENOUS at 08:48

## 2024-09-20 RX ADMIN — SUGAMMADEX 200 MG: 100 INJECTION, SOLUTION INTRAVENOUS at 11:48

## 2024-09-20 RX ADMIN — SODIUM CHLORIDE, POTASSIUM CHLORIDE, SODIUM LACTATE AND CALCIUM CHLORIDE: 600; 310; 30; 20 INJECTION, SOLUTION INTRAVENOUS at 10:30

## 2024-09-20 RX ADMIN — MIDAZOLAM 2 MG: 1 INJECTION INTRAMUSCULAR; INTRAVENOUS at 08:37

## 2024-09-20 RX ADMIN — LIDOCAINE HYDROCHLORIDE 100 MG: 20 INJECTION, SOLUTION INFILTRATION; PERINEURAL at 08:46

## 2024-09-20 RX ADMIN — FENTANYL CITRATE 50 MCG: 50 INJECTION INTRAMUSCULAR; INTRAVENOUS at 11:07

## 2024-09-20 RX ADMIN — FENTANYL CITRATE 50 MCG: 50 INJECTION INTRAMUSCULAR; INTRAVENOUS at 08:46

## 2024-09-20 RX ADMIN — DEXAMETHASONE SODIUM PHOSPHATE 8 MG: 4 INJECTION, SOLUTION INTRA-ARTICULAR; INTRALESIONAL; INTRAMUSCULAR; INTRAVENOUS; SOFT TISSUE at 09:23

## 2024-09-20 RX ADMIN — DEXMEDETOMIDINE HYDROCHLORIDE 20 MCG: 200 INJECTION INTRAVENOUS at 08:46

## 2024-09-20 ASSESSMENT — ACTIVITIES OF DAILY LIVING (ADL)
ADLS_ACUITY_SCORE: 37

## 2024-09-20 ASSESSMENT — ENCOUNTER SYMPTOMS: SEIZURES: 0

## 2024-09-20 NOTE — PROGRESS NOTES
Dictated.    Patient in AF.  Cardioverted x 2.  Severe LA enlargement with significant patchy scar (atrial myopathy).     Successful wide-area PVI, posterior wall isolation and CTI ablation.    EBL 40 ML.  No apparent complication.      Plan:  -bedrest for 4 to 5 hours  -discharge home later this evening, if feeling well  -PPI for 30 days  -continue amiodarone and Eliquis  -follow-up in the EP clinic next week

## 2024-09-20 NOTE — ANESTHESIA PROCEDURE NOTES
Airway       Patient location during procedure: OR ([facility] - Operating Room or Procedural Area)       Procedure Start/Stop Times: 9/20/2024 8:55 AM  Staff -        Anesthesiologist:  Janet Osborn MD       CRNA: Grey Schaeffer APRN CRNA       Performed By: CRNAIndications and Patient Condition       Indications for airway management: ethel-procedural       Induction type:intravenous       Mask difficulty assessment: 3 - difficult mask (inadequate, unstable, or two providers) +/- NMBA    Final Airway Details       Final airway type: endotracheal airway       Successful airway: ETT - single  Endotracheal Airway Details        ETT size (mm): 7.0       Cuffed: yes       Cuff volume (mL): 10       Successful intubation technique: video laryngoscopy       VL Blade Size: Glidescope 4       Grade View of Cords: 2       Adjucts: stylet       Position: Right       Measured from: lips       Secured at (cm): 23       Bite block used: None    Post intubation assessment        Number of attempts at approach: 2       Number of other approaches attempted: 0       Secured with: tape       Ease of procedure: difficult (Patient has extremely anterior airway and small glottic opening. First attempt with Alves unsuccessful. Able to mask ventilate with two providers. Able to intubate with glidescope.)       Dentition: Intact and Unchanged    Medication(s) Administered   Medication Administration Time: 9/20/2024 8:55 AM    Additional Comments       Patient has extremely anterior airway and small glottic opening. First attempt with Alves unsuccessful. Able to mask ventilate with two providers. Successful intubation with glidescope size 4.

## 2024-09-20 NOTE — ANESTHESIA POSTPROCEDURE EVALUATION
Patient: Bryce Espinoza    Procedure: Procedure(s):  Ablation Atrial Fibrilation       Anesthesia Type:  General    Note:  Disposition: Admission   Postop Pain Control: Uneventful            Sign Out: Well controlled pain   PONV: No   Neuro/Psych: Uneventful            Sign Out: Acceptable/Baseline neuro status   Airway/Respiratory: Uneventful            Sign Out: Acceptable/Baseline resp. status   CV/Hemodynamics: Uneventful            Sign Out: Acceptable CV status; No obvious hypovolemia; No obvious fluid overload   Other NRE: NONE   DID A NON-ROUTINE EVENT OCCUR? No           Last vitals:  Vitals Value Taken Time   /79 09/20/24 1320   Temp 36.1  C (97  F) 09/20/24 1250   Pulse 61 09/20/24 1321   Resp 15 09/20/24 1321   SpO2 94 % 09/20/24 1320   Vitals shown include unfiled device data.    Electronically Signed By: Janet Osborn MD  September 20, 2024  2:00 PM

## 2024-09-20 NOTE — Clinical Note
Arrhythmia Type: atrial fibrillation.   Method of Cardioversion: synchronous.   The arrhythmia was terminated.   Energy shock delivered: 200 joules.   Time shock delivered: 09:38 CDT.   Post cardioversion rhythm: sinus rhythm.

## 2024-09-20 NOTE — ANESTHESIA POSTPROCEDURE EVALUATION
Patient: Bryce Espinoza    Procedure: Procedure(s):  Ablation Atrial Fibrilation       Anesthesia Type:  General    Note:  Disposition: Admission   Postop Pain Control: Uneventful            Sign Out: Well controlled pain   PONV: No   Neuro/Psych: Uneventful            Sign Out: Acceptable/Baseline neuro status   Airway/Respiratory: Uneventful            Sign Out: Acceptable/Baseline resp. status   CV/Hemodynamics: Uneventful            Sign Out: Acceptable CV status; No obvious hypovolemia; No obvious fluid overload   Other NRE: NONE   DID A NON-ROUTINE EVENT OCCUR? No           Last vitals:  Vitals Value Taken Time   /66 09/20/24 1230   Temp     Pulse 61 09/20/24 1237   Resp 17 09/20/24 1237   SpO2 93 % 09/20/24 1237   Vitals shown include unfiled device data.    Electronically Signed By: Janet Osborn MD  September 20, 2024  12:37 PM

## 2024-09-20 NOTE — DISCHARGE SUMMARY
Care Suites Discharge Nursing Note    Patient Information  Name: Bryce Espinoza  Age: 64 year old    Discharge Education:  Discharge instructions reviewed: Yes  Additional education/resources provided: AVS  Patient/patient representative verbalizes understanding: Yes  Patient discharging on new medications: Yes  Medication education completed: Yes -Protonix    Discharge Plans:   Discharge location: home  Discharge ride contacted: Yes  Approximate discharge time: 1715    Discharge Criteria:  Discharge criteria met and vital signs stable: Yes    Patient Belongs:  Patient belongings returned to patient: Yes    Valentin Houston RN

## 2024-09-20 NOTE — CONSULTS
History of present illness:  64-year-old male with history of CAD (CABG in 2019), persistent AF, HFpEF (normal LVEF), dyslipidemia, labile hypertension, DENYS on CPAP who is being electively admitted for AF ablation under anesthesia.  The patient initially experienced both atrial fibrillation and atrial flutter after CABG. In fact, he had arrhythmia even after the typical 6-week window following the procedure. After several months, the arrhythmia subsided.  Recurrence of AF was documented in 03/2024. Following development of HFpEF related to AF he was cardioverted on 5/23/2024. Within a couple of minutes he was back in AF. The patient has remained in AF since then. He has had intermittent bradycardia on cardiac monitors which has limited drug therapy.  He was started on amiodarone ~3 weeks ago, in anticipation of today's procedure.    Physical exam:  144/88, 68 and irregular, 105 kg  alert and oriented  lungs: clear  cardiovascular: irregular rhythm, normal JVP  extremities: no edema    Diagnostic studies:  creatinine 1.1, potassium 4.3, sodium 145, hematocrit 52%  ECG shows AF with controlled VR.      Impression/plan:  Symptomatic persistent atrial fibrillation. We will proceed with atrial fibrillation and atrial flutter ablation under general anesthesia today. The risks/benefits of procedure were previously reviewed with the patient who expressed understanding and agrees to proceed.    EMMIE Mcgovern MD

## 2024-09-20 NOTE — ANESTHESIA CARE TRANSFER NOTE
Patient: Bryce Espinoza    Procedure: Procedure(s):  Ablation Atrial Fibrilation       Diagnosis: Atrial Fibrillation  Diagnosis Additional Information: No value filed.    Anesthesia Type:   General     Note:    Oropharynx: oropharynx clear of all foreign objects and spontaneously breathing  Level of Consciousness: awake  Oxygen Supplementation: face mask  Level of Supplemental Oxygen (L/min / FiO2): 6  Independent Airway: airway patency satisfactory and stable  Dentition: dentition unchanged  Vital Signs Stable: post-procedure vital signs reviewed and stable  Report to RN Given: handoff report given  Patient transferred to: PACU    Handoff Report: Identifed the Patient, Identified the Reponsible Provider, Reviewed the pertinent medical history, Discussed the surgical course, Reviewed Intra-OP anesthesia mangement and issues during anesthesia, Set expectations for post-procedure period and Allowed opportunity for questions and acknowledgement of understanding    Vitals:  Vitals Value Taken Time   /82 09/20/24 1202   Temp     Pulse 64 09/20/24 1205   Resp 10 09/20/24 1205   SpO2 96 % 09/20/24 1205   Vitals shown include unfiled device data.    Electronically Signed By: AURELIA Thomson CRNA  September 20, 2024  12:06 PM

## 2024-09-20 NOTE — PROGRESS NOTES
Care Suites Post Procedure Note    Patient Information  Name: Bryce Espinoza  Age: 64 year old    Post Procedure  Time patient returned to Care Suites: 1340  Concerns/abnormal assessment: None at this time.  If abnormal assessment, provider notified: N/A  Plan/Other: Per orders.    Loosen stopcock at 1445. Bedrest until 1630. Dr. Mcgovern to see prior to discharge.    Tamara Gay RN

## 2024-09-20 NOTE — Clinical Note
Bedside and Verbal shift change report given to Stephanie DE LA CRUZ (oncoming nurse) by Lauren Choudhary (offgoing nurse). Report included the following information SBAR, Kardex, Intake/Output, MAR, Accordion, Recent Results and Med Rec Status. Tegaderm applied to wound securely.

## 2024-09-20 NOTE — DISCHARGE INSTRUCTIONS
A Fib Ablation Discharge Instructions    After you go home:  Have an adult stay with you until tomorrow.  You may eat your normal diet, unless your doctor tells you otherwise.  RELAX and take it easy for 3 days.        For 24-48 hours (due to the sedation you received):  DO NOT DRIVE FOR 2 DAYS!   Do NOT make any important or legal decisions.  Do NOT drive or operate machines at home or at work.  Do NOT drink alcohol.    Care of Puncture Site:  Check the puncture site severy 1-2 hours while awake.  For 2-3 days, when you cough, sneeze, laugh or move your bowels, hold your hand over the puncture sites and press firmly.  Please remove Dressing after 24 hours, works best to take off in shower.  Then apply a band aid daily for at least 3 days (if needed). If there is minor oozing, apply another band aid and remove it after 12 hours.   It is normal to have a bruising or a small lump that is present under the skin . This will go away on its own after 3-4 weeks.   You may shower. Do NOT take a bath, or use a hot tub or pool until groin site heals, which may take up to a week.  Do NOT scrub the site. Do NOT use lotion or powder near the puncture site.    Activity:  NO bending, stooping over or squatting  for 3 days  Do NOT lift, push or pull more than 10 pounds (equal to a gallon of milk) for 3 days.  NO repetitive motions such as loading , vacuuming, raking, shoveling,   Limit going up and down the stairs repetitively, for the first 24 hours after procedure.     Bleeding:  If you start bleeding from the groin site, lie down flat and press firmly on the site for 10 minutes or until bleeding stops.   Once bleeding stops, lay flat for 1-2 hours.  Call your A Fib nurse if bleeding does not stop or after hours will need to go to ER.       Go to ER or Call 911 right away if you have heavy bleeding or bleeding that does not stop.    Medications:  Take your medications, including blood thinners, unless your doctor tells  you not to.  If you have stopped any other medicines, check with your nurse or provider about when to restart them.  If you have PAIN or a TIGHTNESS in your chest, you MAY take Tylenol (acetaminophen) and if this does not help, you MAY take Advil (ibuprofen-400 mg with food).  If you have constipation or prone to constipation,  take a fiber supplement, ie metamucil or stool softners.    Call the A Fib RN if:  Chest pain not relieved by acetaminophen or ibuprofen  Difficulty swallowing and/or coughing up blood  Shortness of breath  Increased groin pain or a large or growing hard lump around the site.  Groin site is red, swollen, hot or tender.  Blood or fluid is draining from the groin site.  You have chills or a fever greater than 101 F (38 C).  Your leg feels numb, cool or changes color.  If groin pain is not relieved by Tylenol or Ibuprofen.  Recurrent irregular or fast heart rate lasting over 2-3 hours.  Any questions or concerns.    Heart rhythms:  You may have some irregular heartbeats. These feel very strong. They may make you feel that the A Fib is going to start again.  Give it time. The irregular beats should occur less often.    Follow Up Appointments:  9/27/2024 with Naomi Burton, ABBEY at 3:30pm in [place]  12/31/2024 with Dr Mcgovern at 4:15pm in [facility]   A Fib clinic RN's Karime Chapin [phone] (Mon-Fri, 8:00-4:30)   [phone] Option 2 (7 days a week) after hours for on call Cardiologist.

## 2024-09-20 NOTE — PROGRESS NOTES
Care Suites Admission Nursing Note    Patient Information  Name: Bryce Espinoza  Age: 64 year old  Reason for admission: A-fib ablation  Care Suites arrival time: 0630    Visitor Information  Name: Brooke     Patient Admission/Assessment   Pre-procedure assessment complete: Yes  If abnormal assessment/labs, provider notified: N/A  NPO: Yes  Medications held per instructions/orders: N/A  Consent: obtained  If applicable, pregnancy test status: deferred  Patient oriented to room: Yes  Education/questions answered: Yes  Plan/other: Proceed as scheduled.    Discharge Planning  Discharge name/phone number: Brooke-spouse [phone]  Overnight post sedation caregiver: Brooke  Discharge location: home    Tamara Gay RN

## 2024-09-20 NOTE — PROGRESS NOTES
Pt up - walked to restroom > + urination   Groin site dry and intact   Denies any CP -SOB   Monitor > SR

## 2024-09-23 ENCOUNTER — TELEPHONE (OUTPATIENT)
Dept: CARDIOLOGY | Facility: CLINIC | Age: 64
End: 2024-09-23
Payer: COMMERCIAL

## 2024-09-23 NOTE — TELEPHONE ENCOUNTER
Spoke to patient in follow up to afib ablation on 9/20.  Patient reports feeling well.  Denies CP, SOB or feeling lightheaded.  Groin site CDI.  Eating drinking and going to the bathroom with out difficulty.  Patient started protonix as instructed on 9/21.  He is aware that this will be only for 30 days.  Reviewed discharge instructions and follow up appointment.  Patient not aware when he goes into AF.  Asymptomatic.    Recommended he check HR on regular basis and report irregular and elevated HR.   Patient to call clinic if any further issues.  FELICIANO Juarez

## 2024-09-27 ENCOUNTER — OFFICE VISIT (OUTPATIENT)
Dept: CARDIOLOGY | Facility: CLINIC | Age: 64
End: 2024-09-27
Payer: COMMERCIAL

## 2024-09-27 DIAGNOSIS — I48.0 PAROXYSMAL ATRIAL FIBRILLATION (H): ICD-10-CM

## 2024-09-27 PROCEDURE — 99214 OFFICE O/P EST MOD 30 MIN: CPT | Performed by: NURSE PRACTITIONER

## 2024-09-27 PROCEDURE — 93000 ELECTROCARDIOGRAM COMPLETE: CPT | Performed by: NURSE PRACTITIONER

## 2024-09-27 NOTE — PROGRESS NOTES
Electrophysiology Clinic Progress Note  Bryce Espinoza MRN# [id number]   YOB: 1960 Age: 64 year old     Primary cardiologist: Dr. Blake (general) and Dr. Mcgovern (general)    Reason for visit: Follow up ablation     History of presenting illness:    Bryce Espinoza is a pleasant 64 year old patient with past medical history significant for:    Symptomatic persistent atrial fibrillation and typical atrial flutter: initially diagnosed post CABG with paroxysmal AF/AFl. No symptomatic recurrence until 3/2024 and Zio showed persistent AF with CVR and up ot 5.6 second pauses. S/p DCCV 5/2024 and converted to SR but back to AF within a few minutes. S/p successful wide PVI with LA posterior wall isolation by linear ablation and CTI ablation 9/20/2024. He was started on amiodarone taper 3 weeks prior to the procedure.   [id number] Score (CAD, HTN)   Coronary artery disease: s/p CABG in 2019 ( (LIMA to LAD, the very small circumflex remained ungrafted). LAD endarterectomy required prior to LIMA grafting due to severe calcification of the vessel.   Hypertension  DENYS: Compliant with CPAP    Mr. Espinoza underwent an EPS and successful wide PVI with LA posterior wall isolation by linear ablation and CTI ablation 9/20/2024 with Dr. Mcgovern. He presented in AF and underwent DCCV x 2, He was found to have severe LA enlargement with significant patchy scar (atrial myopathy). Prior to the procedure he was placed on PO amiodarone 200 mg bid x 1 week followed by 200 mg daily, about 2-3 weeks before the procedure.     Today he returns for a follow up post procedure, His EKG notes SR and he had not had symptomatic recurrence of AF/AFL. He reports increased stamina while in SR and is overall very pleased by his symptomatic improvement. No concerns with right femoral vascular site.     Diagnotic studies:  EKG 9/27/2024: SR  E5G 6/2024: AF with controlled VR, mild nonspecific ST abnormality.   Echocardiogram 04/2024: EF 60 -  65%, moderate LA enlargement, no significant valvular disease.            Assessment and Plan:     ASSESSMENT:    Symptomatic persistent atrial fibrillation and typical atrial flutter:   Initially diagnosed post CABG with paroxysmal AF/AFl. No symptomatic recurrence until 3/2024 and Zio showed persistent AF with CVR and up ot 5.6 second pauses. S/p DCCV 5/2024 and converted to SR but back to AF within a few minutes.   S/p successful wide PVI with LA posterior wall isolation by linear ablation and CTI ablation 9/20/2024. He was started on amiodarone taper 3 weeks prior to the procedure.   [id number] Score 2 (CAD, HTN) anticoagulated on Eliquis 5 mg twice daily  Rate controlled metoprolol XL 50 mg daily    Coronary artery disease   S/p CABG in 2019 ( (LIMA to LAD, the very small circumflex remained ungrafted). LAD endarterectomy required prior to LIMA grafting due to severe calcification of the vessel.   LDL 28 from 4/2024 currently on Repatha    Hypertension  BP today 111/65  Improved control with restarting hydrochlorothiazide     PLAN:    Remain on all other medications including amiodarone and Eliquis without interruption for 2-3 months  Return to clinic and follow up with Dr. Mcgovern. Will discuss discontinuation of amiodarone.    Of note, there was a systemwide epic outage at the time of this office visit.        Orders this Visit:  Orders Placed This Encounter   Procedures    EKG 12-lead complete w/read - Clinics (performed today)     No orders of the defined types were placed in this encounter.    There are no discontinued medications.    Today's clinic visit entailed:  Review of the result(s) of each unique test - Echo, EKG, Zio   The following tests were independently interpreted by me as noted in my documentation: EKG  I spent a total of 30 minutes on the day of the visit.   Time spent by me doing chart review, history and exam, documentation and further activities per the note  Provider  Link to [facility] Help  "Grid     The level of medical decision making during this visit was of moderate complexity.           Review of Systems:     Review of Systems:  Skin:  not assessed     Eyes:  not assessed    ENT:  not assessed    Respiratory:  Negative    Cardiovascular:  Negative    Gastroenterology: not assessed    Genitourinary:  not assessed    Musculoskeletal:  not assessed    Neurologic:  Negative    Psychiatric:  not assessed    Heme/Lymph/Imm:  Negative    Endocrine:  Negative              Physical Exam:     Vitals: Resp 18   Ht 1.753 m (5' 9\")   SpO2 100%   BMI 31.60 kg/m    Constitutional: Well nourished and in no apparent distress.  Eyes: Pupils equal, round.   HEENT: Normocephalic, atraumatic.   Neck: Supple.   Respiratory: Breathing non-labored. Lungs clear to auscultation bilaterally.  Cardiovascular:  Regular rate and rhythm, normal S1 and S2. No murmur   Skin: Warm, dry.   Extremities: No edema.  Neurologic: No gross motor deficits. Alert, awake, and oriented to person, place and time.  Psychiatric: Affect appropriate.        CURRENT MEDICATIONS:  Current Outpatient Medications   Medication Sig Dispense Refill    amiodarone (PACERONE) 200 MG tablet Take 200mg (1 tablet) po twice daily for 7 days then 200mg (1 tablet) po daily.   Patient to start 9/2/2024 97 tablet 0    amLODIPine (NORVASC) 5 MG tablet Take 1 tablet (5 mg) by mouth daily 90 tablet 3    apixaban ANTICOAGULANT (ELIQUIS ANTICOAGULANT) 5 MG tablet Take 1 tablet (5 mg) by mouth 2 times daily 60 tablet 11    Cholecalciferol (VITAMIN D) 2000 UNITS tablet Take 2,000 Units by mouth daily      evolocumab (REPATHA) 140 MG/ML prefilled autoinjector Inject 1 mL (140 mg) Subcutaneous every 14 days 6 mL 3    hydroCHLOROthiazide 12.5 MG tablet Take 1 tablet (12.5 mg) by mouth daily 30 tablet 11    losartan (COZAAR) 100 MG tablet Take 1 tablet (100 mg) by mouth daily 90 tablet 3    metoprolol succinate ER (TOPROL XL) 50 MG 24 hr tablet Take 1 tablet (50 mg) by " mouth daily Appointment required for further refills 180 tablet 3    pantoprazole (PROTONIX) 40 MG EC tablet Take 1 tablet (40 mg) by mouth daily. 30 tablet 0       ALLERGIES  Allergies   Allergen Reactions    Atorvastatin      Myalgias    Crestor [Rosuvastatin]      Myalgias      Lisinopril      Body aches pt d/mylene  06/2015    Simvastatin      Myalgias         PAST MEDICAL HISTORY:  Past Medical History:   Diagnosis Date    Antiplatelet or antithrombotic long-term use     Arrhythmia     Coronary artery disease involving native coronary artery of native heart without angina pectoris 07/04/2019    Per CT calcium score of 722.97 3/2019    Difficulty walking     Dyspnea on exertion     Essential hypertension, benign     Hard to intubate 9/20/2024    Hematuria     Irregular heart beat     Malignant neoplasm of bladder, 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    Olecranon bursitis 03/2001    right    Other and unspecified hyperlipidemia     Paroxysmal atrial fibrillation (H)     Paroxysmal atrial flutter (H)     RIGHT LUNG CALCIFIED GRANULOMA 02/2002    RML    Sleep apnea     Tobacco use disorder     Quit 1/03    Unspecified hemorrhoids without mention of complication 08/2001    Unspecified hereditary and idiopathic peripheral neuropathy        PAST SURGICAL HISTORY:  Past Surgical History:   Procedure Laterality Date    ANESTHESIA CARDIOVERSION N/A 5/23/2024    Procedure: Anesthesia cardioversion;  Surgeon: GENERIC ANESTHESIA PROVIDER;  Location:  OR    BYPASS GRAFT ARTERY CORONARY N/A 9/25/2019    Procedure: CORONARY ARTERY BYPASS GRAFTING x 1 (LIMA - LAD) WITH CORONARY ENDARTERECTOMY  (ON PUMP OXYGENATOR ; HANK BY [facility]);  Surgeon: Magdi Turner MD;  Location:  OR    CV HEART CATHETERIZATION WITH POSSIBLE INTERVENTION N/A 9/6/2019    Procedure: Coronary Angiogram;  Surgeon: Nick Willson MD;  Location:  HEART CARDIAC CATH LAB    CV LEFT HEART CATH N/A 9/6/2019    Procedure:  Left Heart Cath;  Surgeon: Nick Willson MD;  Location:  HEART CARDIAC CATH LAB    CV LEFT VENTRICULOGRAM N/A 2019    Procedure: Left Ventriculogram;  Surgeon: Nick Willson MD;  Location:  HEART CARDIAC CATH LAB    DAVINCI XI HERNIORRHAPHY INGUINAL Right 2024    Procedure: Robotic assisted right inguinal hernia repair with mesh;  Surgeon: You Lowe MD;  Location: [facility] OR    EP ABLATION FOCAL AFIB N/A 2024    Procedure: Ablation Atrial Fibrilation;  Surgeon: Martha Mcgovern MD;  Location:  HEART CARDIAC CATH LAB    ZZC NONSPECIFIC PROCEDURE      EBCT       FAMILY HISTORY:  Family History   Problem Relation Age of Onset    Arthritis Mother         RA    Hypertension Mother     Breast Cancer Mother     Prostate Cancer Maternal Grandfather         80    Cancer Maternal Grandmother         Lung    Coronary Artery Disease Father     Other - See Comments Brother     Diabetes Brother     Genetic Disorder Son        SOCIAL HISTORY:  Social History     Socioeconomic History    Marital status:    Tobacco Use    Smoking status: Former     Current packs/day: 0.00     Average packs/day: 1 pack/day for 33.0 years (33.0 ttl pk-yrs)     Types: Cigarettes     Start date: 1980     Quit date: 2013     Years since quittin.6    Smokeless tobacco: Never   Vaping Use    Vaping status: Never Used   Substance and Sexual Activity    Alcohol use: Yes     Comment: weekends    Drug use: Yes     Comment: nica taylor    Sexual activity: Yes   Other Topics Concern    Parent/sibling w/ CABG, MI or angioplasty before 65F 55M? No     Social Determinants of Health     Interpersonal Safety: Low Risk  (2024)    Interpersonal Safety     Do you feel physically and emotionally safe where you currently live?: Yes     Within the past 12 months, have you been hit, slapped, kicked or otherwise physically hurt by someone?: No     Within the past 12 months, have you been  humiliated or emotionally abused in other ways by your partner or ex-partner?: No

## 2024-09-27 NOTE — LETTER
9/27/2024    Remington Riggs MD  [address] [facility] [address]    RE: Bryce Espinoza       Dear Colleague,     I had the pleasure of seeing Bryce Espinoza in the [facility].    Electrophysiology Clinic Progress Note  Bryce Espinoza MRN# [id number]   YOB: 1960 Age: 64 year old     Primary cardiologist: Dr. Blake (general) and Dr. Mcgovern (general)    Reason for visit: Follow up ablation     History of presenting illness:    Bryce Espinoza is a pleasant 64 year old patient with past medical history significant for:    Symptomatic persistent atrial fibrillation and typical atrial flutter: initially diagnosed post CABG with paroxysmal AF/AFl. No symptomatic recurrence until 3/2024 and Zio showed persistent AF with CVR and up ot 5.6 second pauses. S/p DCCV 5/2024 and converted to SR but back to AF within a few minutes. S/p successful wide PVI with LA posterior wall isolation by linear ablation and CTI ablation 9/20/2024. He was started on amiodarone taper 3 weeks prior to the procedure.   [id number] Score (CAD, HTN)   Coronary artery disease: s/p CABG in 2019 ( (LIMA to LAD, the very small circumflex remained ungrafted). LAD endarterectomy required prior to LIMA grafting due to severe calcification of the vessel.   Hypertension  DENYS: Compliant with CPAP    Mr. Espinoza underwent an EPS and successful wide PVI with LA posterior wall isolation by linear ablation and CTI ablation 9/20/2024 with Dr. Mcgovern. He presented in AF and underwent DCCV x 2, He was found to have severe LA enlargement with significant patchy scar (atrial myopathy). Prior to the procedure he was placed on PO amiodarone 200 mg bid x 1 week followed by 200 mg daily, about 2-3 weeks before the procedure.     Today he returns for a follow up post procedure, His EKG notes SR and he had not had symptomatic recurrence of AF/AFL. He reports increased stamina while in SR and is overall very pleased by his symptomatic  improvement. No concerns with right femoral vascular site.     Diagnotic studies:  EKG 9/27/2024: SR  E5G 6/2024: AF with controlled VR, mild nonspecific ST abnormality.   Echocardiogram 04/2024: EF 60 - 65%, moderate LA enlargement, no significant valvular disease.            Assessment and Plan:     ASSESSMENT:    Symptomatic persistent atrial fibrillation and typical atrial flutter:   Initially diagnosed post CABG with paroxysmal AF/AFl. No symptomatic recurrence until 3/2024 and Zio showed persistent AF with CVR and up ot 5.6 second pauses. S/p DCCV 5/2024 and converted to SR but back to AF within a few minutes.   S/p successful wide PVI with LA posterior wall isolation by linear ablation and CTI ablation 9/20/2024. He was started on amiodarone taper 3 weeks prior to the procedure.   [id number] Score 2 (CAD, HTN) anticoagulated on Eliquis 5 mg twice daily  Rate controlled metoprolol XL 50 mg daily    Coronary artery disease   S/p CABG in 2019 ( (LIMA to LAD, the very small circumflex remained ungrafted). LAD endarterectomy required prior to LIMA grafting due to severe calcification of the vessel.   LDL 28 from 4/2024 currently on Repatha    Hypertension  BP today 111/65  Improved control with restarting hydrochlorothiazide     PLAN:    Remain on all other medications including amiodarone and Eliquis without interruption for 2-3 months  Return to clinic and follow up with Dr. Mcgovern. Will discuss discontinuation of amiodarone.    Of note, there was a systemwide epic outage at the time of this office visit.        Orders this Visit:  Orders Placed This Encounter   Procedures     EKG 12-lead complete w/read - Clinics (performed today)     No orders of the defined types were placed in this encounter.    There are no discontinued medications.    Today's clinic visit entailed:  Review of the result(s) of each unique test - Echo, EKG, Zio   The following tests were independently interpreted by me as noted in my  "documentation: EKG  I spent a total of 30 minutes on the day of the visit.   Time spent by me doing chart review, history and exam, documentation and further activities per the note  Provider  Link to [facility] Help Grid     The level of medical decision making during this visit was of moderate complexity.           Review of Systems:     Review of Systems:  Skin:  not assessed     Eyes:  not assessed    ENT:  not assessed    Respiratory:  Negative    Cardiovascular:  Negative    Gastroenterology: not assessed    Genitourinary:  not assessed    Musculoskeletal:  not assessed    Neurologic:  Negative    Psychiatric:  not assessed    Heme/Lymph/Imm:  Negative    Endocrine:  Negative              Physical Exam:     Vitals: Resp 18   Ht 1.753 m (5' 9\")   SpO2 100%   BMI 31.60 kg/m    Constitutional: Well nourished and in no apparent distress.  Eyes: Pupils equal, round.   HEENT: Normocephalic, atraumatic.   Neck: Supple.   Respiratory: Breathing non-labored. Lungs clear to auscultation bilaterally.  Cardiovascular:  Regular rate and rhythm, normal S1 and S2. No murmur   Skin: Warm, dry.   Extremities: No edema.  Neurologic: No gross motor deficits. Alert, awake, and oriented to person, place and time.  Psychiatric: Affect appropriate.        CURRENT MEDICATIONS:  Current Outpatient Medications   Medication Sig Dispense Refill     amiodarone (PACERONE) 200 MG tablet Take 200mg (1 tablet) po twice daily for 7 days then 200mg (1 tablet) po daily.   Patient to start 9/2/2024 97 tablet 0     amLODIPine (NORVASC) 5 MG tablet Take 1 tablet (5 mg) by mouth daily 90 tablet 3     apixaban ANTICOAGULANT (ELIQUIS ANTICOAGULANT) 5 MG tablet Take 1 tablet (5 mg) by mouth 2 times daily 60 tablet 11     Cholecalciferol (VITAMIN D) 2000 UNITS tablet Take 2,000 Units by mouth daily       evolocumab (REPATHA) 140 MG/ML prefilled autoinjector Inject 1 mL (140 mg) Subcutaneous every 14 days 6 mL 3     hydroCHLOROthiazide 12.5 MG tablet Take 1 " tablet (12.5 mg) by mouth daily 30 tablet 11     losartan (COZAAR) 100 MG tablet Take 1 tablet (100 mg) by mouth daily 90 tablet 3     metoprolol succinate ER (TOPROL XL) 50 MG 24 hr tablet Take 1 tablet (50 mg) by mouth daily Appointment required for further refills 180 tablet 3     pantoprazole (PROTONIX) 40 MG EC tablet Take 1 tablet (40 mg) by mouth daily. 30 tablet 0       ALLERGIES  Allergies   Allergen Reactions     Atorvastatin      Myalgias     Crestor [Rosuvastatin]      Myalgias       Lisinopril      Body aches pt d/mylene  06/2015     Simvastatin      Myalgias         PAST MEDICAL HISTORY:  Past Medical History:   Diagnosis Date     Antiplatelet or antithrombotic long-term use      Arrhythmia      Coronary artery disease involving native coronary artery of native heart without angina pectoris 07/04/2019    Per CT calcium score of 722.97 3/2019     Difficulty walking      Dyspnea on exertion      Essential hypertension, benign      Hard to intubate 9/20/2024     Hematuria      Irregular heart beat      Malignant neoplasm of bladder, 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     Olecranon bursitis 03/2001    right     Other and unspecified hyperlipidemia      Paroxysmal atrial fibrillation (H)      Paroxysmal atrial flutter (H)      RIGHT LUNG CALCIFIED GRANULOMA 02/2002    RML     Sleep apnea      Tobacco use disorder     Quit 1/03     Unspecified hemorrhoids without mention of complication 08/2001     Unspecified hereditary and idiopathic peripheral neuropathy        PAST SURGICAL HISTORY:  Past Surgical History:   Procedure Laterality Date     ANESTHESIA CARDIOVERSION N/A 5/23/2024    Procedure: Anesthesia cardioversion;  Surgeon: GENERIC ANESTHESIA PROVIDER;  Location: [facility] OR     BYPASS GRAFT ARTERY CORONARY N/A 9/25/2019    Procedure: CORONARY ARTERY BYPASS GRAFTING x 1 (LIMA - LAD) WITH CORONARY ENDARTERECTOMY  (ON PUMP OXYGENATOR ; HANK BY [facility]);  Surgeon: Magdi Turner,  MD;  Location:  OR     CV HEART CATHETERIZATION WITH POSSIBLE INTERVENTION N/A 2019    Procedure: Coronary Angiogram;  Surgeon: Nick Willson MD;  Location:  HEART CARDIAC CATH LAB     CV LEFT HEART CATH N/A 2019    Procedure: Left Heart Cath;  Surgeon: Nick Willson MD;  Location:  HEART CARDIAC CATH LAB     CV LEFT VENTRICULOGRAM N/A 2019    Procedure: Left Ventriculogram;  Surgeon: Nick Willson MD;  Location:  HEART CARDIAC CATH LAB     DAVINCI XI HERNIORRHAPHY INGUINAL Right 2024    Procedure: Robotic assisted right inguinal hernia repair with mesh;  Surgeon: You Lowe MD;  Location: [facility] OR     EP ABLATION FOCAL AFIB N/A 2024    Procedure: Ablation Atrial Fibrilation;  Surgeon: Martha Mcgovern MD;  Location:  HEART CARDIAC CATH LAB     ZZC NONSPECIFIC PROCEDURE      EBCT       FAMILY HISTORY:  Family History   Problem Relation Age of Onset     Arthritis Mother         RA     Hypertension Mother      Breast Cancer Mother      Prostate Cancer Maternal Grandfather         80     Cancer Maternal Grandmother         Lung     Coronary Artery Disease Father      Other - See Comments Brother      Diabetes Brother      Genetic Disorder Son        SOCIAL HISTORY:  Social History     Socioeconomic History     Marital status:    Tobacco Use     Smoking status: Former     Current packs/day: 0.00     Average packs/day: 1 pack/day for 33.0 years (33.0 ttl pk-yrs)     Types: Cigarettes     Start date: 1980     Quit date: 2013     Years since quittin.6     Smokeless tobacco: Never   Vaping Use     Vaping status: Never Used   Substance and Sexual Activity     Alcohol use: Yes     Comment: weekends     Drug use: Yes     Comment: nica taylor     Sexual activity: Yes   Other Topics Concern     Parent/sibling w/ CABG, MI or angioplasty before 65F 55M? No     Social Determinants of Health     Interpersonal Safety: Low Risk  (2024)     Interpersonal Safety      Do you feel physically and emotionally safe where you currently live?: Yes      Within the past 12 months, have you been hit, slapped, kicked or otherwise physically hurt by someone?: No      Within the past 12 months, have you been humiliated or emotionally abused in other ways by your partner or ex-partner?: No               Thank you for allowing me to participate in the care of your patient.      Sincerely,     AURELIA Enriquez [facility]  cc:   Martha Mcgovern MD  [address]  [place] [address]

## 2024-09-30 VITALS
OXYGEN SATURATION: 100 % | HEIGHT: 69 IN | BODY MASS INDEX: 31.6 KG/M2 | RESPIRATION RATE: 18 BRPM | SYSTOLIC BLOOD PRESSURE: 111 MMHG | DIASTOLIC BLOOD PRESSURE: 65 MMHG

## 2024-10-02 DIAGNOSIS — I10 ESSENTIAL HYPERTENSION, BENIGN: ICD-10-CM

## 2024-10-03 RX ORDER — LOSARTAN POTASSIUM 100 MG/1
100 TABLET ORAL DAILY
Qty: 90 TABLET | Refills: 1 | Status: SHIPPED | OUTPATIENT
Start: 2024-10-03

## 2024-10-09 ENCOUNTER — TRANSFERRED RECORDS (OUTPATIENT)
Dept: HEALTH INFORMATION MANAGEMENT | Facility: CLINIC | Age: 64
End: 2024-10-09
Payer: COMMERCIAL

## 2024-10-29 ENCOUNTER — TELEPHONE (OUTPATIENT)
Dept: CARDIOLOGY | Facility: CLINIC | Age: 64
End: 2024-10-29
Payer: COMMERCIAL

## 2024-10-29 DIAGNOSIS — I48.0 PAROXYSMAL ATRIAL FIBRILLATION (H): ICD-10-CM

## 2024-10-29 RX ORDER — AMIODARONE HYDROCHLORIDE 200 MG/1
200 TABLET ORAL DAILY
Qty: 90 TABLET | Refills: 1 | Status: SHIPPED | OUTPATIENT
Start: 2024-10-29

## 2024-10-29 NOTE — TELEPHONE ENCOUNTER
10/29/24 Pt called and would like to schedule a routine colonoscopy . He is S/P Afib Ablation from 9/20 and is on Eliquis. Informed him he is to remain on Eliquis until 3 m kelly post Ablation. Pt voiced understanding  He requested refill of Amiodarone which I sent  Asked pt to call back when colonoscopy is scheduled to discuss Eliquis hold.  He has 3 m follow up appt scheduled w Dr Mcgovern on 12/31  Pt voiced understanding and agreement with plan.   Shan 1027 am

## 2024-11-14 ENCOUNTER — TELEPHONE (OUTPATIENT)
Dept: SLEEP MEDICINE | Facility: CLINIC | Age: 64
End: 2024-11-14
Payer: COMMERCIAL

## 2024-11-14 DIAGNOSIS — G47.33 OSA (OBSTRUCTIVE SLEEP APNEA): Primary | ICD-10-CM

## 2024-11-14 NOTE — TELEPHONE ENCOUNTER
General Call    Contacts       Contact Date/Time Type Contact Phone/Fax    11/14/2024 01:43 PM CST Phone (Incoming) Jorge Espinoza (Self) [phone] (M)     Requesting new CPAP supplies - needing ASAP otherwise I will go into AFIB since I have been off my machine          Reason for Call:  CPAP Supplies    What are your questions or concerns:  Jorge is needing new supplies for his CPAP machine. He has been off the machine for some time but went into AFIB so he was informed to go back on it.     As of now he has very old supplies and would like to get a re-order before his next appt on 3/31/25    Date of last appointment with provider: 7/17/21    [facility] or would you prefer to receive a phone call?:   Patient would prefer a phone call   Okay to leave a detailed message?: Yes at Cell number on file:    Telephone Information:   Mobile [phone]

## 2024-11-19 ENCOUNTER — TELEPHONE (OUTPATIENT)
Dept: INTERNAL MEDICINE | Facility: CLINIC | Age: 64
End: 2024-11-19
Payer: COMMERCIAL

## 2024-11-19 NOTE — TELEPHONE ENCOUNTER
Last ov 7/29/21  Next ov 3/31/25    Patient requesting updated order for CPAP supplies prior to appointment. Order pended and routed to provider for consideration.    FLORA MAKI RN  [facility]

## 2024-11-19 NOTE — TELEPHONE ENCOUNTER
"Per Enrico JOHNSON, PSCA    Requesting \"Hold Orders\" on Eliquis for upcoming colonoscopy on 12/31    *Start hold on 12/29: 2-day hold of Eliquis*    If provider disagrees to Hold:    Requesting that most recent creatinine lab be faxed to: [phone]     "

## 2024-11-19 NOTE — TELEPHONE ENCOUNTER
Okay to hold Eliquis 2 days prior to colonoscopy as requested by [facility].  Patient to restart Eliquis the day after the colonoscopy.  Inform [facility] and pt

## 2024-12-31 ENCOUNTER — TRANSFERRED RECORDS (OUTPATIENT)
Dept: HEALTH INFORMATION MANAGEMENT | Facility: CLINIC | Age: 64
End: 2024-12-31

## 2025-01-27 ENCOUNTER — OFFICE VISIT (OUTPATIENT)
Dept: CARDIOLOGY | Facility: CLINIC | Age: 65
End: 2025-01-27
Attending: INTERNAL MEDICINE
Payer: COMMERCIAL

## 2025-01-27 VITALS
HEART RATE: 65 BPM | BODY MASS INDEX: 33.9 KG/M2 | DIASTOLIC BLOOD PRESSURE: 80 MMHG | WEIGHT: 228.9 LBS | HEIGHT: 69 IN | SYSTOLIC BLOOD PRESSURE: 132 MMHG | OXYGEN SATURATION: 98 %

## 2025-01-27 DIAGNOSIS — I48.0 PAROXYSMAL ATRIAL FIBRILLATION (H): ICD-10-CM

## 2025-01-27 DIAGNOSIS — Z95.1 S/P CABG (CORONARY ARTERY BYPASS GRAFT): ICD-10-CM

## 2025-01-27 PROCEDURE — G2211 COMPLEX E/M VISIT ADD ON: HCPCS | Performed by: INTERNAL MEDICINE

## 2025-01-27 PROCEDURE — 99214 OFFICE O/P EST MOD 30 MIN: CPT | Performed by: INTERNAL MEDICINE

## 2025-01-27 PROCEDURE — 93000 ELECTROCARDIOGRAM COMPLETE: CPT | Performed by: INTERNAL MEDICINE

## 2025-01-27 RX ORDER — METOPROLOL SUCCINATE 50 MG/1
50 TABLET, EXTENDED RELEASE ORAL DAILY
COMMUNITY
Start: 2025-01-27

## 2025-01-27 RX ORDER — SOTALOL HYDROCHLORIDE 80 MG/1
80 TABLET ORAL 2 TIMES DAILY
Qty: 60 TABLET | Refills: 3 | Status: SHIPPED | OUTPATIENT
Start: 2025-02-03 | End: 2025-01-27

## 2025-01-27 RX ORDER — SOTALOL HYDROCHLORIDE 80 MG/1
80 TABLET ORAL 2 TIMES DAILY
Qty: 180 TABLET | Refills: 3 | Status: SHIPPED | OUTPATIENT
Start: 2025-01-27

## 2025-01-27 NOTE — PROGRESS NOTES
EP Cardiology Clinic Progress Note  Bryce Espinoza MRN# [id number]   YOB: 1960 Age: 64 year old   Primary Cardiologist: Dr. Blake, Dr. Mcgovern (EP) Reason for visit: EP follow-up             Assessment and Plan:   Bryce Espinoza is a very pleasant 64 year old male who is here today for EP follow-up.      1.  Symptomatic persistent atrial fibrillation and typical atrial flutter status post successful wide area PVI with LA posterior wall isolation and CTI ablation 9/20/2024.  On Eliquis 5 mg twice daily for cardioembolic risk reduction in the setting of elevated [id number] score of 2 (HTN, CAD).  Given significant atrial myopathy, patient is at high risk for atrial fibrillation recurrence.  Will transition from amiodarone to sotalol as described below.  2.  Hypertension, well-controlled  3.  Coronary artery disease status post single-vessel CABG with LIMA to LAD in 2019. Patient required a coronary endarterectomy in the proximal segment of the LAD for LIMA grafting.  No angina.  4.  DENYS, compliant with CPAP  5.  Hyperlipidemia complicated by statin intolerance, now on Repatha.    Changes today:   Discontinue amiodarone.    In 1 week, discontinue metoprolol and start sotalol 80 mg twice daily.  Patient has normal renal function. ECG completed in clinic today reveals normal sinus rhythm, VR 67, , , QT/QTc 426/438 ms.    ECG 3 days after sotalol initiation; scheduled for 2/6/2025.  At that time, anticipate increasing sotalol to 120 mg twice daily if acceptable QT/QTc and no significant bradycardia.  Repeat ECG 3 days after subsequent dose increase.     EP follow-up in 3 months, sooner if needed.    SUZETTE Robles [facility]  Pager: [phone]          History of Presenting Illness:    Bryce Espinoza is a very pleasant 64 year old male with a history of symptomatic persistent atrial fibrillation and typical atrial flutter, coronary artery disease status post  single-vessel CABG in 2019 (LIMA to LAD), hypertension, and DENYS on CPAP.    Patient was initially diagnosed with atrial fibrillation after CABG.  He did not have symptomatic recurrence until March 2024 at which time Zio patch monitor revealed persistent atrial fibrillation with controlled ventricular response and pauses up to 5.6 seconds in duration.  He underwent cardioversion in May 2024 which was successful, but reverted back to atrial fibrillation within a few minutes.    Mr. Espinoza underwent EP study with successful wide area PVI with LA posterior wall isolation by linear ablation and CTI ablation 9/20/2024 by Dr. cMgovern.  He presented in atrial fibrillation and will cardiac cardioversion x 2.  He was found to have severe left atrial enlargement with significant patchy scar (atrial myopathy).  Prior to his procedure, he was started on amiodarone.  At the time of his postprocedure follow-up, he was maintaining normal sinus rhythm and was feeling markedly improved in sinus rhythm.  His amiodarone was continued as was his Eliquis.    He presents to clinic today for EP follow-up.  Jorge has been feeling quite well from a cardiac perspective.  Denies chest pain, palpitations, lightheadedness, dizziness, near-syncope or syncope.  No shortness of breath, orthopnea or PND.  No subjective atrial fibrillation recurrence.      Takes all medications daily as prescribed.      Blood pressure 132/80 and heart rate 65 in clinic today.  ECG completed in clinic today reveals normal sinus rhythm, VR 67, , , QT/QTc 426/438 ms.    Works as a  and reports he is active at work.  Does not complete any routine exercise.        Social History       Social History     Socioeconomic History    Marital status:      Spouse name: Not on file    Number of children: Not on file    Years of education: Not on file    Highest education level: Not on file   Occupational History    Not on file   Tobacco Use     "Smoking status: Former     Current packs/day: 0.00     Average packs/day: 1 pack/day for 33.0 years (33.0 ttl pk-yrs)     Types: Cigarettes     Start date: 1980     Quit date: 2013     Years since quittin.9    Smokeless tobacco: Never   Vaping Use    Vaping status: Never Used   Substance and Sexual Activity    Alcohol use: Yes     Comment: weekends    Drug use: Yes     Comment: nica taylor    Sexual activity: Yes   Other Topics Concern    Parent/sibling w/ CABG, MI or angioplasty before 65F 55M? No   Social History Narrative    Not on file     Social Drivers of Health     Financial Resource Strain: Not on file   Food Insecurity: Not on file   Transportation Needs: Not on file   Physical Activity: Not on file   Stress: Not on file   Social Connections: Not on file   Interpersonal Safety: Low Risk  (2024)    Interpersonal Safety     Do you feel physically and emotionally safe where you currently live?: Yes     Within the past 12 months, have you been hit, slapped, kicked or otherwise physically hurt by someone?: No     Within the past 12 months, have you been humiliated or emotionally abused in other ways by your partner or ex-partner?: No   Housing Stability: Not on file            Review of Systems:   Please see HPI         Physical Exam:   Vitals: /80   Pulse 65   Ht 1.753 m (5' 9\")   Wt 103.8 kg (228 lb 14.4 oz)   SpO2 98%   BMI 33.80 kg/m     Wt Readings from Last 4 Encounters:   24 97.1 kg (214 lb)   24 104.6 kg (230 lb 8 oz)   24 103.5 kg (228 lb 3.2 oz)   24 104.8 kg (231 lb)     GEN: well nourished, in no acute distress.  HEENT:  Pupils equal, round. Sclerae nonicteric.   NECK: Supple, no masses appreciated. No JVD  C/V:  Regular rate and rhythm, no murmur, rub or gallop.    RESP: Respirations are unlabored. Clear to auscultation bilaterally without wheezing, rales, or rhonchi.  GI: Abdomen soft, nontender.  EXTREM: no LE edema.  NEURO: Alert and oriented, " cooperative.  SKIN: Warm and dry.        Data:   LIPID RESULTS:  Lab Results   Component Value Date    CHOL 77 04/17/2024    CHOL 158 05/19/2021    HDL 36 (L) 04/17/2024    HDL 43 05/19/2021    LDL 28 04/17/2024    LDL 82 05/19/2021    TRIG 65 04/17/2024    TRIG 163 (H) 05/19/2021    CHOLHDLRATIO 5.3 (H) 04/08/2015     LIVER ENZYME RESULTS:  Lab Results   Component Value Date    AST 21 04/17/2024    AST 21 08/01/2020    ALT 23 04/17/2024    ALT 42 08/01/2020     CBC RESULTS:  Lab Results   Component Value Date    WBC 7.7 09/20/2024    WBC 10.3 05/19/2021    RBC 5.59 09/20/2024    RBC 4.82 05/19/2021    HGB 17.9 (H) 09/20/2024    HGB 15.1 05/19/2021    HCT 52.6 09/20/2024    HCT 45.3 05/19/2021    MCV 94 09/20/2024    MCV 94 05/19/2021    MCH 32.0 09/20/2024    MCH 31.3 05/19/2021    MCHC 34.0 09/20/2024    MCHC 33.3 05/19/2021    RDW 13.9 09/20/2024    RDW 12.6 05/19/2021     09/20/2024     05/19/2021     BMP RESULTS:  Lab Results   Component Value Date     09/20/2024     05/19/2021    POTASSIUM 4.3 09/20/2024    POTASSIUM 4.2 08/16/2022    POTASSIUM 4.1 05/19/2021    CHLORIDE 106 09/20/2024    CHLORIDE 105 08/16/2022    CHLORIDE 108 05/19/2021    CO2 30 (H) 09/20/2024    CO2 26 08/16/2022    CO2 28 05/19/2021    ANIONGAP 9 09/20/2024    ANIONGAP 6 08/16/2022    ANIONGAP 7 05/19/2021     (H) 09/20/2024     (H) 08/16/2022     (H) 05/19/2021    BUN 24.5 (H) 09/20/2024    BUN 12 08/16/2022    BUN 13 05/19/2021    CR 1.10 09/20/2024    CR 0.70 05/19/2021    GFRESTIMATED 75 09/20/2024    GFRESTIMATED >90 05/19/2021    GFRESTBLACK >90 05/19/2021    KELLY 9.9 09/20/2024    KELLY 8.9 05/19/2021      A1C RESULTS:  Lab Results   Component Value Date    A1C 5.4 05/19/2021     INR RESULTS:  Lab Results   Component Value Date    INR 1.07 05/23/2024    INR 0.99 05/19/2021    INR 1.12 09/25/2019            Medications     Current Outpatient Medications   Medication Sig Dispense Refill     amiodarone (PACERONE) 200 MG tablet Take 1 tablet (200 mg) by mouth daily. 90 tablet 1    amLODIPine (NORVASC) 5 MG tablet Take 1 tablet (5 mg) by mouth daily 90 tablet 3    apixaban ANTICOAGULANT (ELIQUIS ANTICOAGULANT) 5 MG tablet Take 1 tablet (5 mg) by mouth 2 times daily 60 tablet 11    Cholecalciferol (VITAMIN D) 2000 UNITS tablet Take 2,000 Units by mouth daily      evolocumab (REPATHA) 140 MG/ML prefilled autoinjector Inject 1 mL (140 mg) Subcutaneous every 14 days 6 mL 3    hydroCHLOROthiazide 12.5 MG tablet Take 1 tablet (12.5 mg) by mouth daily 30 tablet 11    losartan (COZAAR) 100 MG tablet Take 1 tablet (100 mg) by mouth daily. 90 tablet 1    metoprolol succinate ER (TOPROL XL) 50 MG 24 hr tablet Take 1 tablet (50 mg) by mouth daily Appointment required for further refills 180 tablet 3    pantoprazole (PROTONIX) 40 MG EC tablet Take 1 tablet (40 mg) by mouth daily. 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    Olecranon bursitis 03/2001    right    Other and unspecified hyperlipidemia     Paroxysmal atrial fibrillation (H)     Paroxysmal atrial flutter (H)     RIGHT LUNG CALCIFIED GRANULOMA 02/2002    RML    Sleep apnea     Tobacco use disorder     Quit 1/03    Unspecified hemorrhoids without mention of complication 08/2001    Unspecified hereditary and idiopathic peripheral neuropathy      Past Surgical History:   Procedure Laterality Date    ANESTHESIA CARDIOVERSION N/A 5/23/2024    Procedure: Anesthesia  cardioversion;  Surgeon: GENERIC ANESTHESIA PROVIDER;  Location:  OR    BYPASS GRAFT ARTERY CORONARY N/A 9/25/2019    Procedure: CORONARY ARTERY BYPASS GRAFTING x 1 (LIMA - LAD) WITH CORONARY ENDARTERECTOMY  (ON PUMP OXYGENATOR ; HANK BY [facility]);  Surgeon: Magdi Turner MD;  Location:  OR    CV HEART CATHETERIZATION WITH POSSIBLE INTERVENTION N/A 9/6/2019    Procedure: Coronary Angiogram;  Surgeon: Nick Willson MD;  Location:  HEART CARDIAC CATH LAB    CV LEFT HEART CATH N/A 9/6/2019    Procedure: Left Heart Cath;  Surgeon: Nick Willson MD;  Location:  HEART CARDIAC CATH LAB    CV LEFT VENTRICULOGRAM N/A 9/6/2019    Procedure: Left Ventriculogram;  Surgeon: Nick Willson MD;  Location:  HEART CARDIAC CATH LAB    DAVINCI XI HERNIORRHAPHY INGUINAL Right 4/5/2024    Procedure: Robotic assisted right inguinal hernia repair with mesh;  Surgeon: You Lowe MD;  Location: [facility] OR    EP ABLATION FOCAL AFIB N/A 9/20/2024    Procedure: Ablation Atrial Fibrilation;  Surgeon: Martha Mcgovern MD;  Location:  HEART CARDIAC CATH LAB    ZZC NONSPECIFIC PROCEDURE  2/00/02    EBCT     Family History   Problem Relation Age of Onset    Arthritis Mother         RA    Hypertension Mother     Breast Cancer Mother     Prostate Cancer Maternal Grandfather         80    Cancer Maternal Grandmother         Lung    Coronary Artery Disease Father     Other - See Comments Brother     Diabetes Brother     Genetic Disorder Son             Allergies   Atorvastatin, Crestor [rosuvastatin], Lisinopril, and Simvastatin    The longitudinal plan of care for the diagnosis(es)/condition(s) as documented were addressed during this visit. Due to the added complexity in care, I will continue to support Jorge in the subsequent management and with ongoing continuity of care.     30 minutes spent on the date of the encounter doing chart review, history and exam, documentation and further activities as  noted above    Safia Crowley PA-C  [facility]  Pager: [phone]

## 2025-01-27 NOTE — LETTER
1/27/2025    Remington Riggs MD  [address] [facility] [address]    RE: Bryce Espinoza       Dear Colleague,     I had the pleasure of seeing Bryce Espinoza in the [facility].  EP Cardiology Clinic Progress Note  Bryce Espinoza MRN# [id number]   YOB: 1960 Age: 64 year old   Primary Cardiologist: Dr. Blake, Dr. Mcgovern (EP) Reason for visit: EP follow-up             Assessment and Plan:   Bryce Espinoza is a very pleasant 64 year old male who is here today for EP follow-up.      1.  Symptomatic persistent atrial fibrillation and typical atrial flutter status post successful wide area PVI with LA posterior wall isolation and CTI ablation 9/20/2024.  On Eliquis 5 mg twice daily for cardioembolic risk reduction in the setting of elevated [id number]-WOIj score of 2 (HTN, CAD).  Given significant atrial myopathy, patient is at high risk for atrial fibrillation recurrence.  Will transition from amiodarone to sotalol as described below.  2.  Hypertension, well-controlled  3.  Coronary artery disease status post single-vessel CABG with LIMA to LAD in 2019. Patient required a coronary endarterectomy in the proximal segment of the LAD for LIMA grafting.  No angina.  4.  DENYS, compliant with CPAP  5.  Hyperlipidemia complicated by statin intolerance, now on Repatha.    Changes today:   Discontinue amiodarone.    In 1 week, discontinue metoprolol and start sotalol 80 mg twice daily.  Patient has normal renal function. ECG completed in clinic today reveals normal sinus rhythm, VR 67, , , QT/QTc 426/438 ms.    ECG 3 days after sotalol initiation; scheduled for 2/6/2025.  At that time, anticipate increasing sotalol to 120 mg twice daily if acceptable QT/QTc and no significant bradycardia.  Repeat ECG 3 days after subsequent dose increase.     EP follow-up in 3 months, sooner if needed.    SUZETTE Robles [facility]  Pager: [phone]          History of  Presenting Illness:    Bryce Espinoza is a very pleasant 64 year old male with a history of symptomatic persistent atrial fibrillation and typical atrial flutter, coronary artery disease status post single-vessel CABG in 2019 (LIMA to LAD), hypertension, and DENYS on CPAP.    Patient was initially diagnosed with atrial fibrillation after CABG.  He did not have symptomatic recurrence until March 2024 at which time Zio patch monitor revealed persistent atrial fibrillation with controlled ventricular response and pauses up to 5.6 seconds in duration.  He underwent cardioversion in May 2024 which was successful, but reverted back to atrial fibrillation within a few minutes.    Mr. Espinoza underwent EP study with successful wide area PVI with LA posterior wall isolation by linear ablation and CTI ablation 9/20/2024 by Dr. Mcgovern.  He presented in atrial fibrillation and will cardiac cardioversion x 2.  He was found to have severe left atrial enlargement with significant patchy scar (atrial myopathy).  Prior to his procedure, he was started on amiodarone.  At the time of his postprocedure follow-up, he was maintaining normal sinus rhythm and was feeling markedly improved in sinus rhythm.  His amiodarone was continued as was his Eliquis.    He presents to clinic today for EP follow-up.  Jorge has been feeling quite well from a cardiac perspective.  Denies chest pain, palpitations, lightheadedness, dizziness, near-syncope or syncope.  No shortness of breath, orthopnea or PND.  No subjective atrial fibrillation recurrence.      Takes all medications daily as prescribed.      Blood pressure 132/80 and heart rate 65 in clinic today.  ECG completed in clinic today reveals normal sinus rhythm, VR 67, , , QT/QTc 426/438 ms.    Works as a  and reports he is active at work.  Does not complete any routine exercise.        Social History       Social History     Socioeconomic History     Marital status:  "     Spouse name: Not on file     Number of children: Not on file     Years of education: Not on file     Highest education level: Not on file   Occupational History     Not on file   Tobacco Use     Smoking status: Former     Current packs/day: 0.00     Average packs/day: 1 pack/day for 33.0 years (33.0 ttl pk-yrs)     Types: Cigarettes     Start date: 1980     Quit date: 2013     Years since quittin.9     Smokeless tobacco: Never   Vaping Use     Vaping status: Never Used   Substance and Sexual Activity     Alcohol use: Yes     Comment: weekends     Drug use: Yes     Comment: nica taylor     Sexual activity: Yes   Other Topics Concern     Parent/sibling w/ CABG, MI or angioplasty before 65F 55M? No   Social History Narrative     Not on file     Social Drivers of Health     Financial Resource Strain: Not on file   Food Insecurity: Not on file   Transportation Needs: Not on file   Physical Activity: Not on file   Stress: Not on file   Social Connections: Not on file   Interpersonal Safety: Low Risk  (2024)    Interpersonal Safety      Do you feel physically and emotionally safe where you currently live?: Yes      Within the past 12 months, have you been hit, slapped, kicked or otherwise physically hurt by someone?: No      Within the past 12 months, have you been humiliated or emotionally abused in other ways by your partner or ex-partner?: No   Housing Stability: Not on file            Review of Systems:   Please see HPI         Physical Exam:   Vitals: /80   Pulse 65   Ht 1.753 m (5' 9\")   Wt 103.8 kg (228 lb 14.4 oz)   SpO2 98%   BMI 33.80 kg/m     Wt Readings from Last 4 Encounters:   24 97.1 kg (214 lb)   24 104.6 kg (230 lb 8 oz)   24 103.5 kg (228 lb 3.2 oz)   24 104.8 kg (231 lb)     GEN: well nourished, in no acute distress.  HEENT:  Pupils equal, round. Sclerae nonicteric.   NECK: Supple, no masses appreciated. No JVD  C/V:  Regular rate and " rhythm, no murmur, rub or gallop.    RESP: Respirations are unlabored. Clear to auscultation bilaterally without wheezing, rales, or rhonchi.  GI: Abdomen soft, nontender.  EXTREM: no LE edema.  NEURO: Alert and oriented, cooperative.  SKIN: Warm and dry.        Data:   LIPID RESULTS:  Lab Results   Component Value Date    CHOL 77 04/17/2024    CHOL 158 05/19/2021    HDL 36 (L) 04/17/2024    HDL 43 05/19/2021    LDL 28 04/17/2024    LDL 82 05/19/2021    TRIG 65 04/17/2024    TRIG 163 (H) 05/19/2021    CHOLHDLRATIO 5.3 (H) 04/08/2015     LIVER ENZYME RESULTS:  Lab Results   Component Value Date    AST 21 04/17/2024    AST 21 08/01/2020    ALT 23 04/17/2024    ALT 42 08/01/2020     CBC RESULTS:  Lab Results   Component Value Date    WBC 7.7 09/20/2024    WBC 10.3 05/19/2021    RBC 5.59 09/20/2024    RBC 4.82 05/19/2021    HGB 17.9 (H) 09/20/2024    HGB 15.1 05/19/2021    HCT 52.6 09/20/2024    HCT 45.3 05/19/2021    MCV 94 09/20/2024    MCV 94 05/19/2021    MCH 32.0 09/20/2024    MCH 31.3 05/19/2021    MCHC 34.0 09/20/2024    MCHC 33.3 05/19/2021    RDW 13.9 09/20/2024    RDW 12.6 05/19/2021     09/20/2024     05/19/2021     BMP RESULTS:  Lab Results   Component Value Date     09/20/2024     05/19/2021    POTASSIUM 4.3 09/20/2024    POTASSIUM 4.2 08/16/2022    POTASSIUM 4.1 05/19/2021    CHLORIDE 106 09/20/2024    CHLORIDE 105 08/16/2022    CHLORIDE 108 05/19/2021    CO2 30 (H) 09/20/2024    CO2 26 08/16/2022    CO2 28 05/19/2021    ANIONGAP 9 09/20/2024    ANIONGAP 6 08/16/2022    ANIONGAP 7 05/19/2021     (H) 09/20/2024     (H) 08/16/2022     (H) 05/19/2021    BUN 24.5 (H) 09/20/2024    BUN 12 08/16/2022    BUN 13 05/19/2021    CR 1.10 09/20/2024    CR 0.70 05/19/2021    GFRESTIMATED 75 09/20/2024    GFRESTIMATED >90 05/19/2021    GFRESTBLACK >90 05/19/2021    KELLY 9.9 09/20/2024    KELLY 8.9 05/19/2021      A1C RESULTS:  Lab Results   Component Value Date    A1C 5.4  05/19/2021     INR RESULTS:  Lab Results   Component Value Date    INR 1.07 05/23/2024    INR 0.99 05/19/2021    INR 1.12 09/25/2019            Medications     Current Outpatient Medications   Medication Sig Dispense Refill     amiodarone (PACERONE) 200 MG tablet Take 1 tablet (200 mg) by mouth daily. 90 tablet 1     amLODIPine (NORVASC) 5 MG tablet Take 1 tablet (5 mg) by mouth daily 90 tablet 3     apixaban ANTICOAGULANT (ELIQUIS ANTICOAGULANT) 5 MG tablet Take 1 tablet (5 mg) by mouth 2 times daily 60 tablet 11     Cholecalciferol (VITAMIN D) 2000 UNITS tablet Take 2,000 Units by mouth daily       evolocumab (REPATHA) 140 MG/ML prefilled autoinjector Inject 1 mL (140 mg) Subcutaneous every 14 days 6 mL 3     hydroCHLOROthiazide 12.5 MG tablet Take 1 tablet (12.5 mg) by mouth daily 30 tablet 11     losartan (COZAAR) 100 MG tablet Take 1 tablet (100 mg) by mouth daily. 90 tablet 1     metoprolol succinate ER (TOPROL XL) 50 MG 24 hr tablet Take 1 tablet (50 mg) by mouth daily Appointment required for further refills 180 tablet 3     pantoprazole (PROTONIX) 40 MG EC tablet Take 1 tablet (40 mg) by mouth daily. 30 tablet 0          Past Medical History     Past Medical History:   Diagnosis Date     Antiplatelet or antithrombotic long-term use      Arrhythmia      Coronary artery disease involving native coronary artery of native heart without angina pectoris 07/04/2019    Per CT calcium score of 722.97 3/2019     Difficulty walking      Dyspnea on exertion      Essential hypertension, benign      Hard to intubate 9/20/2024     Hematuria      Irregular heart beat      Malignant neoplasm of bladder, 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     Olecranon bursitis 03/2001    right     Other and unspecified hyperlipidemia      Paroxysmal atrial fibrillation (H)      Paroxysmal atrial flutter (H)      RIGHT LUNG CALCIFIED GRANULOMA 02/2002    RML     Sleep apnea      Tobacco use disorder     Quit 1/03      Unspecified hemorrhoids without mention of complication 08/2001     Unspecified hereditary and idiopathic peripheral neuropathy      Past Surgical History:   Procedure Laterality Date     ANESTHESIA CARDIOVERSION N/A 5/23/2024    Procedure: Anesthesia cardioversion;  Surgeon: GENERIC ANESTHESIA PROVIDER;  Location:  OR     BYPASS GRAFT ARTERY CORONARY N/A 9/25/2019    Procedure: CORONARY ARTERY BYPASS GRAFTING x 1 (LIMA - LAD) WITH CORONARY ENDARTERECTOMY  (ON PUMP OXYGENATOR ; HANK BY [facility]);  Surgeon: Magdi Turner MD;  Location:  OR     CV HEART CATHETERIZATION WITH POSSIBLE INTERVENTION N/A 9/6/2019    Procedure: Coronary Angiogram;  Surgeon: Nick Willson MD;  Location:  HEART CARDIAC CATH LAB     CV LEFT HEART CATH N/A 9/6/2019    Procedure: Left Heart Cath;  Surgeon: Nick Willson MD;  Location:  HEART CARDIAC CATH LAB     CV LEFT VENTRICULOGRAM N/A 9/6/2019    Procedure: Left Ventriculogram;  Surgeon: Nick Willson MD;  Location:  HEART CARDIAC CATH LAB     DAVINCI XI HERNIORRHAPHY INGUINAL Right 4/5/2024    Procedure: Robotic assisted right inguinal hernia repair with mesh;  Surgeon: You Lowe MD;  Location: [facility] OR     EP ABLATION FOCAL AFIB N/A 9/20/2024    Procedure: Ablation Atrial Fibrilation;  Surgeon: Martha Mcgovern MD;  Location:  HEART CARDIAC CATH LAB     ZZC NONSPECIFIC PROCEDURE  2/00/02    EBCT     Family History   Problem Relation Age of Onset     Arthritis Mother         RA     Hypertension Mother      Breast Cancer Mother      Prostate Cancer Maternal Grandfather         80     Cancer Maternal Grandmother         Lung     Coronary Artery Disease Father      Other - See Comments Brother      Diabetes Brother      Genetic Disorder Son             Allergies   Atorvastatin, Crestor [rosuvastatin], Lisinopril, and Simvastatin    The longitudinal plan of care for the diagnosis(es)/condition(s) as documented were addressed during  this visit. Due to the added complexity in care, I will continue to support Jorge in the subsequent management and with ongoing continuity of care.     30 minutes spent on the date of the encounter doing chart review, history and exam, documentation and further activities as noted above    SUZETTE Robles [facility] - [facility]  Pager: [phone]      Thank you for allowing me to participate in the care of your patient.      Sincerely,     SUZETTE Robles [facility]  cc:   Martha Mcgovern MD  [address]  [place] [id number]

## 2025-01-27 NOTE — PATIENT INSTRUCTIONS
Call the nurse for any questions or concerns at [phone].    Plan:  1. Medication changes:     Stop amiodarone.    In 1 week, discontinue metoprolol and start sotalol 80 mg twice daily.    2.  ECG 3 days after sotalol initiation, on 2/6/2025. If your ECG looks good, we will likely increase your sotalol to 120 mg twice daily.     3.  Follow-up in 3 months, sooner if needed    It was great seeing you today!    Safia Crowley PA-C  Physician Assistant  [facility], [place]

## 2025-02-05 ENCOUNTER — OFFICE VISIT (OUTPATIENT)
Dept: INTERNAL MEDICINE | Facility: CLINIC | Age: 65
End: 2025-02-05
Payer: COMMERCIAL

## 2025-02-05 VITALS
OXYGEN SATURATION: 96 % | HEART RATE: 55 BPM | WEIGHT: 222.3 LBS | TEMPERATURE: 97 F | BODY MASS INDEX: 32.92 KG/M2 | SYSTOLIC BLOOD PRESSURE: 122 MMHG | RESPIRATION RATE: 17 BRPM | DIASTOLIC BLOOD PRESSURE: 80 MMHG | HEIGHT: 69 IN

## 2025-02-05 DIAGNOSIS — C67.9 MALIGNANT NEOPLASM OF URINARY BLADDER, UNSPECIFIED SITE (H): ICD-10-CM

## 2025-02-05 DIAGNOSIS — I50.9 CONGESTIVE HEART FAILURE, UNSPECIFIED HF CHRONICITY, UNSPECIFIED HEART FAILURE TYPE (H): ICD-10-CM

## 2025-02-05 DIAGNOSIS — R10.31 RIGHT GROIN PAIN: Primary | ICD-10-CM

## 2025-02-05 DIAGNOSIS — J42 CHRONIC BRONCHITIS, UNSPECIFIED CHRONIC BRONCHITIS TYPE (H): ICD-10-CM

## 2025-02-05 PROBLEM — D12.5 BENIGN NEOPLASM OF SIGMOID COLON: Status: ACTIVE | Noted: 2021-04-06

## 2025-02-05 PROBLEM — K57.30 DIVERTICULAR DISEASE OF LARGE INTESTINE: Status: ACTIVE | Noted: 2021-04-02

## 2025-02-05 PROBLEM — D12.2 BENIGN NEOPLASM OF ASCENDING COLON: Status: ACTIVE | Noted: 2021-04-06

## 2025-02-05 PROBLEM — I10 HYPERTENSIVE DISORDER: Status: ACTIVE | Noted: 2020-01-12

## 2025-02-05 PROBLEM — N39.0 URINARY TRACT INFECTIOUS DISEASE: Status: ACTIVE | Noted: 2024-08-03

## 2025-02-05 PROBLEM — K64.9 HEMORRHOIDS: Status: ACTIVE | Noted: 2021-04-02

## 2025-02-05 PROBLEM — K63.5 POLYP OF COLON: Status: ACTIVE | Noted: 2021-04-02

## 2025-02-05 PROBLEM — D12.3 BENIGN NEOPLASM OF TRANSVERSE COLON: Status: ACTIVE | Noted: 2021-04-06

## 2025-02-05 PROCEDURE — 99213 OFFICE O/P EST LOW 20 MIN: CPT | Performed by: INTERNAL MEDICINE

## 2025-02-05 RX ORDER — PANTOPRAZOLE SODIUM 40 MG/1
40 TABLET, DELAYED RELEASE ORAL DAILY
COMMUNITY

## 2025-02-05 ASSESSMENT — PAIN SCALES - GENERAL: PAINLEVEL_OUTOF10: NO PAIN (0)

## 2025-02-05 NOTE — PROGRESS NOTES
"  Assessment & Plan     (R10.31) Right groin pain  (primary encounter diagnosis)  Comment: no evidence for hernia recurrance which was his main concern  Reviewed hernias and hernia reapirs.   Answered his question.   If conerns continue, then return to see surgery  Plan:     (I50.9) Congestive heart failure, unspecified HF chronicity, unspecified heart failure type (H)  Comment: This condition is currently controlled on the current medical regimen.  Continue current therapy.   Plan:     (J42) Chronic bronchitis, unspecified chronic bronchitis type (H)  Comment:   Plan:     (C67.9) Malignant neoplasm of urinary bladder, unspecified site (H)  Comment: Known issue that I take into account for their medical decisions, managed by specialist.    Continue current therapy as directed by their specialist(s).   Plan:              BMI  Estimated body mass index is 32.83 kg/m  as calculated from the following:    Height as of this encounter: 1.753 m (5' 9\").    Weight as of this encounter: 100.8 kg (222 lb 4.8 oz).             Lauren Patel is a 64 year old, presenting for the following health issues:  RECHECK (Recheck post hernia surgery)        2/5/2025     8:49 AM   Additional Questions   Roomed by Juany LAMAR CMA     History of Present Illness       Reason for visit:  Post hernia symptoms    He eats 2-3 servings of fruits and vegetables daily.He consumes 1 sweetened beverage(s) daily.He exercises with enough effort to increase his heart rate 10 to 19 minutes per day.  He exercises with enough effort to increase his heart rate 5 days per week.   He is taking medications regularly.     New allergy: Product Containing 3-Hydroxy-3-Methylglutaryl-Coenzyme A Reductase Inhibitor (Product)     2.)  CHF:  followed by cardiology, amber chamberlain denies active HF sx.     3.)  Has history of atrial fibrillation.    No recent reports of significant palpitations, dizziness, presyncope, dyspnea on exertion, or difficulties with " "exertion.  .   Taking medications as ordered, no side effects reported.   Patient is taking anticoagulation according to direction, with no reported side effects.     4.)  history of bladder cancer, followed by Urology with regular cystoscopies.   Reviewed last note    **I reviewed the information recorded in the patient's EPIC chart (including but not limited to medical history, surgical history, family history, problem list, medication list, and allergy list) and updated the information as indicated based on the patients reported information.       Review of Systems  Constitutional, HEENT, cardiovascular, pulmonary, gi and gu systems are negative, except as otherwise noted.      Objective    /80   Pulse 55   Temp 97  F (36.1  C) (Temporal)   Resp 17   Ht 1.753 m (5' 9\")   Wt 100.8 kg (222 lb 4.8 oz)   SpO2 96%   BMI 32.83 kg/m    Body mass index is 32.83 kg/m .  Physical Exam   GENERAL alert and no distress  EYES:  Normal sclera,conjunctiva, EOMI  HENT: facies symmetric  MS: extremities- no gross deformities of the visible extremities noted,   PSYCH: Alert and oriented times 3; speech- coherent  SKIN:  No obvious significant skin lesions on visible portions of face   NEURO:  Normal facial movements.  Appears to move normally  GROIN:  healed port scars from prior hernia repair in 2024.   No obvious inguinal hernia on either side, no obvious masses felt in inguinal ring with valsalva maneiuver            Signed Electronically by: Darrin Clement MD    "

## 2025-02-05 NOTE — PATIENT INSTRUCTIONS
I do not feel any hernia in the location of the prior hernia repair.      Continue all medications at the same doses.  Contact your usual pharmacy if you need refills.      Follow up with all of the specialists.       Return to see Dr. Riggs for an annual physical in 3-4 months.        I strongly recommend that you receive the most current COVID vaccination as the best possible way to avoid COVID infections.  You can get this from any pharmacy or [facility] (schedule on MyChart or phone)     I strongly recommend that you receive the most current influenza vaccination as the best possible way to avoid influenza infections.  The influenza vaccine changes each year based on estimations of the different strains of influenza projected to arrive each year, and an updated influenza vaccine is available each fall.  You can get this from any pharmacy or [facility] (schedule on MyChart or phone)        Shingles Vaccine (SHINGRIX):      I would recommend that you consider getting the Shingrix shingles vaccine.  The shingles vaccine is recommended for anyone over age 50.     The Shingrix vaccine is a series of 2 vaccines given 2-6 months apart.     The shingles vaccine does not stop you from getting shingles, but it decreases the intensity of the event, the duration of the event, and decreases the painful nerve condition that results     Based on the available data, the Shingrix vaccine has superior benefit and should be considered even if you have had the old Zostavax shinglesvaccine before.      Contact your insurance provider and ask them if either shingles vaccine is covered and is so, how much it will cost you.  Usually this will be cheaper to get in a pharmacy given by the pharmacist.    Regardless of the coverage, I would recommend that you consider the vaccine since shingles is very painful, (just ask anyone who has ever had it)    For Medicare insurance, the vaccine is cheaper to receive  from a pharmacist in a pharmacy than for us to give you in the clinic.

## 2025-02-06 ENCOUNTER — TELEPHONE (OUTPATIENT)
Dept: CARDIOLOGY | Facility: CLINIC | Age: 65
End: 2025-02-06

## 2025-02-06 DIAGNOSIS — I48.0 PAROXYSMAL ATRIAL FIBRILLATION (H): ICD-10-CM

## 2025-02-06 NOTE — TELEPHONE ENCOUNTER
EKG completed post Sotalol start.  Reviewed by Safia Crowley and pt is to stay on the dose of 80 mg bid, no increasing, d/t HR of 52 bpm.  . Wife had many questions that this writer answered to the best of my ability. This writer has asked pt to monitor his HR to see that it is not going below 50 and staying in the 40's. If it does pt is to call to make Safia aware. Pt and wife state understanding, no further questions at this time. Jagruti

## 2025-02-21 ENCOUNTER — TELEPHONE (OUTPATIENT)
Dept: CARDIOLOGY | Facility: CLINIC | Age: 65
End: 2025-02-21
Payer: COMMERCIAL

## 2025-02-21 DIAGNOSIS — I48.0 PAROXYSMAL ATRIAL FIBRILLATION (H): Primary | ICD-10-CM

## 2025-02-21 NOTE — TELEPHONE ENCOUNTER
Patient called inquiring if he needed any lab work completed being on sotalol.  Reviewed last OV with MARE Robles on 1/27/2025 and reviewed plan.  No labs needed at this time.  Will send a message to the provider to be assured of this.  FELICIANO Juarez

## 2025-02-27 DIAGNOSIS — I48.91 ATRIAL FIBRILLATION, UNSPECIFIED TYPE (H): ICD-10-CM

## 2025-02-27 RX ORDER — APIXABAN 5 MG/1
5 TABLET, FILM COATED ORAL 2 TIMES DAILY
Qty: 180 TABLET | Refills: 3 | Status: SHIPPED | OUTPATIENT
Start: 2025-02-27

## 2025-02-27 NOTE — TELEPHONE ENCOUNTER
Per MARE Robles:  We could get an updated BMP at the time of his next visit. Nothing urgent.    Orders placed in epic.  Sent my chart to patient letting him know.  FELICIANO Juarez

## 2025-03-05 DIAGNOSIS — E78.5 HYPERLIPIDEMIA LDL GOAL <70: ICD-10-CM

## 2025-03-05 DIAGNOSIS — Z95.1 S/P CABG (CORONARY ARTERY BYPASS GRAFT): ICD-10-CM

## 2025-03-05 DIAGNOSIS — I10 ESSENTIAL HYPERTENSION, BENIGN: ICD-10-CM

## 2025-03-26 ASSESSMENT — SLEEP AND FATIGUE QUESTIONNAIRES
HOW LIKELY ARE YOU TO NOD OFF OR FALL ASLEEP WHEN YOU ARE A PASSENGER IN A CAR FOR AN HOUR WITHOUT A BREAK: WOULD NEVER DOZE
HOW LIKELY ARE YOU TO NOD OFF OR FALL ASLEEP WHILE SITTING QUIETLY AFTER LUNCH WITHOUT ALCOHOL: WOULD NEVER DOZE
HOW LIKELY ARE YOU TO NOD OFF OR FALL ASLEEP IN A CAR, WHILE STOPPED FOR A FEW MINUTES IN TRAFFIC: WOULD NEVER DOZE
HOW LIKELY ARE YOU TO NOD OFF OR FALL ASLEEP WHILE SITTING AND TALKING TO SOMEONE: WOULD NEVER DOZE
HOW LIKELY ARE YOU TO NOD OFF OR FALL ASLEEP WHILE SITTING AND READING: WOULD NEVER DOZE
HOW LIKELY ARE YOU TO NOD OFF OR FALL ASLEEP WHILE SITTING INACTIVE IN A PUBLIC PLACE: WOULD NEVER DOZE
HOW LIKELY ARE YOU TO NOD OFF OR FALL ASLEEP WHILE WATCHING TV: SLIGHT CHANCE OF DOZING
HOW LIKELY ARE YOU TO NOD OFF OR FALL ASLEEP WHILE LYING DOWN TO REST IN THE AFTERNOON WHEN CIRCUMSTANCES PERMIT: WOULD NEVER DOZE

## 2025-03-28 ENCOUNTER — OFFICE VISIT (OUTPATIENT)
Dept: INTERNAL MEDICINE | Facility: CLINIC | Age: 65
End: 2025-03-28
Payer: COMMERCIAL

## 2025-03-28 VITALS
SYSTOLIC BLOOD PRESSURE: 108 MMHG | OXYGEN SATURATION: 96 % | HEIGHT: 69 IN | HEART RATE: 69 BPM | WEIGHT: 223.3 LBS | RESPIRATION RATE: 16 BRPM | BODY MASS INDEX: 33.07 KG/M2 | DIASTOLIC BLOOD PRESSURE: 76 MMHG

## 2025-03-28 DIAGNOSIS — I10 ESSENTIAL HYPERTENSION, BENIGN: ICD-10-CM

## 2025-03-28 DIAGNOSIS — I25.10 CORONARY ARTERY DISEASE INVOLVING NATIVE CORONARY ARTERY OF NATIVE HEART WITHOUT ANGINA PECTORIS: ICD-10-CM

## 2025-03-28 DIAGNOSIS — M19.90 ARTHRITIS: ICD-10-CM

## 2025-03-28 DIAGNOSIS — E78.5 HYPERLIPIDEMIA LDL GOAL <70: ICD-10-CM

## 2025-03-28 DIAGNOSIS — I10 HYPERTENSION, UNSPECIFIED TYPE: ICD-10-CM

## 2025-03-28 DIAGNOSIS — K64.9 HEMORRHOIDS, UNSPECIFIED HEMORRHOID TYPE: ICD-10-CM

## 2025-03-28 DIAGNOSIS — Z86.79: ICD-10-CM

## 2025-03-28 DIAGNOSIS — G47.33 OSA (OBSTRUCTIVE SLEEP APNEA): ICD-10-CM

## 2025-03-28 DIAGNOSIS — Z01.818 PREOPERATIVE EXAMINATION: ICD-10-CM

## 2025-03-28 DIAGNOSIS — E78.5 HYPERLIPIDEMIA LDL GOAL <70: Primary | ICD-10-CM

## 2025-03-28 DIAGNOSIS — I48.0 PAROXYSMAL ATRIAL FIBRILLATION (H): ICD-10-CM

## 2025-03-28 LAB
ALBUMIN SERPL BCG-MCNC: 4.5 G/DL (ref 3.5–5.2)
ALP SERPL-CCNC: 102 U/L (ref 40–150)
ALT SERPL W P-5'-P-CCNC: 29 U/L (ref 0–70)
ANION GAP SERPL CALCULATED.3IONS-SCNC: 11 MMOL/L (ref 7–15)
AST SERPL W P-5'-P-CCNC: 25 U/L (ref 0–45)
BILIRUB SERPL-MCNC: 0.5 MG/DL
BUN SERPL-MCNC: 28.8 MG/DL (ref 8–23)
CALCIUM SERPL-MCNC: 10.2 MG/DL (ref 8.8–10.4)
CHLORIDE SERPL-SCNC: 103 MMOL/L (ref 98–107)
CREAT SERPL-MCNC: 1.21 MG/DL (ref 0.67–1.17)
CRP SERPL-MCNC: <3 MG/L
EGFRCR SERPLBLD CKD-EPI 2021: 67 ML/MIN/1.73M2
ERYTHROCYTE [DISTWIDTH] IN BLOOD BY AUTOMATED COUNT: 12.3 % (ref 10–15)
ERYTHROCYTE [SEDIMENTATION RATE] IN BLOOD BY WESTERGREN METHOD: 9 MM/HR (ref 0–20)
GLUCOSE SERPL-MCNC: 98 MG/DL (ref 70–99)
HCO3 SERPL-SCNC: 28 MMOL/L (ref 22–29)
HCT VFR BLD AUTO: 46.2 % (ref 40–53)
HGB BLD-MCNC: 16.2 G/DL (ref 13.3–17.7)
MCH RBC QN AUTO: 33.2 PG (ref 26.5–33)
MCHC RBC AUTO-ENTMCNC: 35.1 G/DL (ref 31.5–36.5)
MCV RBC AUTO: 95 FL (ref 78–100)
PLATELET # BLD AUTO: 218 10E3/UL (ref 150–450)
POTASSIUM SERPL-SCNC: 4.6 MMOL/L (ref 3.4–5.3)
PROT SERPL-MCNC: 6.8 G/DL (ref 6.4–8.3)
RBC # BLD AUTO: 4.88 10E6/UL (ref 4.4–5.9)
RHEUMATOID FACT SERPL-ACNC: <10 IU/ML
SODIUM SERPL-SCNC: 142 MMOL/L (ref 135–145)
URATE SERPL-MCNC: 6.6 MG/DL (ref 3.4–7)
WBC # BLD AUTO: 11.3 10E3/UL (ref 4–11)

## 2025-03-28 PROCEDURE — 84550 ASSAY OF BLOOD/URIC ACID: CPT | Performed by: INTERNAL MEDICINE

## 2025-03-28 PROCEDURE — 85652 RBC SED RATE AUTOMATED: CPT | Performed by: INTERNAL MEDICINE

## 2025-03-28 PROCEDURE — 99214 OFFICE O/P EST MOD 30 MIN: CPT | Performed by: INTERNAL MEDICINE

## 2025-03-28 PROCEDURE — 85027 COMPLETE CBC AUTOMATED: CPT | Performed by: INTERNAL MEDICINE

## 2025-03-28 PROCEDURE — 86140 C-REACTIVE PROTEIN: CPT | Performed by: INTERNAL MEDICINE

## 2025-03-28 PROCEDURE — 86431 RHEUMATOID FACTOR QUANT: CPT | Performed by: INTERNAL MEDICINE

## 2025-03-28 PROCEDURE — 3078F DIAST BP <80 MM HG: CPT | Performed by: INTERNAL MEDICINE

## 2025-03-28 PROCEDURE — 3074F SYST BP LT 130 MM HG: CPT | Performed by: INTERNAL MEDICINE

## 2025-03-28 PROCEDURE — 36415 COLL VENOUS BLD VENIPUNCTURE: CPT | Performed by: INTERNAL MEDICINE

## 2025-03-28 PROCEDURE — 80053 COMPREHEN METABOLIC PANEL: CPT | Performed by: INTERNAL MEDICINE

## 2025-03-28 NOTE — PROGRESS NOTES
Preoperative Evaluation  [address] [address]  Phone: [phone]  Primary Provider: Remington Riggs MD  Pre-op Performing Provider: Remington Riggs MD  Mar 28, 2025   {Provider  Link to PREOP SmartSet  REQUIRED to apply standard patient instructions and medication directions to the AVS :596759}  {ROOMER review and update patient entered surgical information if needed :228603}        3/23/2025   Surgical Information   What procedure is being done? Hemorrhoid removal   Facility or Hospital where procedure/surgery will be performed: [facility]   Who is doing the procedure / surgery? Girish Valdez M.D.   Date of surgery / procedure: April 10 2025   Time of surgery / procedure: 12:15 PM   Where do you plan to recover after surgery? at home with family     Fax number for surgical facility:  [phone]    {Provider Charting Preference for Preop :924452}    Lauren Patel is a 64 year old, presenting for the following:  Pre-Op Exam        HPI: ***          3/23/2025   Pre-Op Questionnaire   Have you ever had a heart attack or stroke? No   Have you ever had surgery on your heart or blood vessels, such as a stent placement, a coronary artery bypass, or surgery on an artery in your head, neck, heart, or legs? (!) YES ***   Do you have chest pain with activity? No   Do you have a history of heart failure? No   Do you currently have a cold, bronchitis or symptoms of other infection? No   Do you have a cough, shortness of breath, or wheezing? No   Do you or anyone in your family have previous history of blood clots? No   Do you or does anyone in your family have a serious bleeding problem such as prolonged bleeding following surgeries or cuts? No   Have you ever had problems with anemia or been told to take iron pills? No   Have you had any abnormal blood loss such as black, tarry or bloody stools? No   Have you ever had a blood  transfusion? No   Are you willing to have a blood transfusion if it is medically needed before, during, or after your surgery? Yes   Have you or any of your relatives ever had problems with anesthesia? (!) YES ***   Do you have sleep apnea, excessive snoring or daytime drowsiness? (!) YES   Do you have a CPAP machine? Yes   Do you have any artifical heart valves or other implanted medical devices like a pacemaker, defibrillator, or continuous glucose monitor? No   Do you have artificial joints? No   Are you allergic to latex? No     Health Care Directive  Patient does not have a Health Care Directive: {ADVANCE_DIRECTIVE_STATUS:360947}    Preoperative Review of   {Mnpmpreport:663570}  {Review MNPMP for all patients per [facility] MNPMP Profile:696718}    {Chronic problem details (Optional) :147401}    Patient Active Problem List    Diagnosis Date Noted    Congestive heart failure, unspecified HF chronicity, unspecified heart failure type (H) 02/05/2025     Priority: Medium    Chronic bronchitis, unspecified chronic bronchitis type (H) 02/05/2025     Priority: Medium    Hard to intubate 09/20/2024     Priority: Medium    Urinary tract infectious disease 08/03/2024     Priority: Medium    DENYS (obstructive sleep apnea) 07/16/2021     Priority: Medium     Severe DENYS      Benign neoplasm of ascending colon 04/06/2021     Priority: Medium    Benign neoplasm of sigmoid colon 04/06/2021     Priority: Medium    Benign neoplasm of transverse colon 04/06/2021     Priority: Medium    Diverticular disease of large intestine 04/02/2021     Priority: Medium    Hemorrhoids 04/02/2021     Priority: Medium    Polyp of colon 04/02/2021     Priority: Medium    Hypertensive disorder 01/12/2020     Priority: Medium     I10 : Essential (primary) hypertension      Paroxysmal atrial fibrillation (H)      Priority: Medium    S/P CABG (coronary artery bypass graft) 10/12/2019     Priority: Medium    Coronary artery disease involving native  coronary artery of native heart without angina pectoris 07/04/2019     Priority: Medium     Per CT calcium score of 722.97 3/2019      Hyperlipidemia LDL goal <70 04/04/2019     Priority: Medium    Other specified idiopathic peripheral neuropathy 02/17/2004     Priority: Medium    Malignant neoplasm of bladder (H) 01/07/2003     Priority: Medium     Problem list name updated by automated process. Provider to review      Essential hypertension, benign 11/01/2002     Priority: Medium      Past Medical History:   Diagnosis Date    Antiplatelet or antithrombotic long-term use     Arrhythmia     Coronary artery disease involving native coronary artery of native heart without angina pectoris 07/04/2019    Per CT calcium score of 722.97 3/2019    Difficulty walking     Dyspnea on exertion     Essential hypertension, benign     Hard to intubate 9/20/2024    Hematuria     Irregular heart beat     Malignant neoplasm of bladder, 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    Olecranon bursitis 03/2001    right    Other and unspecified hyperlipidemia     Paroxysmal atrial fibrillation (H)     Paroxysmal atrial flutter (H)     RIGHT LUNG CALCIFIED GRANULOMA 02/2002    RML    Sleep apnea     Tobacco use disorder     Quit 1/03    Unspecified hemorrhoids without mention of complication 08/2001    Unspecified hereditary and idiopathic peripheral neuropathy      Past Surgical History:   Procedure Laterality Date    ANESTHESIA CARDIOVERSION N/A 05/23/2024    Procedure: Anesthesia cardioversion;  Surgeon: GENERIC ANESTHESIA PROVIDER;  Location:  OR    BYPASS GRAFT ARTERY CORONARY N/A 09/25/2019    Procedure: CORONARY ARTERY BYPASS GRAFTING x 1 (LIMA - LAD) WITH CORONARY ENDARTERECTOMY  (ON PUMP OXYGENATOR ; HANK BY BETSY);  Surgeon: Magdi Turner MD;  Location:  OR    COLONOSCOPY      CV HEART CATHETERIZATION WITH POSSIBLE INTERVENTION N/A 09/06/2019    Procedure: Coronary Angiogram;  Surgeon: Anamika  Nick EDMONDS MD;  Location:  HEART CARDIAC CATH LAB    CV LEFT HEART CATH N/A 09/06/2019    Procedure: Left Heart Cath;  Surgeon: Nick Willson MD;  Location:  HEART CARDIAC CATH LAB    CV LEFT VENTRICULOGRAM N/A 09/06/2019    Procedure: Left Ventriculogram;  Surgeon: Nick Willson MD;  Location:  HEART CARDIAC CATH LAB    DAVINCI XI HERNIORRHAPHY INGUINAL Right 04/05/2024    Procedure: Robotic assisted right inguinal hernia repair with mesh;  Surgeon: You Lowe MD;  Location: [facility] OR    EP ABLATION FOCAL AFIB N/A 09/20/2024    Procedure: Ablation Atrial Fibrilation;  Surgeon: Martha Mcgovern MD;  Location:  HEART CARDIAC CATH LAB    ZZC NONSPECIFIC PROCEDURE  2/00/02    EBCT     Current Outpatient Medications   Medication Sig Dispense Refill    amLODIPine (NORVASC) 5 MG tablet Take 1 tablet (5 mg) by mouth daily 90 tablet 3    apixaban ANTICOAGULANT (ELIQUIS ANTICOAGULANT) 5 MG tablet TAKE 1 TABLET(5 MG) BY MOUTH TWICE DAILY 180 tablet 3    Cholecalciferol (VITAMIN D) 2000 UNITS tablet Take 2,000 Units by mouth daily      evolocumab (REPATHA) 140 MG/ML prefilled autoinjector Inject 1 mL (140 mg) subcutaneously every 14 days. 6 mL 3    hydroCHLOROthiazide 12.5 MG tablet Take 1 tablet (12.5 mg) by mouth daily 30 tablet 11    losartan (COZAAR) 100 MG tablet Take 1 tablet (100 mg) by mouth daily. 90 tablet 1    sotalol (BETAPACE) 80 MG tablet TAKE 1 TABLET(80 MG) BY MOUTH TWICE DAILY 180 tablet 3    pantoprazole (PROTONIX) 40 MG EC tablet 40 mg daily. (Patient not taking: Reported on 2/5/2025)         Allergies   Allergen Reactions    Atorvastatin      Myalgias    Crestor [Rosuvastatin]      Myalgias      Lisinopril      Body aches pt d/mylene  06/2015    Other Drug Allergy (See Comments)     Simvastatin      Myalgias        Social History     Tobacco Use    Smoking status: Former     Current packs/day: 0.00     Average packs/day: 1 pack/day for 33.0 years (33.0 ttl pk-yrs)      "Types: Cigarettes     Start date: 1980     Quit date: 2013     Years since quittin.1    Smokeless tobacco: Never   Substance Use Topics    Alcohol use: Yes     Comment: weekends     {FAMILY HISTORY (Optional):310457317}  History   Drug Use    Types: Marijuana     Comment: nica occ           {ROS Picklists (Optional):247605}    Objective    /76   Pulse 69   Resp 16   Ht 1.753 m (5' 9\")   Wt 101.3 kg (223 lb 4.8 oz)   SpO2 96%   BMI 32.98 kg/m     Estimated body mass index is 32.98 kg/m  as calculated from the following:    Height as of this encounter: 1.753 m (5' 9\").    Weight as of this encounter: 101.3 kg (223 lb 4.8 oz).  Physical Exam  {Exam List :000718}    Recent Labs   Lab Test 24  0719 24  0925 24  0903 24  1337 24  1912   HGB 17.9*  --   --   --  14.0     --   --   --  218   INR  --   --  1.07  --   --     143  --    < > 144   POTASSIUM 4.3 4.8 3.8   < > 4.0   CR 1.10 0.86  --    < > 0.81    < > = values in this interval not displayed.        Diagnostics  {LABS:303837}   {EK}    Revised Cardiac Risk Index (RCRI)  The patient has the following serious cardiovascular risks for perioperative complications:  {PREOP REVISED CARDIAC RISK INDEX (RCRI) :313601}     RCRI Interpretation: {REVISED CARDIAC RISK INTERPRETATION :478179}         Signed Electronically by: Remington Riggs MD  A copy of this evaluation report is provided to the requesting physician.    {Provider Resources  Preop [facility] Preop Guidelines  Revised Cardiac Risk Index :717186}   {Email feedback regarding this note to [email or web address]   :787729}  " Left Heart Cath;  Surgeon: Nick Willson MD;  Location:  HEART CARDIAC CATH LAB    CV LEFT VENTRICULOGRAM N/A 2019    Procedure: Left Ventriculogram;  Surgeon: Nick Willson MD;  Location:  HEART CARDIAC CATH LAB    DAVINCI XI HERNIORRHAPHY INGUINAL Right 2024    Procedure: Robotic assisted right inguinal hernia repair with mesh;  Surgeon: You Lowe MD;  Location: [facility] OR    EP ABLATION FOCAL AFIB N/A 2024    Procedure: Ablation Atrial Fibrilation;  Surgeon: Martha Mcgovern MD;  Location:  HEART CARDIAC CATH LAB    ZZC NONSPECIFIC PROCEDURE      EBCT     Current Outpatient Medications   Medication Sig Dispense Refill    amLODIPine (NORVASC) 5 MG tablet Take 1 tablet (5 mg) by mouth daily 90 tablet 3    apixaban ANTICOAGULANT (ELIQUIS ANTICOAGULANT) 5 MG tablet TAKE 1 TABLET(5 MG) BY MOUTH TWICE DAILY 180 tablet 3    Cholecalciferol (VITAMIN D) 2000 UNITS tablet Take 2,000 Units by mouth daily      evolocumab (REPATHA) 140 MG/ML prefilled autoinjector Inject 1 mL (140 mg) subcutaneously every 14 days. 6 mL 3    hydroCHLOROthiazide 12.5 MG tablet Take 1 tablet (12.5 mg) by mouth daily 30 tablet 11    losartan (COZAAR) 100 MG tablet Take 1 tablet (100 mg) by mouth daily. 90 tablet 1    sotalol (BETAPACE) 80 MG tablet TAKE 1 TABLET(80 MG) BY MOUTH TWICE DAILY 180 tablet 3       Allergies   Allergen Reactions    Atorvastatin      Myalgias    Crestor [Rosuvastatin]      Myalgias      Lisinopril      Body aches pt d/mylene  2015    Other Drug Allergy (See Comments)     Simvastatin      Myalgias        Social History     Tobacco Use    Smoking status: Former     Current packs/day: 0.00     Average packs/day: 1 pack/day for 33.0 years (33.0 ttl pk-yrs)     Types: Cigarettes     Start date: 1980     Quit date: 2013     Years since quittin.1    Smokeless tobacco: Never   Substance Use Topics    Alcohol use: Yes     Comment: weekends     Family  "History   Problem Relation Age of Onset    Arthritis Mother         RA    Hypertension Mother     Breast Cancer Mother     Coronary Artery Disease Mother     Prostate Cancer Maternal Grandfather         80    Cancer Maternal Grandmother         Lung    Coronary Artery Disease Father     Other - See Comments Brother     Diabetes Brother     Genetic Disorder Son     Diabetes Brother      History   Drug Use    Types: Marijuana     Comment: [email or web address]             Review of Systems  CONSTITUTIONAL: NEGATIVE for fever, chills.  Weight down 2 pounds overall from 1 year ago  INTEGUMENTARY/SKIN: NEGATIVE for worrisome rashes, moles or lesions  EYES: NEGATIVE for vision changes or irritation  ENT/MOUTH: NEGATIVE for ear, mouth and throat problems  RESP: NEGATIVE for significant cough or SOB  CV: NEGATIVE for chest pain, recent palpitations or peripheral edema  GI: NEGATIVE for nausea, abdominal pain, heartburn, or change in bowel habits. Hx I/E hemorrhoids. Some bleeding in past but no blood on TP rcently  : NEGATIVE for frequency, dysuria, or hematuria.  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  Last cystoscopy unremarkable October 2024.  Has annual follow-up with urology   MUSCULOSKELETAL: NEGATIVE for  myalgia. Intermittent pain in joints. No localized swelling or redness  NEURO: NEGATIVE for weakness, dizziness or paresthesias  ENDOCRINE: NEGATIVE for temperature intolerance, skin/hair changes  HEME: NEGATIVE for bleeding problems  PSYCHIATRIC: NEGATIVE for changes in mood or affect    Objective    /76   Pulse 69   Resp 16   Ht 1.753 m (5' 9\")   Wt 101.3 kg (223 lb 4.8 oz)   SpO2 96%   BMI 32.98 kg/m     Estimated body mass index is 32.98 kg/m  as calculated from the following:    Height as of this encounter: 1.753 m (5' 9\").    Weight as of this encounter: 101.3 kg (223 lb 4.8 oz).  Physical Exam  Eye: PERRL, EOMI  HENT: ear canals and TM's normal and nose and mouth without ulcers or " lesions   Neck: no adenopathy. Thyroid normal to palpation. No bruits  Pulm: Lungs clear to auscultation   CV: Regular rates and rhythm  GI: Soft, nontender, Normal active bowel sounds, No hepatosplenomegaly or masses palpable  Ext: Peripheral pulses intact. No edema.  No synovial thickening/increased warmth/erythema  Neuro: Normal strength and tone, sensory exam grossly normal  Rectal - deferred    Recent Labs   Lab Test 09/20/24  0719 07/05/24  0925 05/23/24  0903 04/17/24  1337 04/07/24  1912   HGB 17.9*  --   --   --  14.0     --   --   --  218   INR  --   --  1.07  --   --     143  --    < > 144   POTASSIUM 4.3 4.8 3.8   < > 4.0   CR 1.10 0.86  --    < > 0.81    < > = values in this interval not displayed.        Diagnostics  Component      Latest Ref Rn 3/28/2025  3:24 PM   Sodium      135 - 145 mmol/L 142    Potassium      3.4 - 5.3 mmol/L 4.6    Carbon Dioxide (CO2)      22 - 29 mmol/L 28    Anion Gap      7 - 15 mmol/L 11    Urea Nitrogen      8.0 - 23.0 mg/dL 28.8 (H)    Creatinine      0.67 - 1.17 mg/dL 1.21 (H)    GFR Estimate      >60 mL/min/1.73m2 67    Calcium      8.8 - 10.4 mg/dL 10.2    Chloride      98 - 107 mmol/L 103    Glucose      70 - 99 mg/dL 98    Alkaline Phosphatase      40 - 150 U/L 102    AST      0 - 45 U/L 25    ALT      0 - 70 U/L 29    Protein Total      6.4 - 8.3 g/dL 6.8    Albumin      3.5 - 5.2 g/dL 4.5    Bilirubin Total      <=1.2 mg/dL 0.5    WBC      4.0 - 11.0 10e3/uL 11.3 (H)    RBC Count      4.40 - 5.90 10e6/uL 4.88    Hemoglobin      13.3 - 17.7 g/dL 16.2    Hematocrit      40.0 - 53.0 % 46.2    MCV      78 - 100 fL 95    MCH      26.5 - 33.0 pg 33.2 (H)    MCHC      31.5 - 36.5 g/dL 35.1    RDW      10.0 - 15.0 % 12.3    Platelet Count      150 - 450 10e3/uL 218    Sed Rate      0 - 20 mm/hr 9    CRP Inflammation      <5.00 mg/L <3.00    Uric Acid      3.4 - 7.0 mg/dL 6.6    Rheumatoid Factor      <14 IU/mL <10         EKG 2/6/25 showed sinus bradycardia  with rate 52. No acute ST/T changes    Revised Cardiac Risk Index (RCRI)  The patient has the following serious cardiovascular risks for perioperative complications:   - Coronary Artery Disease (MI, positive stress test, angina, Qs on EKG) = 1 point     RCRI Interpretation: 1 point: Class II (low risk - 0.9% complication rate)         Signed Electronically by: Remington Riggs MD  A copy of this evaluation report is provided to the requesting physician.

## 2025-03-31 ENCOUNTER — OFFICE VISIT (OUTPATIENT)
Dept: SLEEP MEDICINE | Facility: CLINIC | Age: 65
End: 2025-03-31
Payer: COMMERCIAL

## 2025-03-31 VITALS
OXYGEN SATURATION: 95 % | BODY MASS INDEX: 33.47 KG/M2 | HEIGHT: 69 IN | DIASTOLIC BLOOD PRESSURE: 78 MMHG | WEIGHT: 226 LBS | SYSTOLIC BLOOD PRESSURE: 123 MMHG | HEART RATE: 63 BPM

## 2025-03-31 DIAGNOSIS — G47.33 OSA (OBSTRUCTIVE SLEEP APNEA): Primary | ICD-10-CM

## 2025-03-31 PROCEDURE — 3078F DIAST BP <80 MM HG: CPT | Performed by: INTERNAL MEDICINE

## 2025-03-31 PROCEDURE — 1126F AMNT PAIN NOTED NONE PRSNT: CPT | Performed by: INTERNAL MEDICINE

## 2025-03-31 PROCEDURE — 3074F SYST BP LT 130 MM HG: CPT | Performed by: INTERNAL MEDICINE

## 2025-03-31 PROCEDURE — 99203 OFFICE O/P NEW LOW 30 MIN: CPT | Performed by: INTERNAL MEDICINE

## 2025-03-31 NOTE — NURSING NOTE
"Chief Complaint   Patient presents with    Sleep Problem     Need supplies, Nose runs excessively with CPAP       Initial /78   Pulse 63   Ht 1.753 m (5' 9\")   Wt 102.5 kg (226 lb)   SpO2 95%   BMI 33.37 kg/m   Estimated body mass index is 33.37 kg/m  as calculated from the following:    Height as of this encounter: 1.753 m (5' 9\").    Weight as of this encounter: 102.5 kg (226 lb).    Medication Reconciliation: complete  ESS 1  Arlene Billings MA   "

## 2025-03-31 NOTE — PROGRESS NOTES
Sleep Consultation:    Date on this visit: 3/31/2025    Bryce Espinoza  is referred by No ref. provider found for a sleep consultation.     Primary Physician: Remington Riggs     Bryce Espinoza is a 64 years old male, with medical history significant hypertension, coronary artery disease, hyperlipidemia, atrial fibrillation, who presents to clinic for management of obstructive sleep apnea.      HST on 7/15/2021 at a weight of 220 pounds showed an apnea-hypopnea index of 64/h. AHI was 64.4 per hour supine, - per hour prone, - per hour on left side, and 63.9 per hour on right side. Baseline oxygen saturation was 94%.  Time with saturation less than or equal to 88% was 14.4 minutes. The lowest oxygen saturation was 83%.     Overall, he rates the experience with PAP as ok. The mask is comfortable. The mask is not leaking.  He is not snoring with the mask on. He is not having gasp arousals.  He is not having significant oral/nasal dryness. The pressure settings are comfortable.     He uses nasal pillows.     He does feel rested in the morning.    ResMed     Auto-PAP 5.0 - 15.0 cmH2O 30 day usage data:    100% of days with > 4 hours of use. 0/30 days with no use.   Average use 509 minutes per day.   95%ile Leak 22.22 L/min.   CPAP 95% pressure 11.2 cm.   AHI 1.52 events per hour.     Bryce goes to sleep at 10:00 PM. He wakes up at 7:00 AM. He falls asleep in 5 minutes.  Bryce denies difficulty falling asleep.  He wakes up 1-2 times a night for 5 minutes before falling back to sleep.  Bryce wakes up to go to the bathroom.      Bryce has occasional sleep talking and denies any sleep walking, dream enactment, sleep paralysis, and hypnogogic/hypnopompic hallucinations.    Patient's Carman Sleepiness score 1/24 consistent with no daytime sleepiness.      Bryce naps 1 times per week for 20-30 minutes, feels refreshed after naps. He takes no inadvertant naps.  He denies closing eyes, dozing, and falling asleep while  driving. Patient was counseled on the importance of driving while alert, to pull over if drowsy, or nap before getting into the vehicle if sleepy.      He uses no caffeine.     Problem List:  Patient Active Problem List    Diagnosis Date Noted    Congestive heart failure, unspecified HF chronicity, unspecified heart failure type (H) 02/05/2025     Priority: Medium    Chronic bronchitis, unspecified chronic bronchitis type (H) 02/05/2025     Priority: Medium    Hard to intubate 09/20/2024     Priority: Medium    Urinary tract infectious disease 08/03/2024     Priority: Medium    DENYS (obstructive sleep apnea) 07/16/2021     Priority: Medium     Severe DENYS      Benign neoplasm of ascending colon 04/06/2021     Priority: Medium    Benign neoplasm of sigmoid colon 04/06/2021     Priority: Medium    Benign neoplasm of transverse colon 04/06/2021     Priority: Medium    Diverticular disease of large intestine 04/02/2021     Priority: Medium    Hemorrhoids 04/02/2021     Priority: Medium    Polyp of colon 04/02/2021     Priority: Medium    Hypertensive disorder 01/12/2020     Priority: Medium     I10 : Essential (primary) hypertension      Paroxysmal atrial fibrillation (H)      Priority: Medium    S/P CABG (coronary artery bypass graft) 10/12/2019     Priority: Medium    Coronary artery disease involving native coronary artery of native heart without angina pectoris 07/04/2019     Priority: Medium     Per CT calcium score of 722.97 3/2019      Hyperlipidemia LDL goal <70 04/04/2019     Priority: Medium    Other specified idiopathic peripheral neuropathy 02/17/2004     Priority: Medium    Malignant neoplasm of bladder (H) 01/07/2003     Priority: Medium     Problem list name updated by automated process. Provider to review      Essential hypertension, benign 11/01/2002     Priority: Medium        Past Medical/Surgical History:  Past Medical History:   Diagnosis Date    Antiplatelet or antithrombotic long-term use      "Arrhythmia     Coronary artery disease involving native coronary artery of native heart without angina pectoris 07/04/2019    Per CT calcium score of 722.97 3/2019    Difficulty walking     Dyspnea on exertion     Essential hypertension, benign     Hard to intubate 9/20/2024    Hematuria     Irregular heart beat     Malignant neoplasm of bladder, 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    Olecranon bursitis 03/2001    right    Other and unspecified hyperlipidemia     Paroxysmal atrial fibrillation (H)     Paroxysmal atrial flutter (H)     RIGHT LUNG CALCIFIED GRANULOMA 02/2002    RML    Sleep apnea     Tobacco use disorder     Quit 1/03    Unspecified hemorrhoids without mention of complication 08/2001    Unspecified hereditary and idiopathic peripheral neuropathy      Physical Examination:  Vitals: /78   Pulse 63   Ht 1.753 m (5' 9\")   Wt 102.5 kg (226 lb)   SpO2 95%   BMI 33.37 kg/m    BMI= Body mass index is 33.37 kg/m .  GENERAL APPEARANCE: healthy, alert, and no distress  NEURO: mentation intact and speech normal  PSYCH: mentation appears normal and affect normal/bright  Mallampati Class: IV.  Tonsillar Stage: 1  hidden by pillars.    Impression/Plan:    Severe obstructive sleep apnea    Patient is using CPAP regularly and is benefiting from treatment.  I reviewed download data and graphs from his device for the last 30 days.  Regular compliance and normal residual AHI is demonstrated, confirming adequate treatment of sleep apnea.    Plan:     Continue auto CPAP therapy with a pressure range of 5 to 15 cm H2O.    Obstructive sleep apnea reviewed.  Complications of untreated sleep apnea were reviewed.    Electronically signed by Dr. Rishi Gerard, Diplomate, Sleep Medicine, ABPN         CC: No ref. provider found              "

## 2025-04-08 PROBLEM — N20.0 KIDNEY STONE: Status: ACTIVE | Noted: 2025-04-08

## 2025-04-08 PROBLEM — Z86.79: Status: ACTIVE | Noted: 2025-04-08

## 2025-04-08 PROBLEM — N39.0 URINARY TRACT INFECTIOUS DISEASE: Status: RESOLVED | Noted: 2024-08-03 | Resolved: 2025-04-08

## 2025-04-08 PROBLEM — J42 CHRONIC BRONCHITIS, UNSPECIFIED CHRONIC BRONCHITIS TYPE (H): Status: RESOLVED | Noted: 2025-02-05 | Resolved: 2025-04-08

## 2025-04-09 NOTE — PATIENT INSTRUCTIONS
Approval given to proceed with proposed procedure, without further diagnostic evaluation..  No solid food after midnight prior to your  procedure. May have clear liquids up until 9:00am the morning of surgery   Stop  Eliquis 3 days prior to your  procedure to reduce risk of bleeding.   May use Tylenol for pain control  between then and your procedure.  Take losartan and amlodipine the night before surgery as usual  Take sotalol with a   water to swallow the medication prior to 9 AM on the morning of surgery.  Hold hydrochlorothiazide the morning of surgery  Hold other prescription and over the counter medications/supplements the morning of the  procedure. May resume usual medications/supplements after the procedure unless instructed otherwise by your surgeon

## 2025-04-22 ENCOUNTER — TRANSFERRED RECORDS (OUTPATIENT)
Dept: HEALTH INFORMATION MANAGEMENT | Facility: CLINIC | Age: 65
End: 2025-04-22
Payer: COMMERCIAL

## 2025-04-28 ENCOUNTER — OFFICE VISIT (OUTPATIENT)
Dept: CARDIOLOGY | Facility: CLINIC | Age: 65
End: 2025-04-28
Payer: COMMERCIAL

## 2025-04-28 VITALS
WEIGHT: 231 LBS | SYSTOLIC BLOOD PRESSURE: 128 MMHG | HEIGHT: 69 IN | HEART RATE: 58 BPM | DIASTOLIC BLOOD PRESSURE: 78 MMHG | BODY MASS INDEX: 34.21 KG/M2

## 2025-04-28 DIAGNOSIS — E78.5 HYPERLIPIDEMIA LDL GOAL <70: ICD-10-CM

## 2025-04-28 DIAGNOSIS — I48.0 PAROXYSMAL ATRIAL FIBRILLATION (H): Primary | ICD-10-CM

## 2025-04-28 DIAGNOSIS — I10 ESSENTIAL HYPERTENSION, BENIGN: ICD-10-CM

## 2025-04-28 RX ORDER — AMLODIPINE BESYLATE 5 MG/1
5 TABLET ORAL DAILY
Qty: 90 TABLET | Refills: 3 | Status: SHIPPED | OUTPATIENT
Start: 2025-04-28

## 2025-04-28 NOTE — PROGRESS NOTES
EP Cardiology Clinic Progress Note  Bryce Espinoza MRN# [id number]   YOB: 1960 Age: 64 year old   Primary Cardiologist: Dr. Blake, Dr. Mcgovern (EP)  Reason for visit: EP follow-up            Assessment and Plan:   Bryce Espinoza is a very pleasant 64 year old male who is here today for EP follow-up.       1.  Symptomatic persistent atrial fibrillation and typical atrial flutter status post successful wide area PVI with LA posterior wall isolation and CTI ablation 9/20/2024.  On Eliquis 5 mg twice daily for cardioembolic risk reduction in the setting of elevated [id number] score of 2 (HTN, CAD).  Given significant atrial myopathy, patient is at high risk for atrial fibrillation recurrence.  Transitioned from amiodarone to sotalol 80 mg twice daily 2/2025.  ECG today with acceptable QT/QTc.  2.  Hypertension with whitecoat component.  BP well-controlled at home.  3.  Coronary artery disease status post single-vessel CABG with LIMA to LAD in 2019. Patient required a coronary endarterectomy in the proximal segment of the LAD for LIMA grafting.  No angina.  4.  DENYS, compliant with CPAP  5.  Hyperlipidemia complicated by statin intolerance, now on Repatha.  6.  Chronic HFpEF, appears well compensated in the absence of diuretic use    Changes today: none    Jorge continues to feel well from a cardiac perspective.  He has not had any recurrent atrial fibrillation since transitioning from amiodarone to sotalol.  ECG today with acceptable QT/QTc.  I reviewed his most recent lab work which returned within normal limits with exception of his creatinine and BUN which were mildly elevated.  His hydrochlorothiazide has since been discontinued.  His cholesterol has not been checked in over a year, so I ordered a fasting lipid panel today.  Jorge will plan to follow-up in the electrophysiology clinic in 1 year with an ECG.  Should he develop any new or worsening symptoms, we would be happy to see him sooner.    Safia  SUZETTE Crowley [facility]  Pager: [phone]          History of Presenting Illness:    Bryce Espinoza is a very pleasant 64 year old male with a history of symptomatic persistent atrial fibrillation and typical atrial flutter, coronary artery disease status post single-vessel CABG in 2019 (LIMA to LAD), hypertension, and DENYS on CPAP.     Patient was initially diagnosed with atrial fibrillation after CABG.  He did not have symptomatic recurrence until March 2024 at which time Zio patch monitor revealed persistent atrial fibrillation with controlled ventricular response and pauses up to 5.6 seconds in duration.  He underwent cardioversion in May 2024 which was successful, but reverted back to atrial fibrillation within a few minutes.     Mr. Espinoza underwent EP study with successful wide area PVI with LA posterior wall isolation by linear ablation and CTI ablation 9/20/2024 by Dr. Mcgovern.  He presented in atrial fibrillation and will cardiac cardioversion x 2.  He was found to have severe left atrial enlargement with significant patchy scar (atrial myopathy).  Prior to his procedure, he was started on amiodarone.  At the time of his postprocedure follow-up, he was maintaining normal sinus rhythm and was feeling markedly improved in sinus rhythm.  His amiodarone was continued as was his Eliquis.    I met Jorge 1/27/2025.  At that time, we transitioned him from amiodarone to sotalol after 1 week washout.  Initial plan was to increase sotalol to 120 mg twice daily.  However, initial follow-up ECG revealed bradycardia with VR 50s.  As result, dose was kept at 80 mg twice daily.    Patient presents to clinic today for routine EP follow-up. Jorge reports feeling very well over the last few months from a cardiac perspective.  He denies chest pain, palpitations, lightheadedness, dizziness, near-syncope or syncope.  No breathing concerns.  No bleeding concerns.  He has had no recurrent episodes of atrial  fibrillation since transitioning to sotalol.     Taking medications daily as prescribed. He recently stopped his hydrochlorothiazide after his recent CMP revealed mildly elevated creatinine and BUN.     Using CPAP nightly.     Blood pressure elevated in clinic today at 175/89, coming down on recheck. Blood pressure at home remains well-controlled, 120/70s. HR at home 65 bpm.  ECG completed in clinic today reveals sinus bradycardia with VR 58, , QRS 94, QT/QTc 430 ms.      Social History       Social History     Socioeconomic History    Marital status:      Spouse name: Not on file    Number of children: Not on file    Years of education: Not on file    Highest education level: Not on file   Occupational History    Not on file   Tobacco Use    Smoking status: Former     Current packs/day: 0.00     Average packs/day: 1 pack/day for 33.0 years (33.0 ttl pk-yrs)     Types: Cigarettes     Start date: 1980     Quit date: 2013     Years since quittin.2    Smokeless tobacco: Never   Vaping Use    Vaping status: Never Used   Substance and Sexual Activity    Alcohol use: Yes     Comment: weekends    Drug use: Yes     Types: Marijuana     Comment: nica taylor    Sexual activity: Yes   Other Topics Concern    Parent/sibling w/ CABG, MI or angioplasty before 65F 55M? No   Social History Narrative    Not on file     Social Drivers of Health     Financial Resource Strain: Not on file   Food Insecurity: Not on file   Transportation Needs: Not on file   Physical Activity: Not on file   Stress: Not on file   Social Connections: Not on file   Interpersonal Safety: Low Risk  (2024)    Interpersonal Safety     Do you feel physically and emotionally safe where you currently live?: Yes     Within the past 12 months, have you been hit, slapped, kicked or otherwise physically hurt by someone?: No     Within the past 12 months, have you been humiliated or emotionally abused in other ways by your partner or  "ex-partner?: No   Housing Stability: Not on file            Review of Systems:   Please see HPI         Physical Exam:   Vitals: /78   Pulse 58   Ht 1.753 m (5' 9\")   Wt 104.8 kg (231 lb)   BMI 34.11 kg/m     Wt Readings from Last 4 Encounters:   03/31/25 102.5 kg (226 lb)   03/28/25 101.3 kg (223 lb 4.8 oz)   02/05/25 100.8 kg (222 lb 4.8 oz)   01/27/25 103.8 kg (228 lb 14.4 oz)     GEN: well nourished, in no acute distress.  HEENT:  Pupils equal, round. Sclerae nonicteric.   NECK: Supple, no masses appreciated. No JVD  C/V:  Regular rate and rhythm, no murmur appreciated  RESP: Respirations are unlabored. Clear to auscultation bilaterally without wheezing, rales, or rhonchi.  GI: Abdomen soft, nontender.  EXTREM: no LE edema.  NEURO: Alert and oriented, cooperative.  SKIN: Warm and dry.        Data:   LIPID RESULTS:  Lab Results   Component Value Date    CHOL 77 04/17/2024    CHOL 158 05/19/2021    HDL 36 (L) 04/17/2024    HDL 43 05/19/2021    LDL 28 04/17/2024    LDL 82 05/19/2021    TRIG 65 04/17/2024    TRIG 163 (H) 05/19/2021    CHOLHDLRATIO 5.3 (H) 04/08/2015     LIVER ENZYME RESULTS:  Lab Results   Component Value Date    AST 25 03/28/2025    AST 21 08/01/2020    ALT 29 03/28/2025    ALT 42 08/01/2020     CBC RESULTS:  Lab Results   Component Value Date    WBC 11.3 (H) 03/28/2025    WBC 10.3 05/19/2021    RBC 4.88 03/28/2025    RBC 4.82 05/19/2021    HGB 16.2 03/28/2025    HGB 15.1 05/19/2021    HCT 46.2 03/28/2025    HCT 45.3 05/19/2021    MCV 95 03/28/2025    MCV 94 05/19/2021    MCH 33.2 (H) 03/28/2025    MCH 31.3 05/19/2021    MCHC 35.1 03/28/2025    MCHC 33.3 05/19/2021    RDW 12.3 03/28/2025    RDW 12.6 05/19/2021     03/28/2025     05/19/2021     BMP RESULTS:  Lab Results   Component Value Date     03/28/2025     05/19/2021    POTASSIUM 4.6 03/28/2025    POTASSIUM 4.2 08/16/2022    POTASSIUM 4.1 05/19/2021    CHLORIDE 103 03/28/2025    CHLORIDE 105 08/16/2022    " CHLORIDE 108 05/19/2021    CO2 28 03/28/2025    CO2 26 08/16/2022    CO2 28 05/19/2021    ANIONGAP 11 03/28/2025    ANIONGAP 6 08/16/2022    ANIONGAP 7 05/19/2021    GLC 98 03/28/2025     (H) 08/16/2022     (H) 05/19/2021    BUN 28.8 (H) 03/28/2025    BUN 12 08/16/2022    BUN 13 05/19/2021    CR 1.21 (H) 03/28/2025    CR 0.70 05/19/2021    GFRESTIMATED 67 03/28/2025    GFRESTIMATED >90 05/19/2021    GFRESTBLACK >90 05/19/2021    KELLY 10.2 03/28/2025    KELLY 8.9 05/19/2021      A1C RESULTS:  Lab Results   Component Value Date    A1C 5.4 05/19/2021     INR RESULTS:  Lab Results   Component Value Date    INR 1.07 05/23/2024    INR 0.99 05/19/2021    INR 1.12 09/25/2019            Medications     Current Outpatient Medications   Medication Sig Dispense Refill    amLODIPine (NORVASC) 5 MG tablet Take 1 tablet (5 mg) by mouth daily 90 tablet 3    apixaban ANTICOAGULANT (ELIQUIS ANTICOAGULANT) 5 MG tablet TAKE 1 TABLET(5 MG) BY MOUTH TWICE DAILY 180 tablet 3    Cholecalciferol (VITAMIN D) 2000 UNITS tablet Take 2,000 Units by mouth daily      evolocumab (REPATHA) 140 MG/ML prefilled autoinjector Inject 1 mL (140 mg) subcutaneously every 14 days. 6 mL 3    hydroCHLOROthiazide 12.5 MG tablet Take 1 tablet (12.5 mg) by mouth daily 30 tablet 11    losartan (COZAAR) 100 MG tablet Take 1 tablet (100 mg) by mouth daily. 90 tablet 1    sotalol (BETAPACE) 80 MG tablet TAKE 1 TABLET(80 MG) BY MOUTH TWICE DAILY 180 tablet 3          Past Medical History     Past Medical History:   Diagnosis Date    Antiplatelet or antithrombotic long-term use     Arrhythmia     Benign neoplasm of ascending colon 04/06/2021    Benign neoplasm of sigmoid colon 04/06/2021    Benign neoplasm of transverse colon 04/06/2021    Coronary artery disease involving native coronary artery of native heart without angina pectoris 07/04/2019    Per CT calcium score of 722.97 3/2019    Difficulty walking     Dyspnea on exertion     Essential  hypertension, benign     Hard to intubate 09/20/2024    Hematuria     Hemorrhoids 04/02/2021    Hx of acute heart failure 04/08/2025    Hyperlipidemia LDL goal <70 04/04/2019    Irregular heart beat     Kidney stone 04/08/2025    Malignant neoplasm of bladder (H) 01/07/2003    Problem list name updated by automated process. Provider to review      Malignant neoplasm of bladder, 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    Olecranon bursitis 03/2001    right    DENYS (obstructive sleep apnea) 07/16/2021    Severe DENYS      Other and unspecified hyperlipidemia     Paroxysmal atrial fibrillation (H)     Paroxysmal atrial flutter (H)     Polyp of colon 04/02/2021    RIGHT LUNG CALCIFIED GRANULOMA 02/2002    RML    S/P CABG (coronary artery bypass graft) 10/12/2019    Sleep apnea     Tobacco use disorder     Quit 1/03    Unspecified hemorrhoids without mention of complication 08/2001    Unspecified hereditary and idiopathic peripheral neuropathy      Past Surgical History:   Procedure Laterality Date    ANESTHESIA CARDIOVERSION N/A 05/23/2024    Procedure: Anesthesia cardioversion;  Surgeon: GENERIC ANESTHESIA PROVIDER;  Location:  OR    BYPASS GRAFT ARTERY CORONARY N/A 09/25/2019    Procedure: CORONARY ARTERY BYPASS GRAFTING x 1 (LIMA - LAD) WITH CORONARY ENDARTERECTOMY  (ON PUMP OXYGENATOR ; HANK BY [facility]);  Surgeon: Magdi Turner MD;  Location:  OR    COLONOSCOPY      CV HEART CATHETERIZATION WITH POSSIBLE INTERVENTION N/A 09/06/2019    Procedure: Coronary Angiogram;  Surgeon: Nick Willson MD;  Location:  HEART CARDIAC CATH LAB    CV LEFT HEART CATH N/A 09/06/2019    Procedure: Left Heart Cath;  Surgeon: Nick Willson MD;  Location:  HEART CARDIAC CATH LAB    CV LEFT VENTRICULOGRAM N/A 09/06/2019    Procedure: Left Ventriculogram;  Surgeon: Nick Willson MD;  Location:  HEART CARDIAC CATH LAB    DAVINCI XI HERNIORRHAPHY INGUINAL Right 04/05/2024    Procedure:  Robotic assisted right inguinal hernia repair with mesh;  Surgeon: You Lowe MD;  Location: [facility] OR    EP ABLATION FOCAL AFIB N/A 09/20/2024    Procedure: Ablation Atrial Fibrilation;  Surgeon: Martha Mcgovern MD;  Location:  HEART CARDIAC CATH LAB    [facility] NONSPECIFIC PROCEDURE  2/00/02    EBCT     Family History   Problem Relation Age of Onset    Arthritis Mother         RA    Hypertension Mother     Breast Cancer Mother     Coronary Artery Disease Mother     Prostate Cancer Maternal Grandfather         80    Cancer Maternal Grandmother         Lung    Coronary Artery Disease Father     Other - See Comments Brother     Diabetes Brother     Genetic Disorder Son     Diabetes Brother             Allergies   Atorvastatin, Crestor [rosuvastatin], Lisinopril, Other drug allergy (see comments), and Simvastatin    The longitudinal plan of care for the diagnosis(es)/condition(s) as documented were addressed during this visit. Due to the added complexity in care, I will continue to support Jorge in the subsequent management and with ongoing continuity of care.     30 minutes spent on the date of the encounter doing chart review, history and exam, documentation and further activities as noted above    Safia Crowley PA-C  [facility]  Pager: [phone]

## 2025-04-28 NOTE — LETTER
4/28/2025    Remington Riggs MD  [address] [facility] [address]    RE: Bryce Esipnoza       Dear Colleague,     I had the pleasure of seeing Bryce Espinoza in the [facility].  EP Cardiology Clinic Progress Note  Bryce Espinoza MRN# [id number]   YOB: 1960 Age: 64 year old   Primary Cardiologist: Dr. Blake, Dr. Mcgovern (EP)  Reason for visit: EP follow-up            Assessment and Plan:   Bryce Espinoza is a very pleasant 64 year old male who is here today for EP follow-up.       1.  Symptomatic persistent atrial fibrillation and typical atrial flutter status post successful wide area PVI with LA posterior wall isolation and CTI ablation 9/20/2024.  On Eliquis 5 mg twice daily for cardioembolic risk reduction in the setting of elevated [id number] score of 2 (HTN, CAD).  Given significant atrial myopathy, patient is at high risk for atrial fibrillation recurrence.  Transitioned from amiodarone to sotalol 80 mg twice daily 2/2025.  ECG today with acceptable QT/QTc.  2.  Hypertension with whitecoat component.  BP well-controlled at home.  3.  Coronary artery disease status post single-vessel CABG with LIMA to LAD in 2019. Patient required a coronary endarterectomy in the proximal segment of the LAD for LIMA grafting.  No angina.  4.  DENYS, compliant with CPAP  5.  Hyperlipidemia complicated by statin intolerance, now on Repatha.  6.  Chronic HFpEF, appears well compensated in the absence of diuretic use    Changes today: marly    Jorge continues to feel well from a cardiac perspective.  He has not had any recurrent atrial fibrillation since transitioning from amiodarone to sotalol.  ECG today with acceptable QT/QTc.  I reviewed his most recent lab work which returned within normal limits with exception of his creatinine and BUN which were mildly elevated.  His hydrochlorothiazide has since been discontinued.  His cholesterol has not been checked in over a year, so I ordered a fasting  lipid panel today.  Jorge will plan to follow-up in the electrophysiology clinic in 1 year with an ECG.  Should he develop any new or worsening symptoms, we would be happy to see him sooner.    Safia Crowley PA-C  [facility]  Pager: [phone]          History of Presenting Illness:    Bryce Espinoza is a very pleasant 64 year old male with a history of symptomatic persistent atrial fibrillation and typical atrial flutter, coronary artery disease status post single-vessel CABG in 2019 (LIMA to LAD), hypertension, and DENYS on CPAP.     Patient was initially diagnosed with atrial fibrillation after CABG.  He did not have symptomatic recurrence until March 2024 at which time Zio patch monitor revealed persistent atrial fibrillation with controlled ventricular response and pauses up to 5.6 seconds in duration.  He underwent cardioversion in May 2024 which was successful, but reverted back to atrial fibrillation within a few minutes.     Mr. Espinoza underwent EP study with successful wide area PVI with LA posterior wall isolation by linear ablation and CTI ablation 9/20/2024 by Dr. Mcgovern.  He presented in atrial fibrillation and will cardiac cardioversion x 2.  He was found to have severe left atrial enlargement with significant patchy scar (atrial myopathy).  Prior to his procedure, he was started on amiodarone.  At the time of his postprocedure follow-up, he was maintaining normal sinus rhythm and was feeling markedly improved in sinus rhythm.  His amiodarone was continued as was his Eliquis.    I met Jorge 1/27/2025.  At that time, we transitioned him from amiodarone to sotalol after 1 week washout.  Initial plan was to increase sotalol to 120 mg twice daily.  However, initial follow-up ECG revealed bradycardia with VR 50s.  As result, dose was kept at 80 mg twice daily.    Patient presents to clinic today for routine EP follow-up. Jorge reports feeling very well over the last few months from a  cardiac perspective.  He denies chest pain, palpitations, lightheadedness, dizziness, near-syncope or syncope.  No breathing concerns.  No bleeding concerns.  He has had no recurrent episodes of atrial fibrillation since transitioning to sotalol.     Taking medications daily as prescribed. He recently stopped his hydrochlorothiazide after his recent CMP revealed mildly elevated creatinine and BUN.     Using CPAP nightly.     Blood pressure elevated in clinic today at 175/89, coming down on recheck. Blood pressure at home remains well-controlled, 120/70s. HR at home 65 bpm.  ECG completed in clinic today reveals sinus bradycardia with VR 58, , QRS 94, QT/QTc 430 ms.      Social History       Social History     Socioeconomic History     Marital status:      Spouse name: Not on file     Number of children: Not on file     Years of education: Not on file     Highest education level: Not on file   Occupational History     Not on file   Tobacco Use     Smoking status: Former     Current packs/day: 0.00     Average packs/day: 1 pack/day for 33.0 years (33.0 ttl pk-yrs)     Types: Cigarettes     Start date: 1980     Quit date: 2013     Years since quittin.2     Smokeless tobacco: Never   Vaping Use     Vaping status: Never Used   Substance and Sexual Activity     Alcohol use: Yes     Comment: weekends     Drug use: Yes     Types: Marijuana     Comment: nica taylor     Sexual activity: Yes   Other Topics Concern     Parent/sibling w/ CABG, MI or angioplasty before 65F 55M? No   Social History Narrative     Not on file     Social Drivers of Health     Financial Resource Strain: Not on file   Food Insecurity: Not on file   Transportation Needs: Not on file   Physical Activity: Not on file   Stress: Not on file   Social Connections: Not on file   Interpersonal Safety: Low Risk  (2024)    Interpersonal Safety      Do you feel physically and emotionally safe where you currently live?: Yes      Within  "the past 12 months, have you been hit, slapped, kicked or otherwise physically hurt by someone?: No      Within the past 12 months, have you been humiliated or emotionally abused in other ways by your partner or ex-partner?: No   Housing Stability: Not on file            Review of Systems:   Please see HPI         Physical Exam:   Vitals: /78   Pulse 58   Ht 1.753 m (5' 9\")   Wt 104.8 kg (231 lb)   BMI 34.11 kg/m     Wt Readings from Last 4 Encounters:   03/31/25 102.5 kg (226 lb)   03/28/25 101.3 kg (223 lb 4.8 oz)   02/05/25 100.8 kg (222 lb 4.8 oz)   01/27/25 103.8 kg (228 lb 14.4 oz)     GEN: well nourished, in no acute distress.  HEENT:  Pupils equal, round. Sclerae nonicteric.   NECK: Supple, no masses appreciated. No JVD  C/V:  Regular rate and rhythm, no murmur appreciated  RESP: Respirations are unlabored. Clear to auscultation bilaterally without wheezing, rales, or rhonchi.  GI: Abdomen soft, nontender.  EXTREM: no LE edema.  NEURO: Alert and oriented, cooperative.  SKIN: Warm and dry.        Data:   LIPID RESULTS:  Lab Results   Component Value Date    CHOL 77 04/17/2024    CHOL 158 05/19/2021    HDL 36 (L) 04/17/2024    HDL 43 05/19/2021    LDL 28 04/17/2024    LDL 82 05/19/2021    TRIG 65 04/17/2024    TRIG 163 (H) 05/19/2021    CHOLHDLRATIO 5.3 (H) 04/08/2015     LIVER ENZYME RESULTS:  Lab Results   Component Value Date    AST 25 03/28/2025    AST 21 08/01/2020    ALT 29 03/28/2025    ALT 42 08/01/2020     CBC RESULTS:  Lab Results   Component Value Date    WBC 11.3 (H) 03/28/2025    WBC 10.3 05/19/2021    RBC 4.88 03/28/2025    RBC 4.82 05/19/2021    HGB 16.2 03/28/2025    HGB 15.1 05/19/2021    HCT 46.2 03/28/2025    HCT 45.3 05/19/2021    MCV 95 03/28/2025    MCV 94 05/19/2021    MCH 33.2 (H) 03/28/2025    MCH 31.3 05/19/2021    MCHC 35.1 03/28/2025    MCHC 33.3 05/19/2021    RDW 12.3 03/28/2025    RDW 12.6 05/19/2021     03/28/2025     05/19/2021     BMP RESULTS:  Lab " Results   Component Value Date     03/28/2025     05/19/2021    POTASSIUM 4.6 03/28/2025    POTASSIUM 4.2 08/16/2022    POTASSIUM 4.1 05/19/2021    CHLORIDE 103 03/28/2025    CHLORIDE 105 08/16/2022    CHLORIDE 108 05/19/2021    CO2 28 03/28/2025    CO2 26 08/16/2022    CO2 28 05/19/2021    ANIONGAP 11 03/28/2025    ANIONGAP 6 08/16/2022    ANIONGAP 7 05/19/2021    GLC 98 03/28/2025     (H) 08/16/2022     (H) 05/19/2021    BUN 28.8 (H) 03/28/2025    BUN 12 08/16/2022    BUN 13 05/19/2021    CR 1.21 (H) 03/28/2025    CR 0.70 05/19/2021    GFRESTIMATED 67 03/28/2025    GFRESTIMATED >90 05/19/2021    GFRESTBLACK >90 05/19/2021    KELLY 10.2 03/28/2025    KELLY 8.9 05/19/2021      A1C RESULTS:  Lab Results   Component Value Date    A1C 5.4 05/19/2021     INR RESULTS:  Lab Results   Component Value Date    INR 1.07 05/23/2024    INR 0.99 05/19/2021    INR 1.12 09/25/2019            Medications     Current Outpatient Medications   Medication Sig Dispense Refill     amLODIPine (NORVASC) 5 MG tablet Take 1 tablet (5 mg) by mouth daily 90 tablet 3     apixaban ANTICOAGULANT (ELIQUIS ANTICOAGULANT) 5 MG tablet TAKE 1 TABLET(5 MG) BY MOUTH TWICE DAILY 180 tablet 3     Cholecalciferol (VITAMIN D) 2000 UNITS tablet Take 2,000 Units by mouth daily       evolocumab (REPATHA) 140 MG/ML prefilled autoinjector Inject 1 mL (140 mg) subcutaneously every 14 days. 6 mL 3     hydroCHLOROthiazide 12.5 MG tablet Take 1 tablet (12.5 mg) by mouth daily 30 tablet 11     losartan (COZAAR) 100 MG tablet Take 1 tablet (100 mg) by mouth daily. 90 tablet 1     sotalol (BETAPACE) 80 MG tablet TAKE 1 TABLET(80 MG) BY MOUTH TWICE DAILY 180 tablet 3          Past Medical History     Past Medical History:   Diagnosis Date     Antiplatelet or antithrombotic long-term use      Arrhythmia      Benign neoplasm of ascending colon 04/06/2021     Benign neoplasm of sigmoid colon 04/06/2021     Benign neoplasm of transverse colon  04/06/2021     Coronary artery disease involving native coronary artery of native heart without angina pectoris 07/04/2019    Per CT calcium score of 722.97 3/2019     Difficulty walking      Dyspnea on exertion      Essential hypertension, benign      Hard to intubate 09/20/2024     Hematuria      Hemorrhoids 04/02/2021     Hx of acute heart failure 04/08/2025     Hyperlipidemia LDL goal <70 04/04/2019     Irregular heart beat      Kidney stone 04/08/2025     Malignant neoplasm of bladder (H) 01/07/2003    Problem list name updated by automated process. Provider to review       Malignant neoplasm of bladder, 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     Olecranon bursitis 03/2001    right     DENYS (obstructive sleep apnea) 07/16/2021    Severe DENYS       Other and unspecified hyperlipidemia      Paroxysmal atrial fibrillation (H)      Paroxysmal atrial flutter (H)      Polyp of colon 04/02/2021     RIGHT LUNG CALCIFIED GRANULOMA 02/2002    RML     S/P CABG (coronary artery bypass graft) 10/12/2019     Sleep apnea      Tobacco use disorder     Quit 1/03     Unspecified hemorrhoids without mention of complication 08/2001     Unspecified hereditary and idiopathic peripheral neuropathy      Past Surgical History:   Procedure Laterality Date     ANESTHESIA CARDIOVERSION N/A 05/23/2024    Procedure: Anesthesia cardioversion;  Surgeon: GENERIC ANESTHESIA PROVIDER;  Location:  OR     BYPASS GRAFT ARTERY CORONARY N/A 09/25/2019    Procedure: CORONARY ARTERY BYPASS GRAFTING x 1 (LIMA - LAD) WITH CORONARY ENDARTERECTOMY  (ON PUMP OXYGENATOR ; HANK BY [facility]);  Surgeon: Magdi Turner MD;  Location:  OR     COLONOSCOPY       CV HEART CATHETERIZATION WITH POSSIBLE INTERVENTION N/A 09/06/2019    Procedure: Coronary Angiogram;  Surgeon: Nick Willson MD;  Location:  HEART CARDIAC CATH LAB     CV LEFT HEART CATH N/A 09/06/2019    Procedure: Left Heart Cath;  Surgeon: Nick Willson MD;   Location:  HEART CARDIAC CATH LAB     CV LEFT VENTRICULOGRAM N/A 09/06/2019    Procedure: Left Ventriculogram;  Surgeon: Nick Willson MD;  Location: [facility] CARDIAC CATH LAB     DAVINCI XI HERNIORRHAPHY INGUINAL Right 04/05/2024    Procedure: Robotic assisted right inguinal hernia repair with mesh;  Surgeon: You Lowe MD;  Location: [facility] OR     EP ABLATION FOCAL AFIB N/A 09/20/2024    Procedure: Ablation Atrial Fibrilation;  Surgeon: Martha Mcgovern MD;  Location: [facility] CARDIAC CATH LAB     ZZC NONSPECIFIC PROCEDURE  2/00/02    EBCT     Family History   Problem Relation Age of Onset     Arthritis Mother         RA     Hypertension Mother      Breast Cancer Mother      Coronary Artery Disease Mother      Prostate Cancer Maternal Grandfather         80     Cancer Maternal Grandmother         Lung     Coronary Artery Disease Father      Other - See Comments Brother      Diabetes Brother      Genetic Disorder Son      Diabetes Brother             Allergies   Atorvastatin, Crestor [rosuvastatin], Lisinopril, Other drug allergy (see comments), and Simvastatin    The longitudinal plan of care for the diagnosis(es)/condition(s) as documented were addressed during this visit. Due to the added complexity in care, I will continue to support Jorge in the subsequent management and with ongoing continuity of care.     30 minutes spent on the date of the encounter doing chart review, history and exam, documentation and further activities as noted above    SUZETTE Robles [facility]  Pager: [phone]      Thank you for allowing me to participate in the care of your patient.      Sincerely,     SUZETTE Robles [facility]  cc:   Safia Crowley PA-C  [address] [place] [address]

## 2025-04-28 NOTE — PATIENT INSTRUCTIONS
Call the nurse for any questions or concerns at [phone].    Plan:  1. Medication changes: none    2. Get your cholesterol checked at your convenience     3. EP follow-up in 1 year with Dr. Mcgovern.   -Scheduling phone number: [phone]    It was great seeing you today!    Safia Crowley PA-C  Physician Assistant  [facility] - [facility]

## 2025-04-30 ENCOUNTER — LAB (OUTPATIENT)
Dept: LAB | Facility: CLINIC | Age: 65
End: 2025-04-30
Payer: COMMERCIAL

## 2025-04-30 DIAGNOSIS — I48.0 PAROXYSMAL ATRIAL FIBRILLATION (H): ICD-10-CM

## 2025-04-30 DIAGNOSIS — E78.5 HYPERLIPIDEMIA LDL GOAL <70: ICD-10-CM

## 2025-04-30 LAB
ANION GAP SERPL CALCULATED.3IONS-SCNC: 9 MMOL/L (ref 7–15)
BUN SERPL-MCNC: 16 MG/DL (ref 8–23)
CALCIUM SERPL-MCNC: 8.7 MG/DL (ref 8.8–10.4)
CHLORIDE SERPL-SCNC: 104 MMOL/L (ref 98–107)
CHOLEST SERPL-MCNC: 105 MG/DL
CREAT SERPL-MCNC: 0.96 MG/DL (ref 0.67–1.17)
EGFRCR SERPLBLD CKD-EPI 2021: 88 ML/MIN/1.73M2
FASTING STATUS PATIENT QL REPORTED: NO
FASTING STATUS PATIENT QL REPORTED: NO
GLUCOSE SERPL-MCNC: 86 MG/DL (ref 70–99)
HCO3 SERPL-SCNC: 27 MMOL/L (ref 22–29)
HDLC SERPL-MCNC: 38 MG/DL
LDLC SERPL CALC-MCNC: 10 MG/DL
NONHDLC SERPL-MCNC: 67 MG/DL
POTASSIUM SERPL-SCNC: 4.4 MMOL/L (ref 3.4–5.3)
SODIUM SERPL-SCNC: 140 MMOL/L (ref 135–145)
TRIGL SERPL-MCNC: 287 MG/DL

## 2025-04-30 PROCEDURE — 80061 LIPID PANEL: CPT

## 2025-04-30 PROCEDURE — 36415 COLL VENOUS BLD VENIPUNCTURE: CPT

## 2025-04-30 PROCEDURE — 80048 BASIC METABOLIC PNL TOTAL CA: CPT

## 2025-05-07 ENCOUNTER — TRANSFERRED RECORDS (OUTPATIENT)
Dept: HEALTH INFORMATION MANAGEMENT | Facility: CLINIC | Age: 65
End: 2025-05-07
Payer: COMMERCIAL

## 2025-05-08 ENCOUNTER — RESULTS FOLLOW-UP (OUTPATIENT)
Dept: CARDIOLOGY | Facility: CLINIC | Age: 65
End: 2025-05-08

## 2025-06-14 ENCOUNTER — HEALTH MAINTENANCE LETTER (OUTPATIENT)
Age: 65
End: 2025-06-14

## 2025-06-17 DIAGNOSIS — I10 ESSENTIAL HYPERTENSION, BENIGN: ICD-10-CM

## 2025-06-17 RX ORDER — LOSARTAN POTASSIUM 100 MG/1
100 TABLET ORAL DAILY
Qty: 90 TABLET | Refills: 1 | Status: SHIPPED | OUTPATIENT
Start: 2025-06-17

## 2025-08-11 ENCOUNTER — OFFICE VISIT (OUTPATIENT)
Dept: INTERNAL MEDICINE | Facility: CLINIC | Age: 65
End: 2025-08-11
Payer: COMMERCIAL

## 2025-08-11 ENCOUNTER — NURSE TRIAGE (OUTPATIENT)
Dept: INTERNAL MEDICINE | Facility: CLINIC | Age: 65
End: 2025-08-11

## 2025-08-11 VITALS
DIASTOLIC BLOOD PRESSURE: 76 MMHG | WEIGHT: 223 LBS | OXYGEN SATURATION: 96 % | BODY MASS INDEX: 33.03 KG/M2 | TEMPERATURE: 97.7 F | HEIGHT: 69 IN | RESPIRATION RATE: 16 BRPM | HEART RATE: 59 BPM | SYSTOLIC BLOOD PRESSURE: 120 MMHG

## 2025-08-11 DIAGNOSIS — R11.0 NAUSEA: ICD-10-CM

## 2025-08-11 DIAGNOSIS — Z79.01 ANTICOAGULATED ON ELIQUIS: ICD-10-CM

## 2025-08-11 DIAGNOSIS — K21.01 GASTROESOPHAGEAL REFLUX DISEASE WITH ESOPHAGITIS AND HEMORRHAGE: ICD-10-CM

## 2025-08-11 DIAGNOSIS — I48.0 PAROXYSMAL ATRIAL FIBRILLATION (H): ICD-10-CM

## 2025-08-11 DIAGNOSIS — R11.0 NAUSEA: Primary | ICD-10-CM

## 2025-08-11 LAB
ERYTHROCYTE [DISTWIDTH] IN BLOOD BY AUTOMATED COUNT: 12.4 % (ref 10–15)
HCT VFR BLD AUTO: 42.8 % (ref 40–53)
HGB BLD-MCNC: 15 G/DL (ref 13.3–17.7)
MCH RBC QN AUTO: 32.5 PG (ref 26.5–33)
MCHC RBC AUTO-ENTMCNC: 35 G/DL (ref 31.5–36.5)
MCV RBC AUTO: 93 FL (ref 78–100)
PLATELET # BLD AUTO: 222 10E3/UL (ref 150–450)
RBC # BLD AUTO: 4.62 10E6/UL (ref 4.4–5.9)
WBC # BLD AUTO: 8.8 10E3/UL (ref 4–11)

## 2025-08-11 PROCEDURE — 99214 OFFICE O/P EST MOD 30 MIN: CPT | Performed by: PHYSICIAN ASSISTANT

## 2025-08-11 PROCEDURE — 36415 COLL VENOUS BLD VENIPUNCTURE: CPT | Performed by: PHYSICIAN ASSISTANT

## 2025-08-11 PROCEDURE — 85027 COMPLETE CBC AUTOMATED: CPT | Performed by: PHYSICIAN ASSISTANT

## 2025-08-11 PROCEDURE — 3074F SYST BP LT 130 MM HG: CPT | Performed by: PHYSICIAN ASSISTANT

## 2025-08-11 PROCEDURE — 3078F DIAST BP <80 MM HG: CPT | Performed by: PHYSICIAN ASSISTANT

## 2025-08-11 RX ORDER — OMEPRAZOLE 40 MG/1
40 CAPSULE, DELAYED RELEASE ORAL DAILY
Qty: 30 CAPSULE | Refills: 0 | Status: SHIPPED | OUTPATIENT
Start: 2025-08-11

## 2025-08-12 RX ORDER — OMEPRAZOLE 40 MG/1
40 CAPSULE, DELAYED RELEASE ORAL DAILY
Qty: 90 CAPSULE | OUTPATIENT
Start: 2025-08-12

## (undated) DEVICE — CATH ANGIO SELECTIVE 6FRX100CM AMPLATZ 534643T

## (undated) DEVICE — ESU PENCIL W/HOLSTER E2350H

## (undated) DEVICE — SYR EAR BULB 3OZ 0035830

## (undated) DEVICE — PACK EP SRG PROC LF DISP SAN32EPFSR

## (undated) DEVICE — SLEEVE TR BAND RADIAL COMPRESSION DEVICE 24CM TRB24-REG

## (undated) DEVICE — CATH ANGIO JUDKINS R4 6FRX100CM INFINITI 534621T

## (undated) DEVICE — DRAIN CHEST TUBE 32FR STR 8032

## (undated) DEVICE — CATH ANGIO JUDKINS JL4 6FRX100CM INFINITI 534620T

## (undated) DEVICE — LINEN TOWEL PACK X30 5481

## (undated) DEVICE — LINEN TOWEL PACK X6 WHITE 5487

## (undated) DEVICE — SOL WATER IRRIG 1000ML BOTTLE 2F7114

## (undated) DEVICE — SU PROLENE 7-0 BV-1DA 4X30" M8703

## (undated) DEVICE — ESU GROUND PAD ADULT W/CORD E7507

## (undated) DEVICE — CATH ANGIO INFINITI PIGTAIL 145 6 SH 6FRX110CM  534-652S

## (undated) DEVICE — SU STEEL 6 CCS 4X18" M654G

## (undated) DEVICE — DEFIB PRO-PADZ LVP LQD GEL ADULT 8900-2105-01

## (undated) DEVICE — CATH ANGIO SUPERTORQUE PLUS AL2 6FRX100CM 533646

## (undated) DEVICE — CATHETER OCTARAY LONG SPLINE CURVE F 3-3-3-3-3 D160906

## (undated) DEVICE — PACK OPEN HEART PV12OH524

## (undated) DEVICE — SU SILK 1 TIE 10X30" SA87G

## (undated) DEVICE — INTRODUCER SHEATH FAST-CATH 9FRX12CM 406116

## (undated) DEVICE — GUIDEWIRE TRNSEPT 0.014 X35CM SAFE SE

## (undated) DEVICE — RAD INTRODUCER KIT MICRO 5FRX10CM .018 NITINOL G/W

## (undated) DEVICE — SURGICEL HEMOSTAT 2X14" 1951

## (undated) DEVICE — ESU ELEC BLADE 2.75" COATED/INSULATED E1455

## (undated) DEVICE — SU VICRYL 0 CTX 36" J370H

## (undated) DEVICE — SUCTION CATH AIRLIFE TRI-FLO W/CONTROL PORT 14FR  T60C

## (undated) DEVICE — SPONGE PEANUT

## (undated) DEVICE — MANIFOLD KIT ANGIO AUTOMATED 014613

## (undated) DEVICE — DAVINCI XI DRAPE ARM 470015

## (undated) DEVICE — SU PROLENE 3-0 SHDA 36" 8522H

## (undated) DEVICE — KIT HAND CONTROL ANGIOTOUCH ACIST 65CM AT-P65

## (undated) DEVICE — CANNULA PERFUSION AORTIC ROOT 22FR 20012

## (undated) DEVICE — PREP CHLORAPREP W/ORANGE TINT 10.5ML 260715

## (undated) DEVICE — PATCH CARTO 3 EXTERNAL REFERENCE 3D MAPPING CREFP6

## (undated) DEVICE — PUNCH AORTIC 4.0MMX8" RCB40

## (undated) DEVICE — DEVICE TISSUE STABILIZATION OCTOBASE 28707

## (undated) DEVICE — POSITIONER ASSIST ESSTECH 3S T401210S

## (undated) DEVICE — SUCTION CANISTER MEDIVAC LINER 3000ML W/LID 65651-530

## (undated) DEVICE — PROTECTOR ARM ONE-STEP TRENDELENBURG 40418

## (undated) DEVICE — SOL RINGERS LACTATED 1000ML BAG 2B2324X

## (undated) DEVICE — SU VICRYL 4-0 PS-2 18" UND J496H

## (undated) DEVICE — ESU ELEC BLADE 6" COATED/INSULATED E1455-6

## (undated) DEVICE — DRSG KERLIX 4 1/2"X4YDS ROLL 6715

## (undated) DEVICE — SU PROLENE 4-0 RB-1DA 36" 8557H

## (undated) DEVICE — SU PLEDGET SOFT TFE 3/8"X3/26"X1/16" PCP40

## (undated) DEVICE — INTRO GLIDESHEATH SLENDER 6FR 10X45CM 60-1060

## (undated) DEVICE — SU VICRYL 3-0 FS-1 27" J442H

## (undated) DEVICE — CANNULA PERFUSION ARTERIAL EOPA 18FR 12" 77418

## (undated) DEVICE — LINEN LEG ROLL 5489

## (undated) DEVICE — CABLE MYO/LEAD PACING WHITE DISP 019-530

## (undated) DEVICE — SOL NACL 0.9% IRRIG 1000ML BOTTLE 2F7124

## (undated) DEVICE — COVER TABLE POLY 65X90" 8186

## (undated) DEVICE — CATH EP 6FR 2MM TIP 2-8-2 115C

## (undated) DEVICE — KIT WASH CELL SAVING ATL2001

## (undated) DEVICE — RESERVOIR CELL SAVING BLOOD COLLECTION EL2120

## (undated) DEVICE — TOTE ANGIO CORP PC15AT SAN32CC83O

## (undated) DEVICE — LINEN TOWEL PACK X5 5464

## (undated) DEVICE — DEVICE ASSEMBLY SUCTION/ANTI COAG BTC93

## (undated) DEVICE — PACK TUBING MINI VAC CUSTOM 1/2X3/8T BB9J78R4

## (undated) DEVICE — BLADE KNIFE BEAVER 6900

## (undated) DEVICE — DRAPE STERI FLUOROSCOPE 35X43"1012 LATEX FREE

## (undated) DEVICE — DAVINCI XI OBTURATOR BLADELESS 8MM 470359

## (undated) DEVICE — CANNULA PERFUSION VENOUS 3 STAGE 29FR 15" 91429

## (undated) DEVICE — INTRODUCER SHEATH VASC CATH 8.5FR CARTO GIDE STH MED D138502

## (undated) DEVICE — LUBRICANT INST ELECTROLUBE EL101

## (undated) DEVICE — DAVINCI HOT SHEARS TIP COVER  400180

## (undated) DEVICE — INTRODUCER SHEATH GREEN 6.5FRX11CM .038IN PSI-6F-11-038ACT

## (undated) DEVICE — LINEN ORTHO ACL PACK 5447

## (undated) DEVICE — GLOVE BIOGEL PI MICRO SZ 7.5 48575

## (undated) DEVICE — GLOVE BIOGEL PI MICRO SZ 8.0 48580

## (undated) DEVICE — PACK MINI VAC CUSTOM CARDOPULMONARY BB5Z97R15

## (undated) DEVICE — SU VICRYL 0 CT-2 CR 8X18" J727D

## (undated) DEVICE — SU ETHIBOND 0 CT-1 CR 8X18" CX21D

## (undated) DEVICE — CATH ANGIO JUDKINS JL3.5 6FRX100CM INFINITI 534618T

## (undated) DEVICE — SOMASENSOR CEREBRAL OXIMETER ADULT SAFB-SM

## (undated) DEVICE — NDL TRANSSEPTAL BRK 18GA 71CM BRK CVD 407200

## (undated) DEVICE — DAVINCI XI SEAL UNIVERSAL 5-8MM 470361

## (undated) DEVICE — SU STEEL MYO/WIRE II STERNOTOMY 8 BE-1 3X14" 048-217

## (undated) DEVICE — Device

## (undated) DEVICE — SU PROLENE 4-0 SHDA 36" 8521H

## (undated) DEVICE — TUBE SET SMARKABLATE IRRIGATION

## (undated) DEVICE — SUCTION TIP YANKAUER STR K87

## (undated) DEVICE — SU ETHIBOND 3-0 BBDA 36" X588H

## (undated) DEVICE — SU ETHIBOND 2-0 SHDA 30" X563H

## (undated) DEVICE — SU WND CLOSURE VLOC 90 ABS 3-0 VIOLET 6" CV-23 VLOCM0804

## (undated) DEVICE — CATH ANGIO SUPERTORQUE AL1 6FRX100CM 532-645

## (undated) DEVICE — CATH SOUNDSTAR 8FRX90CM 10439011

## (undated) DEVICE — CONNECTOR DRAIN CHEST Y EXTENSION SET 19909

## (undated) DEVICE — BLOWER/MISTER CLEARVIEW 22150

## (undated) DEVICE — DECANTER BAG 2002S

## (undated) DEVICE — CATH ABLATION 8FR 115CM F-J CURVE BI-DIR QDOT MICRO D139504

## (undated) DEVICE — CATH ANGIO JUDKINS JL5 6FRX100CM INFINITI 534622T

## (undated) DEVICE — BONE WAX OSTENE 3.5GM CT410

## (undated) DEVICE — INTRODUCER SHEATH FAST-CATH SWARTZ 8.5FRX63CM SL1 CVD 406849

## (undated) DEVICE — DRAPE MAYO STAND 23X54 8337

## (undated) DEVICE — SU PROLENE 7-0 BV175-6 24' M8737

## (undated) DEVICE — GLOVE PROTEXIS W/NEU-THERA 7.5  2D73TE75

## (undated) DEVICE — SU PROLENE 6-0 C-1DA 30" M8706

## (undated) DEVICE — WIRE GUIDE 0.035"X260CM SAFE-T-J EXCHANGE G00517

## (undated) DEVICE — SU VICRYL 2-0 CT-1 27" UND J259H

## (undated) DEVICE — CATH EP CATH EP REPRO DAIG RSPN FX CRV DX EP C

## (undated) RX ORDER — LIDOCAINE HYDROCHLORIDE 10 MG/ML
INJECTION, SOLUTION EPIDURAL; INFILTRATION; INTRACAUDAL; PERINEURAL
Status: DISPENSED
Start: 2024-04-05

## (undated) RX ORDER — FENTANYL CITRATE 50 UG/ML
INJECTION, SOLUTION INTRAMUSCULAR; INTRAVENOUS
Status: DISPENSED
Start: 2024-09-20

## (undated) RX ORDER — ASPIRIN 325 MG
TABLET ORAL
Status: DISPENSED
Start: 2019-09-06

## (undated) RX ORDER — VECURONIUM BROMIDE 1 MG/ML
INJECTION, POWDER, LYOPHILIZED, FOR SOLUTION INTRAVENOUS
Status: DISPENSED
Start: 2019-09-25

## (undated) RX ORDER — FENTANYL CITRATE-0.9 % NACL/PF 10 MCG/ML
PLASTIC BAG, INJECTION (ML) INTRAVENOUS
Status: DISPENSED
Start: 2024-04-05

## (undated) RX ORDER — GLYCOPYRROLATE 0.2 MG/ML
INJECTION, SOLUTION INTRAMUSCULAR; INTRAVENOUS
Status: DISPENSED
Start: 2019-09-25

## (undated) RX ORDER — FENTANYL CITRATE 50 UG/ML
INJECTION, SOLUTION INTRAMUSCULAR; INTRAVENOUS
Status: DISPENSED
Start: 2019-09-25

## (undated) RX ORDER — METOPROLOL TARTRATE 50 MG
TABLET ORAL
Status: DISPENSED
Start: 2019-03-26

## (undated) RX ORDER — METOPROLOL TARTRATE 1 MG/ML
INJECTION, SOLUTION INTRAVENOUS
Status: DISPENSED
Start: 2019-03-26

## (undated) RX ORDER — HEPARIN SODIUM 200 [USP'U]/100ML
INJECTION, SOLUTION INTRAVENOUS
Status: DISPENSED
Start: 2024-09-20

## (undated) RX ORDER — HEPARIN SODIUM 1000 [USP'U]/ML
INJECTION, SOLUTION INTRAVENOUS; SUBCUTANEOUS
Status: DISPENSED
Start: 2019-09-06

## (undated) RX ORDER — PROTAMINE SULFATE 10 MG/ML
INJECTION, SOLUTION INTRAVENOUS
Status: DISPENSED
Start: 2019-09-25

## (undated) RX ORDER — PAPAVERINE HYDROCHLORIDE 30 MG/ML
INJECTION INTRAMUSCULAR; INTRAVENOUS
Status: DISPENSED
Start: 2019-09-25

## (undated) RX ORDER — NITROGLYCERIN 0.4 MG/1
TABLET SUBLINGUAL
Status: DISPENSED
Start: 2019-03-26

## (undated) RX ORDER — FENTANYL CITRATE 50 UG/ML
INJECTION, SOLUTION INTRAMUSCULAR; INTRAVENOUS
Status: DISPENSED
Start: 2024-04-05

## (undated) RX ORDER — VERAPAMIL HYDROCHLORIDE 2.5 MG/ML
INJECTION, SOLUTION INTRAVENOUS
Status: DISPENSED
Start: 2019-09-06

## (undated) RX ORDER — LIDOCAINE HYDROCHLORIDE 10 MG/ML
INJECTION, SOLUTION EPIDURAL; INFILTRATION; INTRACAUDAL; PERINEURAL
Status: DISPENSED
Start: 2024-09-20

## (undated) RX ORDER — OXYCODONE HYDROCHLORIDE 5 MG/1
TABLET ORAL
Status: DISPENSED
Start: 2024-04-05

## (undated) RX ORDER — FENTANYL CITRATE 50 UG/ML
INJECTION, SOLUTION INTRAMUSCULAR; INTRAVENOUS
Status: DISPENSED
Start: 2019-09-06

## (undated) RX ORDER — CEFAZOLIN SODIUM 1 G/3ML
INJECTION, POWDER, FOR SOLUTION INTRAMUSCULAR; INTRAVENOUS
Status: DISPENSED
Start: 2019-09-25

## (undated) RX ORDER — HEPARIN SODIUM 200 [USP'U]/100ML
INJECTION, SOLUTION INTRAVENOUS
Status: DISPENSED
Start: 2019-09-06

## (undated) RX ORDER — POTASSIUM CHLORIDE 1500 MG/1
TABLET, EXTENDED RELEASE ORAL
Status: DISPENSED
Start: 2024-05-23

## (undated) RX ORDER — KETOROLAC TROMETHAMINE 30 MG/ML
INJECTION, SOLUTION INTRAMUSCULAR; INTRAVENOUS
Status: DISPENSED
Start: 2024-09-20

## (undated) RX ORDER — FENTANYL CITRATE 0.05 MG/ML
INJECTION, SOLUTION INTRAMUSCULAR; INTRAVENOUS
Status: DISPENSED
Start: 2019-09-25

## (undated) RX ORDER — HEPARIN SODIUM 1000 [USP'U]/ML
INJECTION, SOLUTION INTRAVENOUS; SUBCUTANEOUS
Status: DISPENSED
Start: 2019-09-25

## (undated) RX ORDER — PROPOFOL 10 MG/ML
INJECTION, EMULSION INTRAVENOUS
Status: DISPENSED
Start: 2019-09-25

## (undated) RX ORDER — PROTAMINE SULFATE 10 MG/ML
INJECTION, SOLUTION INTRAVENOUS
Status: DISPENSED
Start: 2024-09-20

## (undated) RX ORDER — BUPIVACAINE HYDROCHLORIDE 5 MG/ML
INJECTION, SOLUTION EPIDURAL; INTRACAUDAL
Status: DISPENSED
Start: 2024-04-05

## (undated) RX ORDER — NEOSTIGMINE METHYLSULFATE 1 MG/ML
VIAL (ML) INJECTION
Status: DISPENSED
Start: 2019-09-25

## (undated) RX ORDER — DEXMEDETOMIDINE HYDROCHLORIDE 4 UG/ML
INJECTION, SOLUTION INTRAVENOUS
Status: DISPENSED
Start: 2024-09-20

## (undated) RX ORDER — VASOPRESSIN 20 U/ML
INJECTION PARENTERAL
Status: DISPENSED
Start: 2019-09-25

## (undated) RX ORDER — ONDANSETRON 2 MG/ML
INJECTION INTRAMUSCULAR; INTRAVENOUS
Status: DISPENSED
Start: 2024-04-05

## (undated) RX ORDER — LIDOCAINE HYDROCHLORIDE 20 MG/ML
INJECTION, SOLUTION EPIDURAL; INFILTRATION; INTRACAUDAL; PERINEURAL
Status: DISPENSED
Start: 2019-09-25

## (undated) RX ORDER — GLYCOPYRROLATE 0.2 MG/ML
INJECTION, SOLUTION INTRAMUSCULAR; INTRAVENOUS
Status: DISPENSED
Start: 2024-04-05

## (undated) RX ORDER — POTASSIUM CHLORIDE 1500 MG/1
TABLET, EXTENDED RELEASE ORAL
Status: DISPENSED
Start: 2019-09-06

## (undated) RX ORDER — DEXAMETHASONE SODIUM PHOSPHATE 4 MG/ML
INJECTION, SOLUTION INTRA-ARTICULAR; INTRALESIONAL; INTRAMUSCULAR; INTRAVENOUS; SOFT TISSUE
Status: DISPENSED
Start: 2024-04-05

## (undated) RX ORDER — MUPIROCIN 20 MG/G
OINTMENT TOPICAL
Status: DISPENSED
Start: 2019-09-25

## (undated) RX ORDER — LIDOCAINE HYDROCHLORIDE 10 MG/ML
INJECTION, SOLUTION EPIDURAL; INFILTRATION; INTRACAUDAL; PERINEURAL
Status: DISPENSED
Start: 2019-09-06

## (undated) RX ORDER — CEFAZOLIN SODIUM 2 G/100ML
INJECTION, SOLUTION INTRAVENOUS
Status: DISPENSED
Start: 2019-09-25

## (undated) RX ORDER — CEFAZOLIN SODIUM/WATER 2 G/20 ML
SYRINGE (ML) INTRAVENOUS
Status: DISPENSED
Start: 2024-04-05

## (undated) RX ORDER — ONDANSETRON 2 MG/ML
INJECTION INTRAMUSCULAR; INTRAVENOUS
Status: DISPENSED
Start: 2019-09-25

## (undated) RX ORDER — HEPARIN SODIUM 1000 [USP'U]/ML
INJECTION, SOLUTION INTRAVENOUS; SUBCUTANEOUS
Status: DISPENSED
Start: 2024-09-20

## (undated) RX ORDER — PROPOFOL 10 MG/ML
INJECTION, EMULSION INTRAVENOUS
Status: DISPENSED
Start: 2024-04-05

## (undated) RX ORDER — BUPIVACAINE HYDROCHLORIDE 5 MG/ML
INJECTION, SOLUTION EPIDURAL; INTRACAUDAL
Status: DISPENSED
Start: 2019-09-25

## (undated) RX ORDER — METOPROLOL TARTRATE 25 MG/1
TABLET, FILM COATED ORAL
Status: DISPENSED
Start: 2019-09-25